# Patient Record
Sex: FEMALE | Race: WHITE
[De-identification: names, ages, dates, MRNs, and addresses within clinical notes are randomized per-mention and may not be internally consistent; named-entity substitution may affect disease eponyms.]

---

## 2017-01-04 ENCOUNTER — APPOINTMENT (OUTPATIENT)
Dept: CARDIOLOGY | Facility: CLINIC | Age: 82
End: 2017-01-04

## 2017-01-25 ENCOUNTER — APPOINTMENT (OUTPATIENT)
Dept: CARDIOLOGY | Facility: CLINIC | Age: 82
End: 2017-01-25

## 2017-02-01 ENCOUNTER — LABORATORY RESULT (OUTPATIENT)
Age: 82
End: 2017-02-01

## 2017-02-01 ENCOUNTER — APPOINTMENT (OUTPATIENT)
Dept: CARDIOLOGY | Facility: CLINIC | Age: 82
End: 2017-02-01

## 2017-02-01 ENCOUNTER — NON-APPOINTMENT (OUTPATIENT)
Age: 82
End: 2017-02-01

## 2017-02-01 VITALS
HEART RATE: 74 BPM | SYSTOLIC BLOOD PRESSURE: 167 MMHG | DIASTOLIC BLOOD PRESSURE: 79 MMHG | HEIGHT: 60 IN | WEIGHT: 155 LBS | BODY MASS INDEX: 30.43 KG/M2 | OXYGEN SATURATION: 96 %

## 2017-02-01 DIAGNOSIS — R07.89 OTHER CHEST PAIN: ICD-10-CM

## 2017-02-01 DIAGNOSIS — R05 COUGH: ICD-10-CM

## 2017-02-01 DIAGNOSIS — Z86.2 PERSONAL HISTORY OF DISEASES OF THE BLOOD AND BLOOD-FORMING ORGANS AND CERTAIN DISORDERS INVOLVING THE IMMUNE MECHANISM: ICD-10-CM

## 2017-02-01 DIAGNOSIS — G89.29 PAIN IN RIGHT KNEE: ICD-10-CM

## 2017-02-01 DIAGNOSIS — Z96.651 PRESENCE OF RIGHT ARTIFICIAL KNEE JOINT: ICD-10-CM

## 2017-02-01 DIAGNOSIS — M25.561 PAIN IN RIGHT KNEE: ICD-10-CM

## 2017-02-01 DIAGNOSIS — M62.81 MUSCLE WEAKNESS (GENERALIZED): ICD-10-CM

## 2017-02-01 DIAGNOSIS — Z87.39 PERSONAL HISTORY OF OTHER DISEASES OF THE MUSCULOSKELETAL SYSTEM AND CONNECTIVE TISSUE: ICD-10-CM

## 2017-02-01 RX ORDER — IRON/IRON ASP GLY/FA/MV-MIN 38 125-25-1MG
TABLET ORAL TWICE DAILY
Refills: 0 | Status: ACTIVE | COMMUNITY

## 2017-02-03 LAB
25(OH)D3 SERPL-MCNC: 22.2 NG/ML
ALBUMIN SERPL ELPH-MCNC: 4.5 G/DL
ALP BLD-CCNC: 67 U/L
ALT SERPL-CCNC: 17 U/L
ANION GAP SERPL CALC-SCNC: 14 MMOL/L
AST SERPL-CCNC: 18 U/L
BASOPHILS # BLD AUTO: 0.26 K/UL
BASOPHILS NFR BLD AUTO: 4 %
BILIRUB SERPL-MCNC: 0.2 MG/DL
BUN SERPL-MCNC: 17 MG/DL
CALCIUM SERPL-MCNC: 9.8 MG/DL
CHLORIDE SERPL-SCNC: 106 MMOL/L
CHOLEST SERPL-MCNC: 208 MG/DL
CHOLEST/HDLC SERPL: 4.2 RATIO
CK SERPL-CCNC: 46 U/L
CO2 SERPL-SCNC: 22 MMOL/L
CREAT SERPL-MCNC: 0.81 MG/DL
EOSINOPHIL # BLD AUTO: 0.32 K/UL
EOSINOPHIL NFR BLD AUTO: 5 %
GLUCOSE SERPL-MCNC: 107 MG/DL
HBA1C MFR BLD HPLC: 5.4 %
HCT VFR BLD CALC: 41.5 %
HDLC SERPL-MCNC: 49 MG/DL
HGB BLD-MCNC: 12.6 G/DL
LDLC SERPL CALC-MCNC: 135 MG/DL
LDLC SERPL DIRECT ASSAY-MCNC: 134 MG/DL
LYMPHOCYTES # BLD AUTO: 2.32 K/UL
LYMPHOCYTES NFR BLD AUTO: 36 %
MAN DIFF?: NORMAL
MCHC RBC-ENTMCNC: 28.8 PG
MCHC RBC-ENTMCNC: 30.4 GM/DL
MCV RBC AUTO: 95 FL
MONOCYTES # BLD AUTO: 0.65 K/UL
MONOCYTES NFR BLD AUTO: 10 %
NEUTROPHILS # BLD AUTO: 2.9 K/UL
NEUTROPHILS NFR BLD AUTO: 45 %
PLATELET # BLD AUTO: 303 K/UL
POTASSIUM SERPL-SCNC: 4.6 MMOL/L
PROT SERPL-MCNC: 6.5 G/DL
RBC # BLD: 4.37 M/UL
RBC # FLD: 14.7 %
SODIUM SERPL-SCNC: 142 MMOL/L
TRIGL SERPL-MCNC: 121 MG/DL
TSH SERPL-ACNC: 3.1 UIU/ML
WBC # FLD AUTO: 6.45 K/UL

## 2017-02-22 ENCOUNTER — MEDICATION RENEWAL (OUTPATIENT)
Age: 82
End: 2017-02-22

## 2017-02-24 ENCOUNTER — RECORD ABSTRACTING (OUTPATIENT)
Age: 82
End: 2017-02-24

## 2017-03-01 ENCOUNTER — APPOINTMENT (OUTPATIENT)
Dept: CARDIOLOGY | Facility: CLINIC | Age: 82
End: 2017-03-01

## 2017-03-02 ENCOUNTER — MEDICATION RENEWAL (OUTPATIENT)
Age: 82
End: 2017-03-02

## 2017-03-06 ENCOUNTER — APPOINTMENT (OUTPATIENT)
Dept: ELECTROPHYSIOLOGY | Facility: CLINIC | Age: 82
End: 2017-03-06

## 2017-04-10 ENCOUNTER — EMERGENCY (EMERGENCY)
Facility: HOSPITAL | Age: 82
LOS: 1 days | Discharge: ANOTHER HOSPITAL | End: 2017-04-10
Attending: EMERGENCY MEDICINE | Admitting: EMERGENCY MEDICINE
Payer: MEDICARE

## 2017-04-10 ENCOUNTER — TRANSCRIPTION ENCOUNTER (OUTPATIENT)
Age: 82
End: 2017-04-10

## 2017-04-10 ENCOUNTER — INPATIENT (INPATIENT)
Facility: HOSPITAL | Age: 82
LOS: 2 days | Discharge: HOME | DRG: 251 | End: 2017-04-13
Attending: INTERNAL MEDICINE | Admitting: INTERNAL MEDICINE
Payer: MEDICARE

## 2017-04-10 VITALS
WEIGHT: 149.91 LBS | HEIGHT: 60 IN | TEMPERATURE: 98 F | OXYGEN SATURATION: 100 % | SYSTOLIC BLOOD PRESSURE: 175 MMHG | RESPIRATION RATE: 20 BRPM | DIASTOLIC BLOOD PRESSURE: 47 MMHG | HEART RATE: 76 BPM

## 2017-04-10 VITALS
OXYGEN SATURATION: 97 % | HEART RATE: 80 BPM | DIASTOLIC BLOOD PRESSURE: 74 MMHG | RESPIRATION RATE: 14 BRPM | SYSTOLIC BLOOD PRESSURE: 158 MMHG | TEMPERATURE: 97 F | WEIGHT: 149.91 LBS

## 2017-04-10 VITALS — DIASTOLIC BLOOD PRESSURE: 67 MMHG | SYSTOLIC BLOOD PRESSURE: 160 MMHG | TEMPERATURE: 99 F | HEART RATE: 76 BPM

## 2017-04-10 DIAGNOSIS — E03.9 HYPOTHYROIDISM, UNSPECIFIED: ICD-10-CM

## 2017-04-10 DIAGNOSIS — Z95.0 PRESENCE OF CARDIAC PACEMAKER: Chronic | ICD-10-CM

## 2017-04-10 DIAGNOSIS — R07.89 OTHER CHEST PAIN: ICD-10-CM

## 2017-04-10 DIAGNOSIS — E78.5 HYPERLIPIDEMIA, UNSPECIFIED: ICD-10-CM

## 2017-04-10 DIAGNOSIS — Z95.5 PRESENCE OF CORONARY ANGIOPLASTY IMPLANT AND GRAFT: Chronic | ICD-10-CM

## 2017-04-10 DIAGNOSIS — Z90.710 ACQUIRED ABSENCE OF BOTH CERVIX AND UTERUS: Chronic | ICD-10-CM

## 2017-04-10 DIAGNOSIS — H26.9 UNSPECIFIED CATARACT: Chronic | ICD-10-CM

## 2017-04-10 DIAGNOSIS — Z96.659 PRESENCE OF UNSPECIFIED ARTIFICIAL KNEE JOINT: Chronic | ICD-10-CM

## 2017-04-10 DIAGNOSIS — Z98.89 OTHER SPECIFIED POSTPROCEDURAL STATES: Chronic | ICD-10-CM

## 2017-04-10 DIAGNOSIS — I10 ESSENTIAL (PRIMARY) HYPERTENSION: ICD-10-CM

## 2017-04-10 DIAGNOSIS — Z79.82 LONG TERM (CURRENT) USE OF ASPIRIN: ICD-10-CM

## 2017-04-10 DIAGNOSIS — G56.01 CARPAL TUNNEL SYNDROME, RIGHT UPPER LIMB: Chronic | ICD-10-CM

## 2017-04-10 DIAGNOSIS — I25.10 ATHEROSCLEROTIC HEART DISEASE OF NATIVE CORONARY ARTERY WITHOUT ANGINA PECTORIS: ICD-10-CM

## 2017-04-10 DIAGNOSIS — Z95.5 PRESENCE OF CORONARY ANGIOPLASTY IMPLANT AND GRAFT: ICD-10-CM

## 2017-04-10 DIAGNOSIS — Z98.82 BREAST IMPLANT STATUS: Chronic | ICD-10-CM

## 2017-04-10 DIAGNOSIS — Z90.710 ACQUIRED ABSENCE OF BOTH CERVIX AND UTERUS: ICD-10-CM

## 2017-04-10 LAB
ALBUMIN SERPL ELPH-MCNC: 3.7 G/DL — SIGNIFICANT CHANGE UP (ref 3.3–5)
ALP SERPL-CCNC: 67 U/L — SIGNIFICANT CHANGE UP (ref 40–120)
ALT FLD-CCNC: 24 U/L — SIGNIFICANT CHANGE UP (ref 12–78)
ANION GAP SERPL CALC-SCNC: 9 MMOL/L — SIGNIFICANT CHANGE UP (ref 5–17)
AST SERPL-CCNC: 18 U/L — SIGNIFICANT CHANGE UP (ref 15–37)
BASOPHILS # BLD AUTO: 0.1 K/UL — SIGNIFICANT CHANGE UP (ref 0–0.2)
BASOPHILS NFR BLD AUTO: 1 % — SIGNIFICANT CHANGE UP (ref 0–2)
BILIRUB SERPL-MCNC: 0.2 MG/DL — SIGNIFICANT CHANGE UP (ref 0.2–1.2)
BUN SERPL-MCNC: 17 MG/DL — SIGNIFICANT CHANGE UP (ref 7–23)
CALCIUM SERPL-MCNC: 9 MG/DL — SIGNIFICANT CHANGE UP (ref 8.5–10.1)
CHLORIDE SERPL-SCNC: 109 MMOL/L — HIGH (ref 96–108)
CK SERPL-CCNC: 49 U/L — SIGNIFICANT CHANGE UP (ref 26–192)
CO2 SERPL-SCNC: 26 MMOL/L — SIGNIFICANT CHANGE UP (ref 22–31)
CREAT SERPL-MCNC: 0.89 MG/DL — SIGNIFICANT CHANGE UP (ref 0.5–1.3)
EOSINOPHIL # BLD AUTO: 0.1 K/UL — SIGNIFICANT CHANGE UP (ref 0–0.5)
EOSINOPHIL NFR BLD AUTO: 1.4 % — SIGNIFICANT CHANGE UP (ref 0–6)
GLUCOSE SERPL-MCNC: 152 MG/DL — HIGH (ref 70–99)
HCT VFR BLD CALC: 44.3 % — SIGNIFICANT CHANGE UP (ref 34.5–45)
HGB BLD-MCNC: 14.2 G/DL — SIGNIFICANT CHANGE UP (ref 11.5–15.5)
INR BLD: 1.07 RATIO — SIGNIFICANT CHANGE UP (ref 0.88–1.16)
LYMPHOCYTES # BLD AUTO: 1.9 K/UL — SIGNIFICANT CHANGE UP (ref 1–3.3)
LYMPHOCYTES # BLD AUTO: 24 % — SIGNIFICANT CHANGE UP (ref 13–44)
MCHC RBC-ENTMCNC: 29.2 PG — SIGNIFICANT CHANGE UP (ref 27–34)
MCHC RBC-ENTMCNC: 32 GM/DL — SIGNIFICANT CHANGE UP (ref 32–36)
MCV RBC AUTO: 91 FL — SIGNIFICANT CHANGE UP (ref 80–100)
MONOCYTES # BLD AUTO: 0.5 K/UL — SIGNIFICANT CHANGE UP (ref 0–0.9)
MONOCYTES NFR BLD AUTO: 6.6 % — SIGNIFICANT CHANGE UP (ref 1–9)
NEUTROPHILS # BLD AUTO: 5.4 K/UL — SIGNIFICANT CHANGE UP (ref 1.8–7.4)
NEUTROPHILS NFR BLD AUTO: 67 % — SIGNIFICANT CHANGE UP (ref 43–77)
NT-PROBNP SERPL-SCNC: 705 PG/ML — HIGH (ref 0–450)
PLATELET # BLD AUTO: 314 K/UL — SIGNIFICANT CHANGE UP (ref 150–400)
POTASSIUM SERPL-MCNC: 4.3 MMOL/L — SIGNIFICANT CHANGE UP (ref 3.5–5.3)
POTASSIUM SERPL-SCNC: 4.3 MMOL/L — SIGNIFICANT CHANGE UP (ref 3.5–5.3)
PROT SERPL-MCNC: 6.6 G/DL — SIGNIFICANT CHANGE UP (ref 6–8.3)
PROTHROM AB SERPL-ACNC: 11.7 SEC — SIGNIFICANT CHANGE UP (ref 9.8–12.7)
RBC # BLD: 4.87 M/UL — SIGNIFICANT CHANGE UP (ref 3.8–5.2)
RBC # FLD: 13 % — SIGNIFICANT CHANGE UP (ref 10.3–14.5)
SODIUM SERPL-SCNC: 144 MMOL/L — SIGNIFICANT CHANGE UP (ref 135–145)
TROPONIN I SERPL-MCNC: <.015 NG/ML — SIGNIFICANT CHANGE UP (ref 0.01–0.04)
WBC # BLD: 8.1 K/UL — SIGNIFICANT CHANGE UP (ref 3.8–10.5)
WBC # FLD AUTO: 8.1 K/UL — SIGNIFICANT CHANGE UP (ref 3.8–10.5)

## 2017-04-10 PROCEDURE — 85610 PROTHROMBIN TIME: CPT

## 2017-04-10 PROCEDURE — 93010 ELECTROCARDIOGRAM REPORT: CPT

## 2017-04-10 PROCEDURE — 99285 EMERGENCY DEPT VISIT HI MDM: CPT | Mod: 25

## 2017-04-10 PROCEDURE — 93458 L HRT ARTERY/VENTRICLE ANGIO: CPT | Mod: 26,GC

## 2017-04-10 PROCEDURE — 99285 EMERGENCY DEPT VISIT HI MDM: CPT

## 2017-04-10 PROCEDURE — 96372 THER/PROPH/DIAG INJ SC/IM: CPT

## 2017-04-10 PROCEDURE — 71045 X-RAY EXAM CHEST 1 VIEW: CPT

## 2017-04-10 PROCEDURE — 83880 ASSAY OF NATRIURETIC PEPTIDE: CPT

## 2017-04-10 PROCEDURE — 80053 COMPREHEN METABOLIC PANEL: CPT

## 2017-04-10 PROCEDURE — 82550 ASSAY OF CK (CPK): CPT

## 2017-04-10 PROCEDURE — 93005 ELECTROCARDIOGRAM TRACING: CPT

## 2017-04-10 PROCEDURE — 85027 COMPLETE CBC AUTOMATED: CPT

## 2017-04-10 PROCEDURE — 71010: CPT | Mod: 26

## 2017-04-10 PROCEDURE — 84484 ASSAY OF TROPONIN QUANT: CPT

## 2017-04-10 RX ORDER — AMLODIPINE BESYLATE 2.5 MG/1
5 TABLET ORAL DAILY
Qty: 0 | Refills: 0 | Status: DISCONTINUED | OUTPATIENT
Start: 2017-04-10 | End: 2017-04-13

## 2017-04-10 RX ORDER — CLOPIDOGREL BISULFATE 75 MG/1
600 TABLET, FILM COATED ORAL ONCE
Qty: 0 | Refills: 0 | Status: COMPLETED | OUTPATIENT
Start: 2017-04-10 | End: 2017-04-10

## 2017-04-10 RX ORDER — ENOXAPARIN SODIUM 100 MG/ML
70 INJECTION SUBCUTANEOUS ONCE
Qty: 0 | Refills: 0 | Status: COMPLETED | OUTPATIENT
Start: 2017-04-10 | End: 2017-04-10

## 2017-04-10 RX ORDER — CLOPIDOGREL BISULFATE 75 MG/1
75 TABLET, FILM COATED ORAL DAILY
Qty: 0 | Refills: 0 | Status: DISCONTINUED | OUTPATIENT
Start: 2017-04-10 | End: 2017-04-13

## 2017-04-10 RX ORDER — ASPIRIN/CALCIUM CARB/MAGNESIUM 324 MG
81 TABLET ORAL DAILY
Qty: 0 | Refills: 0 | Status: DISCONTINUED | OUTPATIENT
Start: 2017-04-10 | End: 2017-04-13

## 2017-04-10 RX ORDER — ASPIRIN/CALCIUM CARB/MAGNESIUM 324 MG
325 TABLET ORAL ONCE
Qty: 0 | Refills: 0 | Status: COMPLETED | OUTPATIENT
Start: 2017-04-10 | End: 2017-04-10

## 2017-04-10 RX ORDER — METOPROLOL TARTRATE 50 MG
200 TABLET ORAL DAILY
Qty: 0 | Refills: 0 | Status: DISCONTINUED | OUTPATIENT
Start: 2017-04-10 | End: 2017-04-13

## 2017-04-10 RX ORDER — SIMVASTATIN 20 MG/1
20 TABLET, FILM COATED ORAL AT BEDTIME
Qty: 0 | Refills: 0 | Status: DISCONTINUED | OUTPATIENT
Start: 2017-04-10 | End: 2017-04-13

## 2017-04-10 RX ORDER — PANTOPRAZOLE SODIUM 20 MG/1
40 TABLET, DELAYED RELEASE ORAL
Qty: 0 | Refills: 0 | Status: DISCONTINUED | OUTPATIENT
Start: 2017-04-10 | End: 2017-04-13

## 2017-04-10 RX ADMIN — Medication 325 MILLIGRAM(S): at 15:14

## 2017-04-10 RX ADMIN — SIMVASTATIN 20 MILLIGRAM(S): 20 TABLET, FILM COATED ORAL at 21:39

## 2017-04-10 RX ADMIN — CLOPIDOGREL BISULFATE 600 MILLIGRAM(S): 75 TABLET, FILM COATED ORAL at 15:18

## 2017-04-10 RX ADMIN — ENOXAPARIN SODIUM 70 MILLIGRAM(S): 100 INJECTION SUBCUTANEOUS at 15:18

## 2017-04-10 NOTE — CONSULT NOTE ADULT - SUBJECTIVE AND OBJECTIVE BOX
CHIEF COMPLAINT: Patient is a 83y old  Female who presents with a chief complaint of chest pain.    HPI:  This is an 83 year old woman with a history of known CAD s/p PCI to the LAD and LCx with BJORN, last in 2014, PVD s/p LE PCI, hypertension, hyperlipidemia, carotid artery disease who presents to the ED with complaints of chest pain.  She reports that on Wednesday she began to notice a burning chest discomfort that radiated up and down the center of her chest, beginning in the epigastric region.  Over the weekend, she had exertional chest pressure while walking to her car from [employer].  She used ntg, and this relieved the pain.  Today, she was a a bible class and had chest discomfort with less exertion that was also relieved by ntg. She was near syncopal as well.  She has had profound fatigue over the past few days as well.  She has not been 100% compliant with her aspirin.  She has a history of anemia, but has not had any recent abnormal bleeding.  She denies dyspnea, PND, orthopnea, LE swelling or syncope.       PAST MEDICAL & SURGICAL HISTORY:  High cholesterol  Stress incontinence in female  Rectocele  Diverticulosis  Hiatal hernia  Hypertension  Hypothyroidism  Dyslipidemia  CAD (coronary artery disease)  Stented coronary artery: 12 heart stents,   1 left leg stents  H/O carotid endarterectomy: right  History of hysterectomy  History of breast reconstruction  Bilateral cataracts: surgery  Status post total knee replacement: right      SOCIAL HISTORY:  No tobacco, ethanol, or drug abuse.    FAMILY HISTORY:  Positive family history of CAD    MEDICATIONS:  Aspirin 81, Plavix 75, Amlodipine/benazapril 5/10, lovastatin 40, Toprol 200 QD.      Allergies:      No Known Drug Allergies  Originally Entered as [Unknown see Comment: pt unable to remember] reaction to [Mold] (Unknown)  stolazine eye med- pt doesn&#x27;t remember (Unknown)  Wheat (Other (Unknown))        REVIEW OF SYSTEMS:    CONSTITUTIONAL: Weakness and fatigue  EYES/ENT: No visual changes;  No vertigo or throat pain   NECK: No pain or stiffness  RESPIRATORY: No cough, wheezing, hemoptysis; No shortness of breath  CARDIOVASCULAR: See HPI  GASTROINTESTINAL: No abdominal pain. No nausea, vomiting, or hematemesis; No diarrhea or constipation. No melena or hematochezia.  GENITOURINARY: No dysuria, frequency or hematuria  NEUROLOGICAL: No numbness or weakness  SKIN: No itching or rash  All other review of systems is negative unless indicated above    VITAL SIGNS:   Vital Signs Last 24 Hrs  T(C): 36.7, Max: 36.7 (04-10 @ 13:10)  T(F): 98, Max: 98 (04-10 @ 13:10)  HR: 77 (77 - 80)  BP: 155/70 (155/70 - 158/74)  BP(mean): --  RR: 18 (14 - 18)  SpO2: 99% (97% - 99%)      On Exam:    Constitutional: NAD, alert and oriented x 3  Lungs:  Non-labored, breath sounds are clear bilaterally, No wheezing, rales or rhonchi  Cardiovascular: RRR.  S1 and S2 positive.  2/6 systolic murmur.    Gastrointestinal: Bowel Sounds present, soft, nontender.   Lymph: No peripheral edema. No cervical lymphadenopathy.  Neurological: Alert, no focal deficits  Skin: No rashes or ulcers   Psych:  Mood & affect appropriate.    LABS: All Labs Reviewed:                        14.2   8.1   )-----------( 314      ( 10 Apr 2017 13:28 )             44.3     10 Apr 2017 13:28    144    |  109    |  17     ----------------------------<  152    4.3     |  26     |  0.89     Ca    9.0        10 Apr 2017 13:28    TPro  6.6    /  Alb  3.7    /  TBili  0.2    /  DBili  x      /  AST  18     /  ALT  24     /  AlkPhos  67     10 Apr 2017 13:28    PT/INR - ( 10 Apr 2017 13:28 )   PT: 11.7 sec;   INR: 1.07 ratio           CARDIAC MARKERS ( 10 Apr 2017 13:28 )  <.015 ng/mL / x     / 49 U/L / x     / x          Blood Culture:   04-10 @ 13:28  Pro Bnp 705    EKG:  Normal sinus rhythm with no acute ST segment or T wave changes    Assessment/Plan:  83y Female with above history with unstable angina.  Patient is going to get Aspirin 325, Plavix 600, and lovenox 1mg/kg stat.  She is going to be transferred to [facility] for coronary angiography, hopefully later this evening.  She will follow with Dr. Welsh in the office post procedure.

## 2017-04-10 NOTE — ED PROVIDER NOTE - PROGRESS NOTE DETAILS
called Dr. Welsh, Dr. Palla covering, asking for Dr. Woo group to consult. Seen by Dr. Santos, to transfer to [facility], accepting Dr. Humphrey to cath lab, patient to get aspirin, lovenox, plavix

## 2017-04-10 NOTE — ED PROVIDER NOTE - OBJECTIVE STATEMENT
83 female presents to ER c/o left sided chest pressure/discomfort, comes and goes, worse with exertion, gets better with nitro patch for the past week, yesterday felt light headed and dizziness, near syncope.

## 2017-04-10 NOTE — H&P CARDIOLOGY - PSH
Bilateral cataracts  surgery  Cardiac pacemaker    Carpal tunnel syndrome of right wrist    H/O carotid endarterectomy  right  History of breast reconstruction    History of hysterectomy    Status post total knee replacement  right  Stented coronary artery  12 heart stents,   1 left leg stents

## 2017-04-10 NOTE — ED ADULT NURSE NOTE - PSH
Bilateral cataracts  surgery  H/O carotid endarterectomy  right  History of breast reconstruction    History of hysterectomy    Status post total knee replacement  right  Stented coronary artery  12 heart stents,   1 left leg stents

## 2017-04-10 NOTE — ED ADULT NURSE NOTE - PMH
CAD (coronary artery disease)    Diverticulosis    Dyslipidemia    Hiatal hernia    High cholesterol    Hypertension    Hypothyroidism    Rectocele    Stress incontinence in female

## 2017-04-10 NOTE — H&P CARDIOLOGY - HISTORY OF PRESENT ILLNESS
83 yo female with pmhx of htn, CAD, arthritis, diverticulosis, hypothyroidism, with pacemaker and cardiac stents, presented to [facility] with sudden onset of non radiating chest pressure at rest & exertion relieved with nitro patch, trop neg, seen & evaluated by Dr subramanian & transferred to [place] for cardiac cath.

## 2017-04-11 LAB
ANION GAP SERPL CALC-SCNC: 16 MMOL/L — SIGNIFICANT CHANGE UP (ref 5–17)
BUN SERPL-MCNC: 25 MG/DL — HIGH (ref 7–23)
CALCIUM SERPL-MCNC: 9.3 MG/DL — SIGNIFICANT CHANGE UP (ref 8.4–10.5)
CHLORIDE SERPL-SCNC: 104 MMOL/L — SIGNIFICANT CHANGE UP (ref 96–108)
CO2 SERPL-SCNC: 22 MMOL/L — SIGNIFICANT CHANGE UP (ref 22–31)
CREAT SERPL-MCNC: 0.8 MG/DL — SIGNIFICANT CHANGE UP (ref 0.5–1.3)
GLUCOSE SERPL-MCNC: 112 MG/DL — HIGH (ref 70–99)
HBA1C BLD-MCNC: 5.8 % — HIGH (ref 4–5.6)
HCT VFR BLD CALC: 37.8 % — SIGNIFICANT CHANGE UP (ref 34.5–45)
HGB BLD-MCNC: 12.9 G/DL — SIGNIFICANT CHANGE UP (ref 11.5–15.5)
MCHC RBC-ENTMCNC: 30.1 PG — SIGNIFICANT CHANGE UP (ref 27–34)
MCHC RBC-ENTMCNC: 34.1 GM/DL — SIGNIFICANT CHANGE UP (ref 32–36)
MCV RBC AUTO: 88.3 FL — SIGNIFICANT CHANGE UP (ref 80–100)
PLATELET # BLD AUTO: 240 K/UL — SIGNIFICANT CHANGE UP (ref 150–400)
POTASSIUM SERPL-MCNC: 4 MMOL/L — SIGNIFICANT CHANGE UP (ref 3.5–5.3)
POTASSIUM SERPL-SCNC: 4 MMOL/L — SIGNIFICANT CHANGE UP (ref 3.5–5.3)
RBC # BLD: 4.28 M/UL — SIGNIFICANT CHANGE UP (ref 3.8–5.2)
RBC # FLD: 12.7 % — SIGNIFICANT CHANGE UP (ref 10.3–14.5)
SODIUM SERPL-SCNC: 142 MMOL/L — SIGNIFICANT CHANGE UP (ref 135–145)
WBC # BLD: 10.2 K/UL — SIGNIFICANT CHANGE UP (ref 3.8–10.5)
WBC # FLD AUTO: 10.2 K/UL — SIGNIFICANT CHANGE UP (ref 3.8–10.5)

## 2017-04-11 PROCEDURE — 99223 1ST HOSP IP/OBS HIGH 75: CPT

## 2017-04-11 PROCEDURE — 92924 PRQ TRLUML C ATHRC 1 ART&/BR: CPT | Mod: LD,GC

## 2017-04-11 PROCEDURE — 93010 ELECTROCARDIOGRAM REPORT: CPT

## 2017-04-11 RX ORDER — SIMVASTATIN 20 MG/1
1 TABLET, FILM COATED ORAL
Qty: 30 | Refills: 0 | OUTPATIENT
Start: 2017-04-11 | End: 2017-05-11

## 2017-04-11 RX ADMIN — Medication 81 MILLIGRAM(S): at 05:00

## 2017-04-11 RX ADMIN — AMLODIPINE BESYLATE 5 MILLIGRAM(S): 2.5 TABLET ORAL at 05:00

## 2017-04-11 RX ADMIN — CLOPIDOGREL BISULFATE 75 MILLIGRAM(S): 75 TABLET, FILM COATED ORAL at 05:00

## 2017-04-11 RX ADMIN — Medication 200 MILLIGRAM(S): at 05:00

## 2017-04-11 RX ADMIN — Medication 1 APPLICATION(S): at 13:11

## 2017-04-11 RX ADMIN — SIMVASTATIN 20 MILLIGRAM(S): 20 TABLET, FILM COATED ORAL at 21:25

## 2017-04-11 RX ADMIN — PANTOPRAZOLE SODIUM 40 MILLIGRAM(S): 20 TABLET, DELAYED RELEASE ORAL at 05:00

## 2017-04-11 NOTE — DISCHARGE NOTE ADULT - CARE PROVIDERS DIRECT ADDRESSES
[email or web address]

## 2017-04-11 NOTE — DISCHARGE NOTE ADULT - CARE PROVIDER_API CALL
Froylan Monsalve), Internal Medicine  [address] [place] [address]  Phone: [phone]  Fax: [phone]

## 2017-04-11 NOTE — DISCHARGE NOTE ADULT - HOSPITAL COURSE
83 yo female with pmhx of htn, CAD, arthritis, diverticulosis, hypothyroidism, with pacemaker and cardiac stents, presented to [facility] with sudden onset of non radiating chest pressure at rest & exertion relieved with nitro patch, trop neg, seen & evaluated by Dr subramanian & transferred to [place] for cardiac cath. 81 yo female with pmhx of htn, CAD, arthritis, diverticulosis, hypothyroidism, with pacemaker and cardiac stents, presented to [facility] with sudden onset of non radiating chest pressure at rest & exertion relieved with nitro patch, trop neg, seen & evaluated by Dr subramanian & transferred to [place] for cardiac cath.  Pt s/p diagnostic cath on 4/10, staged PCI: Laser and POBA to OM via R radial on 4/11, and staged PCI: 81 yo female with pmhx of htn, CAD, arthritis, diverticulosis, hypothyroidism, with pacemaker and cardiac stents, presented to [facility] with sudden onset of non radiating chest pressure at rest & exertion relieved with nitro patch, trop neg, seen & evaluated by Dr subramanian & transferred to [place] for cardiac cath.  Pt s/p diagnostic cath on 4/10, staged PCI: Laser and POBA to OM via R radial on 4/11, and staged PCI: BJORN x1 to OM1 via R radial on 4/12/17. Pt tolerated procedure well with no post complications and hospitalization remained uneventful. Pt is hemodynamically stable and insertion/incision site benign. No c/o chest pain or SOB. Discharge teaching provided to Pt/caretaker and verbalized understanding the instruction. Pt is stable for discharge home as per attending.

## 2017-04-11 NOTE — DISCHARGE NOTE ADULT - PATIENT PORTAL LINK FT
“You can access the FollowHealth Patient Portal, offered by [facility], by registering with the following website: [email or web address]”

## 2017-04-11 NOTE — DISCHARGE NOTE ADULT - CARE PLAN
Principal Discharge DX:	Coronary artery disease due to lipid rich plaque  Goal:	Pt remains chest pain free and understands post cath discharge instructions  Instructions for follow-up, activity and diet:	Do not stop you Aspirin or Plavix unless instructed to do so by your cardiologist.   No heavy lifting or pushing/pulling with procedure arm for 2 weeks. No driving for 2 days. You may shower 24 hours following the procedure but avoid baths/swimming for 1 week. Check your wrist site for bleeding and/or swelling daily following procedure and call your doctor immediately if it occurs or if you experience increased pain at the site. Follow up with your cardiologist in 1-2 weeks. You may call [place] [facility] if you have any questions/concerns regarding your procedure [phone].   Lifestyle modification: include healthy habits to prevent stroke and future CAD  Low salt, low fat diet.   Weight management.   Take medications as prescribed.    No smoking.  Follow up with your cardiologist or primary doctor in 1- 2 weeks.  Secondary Diagnosis:	Dyslipidemia  Goal:	Your LDL cholesterol will be less than 70mg/dL  Instructions for follow-up, activity and diet:	Continue with your cholesterol medications. Eat a heart healthy diet that is low in saturated fats and salt, and includes whole grains, fruits, vegetables and lean protein; exercise regularly (consult with your physician or cardiologist first); maintain a heart healthy weight; if you smoke - quit (A resource to help you stop smoking is the [facility] for Tobacco Control – phone number 516-716-0980.). Continue to follow with your primary physician or cardiologist. Principal Discharge DX:	Coronary artery disease due to lipid rich plaque  Goal:	Pt remains chest pain free and understands post cath discharge instructions  Instructions for follow-up, activity and diet:	Do not stop you Aspirin or Plavix unless instructed to do so by your cardiologist.   No heavy lifting or pushing/pulling with procedure arm for 2 weeks. No driving for 2 days. You may shower 24 hours following the procedure but avoid baths/swimming for 1 week. Check your wrist site for bleeding and/or swelling daily following procedure and call your doctor immediately if it occurs or if you experience increased pain at the site. Follow up with your cardiologist in 1-2 weeks. You may call [place] [facility] if you have any questions/concerns regarding your procedure [phone].   Lifestyle modification: include healthy habits to prevent stroke and future CAD  Low salt, low fat diet.   Weight management.   Take medications as prescribed.    No smoking.  Follow up with your cardiologist or primary doctor in 1- 2 weeks.  Secondary Diagnosis:	Dyslipidemia  Goal:	Your LDL cholesterol will be less than 70mg/dL  Instructions for follow-up, activity and diet:	Continue with your cholesterol medications. Eat a heart healthy diet that is low in saturated fats and salt, and includes whole grains, fruits, vegetables and lean protein; exercise regularly (consult with your physician or cardiologist first); maintain a heart healthy weight; if you smoke - quit (A resource to help you stop smoking is the [facility] for Tobacco Control – phone number [phone].). Continue to follow with your primary physician or cardiologist. Principal Discharge DX:	Coronary artery disease due to lipid rich plaque  Goal:	Pt remains chest pain free and understands post cath discharge instructions  Instructions for follow-up, activity and diet:	Do not stop you Aspirin or Plavix unless instructed to do so by your cardiologist.   No heavy lifting or pushing/pulling with procedure arm for 2 weeks. No driving for 2 days. You may shower 24 hours following the procedure but avoid baths/swimming for 1 week. Check your wrist site for bleeding and/or swelling daily following procedure and call your doctor immediately if it occurs or if you experience increased pain at the site. Follow up with your cardiologist in 1-2 weeks. You may call [place] [facility] if you have any questions/concerns regarding your procedure [phone].   Lifestyle modification: include healthy habits to prevent stroke and future CAD  Low salt, low fat diet.   Weight management.   Take medications as prescribed.    No smoking.  Follow up with your cardiologist or primary doctor in 1- 2 weeks.  Secondary Diagnosis:	Dyslipidemia  Goal:	Your LDL cholesterol will be less than 70mg/dL  Instructions for follow-up, activity and diet:	Continue with your cholesterol medications. Eat a heart healthy diet that is low in saturated fats and salt, and includes whole grains, fruits, vegetables and lean protein; exercise regularly (consult with your physician or cardiologist first); maintain a heart healthy weight; if you smoke - quit (A resource to help you stop smoking is the [facility] for Tobacco Control – phone number 308-468-4848.). Continue to follow with your primary physician or cardiologist. Principal Discharge DX:	Coronary artery disease due to lipid rich plaque  Goal:	Pt remains chest pain free and understands post cath discharge instructions  Instructions for follow-up, activity and diet:	Do not stop you Aspirin or Plavix unless instructed to do so by your cardiologist.   No heavy lifting or pushing/pulling with procedure arm for 2 weeks. No driving for 2 days. You may shower 24 hours following the procedure but avoid baths/swimming for 1 week. Check your wrist site for bleeding and/or swelling daily following procedure and call your doctor immediately if it occurs or if you experience increased pain at the site. Follow up with your cardiologist in 1-2 weeks. You may call [place] [facility] if you have any questions/concerns regarding your procedure [phone].   Lifestyle modification: include healthy habits to prevent stroke and future CAD  Low salt, low fat diet.   Weight management.   Take medications as prescribed.    No smoking.  Follow up with your cardiologist or primary doctor in 1- 2 weeks.  Secondary Diagnosis:	Dyslipidemia  Goal:	Your LDL cholesterol will be less than 70mg/dL  Instructions for follow-up, activity and diet:	Continue with your cholesterol medications. Eat a heart healthy diet that is low in saturated fats and salt, and includes whole grains, fruits, vegetables and lean protein; exercise regularly (consult with your physician or cardiologist first); maintain a heart healthy weight; if you smoke - quit (A resource to help you stop smoking is the [facility] for Tobacco Control – phone number [phone].). Continue to follow with your primary physician or cardiologist. Principal Discharge DX:	Coronary artery disease due to lipid rich plaque  Goal:	Pt remains chest pain free and understands post cath discharge instructions  Instructions for follow-up, activity and diet:	Do not stop you Aspirin or Plavix unless instructed to do so by your cardiologist.   No heavy lifting or pushing/pulling with procedure arm for 2 weeks. No driving for 2 days. You may shower 24 hours following the procedure but avoid baths/swimming for 1 week. Check your wrist site for bleeding and/or swelling daily following procedure and call your doctor immediately if it occurs or if you experience increased pain at the site. Follow up with your cardiologist in 1-2 weeks. You may call [place] [facility] if you have any questions/concerns regarding your procedure [phone].   Lifestyle modification: include healthy habits to prevent stroke and future CAD  Low salt, low fat diet.   Weight management.   Take medications as prescribed.    No smoking.  Follow up with your cardiologist or primary doctor in 1- 2 weeks.  Secondary Diagnosis:	Dyslipidemia  Goal:	Your LDL cholesterol will be less than 70mg/dL  Instructions for follow-up, activity and diet:	Continue with your cholesterol medications. Eat a heart healthy diet that is low in saturated fats and salt, and includes whole grains, fruits, vegetables and lean protein; exercise regularly (consult with your physician or cardiologist first); maintain a heart healthy weight; if you smoke - quit (A resource to help you stop smoking is the [facility] for Tobacco Control – phone number 403-777-4537.). Continue to follow with your primary physician or cardiologist. Principal Discharge DX:	Coronary artery disease due to lipid rich plaque  Goal:	Pt remains chest pain free and understands post cath discharge instructions  Instructions for follow-up, activity and diet:	Do not stop you Aspirin or Plavix unless instructed to do so by your cardiologist.   No heavy lifting or pushing/pulling with procedure arm for 2 weeks. No driving for 2 days. You may shower 24 hours following the procedure but avoid baths/swimming for 1 week. Check your wrist site for bleeding and/or swelling daily following procedure and call your doctor immediately if it occurs or if you experience increased pain at the site. Follow up with your cardiologist in 1-2 weeks. You may call [place] [facility] if you have any questions/concerns regarding your procedure [phone].   Lifestyle modification: include healthy habits to prevent stroke and future CAD  Low salt, low fat diet.   Weight management.   Take medications as prescribed.    No smoking.  Follow up with your cardiologist or primary doctor in 1- 2 weeks.  Secondary Diagnosis:	Dyslipidemia  Goal:	Your LDL cholesterol will be less than 70mg/dL  Instructions for follow-up, activity and diet:	Continue with your cholesterol medications. Eat a heart healthy diet that is low in saturated fats and salt, and includes whole grains, fruits, vegetables and lean protein; exercise regularly (consult with your physician or cardiologist first); maintain a heart healthy weight; if you smoke - quit (A resource to help you stop smoking is the [facility] for Tobacco Control – phone number [phone].). Continue to follow with your primary physician or cardiologist.

## 2017-04-11 NOTE — DISCHARGE NOTE ADULT - PLAN OF CARE
Pt remains chest pain free and understands post cath discharge instructions Do not stop you Aspirin or Plavix unless instructed to do so by your cardiologist.   No heavy lifting or pushing/pulling with procedure arm for 2 weeks. No driving for 2 days. You may shower 24 hours following the procedure but avoid baths/swimming for 1 week. Check your wrist site for bleeding and/or swelling daily following procedure and call your doctor immediately if it occurs or if you experience increased pain at the site. Follow up with your cardiologist in 1-2 weeks. You may call [place] [facility] if you have any questions/concerns regarding your procedure [phone].   Lifestyle modification: include healthy habits to prevent stroke and future CAD  Low salt, low fat diet.   Weight management.   Take medications as prescribed.    No smoking.  Follow up with your cardiologist or primary doctor in 1- 2 weeks. Your LDL cholesterol will be less than 70mg/dL Continue with your cholesterol medications. Eat a heart healthy diet that is low in saturated fats and salt, and includes whole grains, fruits, vegetables and lean protein; exercise regularly (consult with your physician or cardiologist first); maintain a heart healthy weight; if you smoke - quit (A resource to help you stop smoking is the [facility] for Tobacco Control – phone number [phone].). Continue to follow with your primary physician or cardiologist.

## 2017-04-11 NOTE — DISCHARGE NOTE ADULT - MEDICATION SUMMARY - MEDICATIONS TO TAKE
I will START or STAY ON the medications listed below when I get home from the hospital:    aspirin 81 mg oral delayed release tablet  -- 1 tab(s) by mouth once a day home & hosp  -- Indication: For Cad    lovastatin 40 mg oral tablet  -- 1 tab(s) by mouth once a day home & hosp  -- Indication: For htn    simvastatin 20 mg oral tablet  -- 1 tab(s) by mouth once a day (at bedtime) MDD:1  -- Indication: For hld    amlodipine-benazepril 5 mg-10 mg oral capsule  -- 1 cap(s) by mouth once a day  home   -- Indication: For htn    clopidogrel 75 mg oral tablet  -- 1 tab(s) by mouth once a day home  -- Indication: For Cad    zolpidem 5 mg oral tablet  -- 1 tab(s) by mouth once a day (at bedtime), As needed, Insomnia hosp  -- Indication: For sleep    Metoprolol Succinate  mg oral tablet, extended release  -- 1 tab(s) by mouth once a day home & hosp  -- pt doesn't know dose  -- Indication: For htn    Zantac 150 oral tablet  -- 2 tab(s) by mouth once a day home & hosp  -- Indication: For home med    FeroSul 325 mg (65 mg elemental iron) oral tablet  -- 1 tab(s) by mouth 2 times a day home & hosp  -- Indication: For home med I will START or STAY ON the medications listed below when I get home from the hospital:    aspirin 81 mg oral delayed release tablet  -- 1 tab(s) by mouth once a day home & hosp  -- Indication: For Cad    lovastatin 40 mg oral tablet  -- 1 tab(s) by mouth once a day home & hosp  -- Indication: For htn    amlodipine-benazepril 5 mg-10 mg oral capsule  -- 1 cap(s) by mouth once a day  home   -- Indication: For htn    clopidogrel 75 mg oral tablet  -- 1 tab(s) by mouth once a day home  -- Indication: For Cad    zolpidem 5 mg oral tablet  -- 1 tab(s) by mouth once a day (at bedtime), As needed, Insomnia hosp  -- Indication: For sleep    Metoprolol Succinate  mg oral tablet, extended release  -- 1 tab(s) by mouth once a day home & hosp  -- pt doesn't know dose  -- Indication: For htn    Zantac 150 oral tablet  -- 2 tab(s) by mouth once a day home & hosp  -- Indication: For home med    FeroSul 325 mg (65 mg elemental iron) oral tablet  -- 1 tab(s) by mouth 2 times a day home & hosp  -- Indication: For home med

## 2017-04-12 LAB
ANION GAP SERPL CALC-SCNC: 12 MMOL/L — SIGNIFICANT CHANGE UP (ref 5–17)
BUN SERPL-MCNC: 16 MG/DL — SIGNIFICANT CHANGE UP (ref 7–23)
CALCIUM SERPL-MCNC: 9 MG/DL — SIGNIFICANT CHANGE UP (ref 8.4–10.5)
CHLORIDE SERPL-SCNC: 106 MMOL/L — SIGNIFICANT CHANGE UP (ref 96–108)
CO2 SERPL-SCNC: 22 MMOL/L — SIGNIFICANT CHANGE UP (ref 22–31)
CREAT SERPL-MCNC: 0.76 MG/DL — SIGNIFICANT CHANGE UP (ref 0.5–1.3)
GLUCOSE SERPL-MCNC: 106 MG/DL — HIGH (ref 70–99)
HCT VFR BLD CALC: 37.8 % — SIGNIFICANT CHANGE UP (ref 34.5–45)
HGB BLD-MCNC: 12.8 G/DL — SIGNIFICANT CHANGE UP (ref 11.5–15.5)
MCHC RBC-ENTMCNC: 29.8 PG — SIGNIFICANT CHANGE UP (ref 27–34)
MCHC RBC-ENTMCNC: 33.8 GM/DL — SIGNIFICANT CHANGE UP (ref 32–36)
MCV RBC AUTO: 88.2 FL — SIGNIFICANT CHANGE UP (ref 80–100)
PLATELET # BLD AUTO: 259 K/UL — SIGNIFICANT CHANGE UP (ref 150–400)
POTASSIUM SERPL-MCNC: 4 MMOL/L — SIGNIFICANT CHANGE UP (ref 3.5–5.3)
POTASSIUM SERPL-SCNC: 4 MMOL/L — SIGNIFICANT CHANGE UP (ref 3.5–5.3)
RBC # BLD: 4.29 M/UL — SIGNIFICANT CHANGE UP (ref 3.8–5.2)
RBC # FLD: 12.3 % — SIGNIFICANT CHANGE UP (ref 10.3–14.5)
SODIUM SERPL-SCNC: 140 MMOL/L — SIGNIFICANT CHANGE UP (ref 135–145)
WBC # BLD: 8.1 K/UL — SIGNIFICANT CHANGE UP (ref 3.8–10.5)
WBC # FLD AUTO: 8.1 K/UL — SIGNIFICANT CHANGE UP (ref 3.8–10.5)

## 2017-04-12 PROCEDURE — 93010 ELECTROCARDIOGRAM REPORT: CPT | Mod: 77

## 2017-04-12 PROCEDURE — 77770 HDR RDNCL NTRSTL/ICAV BRCHTX: CPT | Mod: 26

## 2017-04-12 PROCEDURE — 92920 PRQ TRLUML C ANGIOP 1ART&/BR: CPT | Mod: LC

## 2017-04-12 PROCEDURE — 77470 SPECIAL RADIATION TREATMENT: CPT | Mod: 26

## 2017-04-12 PROCEDURE — 77290 THER RAD SIMULAJ FIELD CPLX: CPT | Mod: 26

## 2017-04-12 PROCEDURE — 77318 BRACHYTX ISODOSE COMPLEX: CPT | Mod: 26

## 2017-04-12 PROCEDURE — 99221 1ST HOSP IP/OBS SF/LOW 40: CPT | Mod: 25

## 2017-04-12 PROCEDURE — 93010 ELECTROCARDIOGRAM REPORT: CPT

## 2017-04-12 PROCEDURE — 99233 SBSQ HOSP IP/OBS HIGH 50: CPT

## 2017-04-12 PROCEDURE — 77263 THER RADIOLOGY TX PLNG CPLX: CPT

## 2017-04-12 PROCEDURE — 92974 CATH PLACE CARDIO BRACHYTX: CPT

## 2017-04-12 RX ADMIN — Medication 81 MILLIGRAM(S): at 05:34

## 2017-04-12 RX ADMIN — PANTOPRAZOLE SODIUM 40 MILLIGRAM(S): 20 TABLET, DELAYED RELEASE ORAL at 05:34

## 2017-04-12 RX ADMIN — Medication 1 APPLICATION(S): at 05:34

## 2017-04-12 RX ADMIN — Medication 1 APPLICATION(S): at 18:41

## 2017-04-12 RX ADMIN — SIMVASTATIN 20 MILLIGRAM(S): 20 TABLET, FILM COATED ORAL at 21:36

## 2017-04-12 RX ADMIN — AMLODIPINE BESYLATE 5 MILLIGRAM(S): 2.5 TABLET ORAL at 05:34

## 2017-04-12 RX ADMIN — Medication 200 MILLIGRAM(S): at 05:34

## 2017-04-12 RX ADMIN — CLOPIDOGREL BISULFATE 75 MILLIGRAM(S): 75 TABLET, FILM COATED ORAL at 05:34

## 2017-04-13 VITALS
OXYGEN SATURATION: 97 % | SYSTOLIC BLOOD PRESSURE: 111 MMHG | TEMPERATURE: 98 F | HEART RATE: 64 BPM | RESPIRATION RATE: 18 BRPM | DIASTOLIC BLOOD PRESSURE: 44 MMHG

## 2017-04-13 LAB
ANION GAP SERPL CALC-SCNC: 16 MMOL/L — SIGNIFICANT CHANGE UP (ref 5–17)
BUN SERPL-MCNC: 16 MG/DL — SIGNIFICANT CHANGE UP (ref 7–23)
CALCIUM SERPL-MCNC: 9.3 MG/DL — SIGNIFICANT CHANGE UP (ref 8.4–10.5)
CHLORIDE SERPL-SCNC: 103 MMOL/L — SIGNIFICANT CHANGE UP (ref 96–108)
CO2 SERPL-SCNC: 21 MMOL/L — LOW (ref 22–31)
CREAT SERPL-MCNC: 0.85 MG/DL — SIGNIFICANT CHANGE UP (ref 0.5–1.3)
GLUCOSE SERPL-MCNC: 108 MG/DL — HIGH (ref 70–99)
HCT VFR BLD CALC: 37 % — SIGNIFICANT CHANGE UP (ref 34.5–45)
HGB BLD-MCNC: 12.8 G/DL — SIGNIFICANT CHANGE UP (ref 11.5–15.5)
MCHC RBC-ENTMCNC: 30.6 PG — SIGNIFICANT CHANGE UP (ref 27–34)
MCHC RBC-ENTMCNC: 34.6 GM/DL — SIGNIFICANT CHANGE UP (ref 32–36)
MCV RBC AUTO: 88.3 FL — SIGNIFICANT CHANGE UP (ref 80–100)
PLATELET # BLD AUTO: 254 K/UL — SIGNIFICANT CHANGE UP (ref 150–400)
POTASSIUM SERPL-MCNC: 4.1 MMOL/L — SIGNIFICANT CHANGE UP (ref 3.5–5.3)
POTASSIUM SERPL-SCNC: 4.1 MMOL/L — SIGNIFICANT CHANGE UP (ref 3.5–5.3)
RBC # BLD: 4.19 M/UL — SIGNIFICANT CHANGE UP (ref 3.8–5.2)
RBC # FLD: 12.1 % — SIGNIFICANT CHANGE UP (ref 10.3–14.5)
SODIUM SERPL-SCNC: 140 MMOL/L — SIGNIFICANT CHANGE UP (ref 135–145)
WBC # BLD: 8.1 K/UL — SIGNIFICANT CHANGE UP (ref 3.8–10.5)
WBC # FLD AUTO: 8.1 K/UL — SIGNIFICANT CHANGE UP (ref 3.8–10.5)

## 2017-04-13 PROCEDURE — 92924 PRQ TRLUML C ATHRC 1 ART&/BR: CPT | Mod: LC

## 2017-04-13 PROCEDURE — 80048 BASIC METABOLIC PNL TOTAL CA: CPT

## 2017-04-13 PROCEDURE — 93005 ELECTROCARDIOGRAM TRACING: CPT

## 2017-04-13 PROCEDURE — C1887: CPT

## 2017-04-13 PROCEDURE — C1725: CPT

## 2017-04-13 PROCEDURE — 77770 HDR RDNCL NTRSTL/ICAV BRCHTX: CPT

## 2017-04-13 PROCEDURE — 83036 HEMOGLOBIN GLYCOSYLATED A1C: CPT

## 2017-04-13 PROCEDURE — C1894: CPT

## 2017-04-13 PROCEDURE — 99238 HOSP IP/OBS DSCHRG MGMT 30/<: CPT

## 2017-04-13 PROCEDURE — 93458 L HRT ARTERY/VENTRICLE ANGIO: CPT

## 2017-04-13 PROCEDURE — C1717: CPT

## 2017-04-13 PROCEDURE — 77318 BRACHYTX ISODOSE COMPLEX: CPT

## 2017-04-13 PROCEDURE — 77470 SPECIAL RADIATION TREATMENT: CPT

## 2017-04-13 PROCEDURE — 85027 COMPLETE CBC AUTOMATED: CPT

## 2017-04-13 PROCEDURE — 93010 ELECTROCARDIOGRAM REPORT: CPT

## 2017-04-13 PROCEDURE — 77290 THER RAD SIMULAJ FIELD CPLX: CPT

## 2017-04-13 PROCEDURE — 77370 RADIATION PHYSICS CONSULT: CPT

## 2017-04-13 PROCEDURE — C1769: CPT

## 2017-04-13 PROCEDURE — 92920 PRQ TRLUML C ANGIOP 1ART&/BR: CPT | Mod: LC

## 2017-04-13 PROCEDURE — C1885: CPT

## 2017-04-13 RX ADMIN — AMLODIPINE BESYLATE 5 MILLIGRAM(S): 2.5 TABLET ORAL at 05:56

## 2017-04-13 RX ADMIN — Medication 200 MILLIGRAM(S): at 06:21

## 2017-04-13 RX ADMIN — Medication 1 APPLICATION(S): at 05:58

## 2017-04-13 RX ADMIN — Medication 81 MILLIGRAM(S): at 05:56

## 2017-04-13 RX ADMIN — PANTOPRAZOLE SODIUM 40 MILLIGRAM(S): 20 TABLET, DELAYED RELEASE ORAL at 05:56

## 2017-04-13 RX ADMIN — CLOPIDOGREL BISULFATE 75 MILLIGRAM(S): 75 TABLET, FILM COATED ORAL at 05:56

## 2017-04-18 ENCOUNTER — APPOINTMENT (OUTPATIENT)
Dept: CARDIOLOGY | Facility: CLINIC | Age: 82
End: 2017-04-18

## 2017-04-18 VITALS
DIASTOLIC BLOOD PRESSURE: 71 MMHG | WEIGHT: 149 LBS | HEART RATE: 69 BPM | OXYGEN SATURATION: 95 % | HEIGHT: 59.06 IN | BODY MASS INDEX: 30.04 KG/M2 | TEMPERATURE: 97.4 F | SYSTOLIC BLOOD PRESSURE: 125 MMHG

## 2017-04-19 ENCOUNTER — NON-APPOINTMENT (OUTPATIENT)
Age: 82
End: 2017-04-19

## 2017-04-19 ENCOUNTER — APPOINTMENT (OUTPATIENT)
Dept: CARDIOLOGY | Facility: CLINIC | Age: 82
End: 2017-04-19

## 2017-04-19 VITALS
BODY MASS INDEX: 30.04 KG/M2 | WEIGHT: 149 LBS | HEART RATE: 64 BPM | SYSTOLIC BLOOD PRESSURE: 157 MMHG | DIASTOLIC BLOOD PRESSURE: 71 MMHG | HEIGHT: 59 IN | OXYGEN SATURATION: 98 %

## 2017-04-19 DIAGNOSIS — Z95.0 PRESENCE OF CARDIAC PACEMAKER: ICD-10-CM

## 2017-04-19 RX ORDER — RANOLAZINE 500 MG/1
500 TABLET, FILM COATED, EXTENDED RELEASE ORAL
Qty: 120 | Refills: 1 | Status: DISCONTINUED | COMMUNITY
Start: 2017-04-19 | End: 2017-04-19

## 2017-05-17 ENCOUNTER — APPOINTMENT (OUTPATIENT)
Dept: CARDIOLOGY | Facility: CLINIC | Age: 82
End: 2017-05-17

## 2017-06-12 ENCOUNTER — APPOINTMENT (OUTPATIENT)
Dept: ELECTROPHYSIOLOGY | Facility: CLINIC | Age: 82
End: 2017-06-12

## 2017-06-22 ENCOUNTER — APPOINTMENT (OUTPATIENT)
Dept: GASTROENTEROLOGY | Facility: CLINIC | Age: 82
End: 2017-06-22

## 2017-06-22 VITALS — HEIGHT: 59 IN | TEMPERATURE: 98.2 F | WEIGHT: 146 LBS | BODY MASS INDEX: 29.43 KG/M2

## 2017-06-22 VITALS — DIASTOLIC BLOOD PRESSURE: 70 MMHG | SYSTOLIC BLOOD PRESSURE: 146 MMHG

## 2017-06-22 DIAGNOSIS — Z82.49 FAMILY HISTORY OF ISCHEMIC HEART DISEASE AND OTHER DISEASES OF THE CIRCULATORY SYSTEM: ICD-10-CM

## 2017-06-22 DIAGNOSIS — Z92.89 PERSONAL HISTORY OF OTHER MEDICAL TREATMENT: ICD-10-CM

## 2017-06-22 DIAGNOSIS — Z80.0 FAMILY HISTORY OF MALIGNANT NEOPLASM OF DIGESTIVE ORGANS: ICD-10-CM

## 2017-06-23 ENCOUNTER — RESULT REVIEW (OUTPATIENT)
Age: 82
End: 2017-06-23

## 2017-06-23 LAB
ALBUMIN SERPL ELPH-MCNC: 4.2 G/DL
ALP BLD-CCNC: 70 U/L
ALT SERPL-CCNC: 5 U/L
ANION GAP SERPL CALC-SCNC: 14 MMOL/L
AST SERPL-CCNC: 16 U/L
BASOPHILS # BLD AUTO: 0.05 K/UL
BASOPHILS NFR BLD AUTO: 0.7 %
BILIRUB SERPL-MCNC: 0.3 MG/DL
BUN SERPL-MCNC: 18 MG/DL
CALCIUM SERPL-MCNC: 9.7 MG/DL
CHLORIDE SERPL-SCNC: 103 MMOL/L
CO2 SERPL-SCNC: 23 MMOL/L
CREAT SERPL-MCNC: 0.82 MG/DL
EOSINOPHIL # BLD AUTO: 0.25 K/UL
EOSINOPHIL NFR BLD AUTO: 3.3 %
FERRITIN SERPL-MCNC: 56 NG/ML
GLUCOSE SERPL-MCNC: 89 MG/DL
HCT VFR BLD CALC: 41.6 %
HGB BLD-MCNC: 13.4 G/DL
IMM GRANULOCYTES NFR BLD AUTO: 0.3 %
IRON SATN MFR SERPL: 21 %
IRON SERPL-MCNC: 72 UG/DL
LYMPHOCYTES # BLD AUTO: 2.02 K/UL
LYMPHOCYTES NFR BLD AUTO: 26.5 %
MAN DIFF?: NORMAL
MCHC RBC-ENTMCNC: 28.8 PG
MCHC RBC-ENTMCNC: 32.2 GM/DL
MCV RBC AUTO: 89.5 FL
MONOCYTES # BLD AUTO: 0.79 K/UL
MONOCYTES NFR BLD AUTO: 10.4 %
NEUTROPHILS # BLD AUTO: 4.49 K/UL
NEUTROPHILS NFR BLD AUTO: 58.8 %
PLATELET # BLD AUTO: 305 K/UL
POTASSIUM SERPL-SCNC: 4.8 MMOL/L
PROT SERPL-MCNC: 7.1 G/DL
RBC # BLD: 4.65 M/UL
RBC # FLD: 13.9 %
SODIUM SERPL-SCNC: 140 MMOL/L
TIBC SERPL-MCNC: 351 UG/DL
UIBC SERPL-MCNC: 279 UG/DL
WBC # FLD AUTO: 7.62 K/UL

## 2017-06-29 ENCOUNTER — APPOINTMENT (OUTPATIENT)
Dept: CARDIOLOGY | Facility: CLINIC | Age: 82
End: 2017-06-29

## 2017-07-18 ENCOUNTER — APPOINTMENT (OUTPATIENT)
Dept: CARDIOLOGY | Facility: CLINIC | Age: 82
End: 2017-07-18

## 2017-07-25 ENCOUNTER — INPATIENT (INPATIENT)
Facility: HOSPITAL | Age: 82
LOS: 0 days | Discharge: HOME | DRG: 313 | End: 2017-07-26
Attending: FAMILY MEDICINE | Admitting: FAMILY MEDICINE
Payer: COMMERCIAL

## 2017-07-25 ENCOUNTER — TRANSCRIPTION ENCOUNTER (OUTPATIENT)
Age: 82
End: 2017-07-25

## 2017-07-25 VITALS
OXYGEN SATURATION: 96 % | TEMPERATURE: 98 F | WEIGHT: 149.91 LBS | SYSTOLIC BLOOD PRESSURE: 163 MMHG | HEART RATE: 70 BPM | DIASTOLIC BLOOD PRESSURE: 85 MMHG | HEIGHT: 59 IN | RESPIRATION RATE: 14 BRPM

## 2017-07-25 DIAGNOSIS — K64.9 UNSPECIFIED HEMORRHOIDS: ICD-10-CM

## 2017-07-25 DIAGNOSIS — R07.9 CHEST PAIN, UNSPECIFIED: ICD-10-CM

## 2017-07-25 DIAGNOSIS — E78.5 HYPERLIPIDEMIA, UNSPECIFIED: ICD-10-CM

## 2017-07-25 DIAGNOSIS — Z29.9 ENCOUNTER FOR PROPHYLACTIC MEASURES, UNSPECIFIED: ICD-10-CM

## 2017-07-25 DIAGNOSIS — G56.01 CARPAL TUNNEL SYNDROME, RIGHT UPPER LIMB: Chronic | ICD-10-CM

## 2017-07-25 DIAGNOSIS — Z96.659 PRESENCE OF UNSPECIFIED ARTIFICIAL KNEE JOINT: Chronic | ICD-10-CM

## 2017-07-25 DIAGNOSIS — Z98.89 OTHER SPECIFIED POSTPROCEDURAL STATES: Chronic | ICD-10-CM

## 2017-07-25 DIAGNOSIS — Z98.82 BREAST IMPLANT STATUS: Chronic | ICD-10-CM

## 2017-07-25 DIAGNOSIS — I10 ESSENTIAL (PRIMARY) HYPERTENSION: ICD-10-CM

## 2017-07-25 DIAGNOSIS — Z95.0 PRESENCE OF CARDIAC PACEMAKER: Chronic | ICD-10-CM

## 2017-07-25 DIAGNOSIS — I25.10 ATHEROSCLEROTIC HEART DISEASE OF NATIVE CORONARY ARTERY WITHOUT ANGINA PECTORIS: ICD-10-CM

## 2017-07-25 DIAGNOSIS — Z95.5 PRESENCE OF CORONARY ANGIOPLASTY IMPLANT AND GRAFT: Chronic | ICD-10-CM

## 2017-07-25 DIAGNOSIS — K22.9 DISEASE OF ESOPHAGUS, UNSPECIFIED: ICD-10-CM

## 2017-07-25 DIAGNOSIS — E03.9 HYPOTHYROIDISM, UNSPECIFIED: ICD-10-CM

## 2017-07-25 DIAGNOSIS — Z90.710 ACQUIRED ABSENCE OF BOTH CERVIX AND UTERUS: Chronic | ICD-10-CM

## 2017-07-25 DIAGNOSIS — H26.9 UNSPECIFIED CATARACT: Chronic | ICD-10-CM

## 2017-07-25 LAB
ALBUMIN SERPL ELPH-MCNC: 4 G/DL — SIGNIFICANT CHANGE UP (ref 3.3–5)
ALP SERPL-CCNC: 78 U/L — SIGNIFICANT CHANGE UP (ref 40–120)
ALT FLD-CCNC: 19 U/L — SIGNIFICANT CHANGE UP (ref 12–78)
ANION GAP SERPL CALC-SCNC: 4 MMOL/L — LOW (ref 5–17)
APTT BLD: 28.5 SEC — SIGNIFICANT CHANGE UP (ref 27.5–37.4)
AST SERPL-CCNC: 18 U/L — SIGNIFICANT CHANGE UP (ref 15–37)
BASOPHILS # BLD AUTO: 0.1 K/UL — SIGNIFICANT CHANGE UP (ref 0–0.2)
BASOPHILS NFR BLD AUTO: 1 % — SIGNIFICANT CHANGE UP (ref 0–2)
BILIRUB SERPL-MCNC: 0.3 MG/DL — SIGNIFICANT CHANGE UP (ref 0.2–1.2)
BUN SERPL-MCNC: 16 MG/DL — SIGNIFICANT CHANGE UP (ref 7–23)
CALCIUM SERPL-MCNC: 8.9 MG/DL — SIGNIFICANT CHANGE UP (ref 8.5–10.1)
CHLORIDE SERPL-SCNC: 109 MMOL/L — HIGH (ref 96–108)
CK MB BLD-MCNC: 1.8 % — SIGNIFICANT CHANGE UP (ref 0–3.5)
CK MB CFR SERPL CALC: 0.9 NG/ML — SIGNIFICANT CHANGE UP (ref 0–3.6)
CK SERPL-CCNC: 50 U/L — SIGNIFICANT CHANGE UP (ref 26–192)
CO2 SERPL-SCNC: 29 MMOL/L — SIGNIFICANT CHANGE UP (ref 22–31)
CREAT SERPL-MCNC: 0.81 MG/DL — SIGNIFICANT CHANGE UP (ref 0.5–1.3)
EOSINOPHIL # BLD AUTO: 0.3 K/UL — SIGNIFICANT CHANGE UP (ref 0–0.5)
EOSINOPHIL NFR BLD AUTO: 4 % — SIGNIFICANT CHANGE UP (ref 0–6)
GLUCOSE SERPL-MCNC: 93 MG/DL — SIGNIFICANT CHANGE UP (ref 70–99)
HCT VFR BLD CALC: 44.6 % — SIGNIFICANT CHANGE UP (ref 34.5–45)
HGB BLD-MCNC: 14.8 G/DL — SIGNIFICANT CHANGE UP (ref 11.5–15.5)
INR BLD: 1.04 RATIO — SIGNIFICANT CHANGE UP (ref 0.88–1.16)
LYMPHOCYTES # BLD AUTO: 1.8 K/UL — SIGNIFICANT CHANGE UP (ref 1–3.3)
LYMPHOCYTES # BLD AUTO: 25.7 % — SIGNIFICANT CHANGE UP (ref 13–44)
MCHC RBC-ENTMCNC: 30 PG — SIGNIFICANT CHANGE UP (ref 27–34)
MCHC RBC-ENTMCNC: 33.1 GM/DL — SIGNIFICANT CHANGE UP (ref 32–36)
MCV RBC AUTO: 90.6 FL — SIGNIFICANT CHANGE UP (ref 80–100)
MONOCYTES # BLD AUTO: 0.7 K/UL — SIGNIFICANT CHANGE UP (ref 0–0.9)
MONOCYTES NFR BLD AUTO: 9.5 % — HIGH (ref 1–9)
NEUTROPHILS # BLD AUTO: 4.3 K/UL — SIGNIFICANT CHANGE UP (ref 1.8–7.4)
NEUTROPHILS NFR BLD AUTO: 59.8 % — SIGNIFICANT CHANGE UP (ref 43–77)
NT-PROBNP SERPL-SCNC: 482 PG/ML — HIGH (ref 0–450)
PLATELET # BLD AUTO: 242 K/UL — SIGNIFICANT CHANGE UP (ref 150–400)
POTASSIUM SERPL-MCNC: 4.4 MMOL/L — SIGNIFICANT CHANGE UP (ref 3.5–5.3)
POTASSIUM SERPL-SCNC: 4.4 MMOL/L — SIGNIFICANT CHANGE UP (ref 3.5–5.3)
PROT SERPL-MCNC: 6.9 G/DL — SIGNIFICANT CHANGE UP (ref 6–8.3)
PROTHROM AB SERPL-ACNC: 11.3 SEC — SIGNIFICANT CHANGE UP (ref 9.8–12.7)
RBC # BLD: 4.92 M/UL — SIGNIFICANT CHANGE UP (ref 3.8–5.2)
RBC # FLD: 12.3 % — SIGNIFICANT CHANGE UP (ref 10.3–14.5)
SODIUM SERPL-SCNC: 142 MMOL/L — SIGNIFICANT CHANGE UP (ref 135–145)
TROPONIN I SERPL-MCNC: <.015 NG/ML — SIGNIFICANT CHANGE UP (ref 0.01–0.04)
TROPONIN I SERPL-MCNC: <.015 NG/ML — SIGNIFICANT CHANGE UP (ref 0.01–0.04)
WBC # BLD: 7.1 K/UL — SIGNIFICANT CHANGE UP (ref 3.8–10.5)
WBC # FLD AUTO: 7.1 K/UL — SIGNIFICANT CHANGE UP (ref 3.8–10.5)

## 2017-07-25 PROCEDURE — 99285 EMERGENCY DEPT VISIT HI MDM: CPT

## 2017-07-25 PROCEDURE — 99223 1ST HOSP IP/OBS HIGH 75: CPT | Mod: AI,GC

## 2017-07-25 PROCEDURE — 99223 1ST HOSP IP/OBS HIGH 75: CPT

## 2017-07-25 PROCEDURE — 71010: CPT | Mod: 26

## 2017-07-25 PROCEDURE — 93010 ELECTROCARDIOGRAM REPORT: CPT

## 2017-07-25 RX ORDER — SODIUM CHLORIDE 9 MG/ML
3 INJECTION INTRAMUSCULAR; INTRAVENOUS; SUBCUTANEOUS ONCE
Qty: 0 | Refills: 0 | Status: COMPLETED | OUTPATIENT
Start: 2017-07-25 | End: 2017-07-25

## 2017-07-25 RX ORDER — TRAZODONE HCL 50 MG
50 TABLET ORAL DAILY
Qty: 0 | Refills: 0 | Status: DISCONTINUED | OUTPATIENT
Start: 2017-07-25 | End: 2017-07-26

## 2017-07-25 RX ORDER — TRAZODONE HCL 50 MG
1 TABLET ORAL
Qty: 0 | Refills: 0 | COMMUNITY

## 2017-07-25 RX ORDER — LISINOPRIL 2.5 MG/1
10 TABLET ORAL DAILY
Qty: 0 | Refills: 0 | Status: DISCONTINUED | OUTPATIENT
Start: 2017-07-25 | End: 2017-07-26

## 2017-07-25 RX ORDER — ISOSORBIDE MONONITRATE 60 MG/1
30 TABLET, EXTENDED RELEASE ORAL DAILY
Qty: 0 | Refills: 0 | Status: DISCONTINUED | OUTPATIENT
Start: 2017-07-25 | End: 2017-07-26

## 2017-07-25 RX ORDER — ASPIRIN/CALCIUM CARB/MAGNESIUM 324 MG
81 TABLET ORAL DAILY
Qty: 0 | Refills: 0 | Status: DISCONTINUED | OUTPATIENT
Start: 2017-07-25 | End: 2017-07-26

## 2017-07-25 RX ORDER — AMLODIPINE BESYLATE 2.5 MG/1
10 TABLET ORAL DAILY
Qty: 0 | Refills: 0 | Status: DISCONTINUED | OUTPATIENT
Start: 2017-07-25 | End: 2017-07-26

## 2017-07-25 RX ORDER — METOPROLOL TARTRATE 50 MG
200 TABLET ORAL DAILY
Qty: 0 | Refills: 0 | Status: DISCONTINUED | OUTPATIENT
Start: 2017-07-25 | End: 2017-07-26

## 2017-07-25 RX ORDER — DOCUSATE SODIUM 100 MG
100 CAPSULE ORAL
Qty: 0 | Refills: 0 | Status: DISCONTINUED | OUTPATIENT
Start: 2017-07-25 | End: 2017-07-26

## 2017-07-25 RX ORDER — DIPHENHYDRAMINE HCL 50 MG
25 CAPSULE ORAL ONCE
Qty: 0 | Refills: 0 | Status: DISCONTINUED | OUTPATIENT
Start: 2017-07-25 | End: 2017-07-26

## 2017-07-25 RX ORDER — AMLODIPINE BESYLATE 2.5 MG/1
5 TABLET ORAL DAILY
Qty: 0 | Refills: 0 | Status: DISCONTINUED | OUTPATIENT
Start: 2017-07-25 | End: 2017-07-25

## 2017-07-25 RX ORDER — SIMVASTATIN 20 MG/1
20 TABLET, FILM COATED ORAL AT BEDTIME
Qty: 0 | Refills: 0 | Status: DISCONTINUED | OUTPATIENT
Start: 2017-07-25 | End: 2017-07-26

## 2017-07-25 RX ORDER — FERROUS SULFATE 325(65) MG
325 TABLET ORAL
Qty: 0 | Refills: 0 | Status: DISCONTINUED | OUTPATIENT
Start: 2017-07-25 | End: 2017-07-26

## 2017-07-25 RX ORDER — ZALEPLON 10 MG
5 CAPSULE ORAL ONCE
Qty: 0 | Refills: 0 | Status: DISCONTINUED | OUTPATIENT
Start: 2017-07-25 | End: 2017-07-25

## 2017-07-25 RX ORDER — ENOXAPARIN SODIUM 100 MG/ML
40 INJECTION SUBCUTANEOUS EVERY 24 HOURS
Qty: 0 | Refills: 0 | Status: DISCONTINUED | OUTPATIENT
Start: 2017-07-25 | End: 2017-07-26

## 2017-07-25 RX ADMIN — SIMVASTATIN 20 MILLIGRAM(S): 20 TABLET, FILM COATED ORAL at 21:48

## 2017-07-25 RX ADMIN — ENOXAPARIN SODIUM 40 MILLIGRAM(S): 100 INJECTION SUBCUTANEOUS at 21:48

## 2017-07-25 RX ADMIN — SODIUM CHLORIDE 3 MILLILITER(S): 9 INJECTION INTRAMUSCULAR; INTRAVENOUS; SUBCUTANEOUS at 14:30

## 2017-07-25 RX ADMIN — Medication 5 MILLIGRAM(S): at 23:49

## 2017-07-25 NOTE — DISCHARGE NOTE ADULT - HOSPITAL COURSE
85 yo female with pmhx of htn, CAD, arthritis, diverticulosis, hypothyroidism, with pacemaker and cardiac stents, presented to with chest pain that started 3 days ago when she was sitting that worsened yesterday. Pain is located in the left sternum, constant, stabbing, non-radiating and rates at 5/10 at onset and 0/10 currently. Patient tried nitropatch which did not relieve the pain. Pain is unchanged by eating, activity and rest.   Patient states that she does have an esophageal problem for which she gets frequent dilation. She is scheduled to have an endoscopy with possible dilation on monday for which she has stopped taking her plavix.  In the ED:  /85 HR 70 RR 14 Temp 98F O2 96% on RA  Cardiac enzymes (-)  CXR negative for acute process. Pacemaker, hiatal hernia, and coronary stenting noted.  When patient walked to the bathroom in the ED, noticed chest pressure that remitted once she sat down in bed again  Amlodipine increased to 10 for BP in the 160s 83 yo female with pmhx of htn, CAD, arthritis, diverticulosis, hypothyroidism, with pacemaker and cardiac stents, presented to with chest pain that started 3 days ago when she was sitting that worsened yesterday. Pain is located in the left sternum, constant, stabbing, non-radiating and rates at 5/10 at onset and 0/10 currently. Patient tried nitropatch which did not relieve the pain. Pain is unchanged by eating, activity and rest.   Patient states that she does have an esophageal problem for which she gets frequent dilation. She is scheduled to have an endoscopy with possible dilation on monday for which she has stopped taking her plavix.  In the ED:  /85 HR 70 RR 14 Temp 98F O2 96% on RA  Cardiac enzymes (negative)  CXR negative for acute process. Pacemaker, hiatal hernia, and coronary stenting noted.  When patient walked to the bathroom in the ED, noticed chest pressure that remitted once she sat down in bed again  patient was admitted to evaluate her chest pain and monitor given her CAD history. Cardiac enzymes were negative x 3.  Patients Amlodipine was increased to properly manage blood pressure.   On day of discharge, patient was seen and examined.  Patient states that her chest pain has resolved. She denies any SOB, n/v/d, palpitations, headaches or weakness. She states that she was able to walk around without any issue. She has no other complaints at this time.     INTERVAL HPI/OVERNIGHT EVENTS:  Vital Signs Last 24 Hrs  T(C): 36.7 (26 Jul 2017 08:00), Max: 37.1 (25 Jul 2017 18:06)  T(F): 98 (26 Jul 2017 08:00), Max: 98.7 (25 Jul 2017 18:06)  HR: 64 (26 Jul 2017 08:00) (64 - 85)  BP: 159/79 (26 Jul 2017 08:00) (135/52 - 163/85)  BP(mean): --  RR: 17 (26 Jul 2017 08:00) (14 - 19)  SpO2: 97% (26 Jul 2017 08:00) (95% - 97%)    REVIEW OF SYSTEMS:  HEENT: admits decreased vision in right eye from cataracts, difficulty hearing, admits  Respiratory: admits BENJAMIN with 5 steps, admits chronic cough unchanged denies SOB, sputum production, wheezing, hemoptysis  Cardiovascular: denies CP currently, palpitations, edema  Gastrointestinal: intermittent right red blood from hemorrhoid. admits to trouble with swallowing at times denies nausea, vomiting, diarrhea, constipation, abdominal pain, melena   Genitourinary: denies dysuria, frequency, urgency, hematuria   Musculoskeletal: denies myalgias, joint swelling, muscle weakness  Neurologic: denies headache, paresthesias, numbness/tingling  ROS negative except as noted above    PHYSICAL EXAM:  General: Well developed, well nourished, NAD. Sitting comfortably on room air  HEENT: NCAT, PERRLA, EOMI bl, moist mucous membranes   Neurology: A&Ox3, nonfocal,   Respiratory: CTA B/L, No W/R/R  CV: RRR, +S1/S2, (+) systolic murmurs, no rubs or gallops. No pain on palpation of chest  Abdominal: Soft, NT, ND +BSx4  Extremities: Slight edema b/l lower extremities No C/C, + peripheral pulses  Spoke with Patients' cardiologist Dr. Adair. He is aware of patients presentation to the hospital. States that her most recent EF was normal EF. He is awarea of her procedure on Monday for endoscopy.  Spoke with Patient's Gi (Dr. Salinas).    Patient is stable for discharge to home. 83 yo female with pmhx of htn, CAD, arthritis, diverticulosis, hypothyroidism, with pacemaker and cardiac stents, presented to with chest pain that started 3 days ago when she was sitting that worsened yesterday. Pain is located in the left sternum, constant, stabbing, non-radiating and rates at 5/10 at onset and 0/10 currently. Patient tried nitropatch which did not relieve the pain. Pain is unchanged by eating, activity and rest.   Patient states that she does have an esophageal problem for which she gets frequent dilation. She is scheduled to have an endoscopy with possible dilation on monday for which she has stopped taking her plavix.  In the ED:  /85 HR 70 RR 14 Temp 98F O2 96% on RA  Cardiac enzymes (negative)  CXR negative for acute process. Pacemaker, hiatal hernia, and coronary stenting noted.  When patient walked to the bathroom in the ED, noticed chest pressure that remitted once she sat down in bed again  patient was admitted to evaluate her chest pain and monitor given her CAD history. Cardiac enzymes were negative x 3.  Patients Amlodipine was increased to properly manage blood pressure.   On day of discharge, patient was seen and examined.  Patient states that her chest pain has resolved. She denies any SOB, n/v/d, palpitations, headaches or weakness. She states that she was able to walk around without any issue. She has no other complaints at this time.     INTERVAL HPI/OVERNIGHT EVENTS:  Vital Signs Last 24 Hrs  T(C): 36.7 (26 Jul 2017 08:00), Max: 37.1 (25 Jul 2017 18:06)  T(F): 98 (26 Jul 2017 08:00), Max: 98.7 (25 Jul 2017 18:06)  HR: 64 (26 Jul 2017 08:00) (64 - 85)  BP: 159/79 (26 Jul 2017 08:00) (135/52 - 163/85)  BP(mean): --  RR: 17 (26 Jul 2017 08:00) (14 - 19)  SpO2: 97% (26 Jul 2017 08:00) (95% - 97%)    REVIEW OF SYSTEMS:  HEENT: admits decreased vision in right eye from cataracts, difficulty hearing, admits  Respiratory: admits BENJAMIN with 5 steps, admits chronic cough unchanged denies SOB, sputum production, wheezing, hemoptysis  Cardiovascular: denies CP currently, palpitations, edema  Gastrointestinal: intermittent right red blood from hemorrhoid. admits to trouble with swallowing at times denies nausea, vomiting, diarrhea, constipation, abdominal pain, melena   Genitourinary: denies dysuria, frequency, urgency, hematuria   Musculoskeletal: denies myalgias, joint swelling, muscle weakness  Neurologic: denies headache, paresthesias, numbness/tingling  ROS negative except as noted above    PHYSICAL EXAM:  General: Well developed, well nourished, NAD. Sitting comfortably on room air  HEENT: NCAT, PERRLA, EOMI bl, moist mucous membranes   Neurology: A&Ox3, nonfocal,   Respiratory: CTA B/L, No W/R/R  CV: RRR, +S1/S2, (+) systolic murmurs, no rubs or gallops. No pain on palpation of chest  Abdominal: Soft, NT, ND +BSx4  Extremities: Slight edema b/l lower extremities No C/C, + peripheral pulses  Spoke with Patients' cardiologist Dr. Adair. He is aware of patients presentation to the hospital. States that her most recent EF was normal EF. He is awarea of her procedure on Monday for endoscopy.  Spoke with Patient's Gi (Dr. Salinas). Is aware of the patients admission. Plan is to continue with procedure on monday given clearance.  Patient is stable for discharge to home. 85 yo female with pmhx of htn, CAD, arthritis, diverticulosis, hypothyroidism, with pacemaker and cardiac stents, presented to with chest pain that started 3 days ago when she was sitting that worsened yesterday. Pain is located in the left sternum, constant, stabbing, non-radiating and rates at 5/10 at onset and 0/10 currently. Patient tried nitropatch which did not relieve the pain. Pain is unchanged by eating, activity and rest.   Patient states that she does have an esophageal problem for which she gets frequent dilation. She is scheduled to have an endoscopy with possible dilation on monday for which she has stopped taking her plavix .admitted with cp r/o acs   In the ED:  /85 HR 70 RR 14 Temp 98F O2 96% on RA  Cardiac enzymes (negative)  CXR negative for acute process. Pacemaker, hiatal hernia, and coronary stenting noted.  When patient walked to the bathroom in the ED, noticed chest pressure that remitted once she sat down in bed again  patient was admitted to evaluate her chest pain r/o acs with hx cad 12 stent in past  and monitor given her CAD historys/p PCI to the LAD and LCx with BJORN, last in 2014,  s/p POBA to pLAD, D1, and brachytherapy to OM1 in 4/2017,PVD s/p LE PCI,. Cardiac enzymes were negative x 3.  pt is on hold plavix  pt cleared by cardio dr henry for dc plan / no cardiac event on monitor overnight.   On day of discharge, patient was seen and examined.  Patient states that her chest pain has resolved. She denies any SOB, n/v/d, palpitations, headaches or weakness. She states that she was able to walk around without any issue. She has no other complaints at this time. physical exam 7-26-17 day of dc     INTERVAL HPI/OVERNIGHT EVENTS:  Vital Signs Last 24 Hrs  T(C): 36.7 (26 Jul 2017 08:00), Max: 37.1 (25 Jul 2017 18:06)  T(F): 98 (26 Jul 2017 08:00), Max: 98.7 (25 Jul 2017 18:06)  HR: 64 (26 Jul 2017 08:00) (64 - 85)  BP: 159/79 (26 Jul 2017 08:00) (135/52 - 163/85)  BP(mean): --  RR: 17 (26 Jul 2017 08:00) (14 - 19)  SpO2: 97% (26 Jul 2017 08:00) (95% - 97%)    ROS negative except as noted above    PHYSICAL EXAM:  General: Well developed, well nourished, NAD. Sitting comfortably on room air  HEENT: NCAT, PERRLA, EOMI bl, moist mucous membranes   Neurology: A&Ox3, nonfocal,   Respiratory: CTA B/L, No W/R/R  CV: RRR, +S1/S2, (+) systolic murmurs, no rubs or gallops. No pain on palpation of chest  Abdominal: Soft, NT, ND +BSx4  Extremities: Slight edema b/l lower extremities No C/C, + peripheral pulses    Spoke with Patients' cardiologist Dr. Adair. He is aware of patients presentation to the hospital. States that her most recent EF was normal EF. He is aware of her procedure on Monday for endoscopy.  Spoke with Patient's Gi (Dr. Salinas). Is aware of the patients admission. Plan is to continue with procedure on monady hold plavix  cont asa , as chest pain noncardiac this time atypical .   Patient is stable for discharge to home.  pmd dr sahu aware dc plan .

## 2017-07-25 NOTE — DISCHARGE NOTE ADULT - PATIENT PORTAL LINK FT
“You can access the [facility] Patient Portal, offered by [facility], by registering with the following website: [email or web address]”

## 2017-07-25 NOTE — H&P ADULT - ATTENDING COMMENTS
Given pt's lack of response to nitroglycerin, suspect pain is likely esophageal in origin. However, given extensive cardiac and vascular history will r/o ACS.

## 2017-07-25 NOTE — H&P ADULT - PROBLEM SELECTOR PLAN 4
continue imdur, metorpolol, lisinopril (home meds)  increase amlodipine to 10 mg daily  monitor BP  continue DASH diet continue statin

## 2017-07-25 NOTE — H&P ADULT - PROBLEM SELECTOR PLAN 3
continue statin continue statin, metoprolol, asprin  hold plavix for now for possible procedure on monday (endoscopic)  DASH diet  f/u cardiology

## 2017-07-25 NOTE — CONSULT NOTE ADULT - ASSESSMENT
83 year old woman with a history of known CAD s/p PCI to the LAD and LCx with BJORN, last in 2014,  s/p POBA to pLAD, D1, and brachytherapy to OM1 in 4/2017,PVD s/p LE PCI, hypertension, hyperlipidemia, carotid artery disease who presents to the ED with complaints of chest pain.  - CP is nonanginal in nature. No signs of significant ischemia or volume overload. EKG wihtout ischemic changes  - Trend Claudio  - ?if secondary to GI issue.   - Cont ASA and Plavix. Can hold plavix for EGD if necessary  - Cont toprol 200mg Qday  - Increase norvasc to 10mg Qday.   - Cont ACE  - If BP room start Imdur 30mg Qday.   - Monitor and replete electrolytes. Keep K>4.0 and Mg>2.0.  - echo   - Further cardiac workup will depend on clinical course.   - All other workup per primary team. Will followup.

## 2017-07-25 NOTE — H&P ADULT - ASSESSMENT
85 yo female with pmhx of htn, CAD, arthritis, diverticulosis, hypothyroidism, with pacemaker and cardiac stents admitted to evaluate chest pain

## 2017-07-25 NOTE — DISCHARGE NOTE ADULT - CARE PROVIDERS DIRECT ADDRESSES
[email or web address]

## 2017-07-25 NOTE — H&P ADULT - NSHPPHYSICALEXAM_GEN_ALL_CORE
Physical Exam:  General: Well developed, well nourished, NAD  HEENT: NCAT, PERRLA, EOMI bl, moist mucous membranes   Neurology: A&Ox3, nonfocal,   Respiratory: CTA B/L, No W/R/R  CV: RRR, +S1/S2, (+) systolic murmurs, no rubs or gallops. No pain on palpation of chest  Abdominal: Soft, NT, ND +BSx4  Extremities: Slight edema b/l lower extremitiesNo C/C, + peripheral pulses

## 2017-07-25 NOTE — DISCHARGE NOTE ADULT - MEDICATION SUMMARY - MEDICATIONS TO STOP TAKING
I will STOP taking the medications listed below when I get home from the hospital:    clopidogrel 75 mg oral tablet  -- 1 tab(s) by mouth once a day home

## 2017-07-25 NOTE — H&P ADULT - PROBLEM SELECTOR PLAN 6
continue statin, metoprolol, asprin  hold plavix for now for possible procedure on monday (endoscopic)  DASH diet  f/u cardiology patient denies hypothyroidism and not currently on medication as per pharmacy  tsh on last admission 6.39 (May 2017)  f/u TSH

## 2017-07-25 NOTE — DISCHARGE NOTE ADULT - MEDICATION SUMMARY - MEDICATIONS TO TAKE
I will START or STAY ON the medications listed below when I get home from the hospital:    aspirin 81 mg oral delayed release tablet  -- 1 tab(s) by mouth once a day home & hosp  -- Indication: For CAD (coronary artery disease)    isosorbide mononitrate 30 mg oral tablet, extended release  -- 1 tab(s) by mouth once a day  -- Indication: For Hypertension    traZODone 50 mg oral tablet  -- 1 cap(s) by mouth once a day, As Needed   -- Indication: For Insomnia    simvastatin 20 mg oral tablet  -- 1 tab(s) by mouth once a day (at bedtime)  -- Indication: For HLD (hyperlipidemia)    amlodipine-benazepril 5 mg-10 mg oral capsule  -- 1 cap(s) by mouth once a day  home   -- Indication: For Hypertension    Metoprolol Succinate  mg oral tablet, extended release  -- 1 tab(s) by mouth once a day home & hosp  -- pt doesn't know dose  -- Indication: For Hypertension    FeroSul 325 mg (65 mg elemental iron) oral tablet  -- 1 tab(s) by mouth 2 times a day home & hosp  -- Indication: For anemia

## 2017-07-25 NOTE — DISCHARGE NOTE ADULT - CARE PLAN
Principal Discharge DX:	Chest pain  Secondary Diagnosis:	Esophageal abnormality  Secondary Diagnosis:	CAD (coronary artery disease)  Secondary Diagnosis:	Dyslipidemia  Secondary Diagnosis:	Hemorrhoid  Secondary Diagnosis:	Hypertension Principal Discharge DX:	Chest pain  Goal:	atypical  Instructions for follow-up, activity and diet:	hold on plavix , for out pt gi procedure monady  Secondary Diagnosis:	Esophageal abnormality  Instructions for follow-up, activity and diet:	monday procedure by dr romo  Secondary Diagnosis:	CAD (coronary artery disease)  Secondary Diagnosis:	Dyslipidemia  Secondary Diagnosis:	Hemorrhoid  Secondary Diagnosis:	Hypertension Principal Discharge DX:	Chest pain  Goal:	atypical  Instructions for follow-up, activity and diet:	follow up with gastroenterology appointment with Dr. Vargas  Follow up with Cardiologist (Dr. Adair). Possible Echo as outpatient  monitor for further chest pain  Secondary Diagnosis:	Esophageal abnormality  Instructions for follow-up, activity and diet:	follow up with gastroenterology appointment with Dr. Vargas  Hold plavix for Procedure  Diet as tolerated  Secondary Diagnosis:	CAD (coronary artery disease)  Instructions for follow-up, activity and diet:	hold plavix for outpaitent procedure on Monday  continue asprin  Secondary Diagnosis:	Dyslipidemia  Goal:	stable  Instructions for follow-up, activity and diet:	continue statin  maintain proper diet and exercise  Secondary Diagnosis:	Hemorrhoid  Goal:	stable  Instructions for follow-up, activity and diet:	monitor for bloody bowel movements  Secondary Diagnosis:	Hypertension  Instructions for follow-up, activity and diet:	continue IMDUR, toprol, and amlodipine-benzepril 5-10mg  monitor BP  DASH Diet Principal Discharge DX:	Chest pain  Goal:	atypical  Instructions for follow-up, activity and diet:	follow up with gastroenterology appointment with Dr. Vargas  Follow up with Cardiologist (Dr. Adair). Possible Echo as outpatient  Secondary Diagnosis:	Esophageal abnormality  Instructions for follow-up, activity and diet:	follow up with gastroenterology appointment with Dr. Vargas  Hold plavix for Procedure  Diet as tolerated  Secondary Diagnosis:	CAD (coronary artery disease)  Instructions for follow-up, activity and diet:	hold plavix for outpaitent procedure on Monday  continue asprin  Secondary Diagnosis:	Dyslipidemia  Goal:	stable  Instructions for follow-up, activity and diet:	continue statin  maintain proper diet and exercise  Secondary Diagnosis:	Hemorrhoid  Goal:	stable  Instructions for follow-up, activity and diet:	monitor for bloody bowel movements  Secondary Diagnosis:	Hypertension  Instructions for follow-up, activity and diet:	continue IMDUR, toprol, and amlodipine-benzepril 5-10mg  monitor BP  DASH Diet

## 2017-07-25 NOTE — H&P ADULT - NSHPSOCIALHISTORY_GEN_ALL_CORE
Former smoker > 30 years ago. Deacno does not remember for how long or how many packs per day  Denies ETOH use  Current Marijuana USe   Lives at home [place]. Uses cane to ambulate

## 2017-07-25 NOTE — H&P ADULT - NSHPREVIEWOFSYSTEMS_GEN_ALL_CORE
Constitutional: denies fever, chills, diaphoresis   HEENT: denies blurry vision, difficulty hearing  Respiratory: denies SOB, BENJAMIN, cough, sputum production, wheezing, hemoptysis  Cardiovascular: denies CP, palpitations, edema  Gastrointestinal: denies nausea, vomiting, diarrhea, constipation, abdominal pain, melena, hematochezia   Genitourinary: denies dysuria, frequency, urgency, hematuria   Skin/Breast: denies rash, itching  Musculoskeletal: denies myalgias, joint swelling, muscle weakness  Neurologic: denies headache, weakness, dizziness, paresthesias, numbness/tingling  Psychiatric: denies feeling anxious, depressed, suicidal, homicidal thoughts  Hematology/Oncology: denies bruising, tender or enlarged lymph nodes   ROS negative except as noted above Constitutional: denies fever, chills, diaphoresis   HEENT: admits decreased vision in right eye from cataracts, difficulty hearing, admits  Respiratory: admits BENJAMIN with 5 steps, admits chronic cough unchanged denies SOB, sputum production, wheezing, hemoptysis  Cardiovascular: denies CP currently, palpitations, edema  Gastrointestinal: admits dark stools at times with iron use. Admits some right red blood from hemorrhoid. admits to trouble with swallowing at times denies nausea, vomiting, diarrhea, constipation, abdominal pain, melena,   Genitourinary: denies dysuria, frequency, urgency, hematuria   Musculoskeletal: denies myalgias, joint swelling, muscle weakness  Neurologic: admits some dizziness and weakness denies headache, paresthesias, numbness/tingling  Endo: patient states that she gets hot easily  ROS negative except as noted above Constitutional: denies fever, chills, diaphoresis   HEENT: admits decreased vision in right eye from cataracts, difficulty hearing, admits  Respiratory: admits BENJAMIN with 5 steps, admits chronic cough unchanged denies SOB, sputum production, wheezing, hemoptysis  Cardiovascular: denies CP currently, palpitations, edema  Gastrointestinal: admits dark stools at times with iron use. Admits intermittent right red blood from hemorrhoid. admits to trouble with swallowing at times denies nausea, vomiting, diarrhea, constipation, abdominal pain, melena,   Genitourinary: denies dysuria, frequency, urgency, hematuria   Musculoskeletal: denies myalgias, joint swelling, muscle weakness  Neurologic: admits some dizziness and weakness denies headache, paresthesias, numbness/tingling  Endo: patient states that she gets hot easily  ROS negative except as noted above

## 2017-07-25 NOTE — CONSULT NOTE ADULT - SUBJECTIVE AND OBJECTIVE BOX
CHIEF COMPLAINT: Patient is a 84y old  Female who presents with a chief complaint of     HPI:  83 year old woman with a history of known CAD s/p PCI to the LAD and LCx with BJORN, last in 2014,  s/p POBA to pLAD, D1, and brachytherapy to OM1 in 4/2017,PVD s/p LE PCI, hypertension, hyperlipidemia, carotid artery disease who presents to the ED with complaints of chest pain. Chest pain  started 3 days ago when she was sitting.  Pain is located in the left sternum, constant, stabbing, non-radiating and rates at 5/10 at onset and 0/10 currently. Patient tried nitropatch which did not relieve the pain. It was intermittent and then yesterday became constant. Patient states that she does have an esophageal problem for which she gets frequent dilation and is pending a repeat EGD soon.       EKG: SR, ASWMI    REVIEW OF SYSTEMS:   All other review of systems are negative unless indicated above    PAST MEDICAL & SURGICAL HISTORY:  High cholesterol  Stress incontinence in female  Rectocele  Diverticulosis  Hiatal hernia  Hypertension  Hypothyroidism  Dyslipidemia  CAD (coronary artery disease)  Carpal tunnel syndrome of right wrist  Cardiac pacemaker  Stented coronary artery: 12 heart stents,   1 left leg stents  H/O carotid endarterectomy: right  History of hysterectomy  History of breast reconstruction  Bilateral cataracts: surgery  Status post total knee replacement: right      SOCIAL HISTORY:  No tobacco, ethanol, or drug abuse.    FAMILY HISTORY:  No pertinent family history in first degree relatives    No family history of acute MI or sudden cardiac death.    Home meds reviewed      Allergies    No Known Drug Allergies  Originally Entered as [Unknown see Comment: pt unable to remember] reaction to [Mold] (Unknown)  stolazine eye med- pt doesn&#x27;t remember (Unknown)    Intolerances            VITAL SIGNS:   Vital Signs Last 24 Hrs  T(C): 37.1 (25 Jul 2017 18:06), Max: 37.1 (25 Jul 2017 18:06)  T(F): 98.7 (25 Jul 2017 18:06), Max: 98.7 (25 Jul 2017 18:06)  HR: 85 (25 Jul 2017 18:06) (70 - 85)  BP: 161/66 (25 Jul 2017 18:06) (161/66 - 163/85)  BP(mean): --  RR: 19 (25 Jul 2017 18:06) (14 - 19)  SpO2: 97% (25 Jul 2017 18:06) (96% - 97%)    I&O's Summary      On Exam:     Constitutional: NAD, awake and alert, well-developed  HEENT: Moist Mucous Membranes, Anicteric  Pulmonary: Non-labored, breath sounds are clear bilaterally, No wheezing, rales or rhonchi  Cardiovascular: Regular, S1 and S2, No murmurs, rubs, gallops or clicks  Gastrointestinal: Bowel Sounds present, soft, nontender.   Lymph: No peripheral edema. No lymphadenopathy.  Skin: No visible rashes or ulcers.  Psych:  Mood & affect appropriate    LABS: All Labs Reviewed:                        14.8   7.1   )-----------( 242      ( 25 Jul 2017 14:35 )             44.6     25 Jul 2017 14:35    142    |  109    |  16     ----------------------------<  93     4.4     |  29     |  0.81     Ca    8.9        25 Jul 2017 14:35    TPro  6.9    /  Alb  4.0    /  TBili  0.3    /  DBili  x      /  AST  18     /  ALT  19     /  AlkPhos  78     25 Jul 2017 14:35    PT/INR - ( 25 Jul 2017 14:35 )   PT: 11.3 sec;   INR: 1.04 ratio         PTT - ( 25 Jul 2017 14:35 )  PTT:28.5 sec  CARDIAC MARKERS ( 25 Jul 2017 14:35 )  <.015 ng/mL / x     / 50 U/L / x     / 0.9 ng/mL      Blood Culture:   07-25 @ 14:35  Pro Bnp 482        RADIOLOGY:  < from: Xray Chest 1 View AP/PA (07.25.17 @ 14:56) >    EXAM:  CHEST 1 VIEW                            PROCEDURE DATE:  07/25/2017          INTERPRETATION:  AP semierect chest on July 25 at 2:47 PM. Patient has   left-sided chest pain since yesterday.    Heart is magnified by technique. Left-sided pacemaker and left coronary   stents again noted. Large hiatal hernia again noted.    The lung fields and pleural surfaces are unremarkable.    The chest is similar to April 10 of this year.    IMPRESSION: Pacemaker, hiatal hernia, and coronary stenting again noted.                KEVIN ALVARES M.D., ATTENDING RADIOLOGIST  This document has been electronically signed. Jul 25 2017  3:20PM                < end of copied text >

## 2017-07-25 NOTE — H&P ADULT - HISTORY OF PRESENT ILLNESS
83 yo female with pmhx of htn, CAD, arthritis, diverticulosis, hypothyroidism, with pacemaker and cardiac stents, presented to with sudden onset of non radiating chest pressure at rest & exertion relieved with nitro patch, trop neg, seen & evaluated by Dr subramanian & transferred to [place] for cardiac cath. 83 yo female with pmhx of htn, CAD, arthritis, diverticulosis, hypothyroidism, with pacemaker and cardiac stents, presented to with chest pain that started 3 days ago when she was sitting that worsened yesterday. Pain is located in the left sternum, constant, stabbing, non-radiating and rates at 5/10 at onset and 0/10 currently. Patient tried nitropatch which did not relieve the pain. Pain is unchanged by eating, activity and rest.   Patient states that she does have an esophageal problem for which she gets frequent dilation 85 yo female with pmhx of htn, CAD, arthritis, diverticulosis, hypothyroidism, with pacemaker and cardiac stents, presented to with chest pain that started 3 days ago when she was sitting that worsened yesterday. Pain is located in the left sternum, constant, stabbing, non-radiating and rates at 5/10 at onset and 0/10 currently. Patient tried nitropatch which did not relieve the pain. Pain is unchanged by eating, activity and rest.   Patient states that she does have an esophageal problem for which she gets frequent dilation. She is scheduled to have an endoscopy with possible dilation on monday for which she has stopped taking her plavix.  In the ED:  /85 HR 70 RR 14 Temp 98F O2 96% on RA  Cardiac enzymes (-)  CXR negative for acute process. Pacemaker, hiatal hernia, and coronary stenting noted.  When patient walked to the bathroom in the ED, noticed chest pressure that remitted once she sat down in bed again

## 2017-07-25 NOTE — DISCHARGE NOTE ADULT - CARE PROVIDER_API CALL
Ector Welsh), Cardiovascular Disease  [address] [place] [address]  Phone: [phone]  Fax: [phone]    Fermin Cardona), Internal Medicine  [address]  [place] [address]  Phone: [phone]  Fax: [phone]    Alejandro Vargas), Gastroenterology  [address] [facility] [address]  [place] [address]  Phone: [phone]  Fax: [phone]

## 2017-07-25 NOTE — H&P ADULT - PROBLEM SELECTOR PLAN 1
unlikely original pain of cardiac origin. Patients symptoms currently resolved. however patient chest pressure on exertion noted in ED   EKG negative for ischemic changes  Cardiac Enzymes negative x1. continue to trend troponins  likely esophageal source. Due for endoscopy with possible dilation on Monday  f/u GI  monitor for  further chest pain. Admit to Tele  f/u cardiology unlikely original pain of cardiac origin. Patients symptoms currently resolved. however patient chest pressure on exertion noted in ED   EKG negative for ischemic changes  Cardiac Enzymes negative x1. continue to trend troponins  likely esophageal source. Due for endoscopy with possible dilation on Monday  f/u GI  monitor for  further chest pain. Admit to Tele  f/u cardiology: f/u echo

## 2017-07-25 NOTE — H&P ADULT - PROBLEM SELECTOR PLAN 2
DASH diet as tolerated. (Patient was able to tolerate food in the ED)  Patient for outpatient procedure on monday  hold plavix for now for procedure DASH diet as tolerated. (Patient was able to tolerate food in the ED)  Patient for outpatient procedure on monday  hold plavix for now for procedure  calcium channel blocker for pain (amlodipine)  speech and swallow eval DASH diet as tolerated. (Patient was able to tolerate food in the ED)  Patient for outpatient procedure on monday  hold plavix for now for procedure  calcium channel blocker for pain (amlodipine)  speech and swallow eval  aspiration precautions

## 2017-07-25 NOTE — DISCHARGE NOTE ADULT - PLAN OF CARE
atypical hold on plavix , for out pt gi procedure guille monday procedure by dr romo hold plavix for outpaitent procedure on Monday  continue asprin continue statin  maintain proper diet and exercise stable monitor for bloody bowel movements continue IMDUR, toprol, and amlodipine-benzepril 5-10mg  monitor BP  DASH Diet follow up with gastroenterology appointment with Dr. Vargas  Follow up with Cardiologist (Dr. Adair). Possible Echo as outpatient  monitor for further chest pain follow up with gastroenterology appointment with Dr. Vargas  Hold plavix for Procedure  Diet as tolerated follow up with gastroenterology appointment with Dr. Vargas  Follow up with Cardiologist (Dr. Adair). Possible Echo as outpatient

## 2017-07-25 NOTE — ED PROVIDER NOTE - OBJECTIVE STATEMENT
pt An 85 y/o woman with a hx of CAD (s/p 12 stents) presents with chest pain x 2 days. + pressure like chest pain. No nausea/vomiting. No dyspnea. No abdominal pain. No back pain. No weakness. No dizziness. No syncope. No palpitations. No other complaints.  Of note, patient is scheduled for an endoscopy next week and until the procedure, holding off on plavix. An 85 y/o woman with a hx of CAD (s/p 12 stents), hypothyroidism, ppm, presents with left sided chest pain x 2 days. + pressure like chest pain. Nonradiating. Moderate. Intermittent. Not present at this time. No nausea/vomiting. No dyspnea. No abdominal pain. No back pain. No weakness. No dizziness. No syncope. No palpitations. No other complaints.  Of note, patient is scheduled for an endoscopy next week for dilation of a esophageal abnormality and until the procedure, holding off on plavix. Cardiologist- Dr. Welsh.

## 2017-07-25 NOTE — ED PROVIDER NOTE - CHPI ED SYMPTOMS NEG
no diaphoresis/no back pain/no vomiting/no syncope/no shortness of breath/no fever/no chills/no nausea/no dizziness/no cough

## 2017-07-26 ENCOUNTER — OTHER (OUTPATIENT)
Age: 82
End: 2017-07-26

## 2017-07-26 VITALS — SYSTOLIC BLOOD PRESSURE: 110 MMHG | DIASTOLIC BLOOD PRESSURE: 70 MMHG

## 2017-07-26 LAB
ANION GAP SERPL CALC-SCNC: 6 MMOL/L — SIGNIFICANT CHANGE UP (ref 5–17)
BUN SERPL-MCNC: 14 MG/DL — SIGNIFICANT CHANGE UP (ref 7–23)
CALCIUM SERPL-MCNC: 8.9 MG/DL — SIGNIFICANT CHANGE UP (ref 8.5–10.1)
CHLORIDE SERPL-SCNC: 109 MMOL/L — HIGH (ref 96–108)
CO2 SERPL-SCNC: 28 MMOL/L — SIGNIFICANT CHANGE UP (ref 22–31)
CREAT SERPL-MCNC: 0.78 MG/DL — SIGNIFICANT CHANGE UP (ref 0.5–1.3)
GLUCOSE SERPL-MCNC: 99 MG/DL — SIGNIFICANT CHANGE UP (ref 70–99)
HCT VFR BLD CALC: 43 % — SIGNIFICANT CHANGE UP (ref 34.5–45)
HGB BLD-MCNC: 14.4 G/DL — SIGNIFICANT CHANGE UP (ref 11.5–15.5)
MAGNESIUM SERPL-MCNC: 2.2 MG/DL — SIGNIFICANT CHANGE UP (ref 1.6–2.6)
MCHC RBC-ENTMCNC: 30.1 PG — SIGNIFICANT CHANGE UP (ref 27–34)
MCHC RBC-ENTMCNC: 33.5 GM/DL — SIGNIFICANT CHANGE UP (ref 32–36)
MCV RBC AUTO: 89.9 FL — SIGNIFICANT CHANGE UP (ref 80–100)
PLATELET # BLD AUTO: 236 K/UL — SIGNIFICANT CHANGE UP (ref 150–400)
POTASSIUM SERPL-MCNC: 4.1 MMOL/L — SIGNIFICANT CHANGE UP (ref 3.5–5.3)
POTASSIUM SERPL-SCNC: 4.1 MMOL/L — SIGNIFICANT CHANGE UP (ref 3.5–5.3)
RBC # BLD: 4.78 M/UL — SIGNIFICANT CHANGE UP (ref 3.8–5.2)
RBC # FLD: 11.9 % — SIGNIFICANT CHANGE UP (ref 10.3–14.5)
SODIUM SERPL-SCNC: 143 MMOL/L — SIGNIFICANT CHANGE UP (ref 135–145)
TROPONIN I SERPL-MCNC: <.015 NG/ML — SIGNIFICANT CHANGE UP (ref 0.01–0.04)
WBC # BLD: 7.5 K/UL — SIGNIFICANT CHANGE UP (ref 3.8–10.5)
WBC # FLD AUTO: 7.5 K/UL — SIGNIFICANT CHANGE UP (ref 3.8–10.5)

## 2017-07-26 PROCEDURE — 84443 ASSAY THYROID STIM HORMONE: CPT

## 2017-07-26 PROCEDURE — 97161 PT EVAL LOW COMPLEX 20 MIN: CPT

## 2017-07-26 PROCEDURE — 71045 X-RAY EXAM CHEST 1 VIEW: CPT

## 2017-07-26 PROCEDURE — 96372 THER/PROPH/DIAG INJ SC/IM: CPT

## 2017-07-26 PROCEDURE — 80053 COMPREHEN METABOLIC PANEL: CPT

## 2017-07-26 PROCEDURE — 83735 ASSAY OF MAGNESIUM: CPT

## 2017-07-26 PROCEDURE — 99285 EMERGENCY DEPT VISIT HI MDM: CPT | Mod: 25

## 2017-07-26 PROCEDURE — 93010 ELECTROCARDIOGRAM REPORT: CPT

## 2017-07-26 PROCEDURE — 93005 ELECTROCARDIOGRAM TRACING: CPT

## 2017-07-26 PROCEDURE — 82553 CREATINE MB FRACTION: CPT

## 2017-07-26 PROCEDURE — 99232 SBSQ HOSP IP/OBS MODERATE 35: CPT

## 2017-07-26 PROCEDURE — G8980: CPT | Mod: CH

## 2017-07-26 PROCEDURE — G8978: CPT | Mod: CH

## 2017-07-26 PROCEDURE — 85610 PROTHROMBIN TIME: CPT

## 2017-07-26 PROCEDURE — 85730 THROMBOPLASTIN TIME PARTIAL: CPT

## 2017-07-26 PROCEDURE — 85027 COMPLETE CBC AUTOMATED: CPT

## 2017-07-26 PROCEDURE — 83880 ASSAY OF NATRIURETIC PEPTIDE: CPT

## 2017-07-26 PROCEDURE — 80048 BASIC METABOLIC PNL TOTAL CA: CPT

## 2017-07-26 PROCEDURE — 99239 HOSP IP/OBS DSCHRG MGMT >30: CPT

## 2017-07-26 PROCEDURE — G8979: CPT | Mod: CH

## 2017-07-26 PROCEDURE — 84484 ASSAY OF TROPONIN QUANT: CPT

## 2017-07-26 PROCEDURE — 82550 ASSAY OF CK (CPK): CPT

## 2017-07-26 RX ORDER — ISOSORBIDE MONONITRATE 60 MG/1
1 TABLET, EXTENDED RELEASE ORAL
Qty: 0 | Refills: 0 | COMMUNITY
Start: 2017-07-26

## 2017-07-26 RX ORDER — ZOLPIDEM TARTRATE 10 MG/1
5 TABLET ORAL AT BEDTIME
Qty: 0 | Refills: 0 | Status: DISCONTINUED | OUTPATIENT
Start: 2017-07-26 | End: 2017-07-26

## 2017-07-26 RX ORDER — ISOSORBIDE MONONITRATE 60 MG/1
30 TABLET, EXTENDED RELEASE ORAL
Qty: 0 | Refills: 0 | COMMUNITY

## 2017-07-26 RX ADMIN — Medication 200 MILLIGRAM(S): at 05:41

## 2017-07-26 RX ADMIN — Medication 325 MILLIGRAM(S): at 07:47

## 2017-07-26 RX ADMIN — ZOLPIDEM TARTRATE 5 MILLIGRAM(S): 10 TABLET ORAL at 01:21

## 2017-07-26 RX ADMIN — AMLODIPINE BESYLATE 10 MILLIGRAM(S): 2.5 TABLET ORAL at 05:41

## 2017-07-26 RX ADMIN — ISOSORBIDE MONONITRATE 30 MILLIGRAM(S): 60 TABLET, EXTENDED RELEASE ORAL at 11:48

## 2017-07-26 RX ADMIN — LISINOPRIL 10 MILLIGRAM(S): 2.5 TABLET ORAL at 05:41

## 2017-07-26 RX ADMIN — Medication 81 MILLIGRAM(S): at 11:48

## 2017-07-26 NOTE — SWALLOW BEDSIDE ASSESSMENT ADULT - SWALLOW EVAL: RECOMMENDED FEEDING/EATING TECHNIQUES
small sips/bites/allow for swallow between intakes/maintain upright posture during/after eating for 30 mins/oral hygiene/crush medication (when feasible)/alternate food with liquid/hard swallow w/ each bite or sip

## 2017-07-26 NOTE — SWALLOW BEDSIDE ASSESSMENT ADULT - ASR SWALLOW ASPIRATION MONITOR
oral hygiene/change of breathing pattern/fever/gurgly voice/pneumonia/position upright (90Y)/cough/throat clearing/upper respiratory infection

## 2017-07-26 NOTE — PROGRESS NOTE ADULT - SUBJECTIVE AND OBJECTIVE BOX
[facility]    Lore Woo, Keanu, Ramirez, Bello, Jose Luis, Tolu      [phone]    CHIEF COMPLAINT: Patient is a 84y old  Female who presents with a chief complaint of chest pain (25 Jul 2017 19:57)      Follow Up: cad, pvd, cp with neg ce    Interim history: no further cp. no sob.    MEDICATIONS  (STANDING):  aspirin enteric coated 81 milliGRAM(s) Oral daily  isosorbide   mononitrate ER Tablet (IMDUR) 30 milliGRAM(s) Oral daily  simvastatin 20 milliGRAM(s) Oral at bedtime  metoprolol succinate  milliGRAM(s) Oral daily  ferrous    sulfate 325 milliGRAM(s) Oral two times a day with meals  lisinopril 10 milliGRAM(s) Oral daily  amLODIPine   Tablet 10 milliGRAM(s) Oral daily  enoxaparin Injectable 40 milliGRAM(s) SubCutaneous every 24 hours    MEDICATIONS  (PRN):  traZODone 50 milliGRAM(s) Oral daily PRN insomnia  docusate sodium 100 milliGRAM(s) Oral two times a day PRN Constipation  diphenhydrAMINE   Capsule 25 milliGRAM(s) Oral once PRN Insomnia  zolpidem 5 milliGRAM(s) Oral at bedtime PRN Insomnia      REVIEW OF SYSTEMS:  eye, ent, GI, , allergic, dermatologic, musculoskeletal and neurologic are negative except as described above    Vital Signs Last 24 Hrs  T(C): 36.7 (26 Jul 2017 08:00), Max: 37.1 (25 Jul 2017 18:06)  T(F): 98 (26 Jul 2017 08:00), Max: 98.7 (25 Jul 2017 18:06)  HR: 64 (26 Jul 2017 08:00) (64 - 85)  BP: 159/79 (26 Jul 2017 08:00) (135/52 - 163/85)  BP(mean): --  RR: 17 (26 Jul 2017 08:00) (14 - 19)  SpO2: 97% (26 Jul 2017 08:00) (95% - 97%)    I&O's Summary      Telemetry past 24h: sr, occ ap    PHYSICAL EXAM:    Constitutional: well-nourished, well-developed, NAD   HEENT:  MMM, sclerae anicteric, conjunctivae clear, no oral cyanosis.  Pulmonary: Non-labored, breath sounds are clear bilaterally, No wheezing, rales or rhonchi  Cardiovascular: Regular, S1 and S2.  No murmur.  No rubs, gallops or clicks  Gastrointestinal: Bowel Sounds present, soft, nontender.   Lymph: No peripheral edema.   Neurological: Alert, no focal deficits  Skin: No rashes.  Psych:  Mood & affect appropriate    LABS: All Labs Reviewed:                        14.4   7.5   )-----------( 236      ( 26 Jul 2017 06:12 )             43.0                         14.8   7.1   )-----------( 242      ( 25 Jul 2017 14:35 )             44.6     26 Jul 2017 06:12    143    |  109    |  14     ----------------------------<  99     4.1     |  28     |  0.78   25 Jul 2017 14:35    142    |  109    |  16     ----------------------------<  93     4.4     |  29     |  0.81     Ca    8.9        26 Jul 2017 06:12  Ca    8.9        25 Jul 2017 14:35  Mg     2.2       26 Jul 2017 06:12    TPro  6.9    /  Alb  4.0    /  TBili  0.3    /  DBili  x      /  AST  18     /  ALT  19     /  AlkPhos  78     25 Jul 2017 14:35    PT/INR - ( 25 Jul 2017 14:35 )   PT: 11.3 sec;   INR: 1.04 ratio         PTT - ( 25 Jul 2017 14:35 )  PTT:28.5 sec  CARDIAC MARKERS ( 26 Jul 2017 02:06 )  <.015 ng/mL / x     / x     / x     / x      CARDIAC MARKERS ( 25 Jul 2017 20:39 )  <.015 ng/mL / x     / x     / x     / x      CARDIAC MARKERS ( 25 Jul 2017 14:35 )  <.015 ng/mL / x     / 50 U/L / x     / 0.9 ng/mL      Blood Culture:   07-25 @ 14:35  Pro Bnp 482    07-26 @ 06:12  TSH: 2.12      RADIOLOGY:    EKG:    Echo:

## 2017-07-26 NOTE — SWALLOW BEDSIDE ASSESSMENT ADULT - COMMENTS
Pt A+Ox4, cooperative.  Pt reported hx of esophageal dysphagia which she received esophageal dilation for.  Pt reported she is due for dilation this coming Monday.  She reports pharyngeal stasis occasionally.  This will most likely resolve when pt receives dilation.  Pt does not presents with oropharyngeal dysphagia at this time and is safely tolerating regular consistencies with thin liquids.  Safe swallow strategies (i.e. small bites/sips and alternating liquids with solids) reviewed with pt.

## 2017-07-26 NOTE — PHYSICAL THERAPY INITIAL EVALUATION ADULT - SPECIFY REASON(S)
Pt is functionally independent and requires no skilled physical therapy needs. Will discharge from physical therapy secondary to patient independent

## 2017-07-26 NOTE — PROGRESS NOTE ADULT - ASSESSMENT
83 year old woman with a history of known CAD s/p PCI to the LAD and LCx with BJORN, last in 2014,  s/p POBA to pLAD, D1, and brachytherapy to OM1 in 4/2017,PVD s/p LE PCI, hypertension, hyperlipidemia, carotid artery disease who presents to the ED with complaints of chest pain.    - CP is seemingly non-cardiac. No signs of significant ischemia or volume overload. EKG without ischemic changes, and ce are neg despite prolonged sxs  - ?if secondary to GI issue. is already planned for egd in 5d  - Cont ASA.  Was told by her cardiologist to hold plavix, this will be confirmed by housestaff  - Cont bb, ccb, ace, ismn  - Monitor and replete electrolytes. Keep K>4.0 and Mg>2.0.  - Further cardiac workup will depend on clinical course.   - All other workup and dec planning per primary team. Will follow

## 2017-07-26 NOTE — PHYSICAL THERAPY INITIAL EVALUATION ADULT - DISCHARGE DISPOSITION, PT EVAL
no skilled PT needs/home/Pt is functionally independent and requires no skilled physical therapy needs. Will discharge from physical therapy secondary to patient independent

## 2017-07-27 ENCOUNTER — OTHER (OUTPATIENT)
Age: 82
End: 2017-07-27

## 2017-07-28 ENCOUNTER — OTHER (OUTPATIENT)
Age: 82
End: 2017-07-28

## 2017-07-28 ENCOUNTER — APPOINTMENT (OUTPATIENT)
Dept: GASTROENTEROLOGY | Facility: CLINIC | Age: 82
End: 2017-07-28

## 2017-07-31 ENCOUNTER — OTHER (OUTPATIENT)
Age: 82
End: 2017-07-31

## 2017-07-31 ENCOUNTER — APPOINTMENT (OUTPATIENT)
Dept: GASTROENTEROLOGY | Facility: HOSPITAL | Age: 82
End: 2017-07-31

## 2017-08-01 DIAGNOSIS — N39.3 STRESS INCONTINENCE (FEMALE) (MALE): ICD-10-CM

## 2017-08-01 DIAGNOSIS — K64.9 UNSPECIFIED HEMORRHOIDS: ICD-10-CM

## 2017-08-01 DIAGNOSIS — I25.10 ATHEROSCLEROTIC HEART DISEASE OF NATIVE CORONARY ARTERY WITHOUT ANGINA PECTORIS: ICD-10-CM

## 2017-08-01 DIAGNOSIS — K44.9 DIAPHRAGMATIC HERNIA WITHOUT OBSTRUCTION OR GANGRENE: ICD-10-CM

## 2017-08-01 DIAGNOSIS — I10 ESSENTIAL (PRIMARY) HYPERTENSION: ICD-10-CM

## 2017-08-01 DIAGNOSIS — E78.5 HYPERLIPIDEMIA, UNSPECIFIED: ICD-10-CM

## 2017-08-01 DIAGNOSIS — N81.6 RECTOCELE: ICD-10-CM

## 2017-08-01 DIAGNOSIS — Z95.0 PRESENCE OF CARDIAC PACEMAKER: ICD-10-CM

## 2017-08-01 DIAGNOSIS — Z87.891 PERSONAL HISTORY OF NICOTINE DEPENDENCE: ICD-10-CM

## 2017-08-01 DIAGNOSIS — M19.90 UNSPECIFIED OSTEOARTHRITIS, UNSPECIFIED SITE: ICD-10-CM

## 2017-08-01 DIAGNOSIS — K57.90 DIVERTICULOSIS OF INTESTINE, PART UNSPECIFIED, WITHOUT PERFORATION OR ABSCESS WITHOUT BLEEDING: ICD-10-CM

## 2017-08-01 DIAGNOSIS — Z95.1 PRESENCE OF AORTOCORONARY BYPASS GRAFT: ICD-10-CM

## 2017-08-01 DIAGNOSIS — E03.9 HYPOTHYROIDISM, UNSPECIFIED: ICD-10-CM

## 2017-08-01 DIAGNOSIS — R07.89 OTHER CHEST PAIN: ICD-10-CM

## 2017-08-02 ENCOUNTER — RECORD ABSTRACTING (OUTPATIENT)
Age: 82
End: 2017-08-02

## 2017-08-02 ENCOUNTER — APPOINTMENT (OUTPATIENT)
Dept: CARDIOLOGY | Facility: CLINIC | Age: 82
End: 2017-08-02
Payer: MEDICARE

## 2017-08-02 ENCOUNTER — NON-APPOINTMENT (OUTPATIENT)
Age: 82
End: 2017-08-02

## 2017-08-02 VITALS
WEIGHT: 146 LBS | OXYGEN SATURATION: 96 % | SYSTOLIC BLOOD PRESSURE: 110 MMHG | HEART RATE: 79 BPM | BODY MASS INDEX: 29.43 KG/M2 | HEIGHT: 59 IN | DIASTOLIC BLOOD PRESSURE: 67 MMHG

## 2017-08-02 DIAGNOSIS — Z86.79 PERSONAL HISTORY OF OTHER DISEASES OF THE CIRCULATORY SYSTEM: ICD-10-CM

## 2017-08-02 DIAGNOSIS — Z92.3 PERSONAL HISTORY OF IRRADIATION: ICD-10-CM

## 2017-08-02 PROCEDURE — 99214 OFFICE O/P EST MOD 30 MIN: CPT | Mod: 25

## 2017-08-02 PROCEDURE — 93000 ELECTROCARDIOGRAM COMPLETE: CPT

## 2017-08-04 ENCOUNTER — APPOINTMENT (OUTPATIENT)
Dept: GASTROENTEROLOGY | Facility: HOSPITAL | Age: 82
End: 2017-08-04

## 2017-08-06 PROBLEM — Z92.3 HISTORY OF BRACHYTHERAPY: Status: RESOLVED | Noted: 2017-08-06 | Resolved: 2017-08-06

## 2017-08-06 PROBLEM — Z86.79 HISTORY OF CLASS II ANGINA PECTORIS: Status: RESOLVED | Noted: 2017-04-19 | Resolved: 2017-08-06

## 2017-08-07 ENCOUNTER — RESULT CHARGE (OUTPATIENT)
Age: 82
End: 2017-08-07

## 2017-08-15 ENCOUNTER — MEDICATION RENEWAL (OUTPATIENT)
Age: 82
End: 2017-08-15

## 2017-09-15 ENCOUNTER — APPOINTMENT (OUTPATIENT)
Dept: ELECTROPHYSIOLOGY | Facility: CLINIC | Age: 82
End: 2017-09-15

## 2017-09-26 ENCOUNTER — MEDICATION RENEWAL (OUTPATIENT)
Age: 82
End: 2017-09-26

## 2017-10-10 ENCOUNTER — OTHER (OUTPATIENT)
Age: 82
End: 2017-10-10

## 2017-10-23 ENCOUNTER — APPOINTMENT (OUTPATIENT)
Dept: UROLOGY | Facility: CLINIC | Age: 82
End: 2017-10-23
Payer: MEDICARE

## 2017-10-23 PROCEDURE — 99204 OFFICE O/P NEW MOD 45 MIN: CPT

## 2017-10-23 RX ORDER — SODIUM SULFATE, POTASSIUM SULFATE, MAGNESIUM SULFATE 17.5; 3.13; 1.6 G/ML; G/ML; G/ML
17.5-3.13-1.6 SOLUTION, CONCENTRATE ORAL
Qty: 1 | Refills: 0 | Status: COMPLETED | COMMUNITY
Start: 2017-06-22 | End: 2017-10-23

## 2017-11-08 ENCOUNTER — APPOINTMENT (OUTPATIENT)
Dept: CARDIOLOGY | Facility: CLINIC | Age: 82
End: 2017-11-08
Payer: MEDICARE

## 2017-11-08 ENCOUNTER — NON-APPOINTMENT (OUTPATIENT)
Age: 82
End: 2017-11-08

## 2017-11-08 VITALS
SYSTOLIC BLOOD PRESSURE: 164 MMHG | HEART RATE: 63 BPM | WEIGHT: 146 LBS | DIASTOLIC BLOOD PRESSURE: 82 MMHG | HEIGHT: 59 IN | BODY MASS INDEX: 29.43 KG/M2 | OXYGEN SATURATION: 96 %

## 2017-11-08 DIAGNOSIS — Z87.19 PERSONAL HISTORY OF OTHER DISEASES OF THE DIGESTIVE SYSTEM: ICD-10-CM

## 2017-11-08 DIAGNOSIS — N81.5 VAGINAL ENTEROCELE: ICD-10-CM

## 2017-11-08 DIAGNOSIS — N81.6 RECTOCELE: ICD-10-CM

## 2017-11-08 DIAGNOSIS — D50.0 IRON DEFICIENCY ANEMIA SECONDARY TO BLOOD LOSS (CHRONIC): ICD-10-CM

## 2017-11-08 DIAGNOSIS — Z87.898 PERSONAL HISTORY OF OTHER SPECIFIED CONDITIONS: ICD-10-CM

## 2017-11-08 DIAGNOSIS — Z12.11 ENCOUNTER FOR SCREENING FOR MALIGNANT NEOPLASM OF COLON: ICD-10-CM

## 2017-11-08 DIAGNOSIS — D50.8 OTHER IRON DEFICIENCY ANEMIAS: ICD-10-CM

## 2017-11-08 DIAGNOSIS — R39.198 OTHER DIFFICULTIES WITH MICTURITION: ICD-10-CM

## 2017-11-08 PROCEDURE — 93000 ELECTROCARDIOGRAM COMPLETE: CPT

## 2017-11-08 PROCEDURE — 99215 OFFICE O/P EST HI 40 MIN: CPT | Mod: 25

## 2017-11-08 RX ORDER — ISOSORBIDE MONONITRATE 30 MG/1
30 TABLET, EXTENDED RELEASE ORAL DAILY
Qty: 90 | Refills: 3 | Status: DISCONTINUED | COMMUNITY
Start: 2017-04-19 | End: 2017-11-08

## 2017-11-09 PROBLEM — Z87.19 HISTORY OF RECTAL BLEEDING: Status: RESOLVED | Noted: 2017-06-22 | Resolved: 2017-11-09

## 2017-11-09 PROBLEM — Z87.19 HISTORY OF DYSPHAGIA: Status: RESOLVED | Noted: 2017-06-22 | Resolved: 2017-11-09

## 2017-11-09 PROBLEM — R39.198 URINE STREAM SPRAYING: Status: RESOLVED | Noted: 2017-10-23 | Resolved: 2017-11-09

## 2017-11-09 PROBLEM — D50.8 OTHER IRON DEFICIENCY ANEMIA: Status: RESOLVED | Noted: 2017-06-22 | Resolved: 2017-11-09

## 2017-11-09 PROBLEM — D50.0 IRON DEFICIENCY ANEMIA DUE TO CHRONIC BLOOD LOSS: Status: RESOLVED | Noted: 2017-06-22 | Resolved: 2017-11-09

## 2017-11-09 PROBLEM — Z12.11 SCREEN FOR COLON CANCER: Status: RESOLVED | Noted: 2017-06-22 | Resolved: 2017-11-09

## 2017-11-09 PROBLEM — Z87.898 HISTORY OF HEARTBURN: Status: RESOLVED | Noted: 2017-06-22 | Resolved: 2017-11-09

## 2017-11-09 PROBLEM — N81.5 VAGINAL ENTEROCELE: Status: RESOLVED | Noted: 2017-10-23 | Resolved: 2017-11-09

## 2017-11-10 ENCOUNTER — OTHER (OUTPATIENT)
Age: 82
End: 2017-11-10

## 2017-11-13 ENCOUNTER — APPOINTMENT (OUTPATIENT)
Dept: UROLOGY | Facility: CLINIC | Age: 82
End: 2017-11-13

## 2017-11-29 ENCOUNTER — APPOINTMENT (OUTPATIENT)
Dept: CARDIOLOGY | Facility: CLINIC | Age: 82
End: 2017-11-29
Payer: MEDICARE

## 2017-11-29 PROCEDURE — 93280 PM DEVICE PROGR EVAL DUAL: CPT

## 2017-11-30 ENCOUNTER — OTHER (OUTPATIENT)
Age: 82
End: 2017-11-30

## 2017-12-01 ENCOUNTER — APPOINTMENT (OUTPATIENT)
Dept: CARDIOLOGY | Facility: CLINIC | Age: 82
End: 2017-12-01
Payer: MEDICARE

## 2017-12-01 PROCEDURE — 93015 CV STRESS TEST SUPVJ I&R: CPT

## 2017-12-01 PROCEDURE — 78452 HT MUSCLE IMAGE SPECT MULT: CPT

## 2017-12-01 PROCEDURE — A9500: CPT

## 2017-12-06 ENCOUNTER — NON-APPOINTMENT (OUTPATIENT)
Age: 82
End: 2017-12-06

## 2017-12-06 ENCOUNTER — APPOINTMENT (OUTPATIENT)
Dept: CARDIOLOGY | Facility: CLINIC | Age: 82
End: 2017-12-06
Payer: MEDICARE

## 2017-12-06 VITALS — SYSTOLIC BLOOD PRESSURE: 150 MMHG | DIASTOLIC BLOOD PRESSURE: 80 MMHG

## 2017-12-06 VITALS
BODY MASS INDEX: 33.79 KG/M2 | HEART RATE: 67 BPM | HEIGHT: 55 IN | WEIGHT: 146 LBS | SYSTOLIC BLOOD PRESSURE: 146 MMHG | OXYGEN SATURATION: 98 % | DIASTOLIC BLOOD PRESSURE: 86 MMHG

## 2017-12-06 PROCEDURE — 99214 OFFICE O/P EST MOD 30 MIN: CPT | Mod: 25

## 2017-12-06 PROCEDURE — 93000 ELECTROCARDIOGRAM COMPLETE: CPT

## 2017-12-08 ENCOUNTER — MEDICATION RENEWAL (OUTPATIENT)
Age: 82
End: 2017-12-08

## 2017-12-12 ENCOUNTER — MEDICATION RENEWAL (OUTPATIENT)
Age: 82
End: 2017-12-12

## 2017-12-15 ENCOUNTER — OUTPATIENT (OUTPATIENT)
Dept: OUTPATIENT SERVICES | Facility: HOSPITAL | Age: 82
LOS: 1 days | End: 2017-12-15
Payer: MEDICARE

## 2017-12-15 DIAGNOSIS — Z90.710 ACQUIRED ABSENCE OF BOTH CERVIX AND UTERUS: Chronic | ICD-10-CM

## 2017-12-15 DIAGNOSIS — I10 ESSENTIAL (PRIMARY) HYPERTENSION: ICD-10-CM

## 2017-12-15 DIAGNOSIS — R78.9 FINDING OF UNSPECIFIED SUBSTANCE, NOT NORMALLY FOUND IN BLOOD: ICD-10-CM

## 2017-12-15 DIAGNOSIS — Z98.82 BREAST IMPLANT STATUS: Chronic | ICD-10-CM

## 2017-12-15 DIAGNOSIS — Z95.5 PRESENCE OF CORONARY ANGIOPLASTY IMPLANT AND GRAFT: Chronic | ICD-10-CM

## 2017-12-15 DIAGNOSIS — Z96.659 PRESENCE OF UNSPECIFIED ARTIFICIAL KNEE JOINT: Chronic | ICD-10-CM

## 2017-12-15 DIAGNOSIS — Z00.00 ENCOUNTER FOR GENERAL ADULT MEDICAL EXAMINATION WITHOUT ABNORMAL FINDINGS: ICD-10-CM

## 2017-12-15 DIAGNOSIS — Z95.0 PRESENCE OF CARDIAC PACEMAKER: Chronic | ICD-10-CM

## 2017-12-15 DIAGNOSIS — R53.83 OTHER FATIGUE: ICD-10-CM

## 2017-12-15 DIAGNOSIS — Z98.89 OTHER SPECIFIED POSTPROCEDURAL STATES: Chronic | ICD-10-CM

## 2017-12-15 DIAGNOSIS — H26.9 UNSPECIFIED CATARACT: Chronic | ICD-10-CM

## 2017-12-15 DIAGNOSIS — G56.01 CARPAL TUNNEL SYNDROME, RIGHT UPPER LIMB: Chronic | ICD-10-CM

## 2017-12-15 DIAGNOSIS — I50.9 HEART FAILURE, UNSPECIFIED: ICD-10-CM

## 2017-12-15 DIAGNOSIS — R78.89 FINDING OF OTHER SPECIFIED SUBSTANCES, NOT NORMALLY FOUND IN BLOOD: ICD-10-CM

## 2017-12-15 LAB
ALBUMIN SERPL ELPH-MCNC: 4.2 G/DL — SIGNIFICANT CHANGE UP (ref 3.3–5)
ALP SERPL-CCNC: 87 U/L — SIGNIFICANT CHANGE UP (ref 30–120)
ALT FLD-CCNC: 30 U/L DA — SIGNIFICANT CHANGE UP (ref 10–60)
ANION GAP SERPL CALC-SCNC: 8 MMOL/L — SIGNIFICANT CHANGE UP (ref 5–17)
AST SERPL-CCNC: 20 U/L — SIGNIFICANT CHANGE UP (ref 10–40)
BASOPHILS # BLD AUTO: 0.1 K/UL — SIGNIFICANT CHANGE UP (ref 0–0.2)
BASOPHILS NFR BLD AUTO: 1.2 % — SIGNIFICANT CHANGE UP (ref 0–2)
BILIRUB SERPL-MCNC: 0.3 MG/DL — SIGNIFICANT CHANGE UP (ref 0.2–1.2)
BUN SERPL-MCNC: 19 MG/DL — SIGNIFICANT CHANGE UP (ref 7–23)
CALCIUM SERPL-MCNC: 9.5 MG/DL — SIGNIFICANT CHANGE UP (ref 8.4–10.5)
CHLORIDE SERPL-SCNC: 105 MMOL/L — SIGNIFICANT CHANGE UP (ref 96–108)
CO2 SERPL-SCNC: 26 MMOL/L — SIGNIFICANT CHANGE UP (ref 22–31)
CREAT SERPL-MCNC: 0.8 MG/DL — SIGNIFICANT CHANGE UP (ref 0.5–1.3)
D DIMER BLD IA.RAPID-MCNC: 320 NG/ML DDU — HIGH
EOSINOPHIL # BLD AUTO: 0.2 K/UL — SIGNIFICANT CHANGE UP (ref 0–0.5)
EOSINOPHIL NFR BLD AUTO: 2.6 % — SIGNIFICANT CHANGE UP (ref 0–6)
GLUCOSE SERPL-MCNC: 103 MG/DL — HIGH (ref 70–99)
HCT VFR BLD CALC: 46.7 % — HIGH (ref 34.5–45)
HGB BLD-MCNC: 14.6 G/DL — SIGNIFICANT CHANGE UP (ref 11.5–15.5)
LYMPHOCYTES # BLD AUTO: 2 K/UL — SIGNIFICANT CHANGE UP (ref 1–3.3)
LYMPHOCYTES # BLD AUTO: 26.9 % — SIGNIFICANT CHANGE UP (ref 13–44)
MCHC RBC-ENTMCNC: 28.1 PG — SIGNIFICANT CHANGE UP (ref 27–34)
MCHC RBC-ENTMCNC: 31.3 GM/DL — LOW (ref 32–36)
MCV RBC AUTO: 89.8 FL — SIGNIFICANT CHANGE UP (ref 80–100)
MONOCYTES # BLD AUTO: 0.6 K/UL — SIGNIFICANT CHANGE UP (ref 0–0.9)
MONOCYTES NFR BLD AUTO: 8.3 % — SIGNIFICANT CHANGE UP (ref 2–14)
NEUTROPHILS # BLD AUTO: 4.5 K/UL — SIGNIFICANT CHANGE UP (ref 1.8–7.4)
NEUTROPHILS NFR BLD AUTO: 61 % — SIGNIFICANT CHANGE UP (ref 43–77)
NT-PROBNP SERPL-SCNC: 799 PG/ML — HIGH (ref 0–450)
PLATELET # BLD AUTO: 290 K/UL — SIGNIFICANT CHANGE UP (ref 150–400)
POTASSIUM SERPL-MCNC: 4.4 MMOL/L — SIGNIFICANT CHANGE UP (ref 3.5–5.3)
POTASSIUM SERPL-SCNC: 4.4 MMOL/L — SIGNIFICANT CHANGE UP (ref 3.5–5.3)
PROT SERPL-MCNC: 7.3 G/DL — SIGNIFICANT CHANGE UP (ref 6–8.3)
RBC # BLD: 5.2 M/UL — SIGNIFICANT CHANGE UP (ref 3.8–5.2)
RBC # FLD: 12.6 % — SIGNIFICANT CHANGE UP (ref 10.3–14.5)
SODIUM SERPL-SCNC: 139 MMOL/L — SIGNIFICANT CHANGE UP (ref 135–145)
WBC # BLD: 7.3 K/UL — SIGNIFICANT CHANGE UP (ref 3.8–10.5)
WBC # FLD AUTO: 7.3 K/UL — SIGNIFICANT CHANGE UP (ref 3.8–10.5)

## 2017-12-15 PROCEDURE — 82785 ASSAY OF IGE: CPT

## 2017-12-15 PROCEDURE — 80053 COMPREHEN METABOLIC PANEL: CPT

## 2017-12-15 PROCEDURE — 85379 FIBRIN DEGRADATION QUANT: CPT

## 2017-12-15 PROCEDURE — 36415 COLL VENOUS BLD VENIPUNCTURE: CPT

## 2017-12-15 PROCEDURE — 83880 ASSAY OF NATRIURETIC PEPTIDE: CPT

## 2017-12-15 PROCEDURE — 85027 COMPLETE CBC AUTOMATED: CPT

## 2017-12-15 PROCEDURE — 86003 ALLG SPEC IGE CRUDE XTRC EA: CPT

## 2017-12-17 LAB
ALLERGEN - JUNIPER (RED CEDAR) CLASS: 0 — SIGNIFICANT CHANGE UP
ALLERGEN - JUNIPER (RED CEDAR) CONC: <0.1 KUA/L — SIGNIFICANT CHANGE UP
ALLERGEN - ML SYCAMORE, LONDON PLANE: <0.1 KUA/L — SIGNIFICANT CHANGE UP
BERMUDA GRASS IGE QN: 0 — SIGNIFICANT CHANGE UP
BERMUDA GRASS IGE QN: <0.1 KUA/L — SIGNIFICANT CHANGE UP
BOXELDER/MAPLE CLASS: 0 — SIGNIFICANT CHANGE UP
CAT DANDER IGE QN: <0.1 KUA/L — SIGNIFICANT CHANGE UP
CLADOSPORIUM IGE QN: 0 — SIGNIFICANT CHANGE UP
CLADOSPORIUM IGE QN: <0.1 KUA/L — SIGNIFICANT CHANGE UP
CMN PIGWEED IGE QN: <0.1 KUA/L — SIGNIFICANT CHANGE UP
COMMON RAGWEED IGE QN: <0.1 KUA/L — SIGNIFICANT CHANGE UP
COTTONWOOD IGE QN: <0.1 KUA/L — SIGNIFICANT CHANGE UP
D FARINAE IGE QN: <0.1 KUA/L — SIGNIFICANT CHANGE UP
D PTERONYSS IGE QN: <0.1 KUA/L — SIGNIFICANT CHANGE UP
DEPRECATED A ALTERNATA IGE RAST QL: <0.1 KUA/L — SIGNIFICANT CHANGE UP
DEPRECATED A FUMIGATUS IGE RAST QL: 0 — SIGNIFICANT CHANGE UP
DEPRECATED ALTERNARIA IGE RAST QL: 0 — SIGNIFICANT CHANGE UP
DEPRECATED CAT DANDER IGE RAST QL: 0 — SIGNIFICANT CHANGE UP
DEPRECATED COMMON PIGWEED IGE RAST QL: 0 — SIGNIFICANT CHANGE UP
DEPRECATED COMMON RAGWEED IGE RAST QL: 0 — SIGNIFICANT CHANGE UP
DEPRECATED COTTONWOOD IGE RAST QL: 0 — SIGNIFICANT CHANGE UP
DEPRECATED D FARINAE IGE RAST QL: 0 — SIGNIFICANT CHANGE UP
DEPRECATED LONDON PLANE IGE RAST QL: 0 — SIGNIFICANT CHANGE UP
DEPRECATED MUGWORT IGE RAST QL: 0 — SIGNIFICANT CHANGE UP
DEPRECATED P NOTATUM IGE RAST QL: 0 — SIGNIFICANT CHANGE UP
DEPRECATED ROACH IGE RAST QL: 0 — SIGNIFICANT CHANGE UP
DEPRECATED TIMOTHY IGE RAST QL: 0 — SIGNIFICANT CHANGE UP
DEPRECATED WHITE ASH IGE RAST QL: 0 — SIGNIFICANT CHANGE UP
DOG DANDER IGE QN: 0 — SIGNIFICANT CHANGE UP
DOG DANDER IGE QN: <0.1 KUA/L — SIGNIFICANT CHANGE UP
DUST ALLERG MIX2 IGE RAST: 0 — SIGNIFICANT CHANGE UP
GOOSEFOOT IGE QN: <0.1 KUA/L — SIGNIFICANT CHANGE UP
MOLD ALLERG MIX A IGE QL: <0.1 KUA/L — SIGNIFICANT CHANGE UP
MUGWORT IGE QN: <0.1 KUA/L — SIGNIFICANT CHANGE UP
P NOTATUM IGE QN: <0.1 KUA/L — SIGNIFICANT CHANGE UP
ROACH IGE QN: <0.1 KUA/L — SIGNIFICANT CHANGE UP
SILVER BIRCH IGE QN: 0 — SIGNIFICANT CHANGE UP
SILVER BIRCH IGE QN: <0.1 KUA/L — SIGNIFICANT CHANGE UP
TIMOTHY IGE QN: <0.1 KUA/L — SIGNIFICANT CHANGE UP
TREE ALLERG MIX1 IGE QL: <0.1 KUA/L — SIGNIFICANT CHANGE UP
TREE ALLERG MIX1 IGE QN: <0.1 KUA/L — SIGNIFICANT CHANGE UP
TREE ALLERG MIX1 IGE RAST: 0 — SIGNIFICANT CHANGE UP
TREE ALLERG MIX1 IGE RAST: 0 — SIGNIFICANT CHANGE UP
TREE ALLERG MIX8 IGE QL: <0.1 KUA/L — SIGNIFICANT CHANGE UP
WEED ALLERG MIX1 IGE RAST: 0 — SIGNIFICANT CHANGE UP
WHITE ASH IGE QN: <0.1 KUA/L — SIGNIFICANT CHANGE UP

## 2017-12-18 ENCOUNTER — APPOINTMENT (OUTPATIENT)
Dept: UROLOGY | Facility: CLINIC | Age: 82
End: 2017-12-18
Payer: MEDICARE

## 2017-12-18 PROCEDURE — 99212 OFFICE O/P EST SF 10 MIN: CPT

## 2017-12-20 ENCOUNTER — NON-APPOINTMENT (OUTPATIENT)
Age: 82
End: 2017-12-20

## 2017-12-20 ENCOUNTER — APPOINTMENT (OUTPATIENT)
Dept: CARDIOLOGY | Facility: CLINIC | Age: 82
End: 2017-12-20
Payer: MEDICARE

## 2017-12-20 LAB
ALLERGEN - MULBERRY: SIGNIFICANT CHANGE UP
CALIF WALNUT IGE QN: SIGNIFICANT CHANGE UP
DEPRECATED CALIF WALNUT POLN IGE RAST QL: SIGNIFICANT CHANGE UP
DEPRECATED SHEEP SORREL IGE RAST QL: SIGNIFICANT CHANGE UP
MULBERRY CLASS: SIGNIFICANT CHANGE UP
SHEEP SORREL IGE QN: SIGNIFICANT CHANGE UP
TOTAL IGE SMQN RAST: SIGNIFICANT CHANGE UP

## 2017-12-20 PROCEDURE — 36415 COLL VENOUS BLD VENIPUNCTURE: CPT

## 2017-12-20 PROCEDURE — 93000 ELECTROCARDIOGRAM COMPLETE: CPT

## 2017-12-20 PROCEDURE — 81003 URINALYSIS AUTO W/O SCOPE: CPT | Mod: QW

## 2017-12-20 PROCEDURE — 99214 OFFICE O/P EST MOD 30 MIN: CPT | Mod: 25

## 2017-12-20 RX ORDER — NITROGLYCERIN 20 MG/1
0.1 PATCH TRANSDERMAL
Qty: 180 | Refills: 2 | Status: DISCONTINUED | COMMUNITY
Start: 2017-09-26 | End: 2017-12-20

## 2017-12-22 LAB
25(OH)D3 SERPL-MCNC: 24.6 NG/ML
ALBUMIN SERPL ELPH-MCNC: 4.2 G/DL
ALP BLD-CCNC: 74 U/L
ALT SERPL-CCNC: 15 U/L
ANION GAP SERPL CALC-SCNC: 14 MMOL/L
APPEARANCE: CLEAR
AST SERPL-CCNC: 24 U/L
BACTERIA UR CULT: NORMAL
BACTERIA: ABNORMAL
BASOPHILS # BLD AUTO: 0.04 K/UL
BASOPHILS NFR BLD AUTO: 0.5 %
BILIRUB SERPL-MCNC: 0.2 MG/DL
BILIRUBIN URINE: NEGATIVE
BLOOD URINE: NEGATIVE
BUN SERPL-MCNC: 22 MG/DL
CALCIUM SERPL-MCNC: 9.7 MG/DL
CHLORIDE SERPL-SCNC: 103 MMOL/L
CHOLEST SERPL-MCNC: 150 MG/DL
CHOLEST/HDLC SERPL: 3.6 RATIO
CK SERPL-CCNC: 44 U/L
CO2 SERPL-SCNC: 24 MMOL/L
COLOR: YELLOW
CREAT SERPL-MCNC: 0.72 MG/DL
EOSINOPHIL # BLD AUTO: 0.21 K/UL
EOSINOPHIL NFR BLD AUTO: 2.7 %
FOLATE SERPL-MCNC: >20 NG/ML
GLUCOSE QUALITATIVE U: NEGATIVE MG/DL
GLUCOSE SERPL-MCNC: 93 MG/DL
HBA1C MFR BLD HPLC: 5.6 %
HCT VFR BLD CALC: 39.5 %
HDLC SERPL-MCNC: 42 MG/DL
HGB BLD-MCNC: 12.7 G/DL
HYALINE CASTS: 5 /LPF
IMM GRANULOCYTES NFR BLD AUTO: 0.3 %
IRON SERPL-MCNC: 123 UG/DL
KETONES URINE: NEGATIVE
LDLC SERPL CALC-MCNC: 74 MG/DL
LEUKOCYTE ESTERASE URINE: ABNORMAL
LYMPHOCYTES # BLD AUTO: 1.73 K/UL
LYMPHOCYTES NFR BLD AUTO: 22.6 %
MAGNESIUM SERPL-MCNC: 2.4 MG/DL
MAN DIFF?: NORMAL
MCHC RBC-ENTMCNC: 29.3 PG
MCHC RBC-ENTMCNC: 32.2 GM/DL
MCV RBC AUTO: 91 FL
MICROSCOPIC-UA: NORMAL
MONOCYTES # BLD AUTO: 0.89 K/UL
MONOCYTES NFR BLD AUTO: 11.6 %
NEUTROPHILS # BLD AUTO: 4.75 K/UL
NEUTROPHILS NFR BLD AUTO: 62.3 %
NITRITE URINE: NEGATIVE
PH URINE: 5.5
PLATELET # BLD AUTO: 273 K/UL
POTASSIUM SERPL-SCNC: 5 MMOL/L
PROT SERPL-MCNC: 6.4 G/DL
PROTEIN URINE: NEGATIVE MG/DL
RBC # BLD: 4.34 M/UL
RBC # FLD: 14.3 %
RED BLOOD CELLS URINE: 0 /HPF
SODIUM SERPL-SCNC: 141 MMOL/L
SPECIFIC GRAVITY URINE: 1.02
SQUAMOUS EPITHELIAL CELLS: 8 /HPF
T3 SERPL-MCNC: 86 NG/DL
T4 SERPL-MCNC: 6 UG/DL
TRIGL SERPL-MCNC: 168 MG/DL
TSH SERPL-ACNC: 2.84 UIU/ML
UROBILINOGEN URINE: NEGATIVE MG/DL
VIT B12 SERPL-MCNC: 690 PG/ML
WBC # FLD AUTO: 7.64 K/UL
WHITE BLOOD CELLS URINE: 12 /HPF

## 2018-01-10 ENCOUNTER — NON-APPOINTMENT (OUTPATIENT)
Age: 83
End: 2018-01-10

## 2018-01-10 ENCOUNTER — APPOINTMENT (OUTPATIENT)
Dept: CARDIOLOGY | Facility: CLINIC | Age: 83
End: 2018-01-10
Payer: MEDICARE

## 2018-01-10 ENCOUNTER — LABORATORY RESULT (OUTPATIENT)
Age: 83
End: 2018-01-10

## 2018-01-10 ENCOUNTER — INPATIENT (INPATIENT)
Facility: HOSPITAL | Age: 83
LOS: 1 days | Discharge: HOME | End: 2018-01-12
Attending: INTERNAL MEDICINE | Admitting: INTERNAL MEDICINE
Payer: MEDICARE

## 2018-01-10 VITALS
TEMPERATURE: 97 F | RESPIRATION RATE: 16 BRPM | HEART RATE: 76 BPM | HEIGHT: 61 IN | DIASTOLIC BLOOD PRESSURE: 76 MMHG | SYSTOLIC BLOOD PRESSURE: 153 MMHG | OXYGEN SATURATION: 97 % | WEIGHT: 145.95 LBS

## 2018-01-10 DIAGNOSIS — Z95.5 PRESENCE OF CORONARY ANGIOPLASTY IMPLANT AND GRAFT: Chronic | ICD-10-CM

## 2018-01-10 DIAGNOSIS — Z95.0 PRESENCE OF CARDIAC PACEMAKER: Chronic | ICD-10-CM

## 2018-01-10 DIAGNOSIS — Z98.89 OTHER SPECIFIED POSTPROCEDURAL STATES: Chronic | ICD-10-CM

## 2018-01-10 DIAGNOSIS — Z90.710 ACQUIRED ABSENCE OF BOTH CERVIX AND UTERUS: Chronic | ICD-10-CM

## 2018-01-10 DIAGNOSIS — G56.01 CARPAL TUNNEL SYNDROME, RIGHT UPPER LIMB: Chronic | ICD-10-CM

## 2018-01-10 DIAGNOSIS — Z98.82 BREAST IMPLANT STATUS: Chronic | ICD-10-CM

## 2018-01-10 DIAGNOSIS — H26.9 UNSPECIFIED CATARACT: Chronic | ICD-10-CM

## 2018-01-10 DIAGNOSIS — Z96.659 PRESENCE OF UNSPECIFIED ARTIFICIAL KNEE JOINT: Chronic | ICD-10-CM

## 2018-01-10 LAB
ALBUMIN SERPL ELPH-MCNC: 3.4 G/DL — SIGNIFICANT CHANGE UP (ref 3.3–5)
ALP SERPL-CCNC: 77 U/L — SIGNIFICANT CHANGE UP (ref 40–120)
ALT FLD-CCNC: 18 U/L — SIGNIFICANT CHANGE UP (ref 12–78)
ANION GAP SERPL CALC-SCNC: 7 MMOL/L — SIGNIFICANT CHANGE UP (ref 5–17)
APTT BLD: 31 SEC — SIGNIFICANT CHANGE UP (ref 27.5–37.4)
AST SERPL-CCNC: 14 U/L — LOW (ref 15–37)
BASOPHILS # BLD AUTO: 0.1 K/UL — SIGNIFICANT CHANGE UP (ref 0–0.2)
BASOPHILS NFR BLD AUTO: 0.9 % — SIGNIFICANT CHANGE UP (ref 0–2)
BILIRUB SERPL-MCNC: 0.3 MG/DL — SIGNIFICANT CHANGE UP (ref 0.2–1.2)
BUN SERPL-MCNC: 24 MG/DL — HIGH (ref 7–23)
CALCIUM SERPL-MCNC: 9.1 MG/DL — SIGNIFICANT CHANGE UP (ref 8.5–10.1)
CHLORIDE SERPL-SCNC: 108 MMOL/L — SIGNIFICANT CHANGE UP (ref 96–108)
CK SERPL-CCNC: 59 U/L — SIGNIFICANT CHANGE UP (ref 26–192)
CO2 SERPL-SCNC: 26 MMOL/L — SIGNIFICANT CHANGE UP (ref 22–31)
CREAT SERPL-MCNC: 1.03 MG/DL — SIGNIFICANT CHANGE UP (ref 0.5–1.3)
EOSINOPHIL # BLD AUTO: 0.2 K/UL — SIGNIFICANT CHANGE UP (ref 0–0.5)
EOSINOPHIL NFR BLD AUTO: 1.7 % — SIGNIFICANT CHANGE UP (ref 0–6)
GLUCOSE SERPL-MCNC: 108 MG/DL — HIGH (ref 70–99)
HCT VFR BLD CALC: 37.7 % — SIGNIFICANT CHANGE UP (ref 34.5–45)
HGB BLD-MCNC: 12 G/DL — SIGNIFICANT CHANGE UP (ref 11.5–15.5)
INR BLD: 1.06 RATIO — SIGNIFICANT CHANGE UP (ref 0.88–1.16)
LYMPHOCYTES # BLD AUTO: 1.8 K/UL — SIGNIFICANT CHANGE UP (ref 1–3.3)
LYMPHOCYTES # BLD AUTO: 18.8 % — SIGNIFICANT CHANGE UP (ref 13–44)
MCHC RBC-ENTMCNC: 28.8 PG — SIGNIFICANT CHANGE UP (ref 27–34)
MCHC RBC-ENTMCNC: 31.9 GM/DL — LOW (ref 32–36)
MCV RBC AUTO: 90.1 FL — SIGNIFICANT CHANGE UP (ref 80–100)
MONOCYTES # BLD AUTO: 1 K/UL — HIGH (ref 0–0.9)
MONOCYTES NFR BLD AUTO: 10.5 % — SIGNIFICANT CHANGE UP (ref 2–14)
NEUTROPHILS # BLD AUTO: 6.4 K/UL — SIGNIFICANT CHANGE UP (ref 1.8–7.4)
NEUTROPHILS NFR BLD AUTO: 68.2 % — SIGNIFICANT CHANGE UP (ref 43–77)
NT-PROBNP SERPL-SCNC: 1322 PG/ML — HIGH (ref 0–450)
PLATELET # BLD AUTO: 229 K/UL — SIGNIFICANT CHANGE UP (ref 150–400)
POTASSIUM SERPL-MCNC: 3.9 MMOL/L — SIGNIFICANT CHANGE UP (ref 3.5–5.3)
POTASSIUM SERPL-SCNC: 3.9 MMOL/L — SIGNIFICANT CHANGE UP (ref 3.5–5.3)
PROT SERPL-MCNC: 6.7 GM/DL — SIGNIFICANT CHANGE UP (ref 6–8.3)
PROTHROM AB SERPL-ACNC: 11.5 SEC — SIGNIFICANT CHANGE UP (ref 9.8–12.7)
RBC # BLD: 4.19 M/UL — SIGNIFICANT CHANGE UP (ref 3.8–5.2)
RBC # FLD: 12.8 % — SIGNIFICANT CHANGE UP (ref 10.3–14.5)
SODIUM SERPL-SCNC: 141 MMOL/L — SIGNIFICANT CHANGE UP (ref 135–145)
TROPONIN I SERPL-MCNC: 0.13 NG/ML — HIGH (ref 0.01–0.04)
TROPONIN I SERPL-MCNC: 1.24 NG/ML — HIGH (ref 0.01–0.04)
WBC # BLD: 9.4 K/UL — SIGNIFICANT CHANGE UP (ref 3.8–10.5)
WBC # FLD AUTO: 9.4 K/UL — SIGNIFICANT CHANGE UP (ref 3.8–10.5)

## 2018-01-10 PROCEDURE — 71045 X-RAY EXAM CHEST 1 VIEW: CPT | Mod: 26

## 2018-01-10 PROCEDURE — 93000 ELECTROCARDIOGRAM COMPLETE: CPT

## 2018-01-10 PROCEDURE — 93010 ELECTROCARDIOGRAM REPORT: CPT

## 2018-01-10 PROCEDURE — 99214 OFFICE O/P EST MOD 30 MIN: CPT | Mod: 25

## 2018-01-10 PROCEDURE — 36415 COLL VENOUS BLD VENIPUNCTURE: CPT

## 2018-01-10 PROCEDURE — 99285 EMERGENCY DEPT VISIT HI MDM: CPT

## 2018-01-10 RX ORDER — CLOPIDOGREL BISULFATE 75 MG/1
75 TABLET, FILM COATED ORAL ONCE
Qty: 0 | Refills: 0 | Status: COMPLETED | OUTPATIENT
Start: 2018-01-10 | End: 2018-01-10

## 2018-01-10 RX ORDER — ASPIRIN/CALCIUM CARB/MAGNESIUM 324 MG
325 TABLET ORAL ONCE
Qty: 0 | Refills: 0 | Status: COMPLETED | OUTPATIENT
Start: 2018-01-10 | End: 2018-01-10

## 2018-01-10 RX ADMIN — CLOPIDOGREL BISULFATE 75 MILLIGRAM(S): 75 TABLET, FILM COATED ORAL at 23:20

## 2018-01-10 RX ADMIN — Medication 325 MILLIGRAM(S): at 23:20

## 2018-01-10 NOTE — ED ADULT NURSE REASSESSMENT NOTE - NS ED NURSE REASSESS COMMENT FT1
assumed pt care. Pt remains aox3, pt denies any chest pressure or weakness at this time. SR on monitor. Pt awaiting for admitting orders. Exercising .

## 2018-01-10 NOTE — ED ADULT NURSE NOTE - OBJECTIVE STATEMENT
Pt alert and oriented x3. Pt presents with intermittent chest discomfort, pressure and SOB. Pt states "I feel under the weather" Pt saw Dr. Lance today in office and scheduled for cardiac cath on 1/12/18. Pt called her service and they sent pt here for eval. Pt hx of multiple stents and pacemaker. Pt states she took nitro today without relief. Pt states "I felt like I was going to pass out while at the doctors office"

## 2018-01-10 NOTE — ED PROVIDER NOTE - MEDICAL DECISION MAKING DETAILS
Intermittent chest pressure, concern for unstable angina. plan for admission for diagnostic catheterization.

## 2018-01-10 NOTE — ED PROVIDER NOTE - OBJECTIVE STATEMENT
83 y/o F PMH CAD s/p stents x12, HLD, HTN presents to the ED c/o intermittent chest pressure x7 days, worsened today. Pt was seen by Dr. Adair for this pressure and he increased several of pt's medications, but sx did not resolve. Dr. Adair advised pt to go to ED for admission with a plan for elective catheterization. 3 episodes of chest pressure since 3:00am this morning. Chest pressure is located on the mid sternum and described as an elephant sitting on her chest. Pt used a nitro patch at home this morning at 3am which resolved the pain. The last episode of chest pressure was at 6:00pm, and currently does not have any. Pt reports SOB sometimes with the pressure. Pt also c/o b/l shoulder pain radiating down both of her arms since yesterday. No pins and needles, changes in vision, n/v/d, headaches, fever, abd pain, peripheral edema, trauma. On Plavix, ASA.

## 2018-01-10 NOTE — ED ADULT TRIAGE NOTE - CHIEF COMPLAINT QUOTE
Chest pressure, SOB today despite isosorbide.  Pt winded at triage desk.  Angiogram scheduled for 1/12 with Dr. Adair.

## 2018-01-10 NOTE — ED ADULT NURSE REASSESSMENT NOTE - NS ED NURSE REASSESS COMMENT FT1
2nd trop 1.240, DR. Rosario aware, pt already given ASA, Plavix. Pt denies any chest pressure. SR on monitor. Awaiting bed assignment.

## 2018-01-11 DIAGNOSIS — I21.4 NON-ST ELEVATION (NSTEMI) MYOCARDIAL INFARCTION: ICD-10-CM

## 2018-01-11 DIAGNOSIS — E78.00 PURE HYPERCHOLESTEROLEMIA, UNSPECIFIED: ICD-10-CM

## 2018-01-11 DIAGNOSIS — I25.110 ATHEROSCLEROTIC HEART DISEASE OF NATIVE CORONARY ARTERY WITH UNSTABLE ANGINA PECTORIS: ICD-10-CM

## 2018-01-11 LAB
ALBUMIN SERPL ELPH-MCNC: 2.9 G/DL — LOW (ref 3.3–5)
ALBUMIN SERPL ELPH-MCNC: 4.2 G/DL
ALP BLD-CCNC: 73 U/L
ALP SERPL-CCNC: 65 U/L — SIGNIFICANT CHANGE UP (ref 40–120)
ALT FLD-CCNC: 14 U/L — SIGNIFICANT CHANGE UP (ref 12–78)
ALT SERPL-CCNC: 9 U/L
ANION GAP SERPL CALC-SCNC: 14 MMOL/L
ANION GAP SERPL CALC-SCNC: 7 MMOL/L — SIGNIFICANT CHANGE UP (ref 5–17)
APPEARANCE UR: (no result)
APTT BLD: 137.6 SEC — CRITICAL HIGH (ref 27.5–37.4)
APTT IMM NP/PRE NP PPP: NORMAL SEC
APTT INV RATIO PPP: 31 SEC
AST SERPL-CCNC: 14 U/L
AST SERPL-CCNC: 17 U/L — SIGNIFICANT CHANGE UP (ref 15–37)
BACTERIA # UR AUTO: (no result)
BASOPHILS # BLD AUTO: 0.05 K/UL
BASOPHILS NFR BLD AUTO: 0.7 %
BILIRUB SERPL-MCNC: 0.4 MG/DL
BILIRUB SERPL-MCNC: 0.4 MG/DL — SIGNIFICANT CHANGE UP (ref 0.2–1.2)
BILIRUB UR-MCNC: NEGATIVE — SIGNIFICANT CHANGE UP
BLD GP AB SCN SERPL QL: SIGNIFICANT CHANGE UP
BUN SERPL-MCNC: 20 MG/DL
BUN SERPL-MCNC: 21 MG/DL — SIGNIFICANT CHANGE UP (ref 7–23)
CALCIUM SERPL-MCNC: 8.4 MG/DL — LOW (ref 8.5–10.1)
CALCIUM SERPL-MCNC: 9.8 MG/DL
CHLORIDE SERPL-SCNC: 104 MMOL/L
CHLORIDE SERPL-SCNC: 111 MMOL/L — HIGH (ref 96–108)
CHOLEST SERPL-MCNC: 131 MG/DL — SIGNIFICANT CHANGE UP (ref 10–199)
CO2 SERPL-SCNC: 24 MMOL/L — SIGNIFICANT CHANGE UP (ref 22–31)
CO2 SERPL-SCNC: 26 MMOL/L
COLOR SPEC: YELLOW — SIGNIFICANT CHANGE UP
CREAT SERPL-MCNC: 0.68 MG/DL — SIGNIFICANT CHANGE UP (ref 0.5–1.3)
CREAT SERPL-MCNC: 0.87 MG/DL
DIFF PNL FLD: (no result)
EOSINOPHIL # BLD AUTO: 0.14 K/UL
EOSINOPHIL NFR BLD AUTO: 1.9 %
EPI CELLS # UR: (no result)
GLUCOSE SERPL-MCNC: 89 MG/DL
GLUCOSE SERPL-MCNC: 95 MG/DL — SIGNIFICANT CHANGE UP (ref 70–99)
GLUCOSE UR QL: NEGATIVE MG/DL — SIGNIFICANT CHANGE UP
HCT VFR BLD CALC: 32.6 % — LOW (ref 34.5–45)
HCT VFR BLD CALC: 41.6 %
HDLC SERPL-MCNC: 42 MG/DL — SIGNIFICANT CHANGE UP (ref 40–125)
HGB BLD-MCNC: 10.6 G/DL — LOW (ref 11.5–15.5)
HGB BLD-MCNC: 12.5 G/DL
IMM GRANULOCYTES NFR BLD AUTO: 0.4 %
INR BLD: 1.14 RATIO — SIGNIFICANT CHANGE UP (ref 0.88–1.16)
INR PPP: 0.95 RATIO
KETONES UR-MCNC: NEGATIVE — SIGNIFICANT CHANGE UP
LEUKOCYTE ESTERASE UR-ACNC: (no result)
LIPID PNL WITH DIRECT LDL SERPL: 72 MG/DL — SIGNIFICANT CHANGE UP
LYMPHOCYTES # BLD AUTO: 1.53 K/UL
LYMPHOCYTES NFR BLD AUTO: 20.6 %
MAGNESIUM SERPL-MCNC: 2 MG/DL — SIGNIFICANT CHANGE UP (ref 1.6–2.6)
MAN DIFF?: NORMAL
MCHC RBC-ENTMCNC: 28.8 PG
MCHC RBC-ENTMCNC: 29 PG — SIGNIFICANT CHANGE UP (ref 27–34)
MCHC RBC-ENTMCNC: 30 GM/DL
MCHC RBC-ENTMCNC: 32.3 GM/DL — SIGNIFICANT CHANGE UP (ref 32–36)
MCV RBC AUTO: 89.7 FL — SIGNIFICANT CHANGE UP (ref 80–100)
MCV RBC AUTO: 95.9 FL
MONOCYTES # BLD AUTO: 0.86 K/UL
MONOCYTES NFR BLD AUTO: 11.6 %
MYOGLOBIN SERPL-MCNC: 26 NG/ML — SIGNIFICANT CHANGE UP (ref 9–82)
NEUTROPHILS # BLD AUTO: 4.83 K/UL
NEUTROPHILS NFR BLD AUTO: 64.8 %
NITRITE UR-MCNC: NEGATIVE — SIGNIFICANT CHANGE UP
NPP NORMAL POOLED PLASMA: NORMAL SEC
PH UR: 6 — SIGNIFICANT CHANGE UP (ref 5–8)
PHOSPHATE SERPL-MCNC: 2.7 MG/DL — SIGNIFICANT CHANGE UP (ref 2.5–4.5)
PLATELET # BLD AUTO: 202 K/UL — SIGNIFICANT CHANGE UP (ref 150–400)
PLATELET # BLD AUTO: 271 K/UL
POTASSIUM SERPL-MCNC: 4 MMOL/L — SIGNIFICANT CHANGE UP (ref 3.5–5.3)
POTASSIUM SERPL-SCNC: 4 MMOL/L — SIGNIFICANT CHANGE UP (ref 3.5–5.3)
POTASSIUM SERPL-SCNC: 5.6 MMOL/L
PROT SERPL-MCNC: 5.6 GM/DL — LOW (ref 6–8.3)
PROT SERPL-MCNC: 6.6 G/DL
PROT UR-MCNC: NEGATIVE MG/DL — SIGNIFICANT CHANGE UP
PROTHROM AB SERPL-ACNC: 12.3 SEC — SIGNIFICANT CHANGE UP (ref 9.8–12.7)
PT BLD: 10.7 SEC
RBC # BLD: 3.64 M/UL — LOW (ref 3.8–5.2)
RBC # BLD: 4.34 M/UL
RBC # FLD: 12.5 % — SIGNIFICANT CHANGE UP (ref 10.3–14.5)
RBC # FLD: 14 %
RBC CASTS # UR COMP ASSIST: SIGNIFICANT CHANGE UP /HPF (ref 0–4)
SODIUM SERPL-SCNC: 142 MMOL/L — SIGNIFICANT CHANGE UP (ref 135–145)
SODIUM SERPL-SCNC: 144 MMOL/L
SP GR SPEC: 1.01 — SIGNIFICANT CHANGE UP (ref 1.01–1.02)
TOTAL CHOLESTEROL/HDL RATIO MEASUREMENT: 3.1 RATIO — LOW (ref 3.3–7.1)
TRIGL SERPL-MCNC: 85 MG/DL — SIGNIFICANT CHANGE UP (ref 10–149)
TROPONIN I SERPL-MCNC: 1.59 NG/ML — HIGH (ref 0.01–0.04)
TROPONIN I SERPL-MCNC: 2.65 NG/ML — HIGH (ref 0.01–0.04)
TYPE + AB SCN PNL BLD: SIGNIFICANT CHANGE UP
UROBILINOGEN FLD QL: NEGATIVE MG/DL — SIGNIFICANT CHANGE UP
WBC # BLD: 6.4 K/UL — SIGNIFICANT CHANGE UP (ref 3.8–10.5)
WBC # FLD AUTO: 6.4 K/UL — SIGNIFICANT CHANGE UP (ref 3.8–10.5)
WBC # FLD AUTO: 7.44 K/UL
WBC UR QL: (no result)

## 2018-01-11 PROCEDURE — 99223 1ST HOSP IP/OBS HIGH 75: CPT

## 2018-01-11 PROCEDURE — 93571 IV DOP VEL&/PRESS C FLO 1ST: CPT | Mod: 26,LD

## 2018-01-11 PROCEDURE — 93572 IV DOP VEL&/PRESS C FLO EA: CPT | Mod: 26,RC

## 2018-01-11 PROCEDURE — 93458 L HRT ARTERY/VENTRICLE ANGIO: CPT | Mod: 26,59

## 2018-01-11 PROCEDURE — 93010 ELECTROCARDIOGRAM REPORT: CPT

## 2018-01-11 PROCEDURE — 92928 PRQ TCAT PLMT NTRAC ST 1 LES: CPT | Mod: LD,RC

## 2018-01-11 RX ORDER — HEPARIN SODIUM 5000 [USP'U]/ML
4100 INJECTION INTRAVENOUS; SUBCUTANEOUS ONCE
Qty: 0 | Refills: 0 | Status: COMPLETED | OUTPATIENT
Start: 2018-01-11 | End: 2018-01-11

## 2018-01-11 RX ORDER — ATORVASTATIN CALCIUM 80 MG/1
40 TABLET, FILM COATED ORAL AT BEDTIME
Qty: 0 | Refills: 0 | Status: DISCONTINUED | OUTPATIENT
Start: 2018-01-11 | End: 2018-01-12

## 2018-01-11 RX ORDER — HEPARIN SODIUM 5000 [USP'U]/ML
INJECTION INTRAVENOUS; SUBCUTANEOUS
Qty: 25000 | Refills: 0 | Status: DISCONTINUED | OUTPATIENT
Start: 2018-01-11 | End: 2018-01-11

## 2018-01-11 RX ORDER — DOCUSATE SODIUM 100 MG
100 CAPSULE ORAL THREE TIMES A DAY
Qty: 0 | Refills: 0 | Status: DISCONTINUED | OUTPATIENT
Start: 2018-01-11 | End: 2018-01-12

## 2018-01-11 RX ORDER — ASPIRIN/CALCIUM CARB/MAGNESIUM 324 MG
81 TABLET ORAL DAILY
Qty: 0 | Refills: 0 | Status: DISCONTINUED | OUTPATIENT
Start: 2018-01-11 | End: 2018-01-12

## 2018-01-11 RX ORDER — LISINOPRIL 2.5 MG/1
2.5 TABLET ORAL DAILY
Qty: 0 | Refills: 0 | Status: DISCONTINUED | OUTPATIENT
Start: 2018-01-11 | End: 2018-01-12

## 2018-01-11 RX ORDER — AMLODIPINE BESYLATE 2.5 MG/1
5 TABLET ORAL DAILY
Qty: 0 | Refills: 0 | Status: DISCONTINUED | OUTPATIENT
Start: 2018-01-11 | End: 2018-01-12

## 2018-01-11 RX ORDER — NITROGLYCERIN 6.5 MG
0.5 CAPSULE, EXTENDED RELEASE ORAL EVERY 6 HOURS
Qty: 0 | Refills: 0 | Status: DISCONTINUED | OUTPATIENT
Start: 2018-01-11 | End: 2018-01-12

## 2018-01-11 RX ORDER — HEPARIN SODIUM 5000 [USP'U]/ML
4100 INJECTION INTRAVENOUS; SUBCUTANEOUS EVERY 6 HOURS
Qty: 0 | Refills: 0 | Status: DISCONTINUED | OUTPATIENT
Start: 2018-01-11 | End: 2018-01-11

## 2018-01-11 RX ORDER — METOPROLOL TARTRATE 50 MG
200 TABLET ORAL DAILY
Qty: 0 | Refills: 0 | Status: DISCONTINUED | OUTPATIENT
Start: 2018-01-11 | End: 2018-01-12

## 2018-01-11 RX ORDER — ISOSORBIDE MONONITRATE 60 MG/1
30 TABLET, EXTENDED RELEASE ORAL DAILY
Qty: 0 | Refills: 0 | Status: DISCONTINUED | OUTPATIENT
Start: 2018-01-11 | End: 2018-01-12

## 2018-01-11 RX ORDER — MORPHINE SULFATE 50 MG/1
2 CAPSULE, EXTENDED RELEASE ORAL EVERY 6 HOURS
Qty: 0 | Refills: 0 | Status: DISCONTINUED | OUTPATIENT
Start: 2018-01-11 | End: 2018-01-12

## 2018-01-11 RX ORDER — CLOPIDOGREL BISULFATE 75 MG/1
75 TABLET, FILM COATED ORAL DAILY
Qty: 0 | Refills: 0 | Status: DISCONTINUED | OUTPATIENT
Start: 2018-01-11 | End: 2018-01-12

## 2018-01-11 RX ORDER — SODIUM CHLORIDE 9 MG/ML
1000 INJECTION INTRAMUSCULAR; INTRAVENOUS; SUBCUTANEOUS
Qty: 0 | Refills: 0 | Status: DISCONTINUED | OUTPATIENT
Start: 2018-01-11 | End: 2018-01-12

## 2018-01-11 RX ADMIN — HEPARIN SODIUM 800 UNIT(S)/HR: 5000 INJECTION INTRAVENOUS; SUBCUTANEOUS at 03:57

## 2018-01-11 RX ADMIN — Medication 81 MILLIGRAM(S): at 11:14

## 2018-01-11 RX ADMIN — LISINOPRIL 2.5 MILLIGRAM(S): 2.5 TABLET ORAL at 06:07

## 2018-01-11 RX ADMIN — ISOSORBIDE MONONITRATE 30 MILLIGRAM(S): 60 TABLET, EXTENDED RELEASE ORAL at 11:14

## 2018-01-11 RX ADMIN — Medication 0.5 INCH(S): at 06:07

## 2018-01-11 RX ADMIN — AMLODIPINE BESYLATE 5 MILLIGRAM(S): 2.5 TABLET ORAL at 06:07

## 2018-01-11 RX ADMIN — HEPARIN SODIUM 4100 UNIT(S): 5000 INJECTION INTRAVENOUS; SUBCUTANEOUS at 03:57

## 2018-01-11 RX ADMIN — Medication 200 MILLIGRAM(S): at 06:07

## 2018-01-11 RX ADMIN — CLOPIDOGREL BISULFATE 75 MILLIGRAM(S): 75 TABLET, FILM COATED ORAL at 11:14

## 2018-01-11 RX ADMIN — HEPARIN SODIUM 0 UNIT(S)/HR: 5000 INJECTION INTRAVENOUS; SUBCUTANEOUS at 10:52

## 2018-01-11 RX ADMIN — ATORVASTATIN CALCIUM 40 MILLIGRAM(S): 80 TABLET, FILM COATED ORAL at 21:49

## 2018-01-11 NOTE — PROGRESS NOTE ADULT - SUBJECTIVE AND OBJECTIVE BOX
CC:  Patient is a 84y old  Female who presents with a chief complaint of CP (11 Jan 2018 03:12)    SUBJECTIVE:      ROS:      amLODIPine   Tablet 5 milliGRAM(s) Oral daily  aspirin enteric coated 81 milliGRAM(s) Oral daily  atorvastatin 40 milliGRAM(s) Oral at bedtime  clopidogrel Tablet 75 milliGRAM(s) Oral daily  docusate sodium 100 milliGRAM(s) Oral three times a day PRN  heparin  Infusion.  Unit(s)/Hr IV Continuous <Continuous>  heparin  Injectable 4100 Unit(s) IV Push every 6 hours PRN  isosorbide   mononitrate ER Tablet (IMDUR) 30 milliGRAM(s) Oral daily  lisinopril 2.5 milliGRAM(s) Oral daily  metoprolol succinate  milliGRAM(s) Oral daily  morphine  - Injectable 2 milliGRAM(s) IV Push every 6 hours PRN  nitroglycerin    2% Ointment 0.5 Inch(s) Transdermal every 6 hours  sodium chloride 0.9%. 1000 milliLiter(s) IV Continuous <Continuous>    T(C): 36.2 (01-11-18 @ 04:47), Max: 36.7 (01-11-18 @ 04:03)  HR: 70 (01-11-18 @ 06:00) (68 - 82)  BP: 116/49 (01-11-18 @ 06:00) (105/60 - 153/76)  RR: 17 (01-11-18 @ 06:00) (16 - 18)  SpO2: 96% (01-11-18 @ 04:03) (96% - 97%)    PHYSICAL EXAM:  Constitutional: NAD, awake and alert, well-developed with good responses to questions, mentally intact.   HEENT: PERRL, EOMI, MMM.  Neck: Soft and supple, No carotid bruit, No JVD  Respiratory: Breath sounds are clear bilaterally, No wheezing, rales or rhonchi  Cardiovascular: S1 and S2, regular rate and rhythm, no murmur, rub or gallop.  Gastrointestinal: Bowel Sounds present, soft, nontender, nondistended, no guarding, no rebound, no mass.  Extremities: No peripheral edema  Vascular: 2+ peripheral pulses  Neurological: A/O x 3, no focal deficits  Musculoskeletal: 5/5 strength b/l upper and lower extremities  Skin:  no visible rashes.                         12.0   9.4   )-----------( 229      ( 10 Christian 2018 19:12 )             37.7     01-10    141  |  108  |  24<H>  ----------------------------<  108<H>  3.9   |  26  |  1.03    Ca    9.1      10 Christian 2018 19:12    TPro  6.7  /  Alb  3.4  /  TBili  0.3  /  DBili  x   /  AST  14<L>  /  ALT  18  /  AlkPhos  77  01-10 CC:  Patient is a 84y old  Female who presents with a chief complaint of CP (11 Jan 2018 03:12)    SUBJECTIVE:  no chest pain at current time.    ROS:  unchanged.    amLODIPine   Tablet 5 milliGRAM(s) Oral daily  aspirin enteric coated 81 milliGRAM(s) Oral daily  atorvastatin 40 milliGRAM(s) Oral at bedtime  clopidogrel Tablet 75 milliGRAM(s) Oral daily  docusate sodium 100 milliGRAM(s) Oral three times a day PRN  heparin  Infusion.  Unit(s)/Hr IV Continuous <Continuous>  heparin  Injectable 4100 Unit(s) IV Push every 6 hours PRN  isosorbide   mononitrate ER Tablet (IMDUR) 30 milliGRAM(s) Oral daily  lisinopril 2.5 milliGRAM(s) Oral daily  metoprolol succinate  milliGRAM(s) Oral daily  morphine  - Injectable 2 milliGRAM(s) IV Push every 6 hours PRN  nitroglycerin    2% Ointment 0.5 Inch(s) Transdermal every 6 hours  sodium chloride 0.9%. 1000 milliLiter(s) IV Continuous <Continuous>    T(C): 36.2 (01-11-18 @ 04:47), Max: 36.7 (01-11-18 @ 04:03)  HR: 70 (01-11-18 @ 06:00) (68 - 82)  BP: 116/49 (01-11-18 @ 06:00) (105/60 - 153/76)  RR: 17 (01-11-18 @ 06:00) (16 - 18)  SpO2: 96% (01-11-18 @ 04:03) (96% - 97%)    PHYSICAL EXAM:  Constitutional: NAD, awake and alert, well-developed with good responses to questions, mentally intact.   HEENT: PERRL, EOMI, MMM.  Neck: Soft and supple, No carotid bruit, No JVD  Respiratory: Breath sounds are clear bilaterally, No wheezing, rales or rhonchi  Cardiovascular: S1 and S2, regular rate and rhythm, no murmur, rub or gallop.  Gastrointestinal: Bowel Sounds present, soft, nontender, nondistended, no guarding, no rebound, no mass.  Extremities: No peripheral edema  Vascular: 2+ peripheral pulses  Neurological: A/O x 3, no focal deficits  Musculoskeletal: 5/5 strength b/l upper and lower extremities  Skin:  no visible rashes.                         12.0   9.4   )-----------( 229      ( 10 Christian 2018 19:12 )             37.7     01-10    141  |  108  |  24<H>  ----------------------------<  108<H>  3.9   |  26  |  1.03    Ca    9.1      10 Christian 2018 19:12    TPro  6.7  /  Alb  3.4  /  TBili  0.3  /  DBili  x   /  AST  14<L>  /  ALT  18  /  AlkPhos  77  01-10

## 2018-01-11 NOTE — CHART NOTE - NSCHARTNOTEFT_GEN_A_CORE
Nurse Practitioner Progress note:   s/p PCI     Denies chest pain, shortness of breath, dizziness or palpitations at this time    Right groin procedure site with angioseal closure;  no bleeding, no hematoma, site soft, non tender, positive pedal pulses bilaterally    T(C): 36.5 (01-11-18 @ 14:11), Max: 36.7 (01-11-18 @ 04:03)  HR: 68 (01-11-18 @ 17:20) (66 - 99)  BP: 148/63 (01-11-18 @ 17:36) (105/60 - 153/76)  RR: 16 (01-11-18 @ 17:36) (16 - 21)  SpO2: 98% (01-11-18 @ 17:36) (96% - 99%)  Wt(kg): --  CBC Full  -  ( 11 Jan 2018 09:35 )  WBC Count : 6.4 K/uL  Hemoglobin : 10.6 g/dL  Hematocrit : 32.6 %  Platelet Count - Automated : 202 K/uL  Mean Cell Volume : 89.7 fl  Mean Cell Hemoglobin : 29.0 pg  Mean Cell Hemoglobin Concentration : 32.3 gm/dL  Auto Neutrophil # : x  Auto Lymphocyte # : x  Auto Monocyte # : x  Auto Eosinophil # : x  Auto Basophil # : x  Auto Neutrophil % : x  Auto Lymphocyte % : x  Auto Monocyte % : x  Auto Eosinophil % : x  Auto Basophil % : x    01-11    142  |  111<H>  |  21  ----------------------------<  95  4.0   |  24  |  0.68    Ca    8.4<L>      11 Jan 2018 09:41  Phos  2.7     01-11  Mg     2.0     01-11    TPro  5.6<L>  /  Alb  2.9<L>  /  TBili  0.4  /  DBili  x   /  AST  17  /  ALT  14  /  AlkPhos  65  01-11    CARDIAC MARKERS ( 11 Jan 2018 09:41 )  1.590 ng/mL / x     / x     / x     / x      CARDIAC MARKERS ( 11 Jan 2018 01:58 )  2.650 ng/mL / x     / x     / x     / x      CARDIAC MARKERS ( 10 Christian 2018 22:17 )  1.240 ng/mL / x     / x     / x     / x      CARDIAC MARKERS ( 10 Christian 2018 19:12 )  0.127 ng/mL / x     / 59 U/L / x     / x              MEDICATIONS  (STANDING):  amLODIPine   Tablet 5 milliGRAM(s) Oral daily  aspirin enteric coated 81 milliGRAM(s) Oral daily  atorvastatin 40 milliGRAM(s) Oral at bedtime  clopidogrel Tablet 75 milliGRAM(s) Oral daily  isosorbide   mononitrate ER Tablet (IMDUR) 30 milliGRAM(s) Oral daily  lisinopril 2.5 milliGRAM(s) Oral daily  metoprolol succinate  milliGRAM(s) Oral daily  nitroglycerin    2% Ointment 0.5 Inch(s) Transdermal every 6 hours  sodium chloride 0.9%. 1000 milliLiter(s) (75 mL/Hr) IV Continuous <Continuous>  sodium chloride 0.9%. 1000 milliLiter(s) (50 mL/Hr) IV Continuous <Continuous>    MEDICATIONS  (PRN):  docusate sodium 100 milliGRAM(s) Oral three times a day PRN Constipation  morphine  - Injectable 2 milliGRAM(s) IV Push every 6 hours PRN Chest Pain        HPI:  85 yo F with PMH of HTN, CAD s/p 12 stents, LLE stent, arthritis, diverticulosis, hypothyroidism, PPM,h/o carotid endarterectomy, p/w CP. Patient states chest pressure feels like elephant sitting on chest. She has been feeling it intermittently for the past few weeks, and now is increasing in frequency and intensity. CP is located in the midsternal area, and radiates across the chest. No radiation to arm / jaw / back. Last time she felt the chest pressure was around 5:20pm, and it lasted for a few minutes.   + Trop    now s/p PCI and BJORN to pLAD and dRCA    ASSESSMENT/PLAN:    Coronary artery disease  -CICU  -VS, labs, diet, activity as per PCI orders  -IV hydration  -Encourage PO fluids  - mg  and Plavix 300 mg load post PCI  -Aspirin 81mg PO daily  -Plavix 75mg PO daily  -Continue BB, ACEI and statin  -Plan of care discussed with patient, and MD  -Discussed therapeutic lifestyle changes to reduce risk factors such as following a cardiac diet, weight loss, maintaining a healthy weight, exercise, smoking cessation, medication compliance, and regular follow-up  with MD

## 2018-01-11 NOTE — H&P ADULT - HISTORY OF PRESENT ILLNESS
83 yo F with PMH of HTN, CAD s/p 12 stents, LLE stent, arthritis, diverticulosis, hypothyroidism, PPM,h/o carotid endarterectomy, p/w CP. Patient states chest pressure feels like elephant sitting on chest. She has been feeling it intermittently for the past few weeks, and now is increasing in frequency and intensity. CP is located in the midsternal area, and radiates across the chest. No radiation to arm / jaw / back. Last time she felt the chest pressure was around 5:20pm, and it lasted for a few minutes. Currently denies any CP at all. Denies nausea, vomiting, diophoresis, cough, runny nose, fever, chills, abdominal pain.     ED physician Dr. Rosario had contacted on call cardiologist Dr. Welsh when troponin was  0.127. He had recommended asa and plavix 75, but no heparin. At this time the troponin has significantly trended up, but patient denies having any CP.     PSH: Cataracts surgery, PPM placement, carotid endarterectomy, breast reconstruction, hysterectomy, s/p TKR, PCI, LLE stent    Family Hx: father - cancer, brother - cancer (unknown what type)    Social hx: former smoker, denies etoh, +marijuana use, lives at home alone

## 2018-01-11 NOTE — CONSULT NOTE ADULT - SUBJECTIVE AND OBJECTIVE BOX
PCP:    REQUESTING PHYSICIAN:    REASON FOR CONSULT:    CHIEF COMPLAINT:    HPI:  85 yo F with PMH of HTN, CAD s/p 12 stents, LLE stent, arthritis, diverticulosis, hypothyroidism, PPM,h/o carotid endarterectomy, p/w CP. Patient states chest pressure feels like elephant sitting on chest. She has been feeling it intermittently for the past few weeks, and now is increasing in frequency and intensity. CP is located in the midsternal area, and radiates across the chest. No radiation to arm / jaw / back. Last time she felt the chest pressure was around 5:20pm, and it lasted for a few minutes. Currently denies any CP at all. Denies nausea, vomiting, diophoresis, cough, runny nose, fever, chills, abdominal pain. Pt was seen in our office earlier and medications were adjusted to no avail. Pt seen in stepdown reporting chest discomfort and mild dyspnea. Cardiac cath planned today. Office records were reviewed and discussed with Dr Welsh.      PSH: Cataracts surgery, PPM placement, carotid endarterectomy, breast reconstruction, hysterectomy, s/p TKR, PCI, LLE stent    Family Hx: father - cancer, brother - cancer (unknown what type)    Social hx: former smoker, denies etoh, +marijuana use, lives at home alone (11 Jan 2018 03:12)      PAST MEDICAL & SURGICAL HISTORY:  High cholesterol  Stress incontinence in female  Rectocele  Diverticulosis  Hiatal hernia  Hypertension  Hypothyroidism  Dyslipidemia  CAD (coronary artery disease)  Carpal tunnel syndrome of right wrist  Cardiac pacemaker  Stented coronary artery: 12 heart stents,   1 left leg stents  H/O carotid endarterectomy: right  History of hysterectomy  History of breast reconstruction  Bilateral cataracts: surgery  Status post total knee replacement: right      Allergies    No Known Drug Allergies  Originally Entered as [Unknown see Comment: pt unable to remember] reaction to [Mold] (Unknown)  stolazine eye med- pt doesn&#x27;t remember (Unknown)    Intolerances        SOCIAL HISTORY:    FAMILY HISTORY:  No pertinent family history in first degree relatives      MEDICATIONS:  MEDICATIONS  (STANDING):  amLODIPine   Tablet 5 milliGRAM(s) Oral daily  aspirin enteric coated 81 milliGRAM(s) Oral daily  atorvastatin 40 milliGRAM(s) Oral at bedtime  clopidogrel Tablet 75 milliGRAM(s) Oral daily  heparin  Infusion.  Unit(s)/Hr (8 mL/Hr) IV Continuous <Continuous>  isosorbide   mononitrate ER Tablet (IMDUR) 30 milliGRAM(s) Oral daily  lisinopril 2.5 milliGRAM(s) Oral daily  metoprolol succinate  milliGRAM(s) Oral daily  nitroglycerin    2% Ointment 0.5 Inch(s) Transdermal every 6 hours  sodium chloride 0.9%. 1000 milliLiter(s) (75 mL/Hr) IV Continuous <Continuous>    MEDICATIONS  (PRN):  docusate sodium 100 milliGRAM(s) Oral three times a day PRN Constipation  heparin  Injectable 4100 Unit(s) IV Push every 6 hours PRN For aPTT less than 40  morphine  - Injectable 2 milliGRAM(s) IV Push every 6 hours PRN Chest Pain      REVIEW OF SYSTEMS:    CONSTITUTIONAL: No weakness, fevers or chills  EYES/ENT: No visual changes;  No vertigo or throat pain   NECK: No pain or stiffness  RESPIRATORY:  shortness of breath  CARDIOVASCULAR: Chest discomfort  GASTROINTESTINAL: No abdominal or epigastric pain. No nausea, vomiting, or hematemesis; No diarrhea or constipation. No melena or hematochezia.  GENITOURINARY: No dysuria, frequency or hematuria  NEUROLOGICAL: No numbness or weakness  SKIN: No itching, burning, rashes, or lesions   All other review of systems is negative unless indicated above    Vital Signs Last 24 Hrs  T(C): 36.2 (11 Jan 2018 04:47), Max: 36.7 (11 Jan 2018 04:03)  T(F): 97.1 (11 Jan 2018 04:47), Max: 98 (11 Jan 2018 04:03)  HR: 70 (11 Jan 2018 06:00) (68 - 82)  BP: 116/49 (11 Jan 2018 06:00) (105/60 - 153/76)  BP(mean): 66 (11 Jan 2018 06:00) (66 - 66)  RR: 17 (11 Jan 2018 06:00) (16 - 18)  SpO2: 96% (11 Jan 2018 04:03) (96% - 97%)    I&O's Summary    10 Christian 2018 07:01  -  11 Jan 2018 07:00  --------------------------------------------------------  IN: 158.2 mL / OUT: 0 mL / NET: 158.2 mL        PHYSICAL EXAM:    Constitutional: NAD, awake and alert, well-developed  HEENT: PERR, EOMI,  No oral cyananosis.  Neck:  supple,  No JVD  Respiratory: Breath sounds are clear bilaterally, No wheezing, rales or rhonchi  Cardiovascular: S1 and S2, regular rate and rhythm, no Murmurs, gallops or rubs  Gastrointestinal: Bowel Sounds present, soft, nontender.   Extremities: No peripheral edema. No clubbing or cyanosis.  Vascular: 2+ peripheral pulses  Neurological: A/O x 3, no focal deficits  Musculoskeletal: no calf tenderness.  Skin: No rashes.      LABS: All Labs Reviewed:                        10.6   6.4   )-----------( 202      ( 11 Jan 2018 09:35 )             32.6                         12.0   9.4   )-----------( 229      ( 10 Christian 2018 19:12 )             37.7     11 Jan 2018 09:41    142    |  111    |  21     ----------------------------<  95     4.0     |  24     |  0.68   10 Christian 2018 19:12    141    |  108    |  24     ----------------------------<  108    3.9     |  26     |  1.03     Ca    8.4        11 Jan 2018 09:41  Ca    9.1        10 Christian 2018 19:12  Phos  2.7       11 Jan 2018 09:41  Mg     2.0       11 Jan 2018 09:41    TPro  5.6    /  Alb  2.9    /  TBili  0.4    /  DBili  x      /  AST  17     /  ALT  14     /  AlkPhos  65     11 Jan 2018 09:41  TPro  6.7    /  Alb  3.4    /  TBili  0.3    /  DBili  x      /  AST  14     /  ALT  18     /  AlkPhos  77     10 Christian 2018 19:12    PT/INR - ( 11 Jan 2018 09:41 )   PT: 12.3 sec;   INR: 1.14 ratio         PTT - ( 11 Jan 2018 09:41 )  PTT:137.6 sec  CARDIAC MARKERS ( 11 Jan 2018 09:41 )  1.590 ng/mL / x     / x     / x     / x      CARDIAC MARKERS ( 11 Jan 2018 01:58 )  2.650 ng/mL / x     / x     / x     / x      CARDIAC MARKERS ( 10 Christian 2018 22:17 )  1.240 ng/mL / x     / x     / x     / x      CARDIAC MARKERS ( 10 Christian 2018 19:12 )  0.127 ng/mL / x     / 59 U/L / x     / x          Blood Culture:   01-10 @ 19:12  Pro Bnp 1322        RADIOLOGY/EKG:< from: 12 Lead ECG (01.11.18 @ 02:58) >  Diagnosis Line Normal sinus rhythm  Septal infarct (cited on or before 10-CHRISTIAN-2018)  Abnormal ECG  When compared with ECG of 10-CHRISTIAN-2018 19:18,  Borderline criteria for Inferior infarct are no longer Present  Nonspecific T wave abnormality has replaced inverted T waves in Inferior leads  Confirmed by OLEGARIO LOVELACE, HENRIQUE ([id number]) on 1/11/2018 9:48:15 AM    < end of copied text >

## 2018-01-11 NOTE — PATIENT PROFILE ADULT. - VISION (WITH CORRECTIVE LENSES IF THE PATIENT USUALLY WEARS THEM):
Partially impaired: cannot see medication labels or newsprint, but can see obstacles in path, and the surrounding layout; can count fingers at arm's length/pt. wears glasses

## 2018-01-11 NOTE — H&P ADULT - ASSESSMENT
83 yo F with PMH of HTN, CAD s/p stents, arthritis, diverticulosis, hypothyroidism, PPM, p/w CP     *NSTEMI w/ h/o CAD s/p PCI  -Denies chest pressure at this time  -ASA / plavix   -Heparin drip -  risks vs benefits explained, including but not limited to risk of catastrophic bleed / death / debility  -Cardio consult  -BB + statin + ACEi  -Morphine PRN  -Nitro PRN  -Supplemental O2  -Tele monitoring  -Stat EKG repeated and reviewed, no ST elevations  -Echo    *H/o arthritis / diverticulosis, hypothyroidism / PPM   -C/w home meds + outpatient management     *Medication reconcilliation  -Patient states she doesn't remember all the names, but meds haven't changed since previous discharge    *DVT ppx  -Heparin drip 83 yo F with PMH of HTN, CAD s/p stents, arthritis, diverticulosis, hypothyroidism, PPM, p/w CP     *NSTEMI w/ h/o CAD s/p PCI  -Denies chest pressure at this time  -ASA / plavix   -Heparin drip -  risks vs benefits explained, including but not limited to risk of catastrophic bleed / death / debility  -Cardio consult  -BB + statin + ACEi  -Morphine PRN  -Nitro PRN  -Supplemental O2  -Tele monitoring  -Stat EKG repeated and reviewed, no ST elevations  -Echo  -NPO for possible cath in AM    *H/o arthritis / diverticulosis, hypothyroidism / PPM   -C/w home meds + outpatient management     *Medication reconciliation  -Patient states she doesn't remember all the names, but meds haven't changed since previous discharge    *DVT ppx  -Heparin drip

## 2018-01-11 NOTE — PROGRESS NOTE ADULT - ASSESSMENT
84F.  admitted 01/10/18.  presents to ED c/o chest pain.  onset ~ 2 weeks prior to day of admission.  hase increased in frequency and intensity.  midsternal a/w radiation across the chest.  TnI 0.127.  given ASA + PLX.    PMHx:  CAD-PCI;  PAD-LLE stent + CEA;  HTN;  PPM;  OA;  hypothyroidism;  diverticulosis    NSTEMI.  hx CAD-PCI.  -telemetry monitoring.  -trend TnI + serial EKGs.  -TTE.  -UFH gtt.  -DAPT + BB + ACEI + NTG + statin.  -MS PRN chest pain.  -Cardiology consult, re:  coronary angiogram, +/- PCI.    DVT prophylaxis.  -UFH gtt.    disposition.  -IS unit monitoring.    communication.  -d/w RN.  -d/w Case Management.

## 2018-01-11 NOTE — PACU DISCHARGE NOTE - COMMENTS
patient discharged to CICU. RIght groin site benign, soft, nontender, clean/dry/intact. IVL site benign, NS at 75cc/hour as per orders. Patient without any complaints. Vital signs stable. post procedure instructions understood by patient via teach back method. Will continue to monitor patient status. Transported on tele monitor with nurse escort. Report given to PAZ Austin.

## 2018-01-12 ENCOUNTER — TRANSCRIPTION ENCOUNTER (OUTPATIENT)
Age: 83
End: 2018-01-12

## 2018-01-12 VITALS — SYSTOLIC BLOOD PRESSURE: 141 MMHG | DIASTOLIC BLOOD PRESSURE: 70 MMHG

## 2018-01-12 LAB
ANION GAP SERPL CALC-SCNC: 6 MMOL/L — SIGNIFICANT CHANGE UP (ref 5–17)
BUN SERPL-MCNC: 17 MG/DL — SIGNIFICANT CHANGE UP (ref 7–23)
CALCIUM SERPL-MCNC: 8.9 MG/DL — SIGNIFICANT CHANGE UP (ref 8.5–10.1)
CHLORIDE SERPL-SCNC: 109 MMOL/L — HIGH (ref 96–108)
CO2 SERPL-SCNC: 24 MMOL/L — SIGNIFICANT CHANGE UP (ref 22–31)
CREAT SERPL-MCNC: 0.68 MG/DL — SIGNIFICANT CHANGE UP (ref 0.5–1.3)
GLUCOSE SERPL-MCNC: 98 MG/DL — SIGNIFICANT CHANGE UP (ref 70–99)
HCT VFR BLD CALC: 34.6 % — SIGNIFICANT CHANGE UP (ref 34.5–45)
HGB BLD-MCNC: 11.7 G/DL — SIGNIFICANT CHANGE UP (ref 11.5–15.5)
MCHC RBC-ENTMCNC: 29.5 PG — SIGNIFICANT CHANGE UP (ref 27–34)
MCHC RBC-ENTMCNC: 33.7 GM/DL — SIGNIFICANT CHANGE UP (ref 32–36)
MCV RBC AUTO: 87.4 FL — SIGNIFICANT CHANGE UP (ref 80–100)
PLATELET # BLD AUTO: 214 K/UL — SIGNIFICANT CHANGE UP (ref 150–400)
POTASSIUM SERPL-MCNC: 4 MMOL/L — SIGNIFICANT CHANGE UP (ref 3.5–5.3)
POTASSIUM SERPL-SCNC: 4 MMOL/L — SIGNIFICANT CHANGE UP (ref 3.5–5.3)
RBC # BLD: 3.96 M/UL — SIGNIFICANT CHANGE UP (ref 3.8–5.2)
RBC # FLD: 12.4 % — SIGNIFICANT CHANGE UP (ref 10.3–14.5)
SODIUM SERPL-SCNC: 139 MMOL/L — SIGNIFICANT CHANGE UP (ref 135–145)
WBC # BLD: 7.9 K/UL — SIGNIFICANT CHANGE UP (ref 3.8–10.5)
WBC # FLD AUTO: 7.9 K/UL — SIGNIFICANT CHANGE UP (ref 3.8–10.5)

## 2018-01-12 PROCEDURE — 93010 ELECTROCARDIOGRAM REPORT: CPT

## 2018-01-12 PROCEDURE — 93306 TTE W/DOPPLER COMPLETE: CPT | Mod: 26

## 2018-01-12 PROCEDURE — 99233 SBSQ HOSP IP/OBS HIGH 50: CPT

## 2018-01-12 RX ORDER — METOPROLOL TARTRATE 50 MG
1 TABLET ORAL
Qty: 0 | Refills: 0 | COMMUNITY

## 2018-01-12 RX ORDER — SIMVASTATIN 20 MG/1
1 TABLET, FILM COATED ORAL
Qty: 0 | Refills: 0 | COMMUNITY

## 2018-01-12 RX ORDER — METOPROLOL TARTRATE 50 MG
1 TABLET ORAL
Qty: 0 | Refills: 0 | DISCHARGE
Start: 2018-01-12

## 2018-01-12 RX ORDER — ATORVASTATIN CALCIUM 80 MG/1
1 TABLET, FILM COATED ORAL
Qty: 30 | Refills: 0 | OUTPATIENT
Start: 2018-01-12 | End: 2018-02-10

## 2018-01-12 RX ADMIN — ISOSORBIDE MONONITRATE 30 MILLIGRAM(S): 60 TABLET, EXTENDED RELEASE ORAL at 09:44

## 2018-01-12 RX ADMIN — AMLODIPINE BESYLATE 5 MILLIGRAM(S): 2.5 TABLET ORAL at 08:44

## 2018-01-12 RX ADMIN — Medication 81 MILLIGRAM(S): at 08:44

## 2018-01-12 RX ADMIN — CLOPIDOGREL BISULFATE 75 MILLIGRAM(S): 75 TABLET, FILM COATED ORAL at 08:44

## 2018-01-12 RX ADMIN — Medication 0.5 INCH(S): at 05:40

## 2018-01-12 RX ADMIN — LISINOPRIL 2.5 MILLIGRAM(S): 2.5 TABLET ORAL at 05:37

## 2018-01-12 RX ADMIN — Medication 200 MILLIGRAM(S): at 05:37

## 2018-01-12 NOTE — DISCHARGE NOTE ADULT - MEDICATION SUMMARY - MEDICATIONS TO TAKE
I will START or STAY ON the medications listed below when I get home from the hospital:    aspirin 81 mg oral delayed release tablet  -- 1 tab(s) by mouth once a day home & hosp  -- Indication: For Cad    isosorbide mononitrate 30 mg oral tablet, extended release  -- 1 tab(s) by mouth once a day  -- Indication: For Cad    traZODone 50 mg oral tablet  -- 1 cap(s) by mouth once a day, As Needed   -- Indication: For depression    atorvastatin 40 mg oral tablet  -- 1 tab(s) by mouth once a day (at bedtime)  -- Indication: For Hld    amlodipine-benazepril 5 mg-10 mg oral capsule  -- 1 cap(s) by mouth once a day  home   -- Indication: For Htn    Plavix 75 mg oral tablet  -- 1 tab(s) by mouth once a day  -- Indication: For Cad    metoprolol succinate 200 mg oral tablet, extended release  -- 1 tab(s) by mouth once a day  -- Indication: For Htn    FeroSul 325 mg (65 mg elemental iron) oral tablet  -- 1 tab(s) by mouth 2 times a day home & hosp  -- Indication: For anemia

## 2018-01-12 NOTE — DISCHARGE NOTE ADULT - CARE PLAN
Principal Discharge DX:	NSTEMI, initial episode of care  Goal:	keep sx free  Instructions for follow-up, activity and diet:	f/u with Dr Welsh in 3-4 days

## 2018-01-12 NOTE — DISCHARGE NOTE ADULT - HOSPITAL COURSE
History of Present Illness: 	  85 yo F with PMH of HTN, CAD s/p 12 stents, LLE stent, arthritis, diverticulosis, hypothyroidism, PPM,h/o carotid endarterectomy, p/w CP. Patient states chest pressure feels like elephant sitting on chest. She has been feeling it intermittently for the past few weeks, and now is increasing in frequency and intensity. CP is located in the midsternal area, and radiates across the chest. No radiation to arm / jaw / back. Last time she felt the chest pressure was around 5:20pm, and it lasted for a few minutes. Currently denies any CP at all. Denies nausea, vomiting, diaphoresis cough, runny nose, fever, chills, abdominal pain.     84/F admitted with     NSTEMI: s/p PCI and stent of LAD and RCA  cont asa, plavix, statins, BB, ACEI, nitrates  f/u with Dr Welsh in 3-4 days  pt cleared by cardio for discharge    *H/o arthritis / diverticulosis, hypothyroidism / PPM   -C/w home meds + outpatient management     poc discussed with pt, team, Dr. Colindres    total time spent 45 min

## 2018-01-12 NOTE — DISCHARGE NOTE ADULT - VISION (WITH CORRECTIVE LENSES IF THE PATIENT USUALLY WEARS THEM):
pt. wears glasses/Partially impaired: cannot see medication labels or newsprint, but can see obstacles in path, and the surrounding layout; can count fingers at arm's length

## 2018-01-12 NOTE — DISCHARGE NOTE ADULT - PATIENT PORTAL LINK FT
“You can access the [employer] Patient Portal, offered by [facility], by registering with the following website: [email or web address]”

## 2018-01-12 NOTE — DISCHARGE NOTE ADULT - CARE PROVIDER_API CALL
Ector Welsh (MD), Cardiovascular Disease  [address] [place] [address]  Phone: [phone]  Fax: [phone]

## 2018-01-12 NOTE — PROGRESS NOTE ADULT - SUBJECTIVE AND OBJECTIVE BOX
PCP:    REQUESTING PHYSICIAN:    REASON FOR CONSULT:    CHIEF COMPLAINT:    HPI:  83 yo F with PMH of HTN, CAD s/p 12 stents, LLE stent, arthritis, diverticulosis, hypothyroidism, PPM,h/o carotid endarterectomy, p/w CP. Patient states chest pressure feels like elephant sitting on chest. She has been feeling it intermittently for the past few weeks, and now is increasing in frequency and intensity. CP is located in the midsternal area, and radiates across the chest. No radiation to arm / jaw / back. Last time she felt the chest pressure was around 5:20pm, and it lasted for a few minutes. Currently denies any CP at all. Denies nausea, vomiting, diophoresis, cough, runny nose, fever, chills, abdominal pain. Pt was seen in our office earlier and medications were adjusted to no avail. Pt seen in stepdown reporting chest discomfort and mild dyspnea. Cardiac cath planned today. Office records were reviewed and discussed with Dr Welsh.    18: Pt s/p PCI yesterday. Feels improved.     PSH: Cataracts surgery, PPM placement, carotid endarterectomy, breast reconstruction, hysterectomy, s/p TKR, PCI, LLE stent    Family Hx: father - cancer, brother - cancer (unknown what type)    Social hx: former smoker, denies etoh, +marijuana use, lives at home alone (2018 03:12)      PAST MEDICAL & SURGICAL HISTORY:  High cholesterol  Stress incontinence in female  Rectocele  Diverticulosis  Hiatal hernia  Hypertension  Hypothyroidism  Dyslipidemia  CAD (coronary artery disease)  Carpal tunnel syndrome of right wrist  Cardiac pacemaker  Stented coronary artery: 12 heart stents,   1 left leg stents  H/O carotid endarterectomy: right  History of hysterectomy  History of breast reconstruction  Bilateral cataracts: surgery  Status post total knee replacement: right      SOCIAL HISTORY:    FAMILY HISTORY:  No pertinent family history in first degree relatives      ALLERGIES:  Allergies    No Known Drug Allergies  Originally Entered as [Unknown see Comment: pt unable to remember] reaction to [Mold] (Unknown)  stolazine eye med- pt doesn&#x27;t remember (Unknown)    Intolerances        MEDICATIONS:    MEDICATIONS  (STANDING):  amLODIPine   Tablet 5 milliGRAM(s) Oral daily  aspirin enteric coated 81 milliGRAM(s) Oral daily  atorvastatin 40 milliGRAM(s) Oral at bedtime  clopidogrel Tablet 75 milliGRAM(s) Oral daily  isosorbide   mononitrate ER Tablet (IMDUR) 30 milliGRAM(s) Oral daily  lisinopril 2.5 milliGRAM(s) Oral daily  metoprolol succinate  milliGRAM(s) Oral daily  nitroglycerin    2% Ointment 0.5 Inch(s) Transdermal every 6 hours  sodium chloride 0.9%. 1000 milliLiter(s) (75 mL/Hr) IV Continuous <Continuous>  sodium chloride 0.9%. 1000 milliLiter(s) (50 mL/Hr) IV Continuous <Continuous>    MEDICATIONS  (PRN):  docusate sodium 100 milliGRAM(s) Oral three times a day PRN Constipation  morphine  - Injectable 2 milliGRAM(s) IV Push every 6 hours PRN Chest Pain      REVIEW OF SYSTEMS:    CONSTITUTIONAL: No weakness, fevers or chills  EYES/ENT: No visual changes;  No vertigo or throat pain   NECK: No pain or stiffness  RESPIRATORY: No cough, wheezing, hemoptysis; No shortness of breath  CARDIOVASCULAR: No chest pain or palpitations  GASTROINTESTINAL: No abdominal or epigastric pain. No nausea, vomiting, or hematemesis; No diarrhea or constipation. No melena or hematochezia.  GENITOURINARY: No dysuria, frequency or hematuria  NEUROLOGICAL: No numbness or weakness  SKIN: No itching, burning, rashes, or lesions   All other review of systems is negative unless indicated above    Vital Signs Last 24 Hrs  T(C): 36.1 (2018 06:36), Max: 36.5 (2018 14:11)  T(F): 97 (2018 06:36), Max: 97.7 (2018 14:11)  HR: 76 (2018 09:00) (66 - 99)  BP: 146/64 (2018 09:00) (81/44 - 150/72)  BP(mean): 72 (2018 04:00) (51 - 80)  RR: 18 (2018 20:00) (16 - 26)  SpO2: 98% (2018 17:36) (96% - 99%)Daily     Daily Weight in k.2 (2018 06:36)I&O's Summary    2018 07:01  -  2018 07:00  --------------------------------------------------------  IN: 0 mL / OUT: 1 mL / NET: -1 mL        PHYSICAL EXAM:    Constitutional: NAD, awake and alert, well-developed  HEENT: PERR, EOMI,  No oral cyananosis.  Neck:  supple,  No JVD  Respiratory: Breath sounds are clear bilaterally, No wheezing, rales or rhonchi  Cardiovascular: S1 and S2, regular rate and rhythm, no Murmurs, gallops or rubs  Gastrointestinal: Bowel Sounds present, soft, nontender.   Extremities: No peripheral edema. No clubbing or cyanosis.  Vascular: 2+ peripheral pulses  Neurological: A/O x 3, no focal deficits  Musculoskeletal: no calf tenderness.  Skin: No rashes.      LABS: All Labs Reviewed:                        11.7   7.9   )-----------( 214      ( 2018 05:14 )             34.6                         10.6   6.4   )-----------( 202      ( 2018 09:35 )             32.6                         12.0   9.4   )-----------( 229      ( 10 Christian 2018 19:12 )             37.7     2018 05:14    139    |  109    |  17     ----------------------------<  98     4.0     |  24     |  0.68   2018 09:41    142    |  111    |  21     ----------------------------<  95     4.0     |  24     |  0.68   10 Christian 2018 19:12    141    |  108    |  24     ----------------------------<  108    3.9     |  26     |  1.03     Ca    8.9        2018 05:14  Ca    8.4        2018 09:41  Ca    9.1        10 Christian 2018 19:12  Phos  2.7       2018 09:41  Mg     2.0       2018 09:41    TPro  5.6    /  Alb  2.9    /  TBili  0.4    /  DBili  x      /  AST  17     /  ALT  14     /  AlkPhos  65     2018 09:41  TPro  6.7    /  Alb  3.4    /  TBili  0.3    /  DBili  x      /  AST  14     /  ALT  18     /  AlkPhos  77     10 Christian 2018 19:12    PT/INR - ( 2018 09:41 )   PT: 12.3 sec;   INR: 1.14 ratio         PTT - ( 2018 09:41 )  PTT:137.6 sec  CARDIAC MARKERS ( 2018 09:41 )  1.590 ng/mL / x     / x     / x     / x      CARDIAC MARKERS ( 2018 01:58 )  2.650 ng/mL / x     / x     / x     / x      CARDIAC MARKERS ( 10 Christian 2018 22:17 )  1.240 ng/mL / x     / x     / x     / x      CARDIAC MARKERS ( 10 Christian 2018 19:12 )  0.127 ng/mL / x     / 59 U/L / x     / x          Blood Culture:   01-10 @ 19:12  Pro Bnp 1322        RADIOLOGY/EKG:      ECHO/CARDIAC CATHTERIZATION/STRESS TEST:< from: Cardiac Cath Lab - Adult (18 @ 15:10) >   Diagnostic Conclusions   Severe proximal LADand RCA stenoses     Interventional Conclusions     Successful Stent of both LAD and RCA stenoses     Recommendations     Continue aspirin and Plavix    Estimated Blood Loss:5ml.     Signatures     ----------------------------------------------------------------   Electronically signed by Zan Burris MD(Performing   Physician) on 2018 16:25   ----------------------------------------------------------------    < end of copied text >

## 2018-01-12 NOTE — PROGRESS NOTE ADULT - ASSESSMENT
HPI:  85 yo F with PMH of HTN, CAD s/p 12 stents, LLE stent, arthritis, diverticulosis, hypothyroidism, PPM,h/o carotid endarterectomy, p/w CP. Patient states chest pressure feels like elephant sitting on chest. She has been feeling it intermittently for the past few weeks, and now is increasing in frequency and intensity. CP is located in the midsternal area, and radiates across the chest.     Patient now s/p angiogram  with PCI and BJORN to the LAD / RCA    - AM labs and EKG reviewed  - procedure, outcome and f/u care reviewed with patient/MD  - continue ASA/ Plavix  - continue statin/ ACE  - DC home today  - f/u with Dr Welsh in 4-7 days

## 2018-01-12 NOTE — PROGRESS NOTE ADULT - SUBJECTIVE AND OBJECTIVE BOX
Cardiology NP     Patient is a 84y old  Female who presents with a chief complaint of CP (11 Jan 2018 03:12)      HPI:  83 yo F with PMH of HTN, CAD s/p 12 stents, LLE stent, arthritis, diverticulosis, hypothyroidism, PPM,h/o carotid endarterectomy, p/w CP. Patient states chest pressure feels like elephant sitting on chest. She has been feeling it intermittently for the past few weeks, and now is increasing in frequency and intensity. CP is located in the midsternal area, and radiates across the chest. No radiation to arm / jaw / back.      PAST MEDICAL & SURGICAL HISTORY:  High cholesterol  Stress incontinence in female  Rectocele  Diverticulosis  Hiatal hernia  Hypertension  Hypothyroidism  Dyslipidemia  CAD (coronary artery disease)  Carpal tunnel syndrome of right wrist  Cardiac pacemaker  Stented coronary artery: 12 heart stents,   1 left leg stents  H/O carotid endarterectomy: right  History of hysterectomy  History of breast reconstruction  Bilateral cataracts: surgery  Status post total knee replacement: right      MEDICATIONS  (STANDING):  amLODIPine   Tablet 5 milliGRAM(s) Oral daily  aspirin enteric coated 81 milliGRAM(s) Oral daily  atorvastatin 40 milliGRAM(s) Oral at bedtime  clopidogrel Tablet 75 milliGRAM(s) Oral daily  isosorbide   mononitrate ER Tablet (IMDUR) 30 milliGRAM(s) Oral daily  lisinopril 2.5 milliGRAM(s) Oral daily  metoprolol succinate  milliGRAM(s) Oral daily  nitroglycerin    2% Ointment 0.5 Inch(s) Transdermal every 6 hours  sodium chloride 0.9%. 1000 milliLiter(s) (75 mL/Hr) IV Continuous <Continuous>  sodium chloride 0.9%. 1000 milliLiter(s) (50 mL/Hr) IV Continuous <Continuous>    MEDICATIONS  (PRN):  docusate sodium 100 milliGRAM(s) Oral three times a day PRN Constipation  morphine  - Injectable 2 milliGRAM(s) IV Push every 6 hours PRN Chest Pain      Allergies    No Known Drug Allergies  Originally Entered as [Unknown see Comment: pt unable to remember] reaction to [Mold] (Unknown)  stolazine eye med- pt doesn&#x27;t remember (Unknown)      REVIEW OF SYSTEMS: As mentioned in HPI all others Negative     Vital Signs Last 24 Hrs  T(C): 36.1 (12 Jan 2018 06:36), Max: 36.5 (11 Jan 2018 14:11)  T(F): 97 (12 Jan 2018 06:36), Max: 97.7 (11 Jan 2018 14:11)  HR: 75 (12 Jan 2018 04:00) (66 - 99)  BP: 123/54 (12 Jan 2018 04:00) (81/44 - 150/72)  BP(mean): 72 (12 Jan 2018 04:00) (51 - 80)  RR: 18 (11 Jan 2018 20:00) (16 - 26)  SpO2: 98% (11 Jan 2018 17:36) (96% - 99%)    PHYSICAL EXAM:  NERVOUS SYSTEM:  Alert & Oriented X3, Good concentration  CHEST/LUNG: Clear to auscultation bilaterally  HEART: Regular rate and rhythm; No murmurs, rubs, or gallops  ABDOMEN: Soft, Nontender, Nondistended  EXTREMITIES:  + Peripheral Pulses  SKIN: right femoral cath site without bleeding or hematoma    LABS:                        11.7   7.9   )-----------( 214      ( 12 Jan 2018 05:14 )             34.6     01-12    139  |  109<H>  |  17  ----------------------------<  98  4.0   |  24  |  0.68    Ca    8.9      12 Jan 2018 05:14  Phos  2.7     01-11  Mg     2.0     01-11    TPro  5.6<L>  /  Alb  2.9<L>  /  TBili  0.4  /  DBili  x   /  AST  17  /  ALT  14  /  AlkPhos  65  01-11    PT/INR - ( 11 Jan 2018 09:41 )   PT: 12.3 sec;   INR: 1.14 ratio         PTT - ( 11 Jan 2018 09:41 )  PTT:137.6 sec  Urinalysis Basic - ( 11 Jan 2018 05:10 )    EKG- SR 77bpm

## 2018-01-18 DIAGNOSIS — Z88.8 ALLERGY STATUS TO OTHER DRUGS, MEDICAMENTS AND BIOLOGICAL SUBSTANCES: ICD-10-CM

## 2018-01-18 DIAGNOSIS — Z95.0 PRESENCE OF CARDIAC PACEMAKER: ICD-10-CM

## 2018-01-18 DIAGNOSIS — N39.3 STRESS INCONTINENCE (FEMALE) (MALE): ICD-10-CM

## 2018-01-18 DIAGNOSIS — Z79.82 LONG TERM (CURRENT) USE OF ASPIRIN: ICD-10-CM

## 2018-01-18 DIAGNOSIS — I21.4 NON-ST ELEVATION (NSTEMI) MYOCARDIAL INFARCTION: ICD-10-CM

## 2018-01-18 DIAGNOSIS — M19.90 UNSPECIFIED OSTEOARTHRITIS, UNSPECIFIED SITE: ICD-10-CM

## 2018-01-18 DIAGNOSIS — N81.6 RECTOCELE: ICD-10-CM

## 2018-01-18 DIAGNOSIS — K57.90 DIVERTICULOSIS OF INTESTINE, PART UNSPECIFIED, WITHOUT PERFORATION OR ABSCESS WITHOUT BLEEDING: ICD-10-CM

## 2018-01-18 DIAGNOSIS — F12.90 CANNABIS USE, UNSPECIFIED, UNCOMPLICATED: ICD-10-CM

## 2018-01-18 DIAGNOSIS — I10 ESSENTIAL (PRIMARY) HYPERTENSION: ICD-10-CM

## 2018-01-18 DIAGNOSIS — E78.5 HYPERLIPIDEMIA, UNSPECIFIED: ICD-10-CM

## 2018-01-18 DIAGNOSIS — Z95.828 PRESENCE OF OTHER VASCULAR IMPLANTS AND GRAFTS: ICD-10-CM

## 2018-01-18 DIAGNOSIS — I25.110 ATHEROSCLEROTIC HEART DISEASE OF NATIVE CORONARY ARTERY WITH UNSTABLE ANGINA PECTORIS: ICD-10-CM

## 2018-01-18 DIAGNOSIS — Z95.5 PRESENCE OF CORONARY ANGIOPLASTY IMPLANT AND GRAFT: ICD-10-CM

## 2018-01-18 DIAGNOSIS — Z79.02 LONG TERM (CURRENT) USE OF ANTITHROMBOTICS/ANTIPLATELETS: ICD-10-CM

## 2018-01-18 DIAGNOSIS — E03.9 HYPOTHYROIDISM, UNSPECIFIED: ICD-10-CM

## 2018-01-18 DIAGNOSIS — Z87.891 PERSONAL HISTORY OF NICOTINE DEPENDENCE: ICD-10-CM

## 2018-01-18 DIAGNOSIS — R07.9 CHEST PAIN, UNSPECIFIED: ICD-10-CM

## 2018-01-18 DIAGNOSIS — K44.9 DIAPHRAGMATIC HERNIA WITHOUT OBSTRUCTION OR GANGRENE: ICD-10-CM

## 2018-01-18 DIAGNOSIS — Z90.710 ACQUIRED ABSENCE OF BOTH CERVIX AND UTERUS: ICD-10-CM

## 2018-01-18 DIAGNOSIS — Z96.651 PRESENCE OF RIGHT ARTIFICIAL KNEE JOINT: ICD-10-CM

## 2018-02-09 ENCOUNTER — NON-APPOINTMENT (OUTPATIENT)
Age: 83
End: 2018-02-09

## 2018-02-09 ENCOUNTER — APPOINTMENT (OUTPATIENT)
Dept: CARDIOLOGY | Facility: CLINIC | Age: 83
End: 2018-02-09
Payer: MEDICARE

## 2018-02-09 VITALS
SYSTOLIC BLOOD PRESSURE: 123 MMHG | DIASTOLIC BLOOD PRESSURE: 77 MMHG | OXYGEN SATURATION: 98 % | HEART RATE: 72 BPM | WEIGHT: 148 LBS | BODY MASS INDEX: 29.84 KG/M2 | HEIGHT: 59 IN

## 2018-02-09 DIAGNOSIS — N81.10 CYSTOCELE, UNSPECIFIED: ICD-10-CM

## 2018-02-09 PROCEDURE — 99214 OFFICE O/P EST MOD 30 MIN: CPT | Mod: 25

## 2018-02-09 PROCEDURE — 93000 ELECTROCARDIOGRAM COMPLETE: CPT

## 2018-03-20 ENCOUNTER — RX RENEWAL (OUTPATIENT)
Age: 83
End: 2018-03-20

## 2018-04-02 ENCOUNTER — APPOINTMENT (OUTPATIENT)
Dept: UROLOGY | Facility: CLINIC | Age: 83
End: 2018-04-02

## 2018-05-23 ENCOUNTER — MEDICATION RENEWAL (OUTPATIENT)
Age: 83
End: 2018-05-23

## 2018-05-23 ENCOUNTER — RX RENEWAL (OUTPATIENT)
Age: 83
End: 2018-05-23

## 2018-07-05 ENCOUNTER — RX RENEWAL (OUTPATIENT)
Age: 83
End: 2018-07-05

## 2018-08-08 ENCOUNTER — APPOINTMENT (OUTPATIENT)
Dept: CARDIOLOGY | Facility: CLINIC | Age: 83
End: 2018-08-08

## 2018-11-15 ENCOUNTER — INPATIENT (INPATIENT)
Facility: HOSPITAL | Age: 83
LOS: 1 days | Discharge: HOME | End: 2018-11-17
Attending: HOSPITALIST | Admitting: HOSPITALIST
Payer: MEDICARE

## 2018-11-15 VITALS — WEIGHT: 145.06 LBS | HEIGHT: 60 IN

## 2018-11-15 DIAGNOSIS — I10 ESSENTIAL (PRIMARY) HYPERTENSION: ICD-10-CM

## 2018-11-15 DIAGNOSIS — Z96.659 PRESENCE OF UNSPECIFIED ARTIFICIAL KNEE JOINT: Chronic | ICD-10-CM

## 2018-11-15 DIAGNOSIS — H26.9 UNSPECIFIED CATARACT: Chronic | ICD-10-CM

## 2018-11-15 DIAGNOSIS — G56.01 CARPAL TUNNEL SYNDROME, RIGHT UPPER LIMB: Chronic | ICD-10-CM

## 2018-11-15 DIAGNOSIS — Z98.82 BREAST IMPLANT STATUS: Chronic | ICD-10-CM

## 2018-11-15 DIAGNOSIS — Z98.89 OTHER SPECIFIED POSTPROCEDURAL STATES: Chronic | ICD-10-CM

## 2018-11-15 DIAGNOSIS — Z90.710 ACQUIRED ABSENCE OF BOTH CERVIX AND UTERUS: Chronic | ICD-10-CM

## 2018-11-15 DIAGNOSIS — I25.10 ATHEROSCLEROTIC HEART DISEASE OF NATIVE CORONARY ARTERY WITHOUT ANGINA PECTORIS: ICD-10-CM

## 2018-11-15 DIAGNOSIS — Z95.0 PRESENCE OF CARDIAC PACEMAKER: Chronic | ICD-10-CM

## 2018-11-15 DIAGNOSIS — Z95.5 PRESENCE OF CORONARY ANGIOPLASTY IMPLANT AND GRAFT: Chronic | ICD-10-CM

## 2018-11-15 LAB
ALBUMIN SERPL ELPH-MCNC: 3.3 G/DL — SIGNIFICANT CHANGE UP (ref 3.3–5)
ALP SERPL-CCNC: 54 U/L — SIGNIFICANT CHANGE UP (ref 40–120)
ALT FLD-CCNC: 23 U/L — SIGNIFICANT CHANGE UP (ref 12–78)
ANION GAP SERPL CALC-SCNC: 8 MMOL/L — SIGNIFICANT CHANGE UP (ref 5–17)
APPEARANCE UR: CLEAR — SIGNIFICANT CHANGE UP
APTT BLD: 24 SEC — LOW (ref 27.5–36.3)
AST SERPL-CCNC: 29 U/L — SIGNIFICANT CHANGE UP (ref 15–37)
BILIRUB SERPL-MCNC: 0.4 MG/DL — SIGNIFICANT CHANGE UP (ref 0.2–1.2)
BILIRUB UR-MCNC: NEGATIVE — SIGNIFICANT CHANGE UP
BUN SERPL-MCNC: 21 MG/DL — SIGNIFICANT CHANGE UP (ref 7–23)
CALCIUM SERPL-MCNC: 8.9 MG/DL — SIGNIFICANT CHANGE UP (ref 8.5–10.1)
CHLORIDE SERPL-SCNC: 111 MMOL/L — HIGH (ref 96–108)
CK SERPL-CCNC: 60 U/L — SIGNIFICANT CHANGE UP (ref 26–192)
CO2 SERPL-SCNC: 22 MMOL/L — SIGNIFICANT CHANGE UP (ref 22–31)
COLOR SPEC: YELLOW — SIGNIFICANT CHANGE UP
CREAT SERPL-MCNC: 0.69 MG/DL — SIGNIFICANT CHANGE UP (ref 0.5–1.3)
DIFF PNL FLD: NEGATIVE — SIGNIFICANT CHANGE UP
GLUCOSE BLDC GLUCOMTR-MCNC: 123 MG/DL — HIGH (ref 70–99)
GLUCOSE SERPL-MCNC: 96 MG/DL — SIGNIFICANT CHANGE UP (ref 70–99)
GLUCOSE UR QL: NEGATIVE MG/DL — SIGNIFICANT CHANGE UP
HCT VFR BLD CALC: 35.2 % — SIGNIFICANT CHANGE UP (ref 34.5–45)
HGB BLD-MCNC: 10.8 G/DL — LOW (ref 11.5–15.5)
INR BLD: 0.94 RATIO — SIGNIFICANT CHANGE UP (ref 0.88–1.16)
KETONES UR-MCNC: NEGATIVE — SIGNIFICANT CHANGE UP
LEUKOCYTE ESTERASE UR-ACNC: NEGATIVE — SIGNIFICANT CHANGE UP
MCHC RBC-ENTMCNC: 27.5 PG — SIGNIFICANT CHANGE UP (ref 27–34)
MCHC RBC-ENTMCNC: 30.7 GM/DL — LOW (ref 32–36)
MCV RBC AUTO: 89.6 FL — SIGNIFICANT CHANGE UP (ref 80–100)
NITRITE UR-MCNC: NEGATIVE — SIGNIFICANT CHANGE UP
NRBC # BLD: 0 /100 WBCS — SIGNIFICANT CHANGE UP (ref 0–0)
PH UR: 7 — SIGNIFICANT CHANGE UP (ref 5–8)
PLATELET # BLD AUTO: 365 K/UL — SIGNIFICANT CHANGE UP (ref 150–400)
POTASSIUM SERPL-MCNC: 4.8 MMOL/L — SIGNIFICANT CHANGE UP (ref 3.5–5.3)
POTASSIUM SERPL-SCNC: 4.8 MMOL/L — SIGNIFICANT CHANGE UP (ref 3.5–5.3)
PROT SERPL-MCNC: 6.8 GM/DL — SIGNIFICANT CHANGE UP (ref 6–8.3)
PROT UR-MCNC: NEGATIVE MG/DL — SIGNIFICANT CHANGE UP
PROTHROM AB SERPL-ACNC: 10.4 SEC — SIGNIFICANT CHANGE UP (ref 10–12.9)
RAPID RVP RESULT: SIGNIFICANT CHANGE UP
RBC # BLD: 3.93 M/UL — SIGNIFICANT CHANGE UP (ref 3.8–5.2)
RBC # FLD: 15.4 % — HIGH (ref 10.3–14.5)
SODIUM SERPL-SCNC: 141 MMOL/L — SIGNIFICANT CHANGE UP (ref 135–145)
SP GR SPEC: 1 — LOW (ref 1.01–1.02)
TROPONIN I SERPL-MCNC: <0.015 NG/ML — SIGNIFICANT CHANGE UP (ref 0.01–0.04)
TROPONIN I SERPL-MCNC: <0.015 NG/ML — SIGNIFICANT CHANGE UP (ref 0.01–0.04)
UROBILINOGEN FLD QL: NEGATIVE MG/DL — SIGNIFICANT CHANGE UP
WBC # BLD: 16.13 K/UL — HIGH (ref 3.8–10.5)
WBC # FLD AUTO: 16.13 K/UL — HIGH (ref 3.8–10.5)

## 2018-11-15 PROCEDURE — 71046 X-RAY EXAM CHEST 2 VIEWS: CPT | Mod: 26

## 2018-11-15 PROCEDURE — 93010 ELECTROCARDIOGRAM REPORT: CPT

## 2018-11-15 PROCEDURE — 99223 1ST HOSP IP/OBS HIGH 75: CPT

## 2018-11-15 PROCEDURE — 99285 EMERGENCY DEPT VISIT HI MDM: CPT

## 2018-11-15 RX ORDER — LISINOPRIL 2.5 MG/1
20 TABLET ORAL DAILY
Qty: 0 | Refills: 0 | Status: DISCONTINUED | OUTPATIENT
Start: 2018-11-15 | End: 2018-11-17

## 2018-11-15 RX ORDER — AMLODIPINE BESYLATE 2.5 MG/1
5 TABLET ORAL DAILY
Qty: 0 | Refills: 0 | Status: DISCONTINUED | OUTPATIENT
Start: 2018-11-15 | End: 2018-11-17

## 2018-11-15 RX ORDER — LANOLIN ALCOHOL/MO/W.PET/CERES
5 CREAM (GRAM) TOPICAL AT BEDTIME
Qty: 0 | Refills: 0 | Status: DISCONTINUED | OUTPATIENT
Start: 2018-11-15 | End: 2018-11-17

## 2018-11-15 RX ORDER — METOPROLOL TARTRATE 50 MG
200 TABLET ORAL DAILY
Qty: 0 | Refills: 0 | Status: DISCONTINUED | OUTPATIENT
Start: 2018-11-15 | End: 2018-11-17

## 2018-11-15 RX ORDER — PANTOPRAZOLE SODIUM 20 MG/1
40 TABLET, DELAYED RELEASE ORAL DAILY
Qty: 0 | Refills: 0 | Status: DISCONTINUED | OUTPATIENT
Start: 2018-11-15 | End: 2018-11-17

## 2018-11-15 RX ORDER — CLOPIDOGREL BISULFATE 75 MG/1
75 TABLET, FILM COATED ORAL DAILY
Qty: 0 | Refills: 0 | Status: DISCONTINUED | OUTPATIENT
Start: 2018-11-15 | End: 2018-11-17

## 2018-11-15 RX ORDER — TRAZODONE HCL 50 MG
1 TABLET ORAL
Qty: 0 | Refills: 0 | COMMUNITY

## 2018-11-15 RX ORDER — HEPARIN SODIUM 5000 [USP'U]/ML
5000 INJECTION INTRAVENOUS; SUBCUTANEOUS EVERY 12 HOURS
Qty: 0 | Refills: 0 | Status: DISCONTINUED | OUTPATIENT
Start: 2018-11-15 | End: 2018-11-17

## 2018-11-15 RX ORDER — ONDANSETRON 8 MG/1
4 TABLET, FILM COATED ORAL EVERY 6 HOURS
Qty: 0 | Refills: 0 | Status: DISCONTINUED | OUTPATIENT
Start: 2018-11-15 | End: 2018-11-17

## 2018-11-15 RX ADMIN — HEPARIN SODIUM 5000 UNIT(S): 5000 INJECTION INTRAVENOUS; SUBCUTANEOUS at 22:08

## 2018-11-15 RX ADMIN — Medication 5 MILLIGRAM(S): at 23:49

## 2018-11-15 NOTE — ED PROVIDER NOTE - OBJECTIVE STATEMENT
84 y/o F with PMHx of CAD s/p 12 stents placed by Dr. Welsh, HTN, HLD, and Hypothyroidism presenting to the ED c/o worsening SOB, chest discomfort x4 days. States chest discomfort is sometimes at rest, sometimes with exertion. SOB worse with exertion. Also reports productive cough. States that she is here for admission to receive stent placement with Dr. Welsh tomorrow. Denies fever, chills, orthopnea, PND. Pt is poor historian.

## 2018-11-15 NOTE — ED PROVIDER NOTE - MUSCULOSKELETAL, MLM
Spine appears normal, range of motion is not limited, no muscle or joint tenderness. BRADSHAW x4, no focal swelling/tenderness

## 2018-11-15 NOTE — ED ADULT TRIAGE NOTE - CHIEF COMPLAINT QUOTE
pt presents to ED pre-op for stent placement w/ MD Welsh tomorrow. hx 12(? unsure) stents. c/o "slight" cp since last night.

## 2018-11-15 NOTE — CONSULT NOTE ADULT - PROBLEM SELECTOR RECOMMENDATION 9
Previously stable, multivessel PCI s/p multiple PCI now with change in symptoms (exertional dyspnea), suggesting possible progression of disease; patient is presently comfortable with no symptoms at rest, non-ischemic ECG appearance, and normal troponin - will plan for coronary angiography 11/16; continue aspirin, Plavix, metoprolol, Imdur.

## 2018-11-15 NOTE — CONSULT NOTE ADULT - CONSULT REASON
Patient called Dr. Welsh last night and described exertional dyspnea; came to the ER today for evaluation.

## 2018-11-15 NOTE — CONSULT NOTE ADULT - SUBJECTIVE AND OBJECTIVE BOX
CHIEF COMPLAINT: Patient is a 85y old  Female who presents with a chief complaint of shortness of breath    HPI: 85 year old woman with a history of multivessel CAD s/p PCI of RCA, LAD, past in-stent restenosis, PPM, HTN, HLD, carotid atherosclerosis, diverticulosis, hypothyroidism, anemia, insomnia presented to the ER with complaints of dyspnea x several days.  She says that she is "winded" and "fatigued" with minimal exertion; symptoms are relieved with rest.   She has difficulty providing additional details -- repeatedly says "I just don't feel well" and "I have a hard time remembering."  She denies angina - can't recall having any recent chest pain.      PAST MEDICAL & SURGICAL HISTORY:  High cholesterol  Stress incontinence in female  Rectocele  Diverticulosis  Hiatal hernia  Hypertension  Hypothyroidism  Dyslipidemia  CAD (coronary artery disease)  Carpal tunnel syndrome of right wrist  Cardiac pacemaker  Stented coronary artery: 12 heart stents,   1 left leg stents  H/O carotid endarterectomy: right  History of hysterectomy  History of breast reconstruction  Bilateral cataracts: surgery  Status post total knee replacement: right    SOCIAL HISTORY:   Alcohol: Social use  Smoking: Former smoker  Marital Status:     FAMILY HISTORY: CAD.  Father with esophageal cancer; mother with DVT    Home Medications:  amlodipine-benazepril 5 mg-10 mg oral capsule: 1 cap(s) orally once a day  home  (11 Jan 2018 03:14)  aspirin 81 mg oral delayed release tablet: 1 tab(s) orally once a day home &amp; hosp (11 Jan 2018 03:14)  isosorbide mononitrate 30 mg oral tablet, extended release: 1 tab(s) orally once a day (11 Jan 2018 03:14)  metoprolol succinate 200 mg oral tablet, extended release: 1 tab(s) orally once a day (12 Jan 2018 13:50)  Plavix 75 mg oral tablet: 1 tab(s) orally once a day (11 Jan 2018 03:14)  traZODone 50 mg oral tablet: 1 cap(s) orally once a day, As Needed  (11 Jan 2018 03:14)    Allergies:  No Known Drug Allergies    REVIEW OF SYSTEMS:  CONSTITUTIONAL: No weakness, fevers or chills; + fatigue  Eyes: No visual changes  NECK: No pain or stiffness  RESPIRATORY: No cough, wheezing, hemoptysis; + shortness of breath  CARDIOVASCULAR: No chest pain or palpitations  GASTROINTESTINAL: No abdominal pain. No nausea, vomiting, or hematemesis; No diarrhea or constipation. No melena or hematochezia.  GENITOURINARY: No dysuria, frequency or hematuria  NEUROLOGICAL: No numbness.  + forgetful  SKIN: No itching or rash  All other review of systems is negative unless indicated above    VITAL SIGNS:   Vital Signs Last 24 Hrs  T(C): 36.9 (15 Nov 2018 11:56), Max: 36.9 (15 Nov 2018 11:56)  T(F): 98.4 (15 Nov 2018 11:56), Max: 98.4 (15 Nov 2018 11:56)  HR: 63 (15 Nov 2018 11:56) (63 - 63)  BP: 148/65 (15 Nov 2018 11:56) (148/65 - 148/65)  RR: 18 (15 Nov 2018 11:56) (18 - 18)  SpO2: 98% (15 Nov 2018 11:56) (98% - 98%)    PHYSICAL EXAM:  Constitutional: NAD, awake and alert, appears stated age  HEENT:  EOMI,  Pupils round, No oral cyanosis.  Pulmonary: Non-labored, breath sounds are clear bilaterally, No wheezing, rales or rhonchi  Cardiovascular: S1 and S2, regular rate and rhythm, II/VI systolic murmur  Gastrointestinal: Bowel Sounds present, soft, nontender.   Lymph: No peripheral edema. No cervical lymphadenopathy.  Neurological: Alert, no focal deficits  Skin: No rashes.  Psych:  Mood & affect appropriate    LABS:                     10.8   16.13 )-----------( 365      ( 15 Nov 2018 12:21 )             35.2     141    |  111    |  21     ----------------------------<  96     4.8     |  22     |  0.69     CARDIAC MARKERS ( 15 Nov 2018 12:21 ) <0.015 ng/mL / x     / 60 U/L / x     / x       12 Lead ECG (11.15.18 @ 11:53):   Poor data quality.  Normal sinus rhythm.    Transthoracic Echocardiogram (01.12.18 @ 09:31):   Mild (1+) mitral regurgitation.   Mild to Moderate aortic insufficiency noted.   Moderate (2+) tricuspid valve regurgitation.   Mild pulmonary hypertension.   The left atrium is mildly dilated.   The left ventricle is normal in size, wall thickness, wall motion and contractility. Estimated left ventricular ejection fraction is 55-60 %.

## 2018-11-15 NOTE — H&P ADULT - ASSESSMENT
85 year old woman with multiple medical history, CAD s/p 12 stents by Dr Welsh presented to the ED with complaints of worsening SOBx3 days. States "I get short of breath when I walk to the curb." No EKG changes, negative troponin. Plan for cardiac cath in AM as per cardiology consult.   Of note patient is on Prednisone 20mg Daily (since October 29) prescribed by Dr Cardona for presumed PMR.     * CAD- BENJAMIN- possible progression of disease  - hx of cardiac stent placement with Dr Welsh  - cardiology consult appreciated  - Troponin negative.  - Plan for coronary angiography in AM  - continue Plavix    *PMR  - I called Dr Cardona's office and patient was started on Prednisone 20mg daily in OCtober 29 for presumes PMR  - ct steroids.    * HTN  - resume antihypertensive medications    *HLD  - statin    DVT prophylaxis- heparin    Case d/w Dr Claire. 85 year old woman with multiple medical history, CAD s/p 12 stents by Dr Welsh presented to the ED with complaints of worsening SOBx3 days. States "I get short of breath when I walk to the curb." No EKG changes, negative troponin. Plan for cardiac cath in AM as per cardiology consult.   Of note patient is on Prednisone 20mg Daily (since October 29) prescribed by Dr Cardona for presumed PMR.     * CAD- BENJAMIN- possible progression of disease  - hx of cardiac stent placement with Dr Welsh  - cardiology consult appreciated  - Troponin negative.  - Plan for coronary angiography in AM  - continue Plavix    *PMR  - Leukocytosis secondary to steroid  - I called Dr Cardona's office and patient was started on Prednisone 20mg daily in OCtober 29 for presumes PMR  - ct steroids.    * HTN  - resume antihypertensive medications    *HLD  - statin    DVT prophylaxis- heparin    Case d/w Dr Claire. 85 year old woman with multiple medical history, CAD s/p 12 stents by Dr Welsh presented to the ED with complaints of worsening SOBx3 days. States "I get short of breath when I walk to the curb." No EKG changes, negative troponin. Plan for cardiac cath in AM as per cardiology consult.   Of note patient is on Prednisone 20mg Daily (since October 29) prescribed by Dr Cardona for presumed PMR.     * CAD- BENJAMIN- possible progression of disease  - hx of cardiac stent placement with Dr Welsh  - cardiology consult appreciated  - Troponin negative.  - Plan for coronary angiography in AM  - NPO a MN. No heparin.   - continue Plavix    *PMR  - Leukocytosis suspect to be secondary to steroid  - I called Dr Cardona's office and patient was started on Prednisone 20mg daily in OCtober 29 for presumes PMR  - ct steroids.  - Check UA/ CXR / RVP to rule out possible occult infection    * HTN  - resume antihypertensive medications    *HLD  - statin    DVT prophylaxis- heparin    Case d/w Dr Trejo.

## 2018-11-15 NOTE — ED PROVIDER NOTE - MEDICAL DECISION MAKING DETAILS
84 y/o F with PMHx of CAD s/p 12 stents, HTN, HLD, ambulatory to ED c/o with progressive BENJAMIN, some exertional chest discomfort. Denies fever, nontoxic. Plan for labs including serial troponin, CXR, EKG, cardiology consult, Tele admission.

## 2018-11-15 NOTE — H&P ADULT - NSHPREVIEWOFSYSTEMS_GEN_ALL_CORE
REVIEW OF SYSTEMS:    CONSTITUTIONAL: No weakness, fevers or chills  EYES/ENT: No visual changes;  No vertigo or throat pain   NECK: No pain or stiffness  RESPIRATORY: + cough, no wheezing, hemoptysis; short of breath when ambulating- going to the bathroom  CARDIOVASCULAR: No chest pain or palpitations  GASTROINTESTINAL: No abdominal or epigastric pain. No nausea, vomiting, or hematemesis; No diarrhea or constipation. No melena or hematochezia.  GENITOURINARY: No dysuria, frequency or hematuria  NEUROLOGICAL: No numbness or weakness  SKIN: No itching, burning, rashes, or lesions   All other review of systems is negative unless indicated above.

## 2018-11-15 NOTE — ED STATDOCS - PROGRESS NOTE DETAILS
Glen CARMONA for ED attending, Dr. Triplett: 86 y/o F with PMHx of CAD s/p 12 stents placed by Dr. Welsh, HTN, HLD, and Hypothyroidism presenting to the ED c/o worsening SOB x4 days. Pt is poor historian. States that she is here for admission to receive stent placement with Dr. Welsh tomorrow. Will send patient to main ED for further evaluation.

## 2018-11-15 NOTE — H&P ADULT - NSHPPHYSICALEXAM_GEN_ALL_CORE
Vital Signs Last 24 Hrs  T(C): 36.9 (15 Nov 2018 11:56), Max: 36.9 (15 Nov 2018 11:56)  T(F): 98.4 (15 Nov 2018 11:56), Max: 98.4 (15 Nov 2018 11:56)  HR: 63 (15 Nov 2018 11:56) (63 - 63)  BP: 148/65 (15 Nov 2018 11:56) (148/65 - 148/65)  BP(mean): --  RR: 18 (15 Nov 2018 11:56) (18 - 18)  SpO2: 98% (15 Nov 2018 11:56) (98% - 98%)    PE:  Constitutional: NAD, laying in bed  HEENT: NC/AT, EOMI, PERRLA  Neck: supple  Back: no tenderness  Respiratory: respirations even and non labored- LCTA  Cardiovascular: S1S2 regular, no murmurs  Abdomen: soft, not tender, not distended, positive BS  Genitourinary: voiding  Musculoskeletal: no muscle tenderness, no joint swelling or tenderness  Extremities: +1 pedal edema   Neurological: no focal deficits

## 2018-11-15 NOTE — ED ADULT NURSE NOTE - OBJECTIVE STATEMENT
Patient presetns with chest pain/ discomfort for the past 4 days. Patient states she has felt generalized discomfort with mild shortness of breat, patient has history of stents

## 2018-11-15 NOTE — PATIENT PROFILE ADULT - IS PATIENT POST-MENOPAUSAL?
-jaqueline Friday, NPO after MN yes

## 2018-11-15 NOTE — H&P ADULT - HISTORY OF PRESENT ILLNESS
85 year old woman with multiple medical history, CAD s/p 12 stents by Dr Welsh presented to the ED with complaints of worsening SOBx3 days. States "I get short of breath when I walk to the curb." No EKG changes, negative troponin. Plan for cardiac cath in AM as per cardiology consult.   Of note patient is on Prednisone 20mg Daily (since October 29) prescribed by Dr Cardona for presumed PMR. 85 year old woman with multiple medical history, CAD s/p 12 stents by Dr Welsh presented to the ED with complaints of worsening SOBx3 days. States "I get short of breath when I walk to the curb." No EKG changes, negative troponin. She is presently comfortable and has no other anginal equivalent when at rest. Denies palpitations. denies LE edema. Endorse chronic cough no fevers or chills. No recent sick contacts. No N/V/D. No abd pain. no dysuria.   Plan for cardiac cath in AM as per cardiology consult.   Of note patient is on Prednisone 20mg Daily (since October 29) prescribed by Dr Cardona for presumed PMR.

## 2018-11-16 LAB
ANION GAP SERPL CALC-SCNC: 8 MMOL/L — SIGNIFICANT CHANGE UP (ref 5–17)
BASOPHILS # BLD AUTO: 0.08 K/UL — SIGNIFICANT CHANGE UP (ref 0–0.2)
BASOPHILS NFR BLD AUTO: 0.7 % — SIGNIFICANT CHANGE UP (ref 0–2)
BLD GP AB SCN SERPL QL: SIGNIFICANT CHANGE UP
BUN SERPL-MCNC: 20 MG/DL — SIGNIFICANT CHANGE UP (ref 7–23)
CALCIUM SERPL-MCNC: 8.8 MG/DL — SIGNIFICANT CHANGE UP (ref 8.5–10.1)
CHLORIDE SERPL-SCNC: 110 MMOL/L — HIGH (ref 96–108)
CO2 SERPL-SCNC: 25 MMOL/L — SIGNIFICANT CHANGE UP (ref 22–31)
CREAT SERPL-MCNC: 0.7 MG/DL — SIGNIFICANT CHANGE UP (ref 0.5–1.3)
CULTURE RESULTS: SIGNIFICANT CHANGE UP
EOSINOPHIL # BLD AUTO: 0.1 K/UL — SIGNIFICANT CHANGE UP (ref 0–0.5)
EOSINOPHIL NFR BLD AUTO: 0.9 % — SIGNIFICANT CHANGE UP (ref 0–6)
GLUCOSE SERPL-MCNC: 94 MG/DL — SIGNIFICANT CHANGE UP (ref 70–99)
HCT VFR BLD CALC: 34.6 % — SIGNIFICANT CHANGE UP (ref 34.5–45)
HGB BLD-MCNC: 10.7 G/DL — LOW (ref 11.5–15.5)
IMM GRANULOCYTES NFR BLD AUTO: 1.1 % — SIGNIFICANT CHANGE UP (ref 0–1.5)
LYMPHOCYTES # BLD AUTO: 3.76 K/UL — HIGH (ref 1–3.3)
LYMPHOCYTES # BLD AUTO: 35.1 % — SIGNIFICANT CHANGE UP (ref 13–44)
MCHC RBC-ENTMCNC: 27 PG — SIGNIFICANT CHANGE UP (ref 27–34)
MCHC RBC-ENTMCNC: 30.9 GM/DL — LOW (ref 32–36)
MCV RBC AUTO: 87.4 FL — SIGNIFICANT CHANGE UP (ref 80–100)
MONOCYTES # BLD AUTO: 1.08 K/UL — HIGH (ref 0–0.9)
MONOCYTES NFR BLD AUTO: 10.1 % — SIGNIFICANT CHANGE UP (ref 2–14)
NEUTROPHILS # BLD AUTO: 5.58 K/UL — SIGNIFICANT CHANGE UP (ref 1.8–7.4)
NEUTROPHILS NFR BLD AUTO: 52.1 % — SIGNIFICANT CHANGE UP (ref 43–77)
NRBC # BLD: 0 /100 WBCS — SIGNIFICANT CHANGE UP (ref 0–0)
PLATELET # BLD AUTO: 351 K/UL — SIGNIFICANT CHANGE UP (ref 150–400)
POTASSIUM SERPL-MCNC: 4 MMOL/L — SIGNIFICANT CHANGE UP (ref 3.5–5.3)
POTASSIUM SERPL-SCNC: 4 MMOL/L — SIGNIFICANT CHANGE UP (ref 3.5–5.3)
RBC # BLD: 3.96 M/UL — SIGNIFICANT CHANGE UP (ref 3.8–5.2)
RBC # FLD: 15.5 % — HIGH (ref 10.3–14.5)
SODIUM SERPL-SCNC: 143 MMOL/L — SIGNIFICANT CHANGE UP (ref 135–145)
SPECIMEN SOURCE: SIGNIFICANT CHANGE UP
TYPE + AB SCN PNL BLD: SIGNIFICANT CHANGE UP
WBC # BLD: 10.72 K/UL — HIGH (ref 3.8–10.5)
WBC # FLD AUTO: 10.72 K/UL — HIGH (ref 3.8–10.5)

## 2018-11-16 PROCEDURE — 99152 MOD SED SAME PHYS/QHP 5/>YRS: CPT

## 2018-11-16 PROCEDURE — 93571 IV DOP VEL&/PRESS C FLO 1ST: CPT | Mod: 26,RC

## 2018-11-16 PROCEDURE — 92928 PRQ TCAT PLMT NTRAC ST 1 LES: CPT | Mod: LC

## 2018-11-16 PROCEDURE — 92978 ENDOLUMINL IVUS OCT C 1ST: CPT | Mod: 26,LC

## 2018-11-16 PROCEDURE — 93010 ELECTROCARDIOGRAM REPORT: CPT

## 2018-11-16 PROCEDURE — 99233 SBSQ HOSP IP/OBS HIGH 50: CPT | Mod: 25

## 2018-11-16 PROCEDURE — 93458 L HRT ARTERY/VENTRICLE ANGIO: CPT | Mod: 26,XU

## 2018-11-16 RX ORDER — SODIUM CHLORIDE 9 MG/ML
1000 INJECTION INTRAMUSCULAR; INTRAVENOUS; SUBCUTANEOUS
Qty: 0 | Refills: 0 | Status: DISCONTINUED | OUTPATIENT
Start: 2018-11-16 | End: 2018-11-17

## 2018-11-16 RX ORDER — ZOLPIDEM TARTRATE 10 MG/1
5 TABLET ORAL AT BEDTIME
Qty: 0 | Refills: 0 | Status: DISCONTINUED | OUTPATIENT
Start: 2018-11-16 | End: 2018-11-17

## 2018-11-16 RX ORDER — ATORVASTATIN CALCIUM 80 MG/1
20 TABLET, FILM COATED ORAL AT BEDTIME
Qty: 0 | Refills: 0 | Status: DISCONTINUED | OUTPATIENT
Start: 2018-11-16 | End: 2018-11-17

## 2018-11-16 RX ORDER — DOCUSATE SODIUM 100 MG
100 CAPSULE ORAL ONCE
Qty: 0 | Refills: 0 | Status: COMPLETED | OUTPATIENT
Start: 2018-11-16 | End: 2018-11-16

## 2018-11-16 RX ORDER — ASPIRIN/CALCIUM CARB/MAGNESIUM 324 MG
81 TABLET ORAL DAILY
Qty: 0 | Refills: 0 | Status: DISCONTINUED | OUTPATIENT
Start: 2018-11-16 | End: 2018-11-17

## 2018-11-16 RX ADMIN — Medication 600 MILLIGRAM(S): at 21:04

## 2018-11-16 RX ADMIN — Medication 200 MILLIGRAM(S): at 05:22

## 2018-11-16 RX ADMIN — Medication 100 MILLIGRAM(S): at 21:04

## 2018-11-16 RX ADMIN — AMLODIPINE BESYLATE 5 MILLIGRAM(S): 2.5 TABLET ORAL at 05:22

## 2018-11-16 RX ADMIN — LISINOPRIL 20 MILLIGRAM(S): 2.5 TABLET ORAL at 05:22

## 2018-11-16 RX ADMIN — ZOLPIDEM TARTRATE 5 MILLIGRAM(S): 10 TABLET ORAL at 01:32

## 2018-11-16 RX ADMIN — SODIUM CHLORIDE 75 MILLILITER(S): 9 INJECTION INTRAMUSCULAR; INTRAVENOUS; SUBCUTANEOUS at 18:50

## 2018-11-16 RX ADMIN — Medication 20 MILLIGRAM(S): at 18:50

## 2018-11-16 RX ADMIN — ZOLPIDEM TARTRATE 5 MILLIGRAM(S): 10 TABLET ORAL at 21:04

## 2018-11-16 RX ADMIN — ATORVASTATIN CALCIUM 20 MILLIGRAM(S): 80 TABLET, FILM COATED ORAL at 21:04

## 2018-11-16 RX ADMIN — CLOPIDOGREL BISULFATE 75 MILLIGRAM(S): 75 TABLET, FILM COATED ORAL at 05:22

## 2018-11-16 NOTE — PROGRESS NOTE ADULT - SUBJECTIVE AND OBJECTIVE BOX
Reason for Admission: sob and dyspnea on exertion	  History of Present Illness: 	  85 year old woman with multiple medical history, CAD s/p 12 stents by Dr Welsh presented to the ED with complaints of worsening SOBx3 days. States "I get short of breath when I walk to the curb." No EKG changes, negative troponin. She is presently comfortable and has no other anginal equivalent when at rest. Denies palpitations. denies LE edema. Endorse chronic cough no fevers or chills. No recent sick contacts. No N/V/D. No abd pain. no dysuria.   Plan for cardiac cath in AM as per cardiology consult.   Of note patient is on Prednisone 20mg Daily (since ) prescribed by Dr Cardona for presumed PMR.   - patient was seen at 12 pm prior to cardiac cath   sitting in chair in [address] asymptomatic     PE:  	Constitutional: NAD, laying in bed  	HEENT: NC/AT, EOMI, PERRLA  	Neck: supple  	Back: no tenderness  	Respiratory: respirations even and non labored-, CTA B/L  	Cardiovascular: S1S2 regular, no murmurs  	Abdomen: soft, not tender, not distended, positive BS  	Genitourinary: voiding  	Musculoskeletal: no muscle tenderness, no joint swelling or tenderness  	Extremities: +1 pedal edema       PHYSICAL EXAM:    Daily     Daily     ICU Vital Signs Last 24 Hrs  T(C): 36.1 (2018 13:51), Max: 36.9 (2018 04:58)  T(F): 97 (2018 13:51), Max: 98.5 (2018 04:58)  HR: 63 (2018 17:35) (62 - 68)  BP: 124/78 (2018 17:35) (105/47 - 159/65)  BP(mean): --  ABP: --  ABP(mean): --  RR: 16 (2018 17:35) (16 - 18)  SpO2: 98% (2018 17:35) (96% - 100%)                            10.7   10.72 )-----------( 351      ( 2018 09:56 )             34.6       CBC Full  -  ( 2018 09:56 )  WBC Count : 10.72 K/uL  Hemoglobin : 10.7 g/dL  Hematocrit : 34.6 %  Platelet Count - Automated : 351 K/uL  Mean Cell Volume : 87.4 fl  Mean Cell Hemoglobin : 27.0 pg  Mean Cell Hemoglobin Concentration : 30.9 gm/dL  Auto Neutrophil # : 5.58 K/uL  Auto Lymphocyte # : 3.76 K/uL  Auto Monocyte # : 1.08 K/uL  Auto Eosinophil # : 0.10 K/uL  Auto Basophil # : 0.08 K/uL  Auto Neutrophil % : 52.1 %  Auto Lymphocyte % : 35.1 %  Auto Monocyte % : 10.1 %  Auto Eosinophil % : 0.9 %  Auto Basophil % : 0.7 %          143  |  110<H>  |  20  ----------------------------<  94  4.0   |  25  |  0.70    Ca    8.8      2018 09:56    TPro  6.8  /  Alb  3.3  /  TBili  0.4  /  DBili  x   /  AST  29  /  ALT  23  /  AlkPhos  54  11-15      LIVER FUNCTIONS - ( 15 Nov 2018 12:21 )  Alb: 3.3 g/dL / Pro: 6.8 gm/dL / ALK PHOS: 54 U/L / ALT: 23 U/L / AST: 29 U/L / GGT: x             PT/INR - ( 15 Nov 2018 12:21 )   PT: 10.4 sec;   INR: 0.94 ratio         PTT - ( 15 Nov 2018 12:21 )  PTT:24.0 sec    CARDIAC MARKERS ( 15 Nov 2018 15:16 )  <0.015 ng/mL / x     / x     / x     / x      CARDIAC MARKERS ( 15 Nov 2018 12:21 )  <0.015 ng/mL / x     / 60 U/L / x     / x            Urinalysis Basic - ( 15 Nov 2018 14:28 )    Color: Yellow / Appearance: Clear / S.005 / pH: x  Gluc: x / Ketone: Negative  / Bili: Negative / Urobili: Negative mg/dL   Blood: x / Protein: Negative mg/dL / Nitrite: Negative   Leuk Esterase: Negative / RBC: x / WBC x   Sq Epi: x / Non Sq Epi: x / Bacteria: x            MEDICATIONS  (STANDING):  amLODIPine   Tablet 5 milliGRAM(s) Oral daily  aspirin  chewable 81 milliGRAM(s) Oral daily  atorvastatin 20 milliGRAM(s) Oral at bedtime  clopidogrel Tablet 75 milliGRAM(s) Oral daily  heparin  Injectable 5000 Unit(s) SubCutaneous every 12 hours  lisinopril 20 milliGRAM(s) Oral daily  melatonin 5 milliGRAM(s) Oral at bedtime  metoprolol succinate  milliGRAM(s) Oral daily  pantoprazole   Suspension 40 milliGRAM(s) Oral daily  predniSONE   Tablet 20 milliGRAM(s) Oral daily  sodium chloride 0.9%. 1000 milliLiter(s) (75 mL/Hr) IV Continuous <Continuous>  zolpidem 5 milliGRAM(s) Oral at bedtime

## 2018-11-16 NOTE — PROGRESS NOTE ADULT - SUBJECTIVE AND OBJECTIVE BOX
CHIEF COMPLAINT: Patient is a 85y old  Female who presents with a chief complaint of shortness of breath    HPI: 85 year old woman with a history of multivessel CAD s/p PCI of RCA, LAD, past in-stent restenosis, PPM, HTN, HLD, carotid atherosclerosis, diverticulosis, hypothyroidism, anemia, insomnia presented to the ER with complaints of dyspnea x several days.  She says that she is "winded" and "fatigued" with minimal exertion; symptoms are relieved with rest.   She has difficulty providing additional details -- repeatedly says "I just don't feel well" and "I have a hard time remembering."  She denies angina - can't recall having any recent chest pain.     11/16/18: s/p Cath with significant lession in Proximal LCX.  BJORN to LCX,     PAST MEDICAL & SURGICAL HISTORY:  High cholesterol  Stress incontinence in female  Rectocele  Diverticulosis  Hiatal hernia  Hypertension  Hypothyroidism  Dyslipidemia  CAD (coronary artery disease)  Carpal tunnel syndrome of right wrist  Cardiac pacemaker  Stented coronary artery: 12 heart stents,   1 left leg stents  H/O carotid endarterectomy: right  History of hysterectomy  History of breast reconstruction  Bilateral cataracts: surgery  Status post total knee replacement: right    SOCIAL HISTORY:   Alcohol: Social use  Smoking: Former smoker  Marital Status:     FAMILY HISTORY: CAD.  Father with esophageal cancer; mother with DVT    Home Medications:  amlodipine-benazepril 5 mg-10 mg oral capsule: 1 cap(s) orally once a day  home  (11 Jan 2018 03:14)  aspirin 81 mg oral delayed release tablet: 1 tab(s) orally once a day home &amp; hosp (11 Jan 2018 03:14)  isosorbide mononitrate 30 mg oral tablet, extended release: 1 tab(s) orally once a day (11 Jan 2018 03:14)  metoprolol succinate 200 mg oral tablet, extended release: 1 tab(s) orally once a day (12 Jan 2018 13:50)  Plavix 75 mg oral tablet: 1 tab(s) orally once a day (11 Jan 2018 03:14)  traZODone 50 mg oral tablet: 1 cap(s) orally once a day, As Needed  (11 Jan 2018 03:14)    Allergies:  No Known Drug Allergies    Intolerance:  Intermitantly refuse to take statin "I dont feel well on them".     REVIEW OF SYSTEMS:  All other review of systems is negative unless indicated above    VITAL SIGNS:   Vital Signs Last 24 Hrs  T(C): 36.9 (15 Nov 2018 11:56), Max: 36.9 (15 Nov 2018 11:56)  T(F): 98.4 (15 Nov 2018 11:56), Max: 98.4 (15 Nov 2018 11:56)  HR: 63 (15 Nov 2018 11:56) (63 - 63)  BP: 148/65 (15 Nov 2018 11:56) (148/65 - 148/65)  RR: 18 (15 Nov 2018 11:56) (18 - 18)  SpO2: 98% (15 Nov 2018 11:56) (98% - 98%)    PHYSICAL EXAM:  Constitutional: NAD, awake and alert, appears stated age  HEENT:  EOMI,  Pupils round, No oral cyanosis.  Pulmonary: Non-labored, breath sounds are clear bilaterally, No wheezing, rales or rhonchi  Cardiovascular: S1 and S2, regular rate and rhythm, II/VI systolic murmur  Gastrointestinal: Bowel Sounds present, soft, nontender.   Lymph: No peripheral edema. No cervical lymphadenopathy.  Neurological: Alert, no focal deficits  Skin: No rashes.  Psych:  Mood & affect appropriate    LABS:                                10.7   10.72 )-----------( 351      ( 16 Nov 2018 09:56 )             34.6     11-16    143  |  110<H>  |  20  ----------------------------<  94  4.0   |  25  |  0.70    Ca    8.8      16 Nov 2018 09:56    TPro  6.8  /  Alb  3.3  /  TBili  0.4  /  DBili  x   /  AST  29  /  ALT  23  /  AlkPhos  54  11-15    CARDIAC MARKERS ( 15 Nov 2018 15:16 )  <0.015 ng/mL / x     / x     / x     / x      CARDIAC MARKERS ( 15 Nov 2018 12:21 )  <0.015 ng/mL / x     / 60 U/L / x     / x          LIVER FUNCTIONS - ( 15 Nov 2018 12:21 )  Alb: 3.3 g/dL / Pro: 6.8 gm/dL / ALK PHOS: 54 U/L / ALT: 23 U/L / AST: 29 U/L / GGT: x           PT/INR - ( 15 Nov 2018 12:21 )   PT: 10.4 sec;   INR: 0.94 ratio         PTT - ( 15 Nov 2018 12:21 )  PTT:24.0 sec         12 Lead ECG (11.15.18 @ 11:53):   Poor data quality.  Normal sinus rhythm.    Transthoracic Echocardiogram (01.12.18 @ 09:31):   Mild (1+) mitral regurgitation.   Mild to Moderate aortic insufficiency noted.   Moderate (2+) tricuspid valve regurgitation.   Mild pulmonary hypertension.   The left atrium is mildly dilated.   The left ventricle is normal in size, wall thickness, wall motion and contractility. Estimated left ventricular ejection fraction is 55-60 %.    < from: Cardiac Cath Lab - Adult (11.16.18 @ 14:52) >   Angiographic Findings     Cardiac Arteries and Lesion Findings    LMCA: Diffuse irregularity.     LMCA: Mid subsection.25% stenosis 12 mm length.Pre procedure ADA III   flow   was noted. Good runoff was present.The lesion was diagnosed as a low risk   lesion.     Devices used    LAD: Diffuse irregularity.     Prox LAD: Proximal subsection.10% stenosis 20 mm length.Pre procedure   ADA   III flow was noted.     The lesion was previously treated with the following devices: drug   eluting   stent.     Devices used     Mid LAD: Mid subsection.20% stenosis 30 mm length.Pre procedure ADA III   flow was noted. The lesion was diagnosed as a low risk lesion.     The lesion was previously treated with the following devices: drug   eluting   stent.     Devices used    LCx: Diffuse irregularity.     Mid CX: Proximal subsection.85% stenosis 8 mm length reduced to 0%.Pre   procedure ADA III flow was noted. The lesion was discrete.The lesion   showed moderate angulation and mild tortuosity.     Post Procedure ADA III flow was present.     Treatment results:Interventional treatment was successful.     Comments:IVUS showed suboptimal stent apposition post deployment so it   was   post dilated.     Devices used     * 6FR JL3.5 LAUNCHER     * .014 x 190cm [place] XT     * Resolute SALIMA BJORN     Diameter: 3 mm. Length:12 mm. Total duration: 20 sec.1 inflation(s). to   a max pressure of: 16 LIYA.     * EAGLE EYE PLATINUM IVUS     * NC Euphora     Diameter: 3.25 mm. Length: 8 mm. Total duration: 14 sec.1 inflation(s).   to a max pressure of: 18 LIYA.     Prox CX: Mid subsection.15% stenosis 20 mm length.Pre procedure ADA III   flow was noted. The lesion was diagnosed as a low risk lesion.     The lesion was previously treated with the following devices: drug   eluting   stent.     Devices used     Mid CX: Mid subsection.20% stenosis 20 mm length.Pre procedure ADA III   flow was noted. The lesion was diagnosed as a low risk lesion.     The lesion was previously treated with the following devices: drug   eluting   stent.     Devices used     1st Ob Ruthie: Proximal subsection.40% stenosis 12 mm length.Pre procedure   ADA III flow was noted. The lesion was diagnosed as a moderate risk   lesion.     Devices used    RCA: Diffuse irregularity.     Mid RCA: Mid subsection.65% stenosis 14 mm length.Pre procedure ADA III   flow was noted. The lesion was diagnosed as a moderate risk lesion.The   lesion was tubular.The lesion showed mild angulation and mild tortuosity.     Comments:IFR Negative.     Devices used     * 6FR JR 4.0 LAUNCHER     * .014 [place] Pressure Wire    FFR  +------------------+-------------------------------+-----------------------  +  !FFR               !Stage/Medication               !Dosage                   !  +------------------+-------------------------------+-----------------------  +  !0.92              !                               !                         !  +------------------+-------------------------------+-----------------------  +  !0.93              !                               !                         !  +------------------+-------------------------------+-----------------------  +     Dist RCA: Mid subsection.0% stenosis 20 mm length.     The lesion was previously treated with the following devices: drug   eluting   stent.     Devices used    Valves  +------+----------------------------+----------------------------+---------  +  !Valve !Stenosis                    !Insufficiency                 !Comments !  +------+----------------------------+----------------------------+---------  +  !Aortic!No                     !Not assessed                !           !  +------+----------------------------+----------------------------+---------  +  !Mitral!Not assessed                !No insufficiency            !           !  +------+----------------------------+----------------------------+---------  +    LV function assessed as:Normal.  Ejection Fraction  +----------------------------------------------------------------------+---  +  !Method          !EF%!  +----------------------------------------------------------------------+---  +  !LV gram                                                                 !65 !  +----------------------------------------------------------------------+---  +    VA  Ventriculography Findings:  Normal left ventricular systolic function.     Coronary tree     Dominance: Right     Impression     Diagnostic Conclusions   3 Vessel CAd with widely patent stents and moderate RCA disease and new   lession inProximal LCX and NL LV FX.     Interventional Conclusions     Successful Coronary Intervention BJORN of LCx.    < end of copied text >

## 2018-11-16 NOTE — PHARMACOTHERAPY INTERVENTION NOTE - COMMENTS
Patient on prednisone 20 mg daily at home for PMR - not ordered at admission.  SPoke to MD and placed order

## 2018-11-16 NOTE — CHART NOTE - NSCHARTNOTEFT_GEN_A_CORE
Nurse Practitioner Progress note:   s/p  PCI. Denies chest pain, shortness of breath, dizziness or palpitations at this time    HPI:85 year old woman with multiple medical history, CAD s/p 12 stents by Dr Welsh presented to the ED with complaints of worsening SOBx3 days. States "I get short of breath when I walk to the curb." No EKG changes, negative troponin.  A+Ox3  CV  Respiratory:  Right radial hemoband in place.  Procedure site CDI, no bleeding, no hematoma, able to move all digits with capillary refill < 3 seconds, fingers warm    T(C): 36.1 (16 Nov 2018 13:51), Max: 36.9 (16 Nov 2018 04:58)  T(F): 97 (16 Nov 2018 13:51), Max: 98.5 (16 Nov 2018 04:58)  HR: 63 (16 Nov 2018 13:51) (63 - 81)  BP: 159/65 (16 Nov 2018 13:51) (105/47 - 159/65)  RR: 16 (16 Nov 2018 13:51) (16 - 18)  SpO2: 100% (16 Nov 2018 13:51) (96% - 100%)  Wt(kg): --  CBC Full  -  ( 16 Nov 2018 09:56 )  WBC Count : 10.72 K/uL  Hemoglobin : 10.7 g/dL  Hematocrit : 34.6 %  Platelet Count - Automated : 351 K/uL  Mean Cell Volume : 87.4 fl  Mean Cell Hemoglobin : 27.0 pg  Mean Cell Hemoglobin Concentration : 30.9 gm/dL  Auto Neutrophil # : 5.58 K/uL  Auto Lymphocyte # : 3.76 K/uL  Auto Monocyte # : 1.08 K/uL  Auto Eosinophil # : 0.10 K/uL  Auto Basophil # : 0.08 K/uL  Auto Neutrophil % : 52.1 %  Auto Lymphocyte % : 35.1 %  Auto Monocyte % : 10.1 %  Auto Eosinophil % : 0.9 %  Auto Basophil % : 0.7 %    11-16    143  |  110<H>  |  20  ----------------------------<  94  4.0   |  25  |  0.70    Ca    8.8      16 Nov 2018 09:56    TPro  6.8  /  Alb  3.3  /  TBili  0.4  /  DBili  x   /  AST  29  /  ALT  23  /  AlkPhos  54  11-15    CARDIAC MARKERS ( 15 Nov 2018 15:16 )  <0.015 ng/mL / x     / x     / x     / x      CARDIAC MARKERS ( 15 Nov 2018 12:21 )  <0.015 ng/mL / x     / 60 U/L / x     / x              MEDICATIONS  (STANDING):  amLODIPine   Tablet 5 milliGRAM(s) Oral daily  aspirin  chewable 81 milliGRAM(s) Oral daily  clopidogrel Tablet 75 milliGRAM(s) Oral daily  heparin  Injectable 5000 Unit(s) SubCutaneous every 12 hours  lisinopril 20 milliGRAM(s) Oral daily  melatonin 5 milliGRAM(s) Oral at bedtime  metoprolol succinate  milliGRAM(s) Oral daily  pantoprazole   Suspension 40 milliGRAM(s) Oral daily  predniSONE   Tablet 20 milliGRAM(s) Oral daily  sodium chloride 0.9%. 1000 milliLiter(s) (75 mL/Hr) IV Continuous <Continuous>  zolpidem 5 milliGRAM(s) Oral at bedtime    MEDICATIONS  (PRN):  ondansetron Injectable 4 milliGRAM(s) IV Push every 6 hours PRN Nausea      ASSESSMENT/PLAN: 85 year old female with PMHx CAD with multiple stents presented to cardiologist with BENJAMIN; now s/p successful PCI and BJORN to LCX    Coronary artery disease  - CICU  -VS, labs, diet, activity as per PCI orders  -IV hydration  -Encourage PO fluids  -Aspirin 81mg PO daily  -Continue BB, ACEI  -Plan of care discussed with patient and MD Nurse Practitioner Progress note:   s/p  PCI. Denies chest pain, shortness of breath, dizziness or palpitations at this time    HPI:85 year old woman with multiple medical history, CAD s/p 12 stents by Dr Welsh presented to the ED with complaints of worsening SOBx3 days. States "I get short of breath when I walk to the curb." No EKG changes, negative troponin.  A+Ox3  CV: S1S2 reg  Respiratory: CTAB, normal effort  Right radial hemoband removed. Procedure site CDI, no bleeding, no hematoma, able to move all digits with capillary refill < 3 seconds, fingers warm    T(C): 36.1 (16 Nov 2018 13:51), Max: 36.9 (16 Nov 2018 04:58)  T(F): 97 (16 Nov 2018 13:51), Max: 98.5 (16 Nov 2018 04:58)  HR: 63 (16 Nov 2018 13:51) (63 - 81)  BP: 159/65 (16 Nov 2018 13:51) (105/47 - 159/65)  RR: 16 (16 Nov 2018 13:51) (16 - 18)  SpO2: 100% (16 Nov 2018 13:51) (96% - 100%)  Wt(kg): --  CBC Full  -  ( 16 Nov 2018 09:56 )  WBC Count : 10.72 K/uL  Hemoglobin : 10.7 g/dL  Hematocrit : 34.6 %  Platelet Count - Automated : 351 K/uL  Mean Cell Volume : 87.4 fl  Mean Cell Hemoglobin : 27.0 pg  Mean Cell Hemoglobin Concentration : 30.9 gm/dL  Auto Neutrophil # : 5.58 K/uL  Auto Lymphocyte # : 3.76 K/uL  Auto Monocyte # : 1.08 K/uL  Auto Eosinophil # : 0.10 K/uL  Auto Basophil # : 0.08 K/uL  Auto Neutrophil % : 52.1 %  Auto Lymphocyte % : 35.1 %  Auto Monocyte % : 10.1 %  Auto Eosinophil % : 0.9 %  Auto Basophil % : 0.7 %    11-16    143  |  110<H>  |  20  ----------------------------<  94  4.0   |  25  |  0.70    Ca    8.8      16 Nov 2018 09:56    TPro  6.8  /  Alb  3.3  /  TBili  0.4  /  DBili  x   /  AST  29  /  ALT  23  /  AlkPhos  54  11-15    CARDIAC MARKERS ( 15 Nov 2018 15:16 )  <0.015 ng/mL / x     / x     / x     / x      CARDIAC MARKERS ( 15 Nov 2018 12:21 )  <0.015 ng/mL / x     / 60 U/L / x     / x              MEDICATIONS  (STANDING):  amLODIPine   Tablet 5 milliGRAM(s) Oral daily  aspirin  chewable 81 milliGRAM(s) Oral daily  clopidogrel Tablet 75 milliGRAM(s) Oral daily  heparin  Injectable 5000 Unit(s) SubCutaneous every 12 hours  lisinopril 20 milliGRAM(s) Oral daily  melatonin 5 milliGRAM(s) Oral at bedtime  metoprolol succinate  milliGRAM(s) Oral daily  pantoprazole   Suspension 40 milliGRAM(s) Oral daily  predniSONE   Tablet 20 milliGRAM(s) Oral daily  sodium chloride 0.9%. 1000 milliLiter(s) (75 mL/Hr) IV Continuous <Continuous>  zolpidem 5 milliGRAM(s) Oral at bedtime    MEDICATIONS  (PRN):  ondansetron Injectable 4 milliGRAM(s) IV Push every 6 hours PRN Nausea      ASSESSMENT/PLAN: 85 year old female with PMHx CAD with multiple stents presented to cardiologist with BENJAMIN; now s/p successful PCI and BJORN to LCX    Coronary artery disease  - CICU  -VS, labs, diet, activity as per PCI orders  -IV hydration  -Encourage PO fluids  -Aspirin 81mg PO daily  -Continue BB, ACEI  -Lipitor 20 mg PO daily  -Plan of care discussed with patient and MD  -Post PCI instructions reviewed

## 2018-11-16 NOTE — PROGRESS NOTE ADULT - ASSESSMENT
· Assessment		  85 year old woman with multiple medical history, CAD s/p 12 stents by Dr Welsh presented to the ED with complaints of worsening SOBx3 days. States "I get short of breath when I walk to the curb." No EKG changes, negative troponin. Plan for cardiac cath in AM as per cardiology consult.   Of note patient is on Prednisone 20mg Daily (since October 29) prescribed by Dr Cardona for presumed PMR.     * CAD hx stent- BENJAMIN with unstable angina presentation   s/p PCI to LCX 11/16  continue asa, plavix and statin   continue BP meds      *PMR  - Leukocytosis suspect to be secondary to steroid/  afberile, CXR and UA wnl, no abdominal pain or tenderness  doubt infcetious etiology  continue steroids until patient sees her rheumatologist Dr Fitzpatrick after discharge  RVP negative     * HTN  - resume antihypertensive medications    *HLD  - statin    DVT prophylaxis- heparin   disposition   plan for D/c home 11/17 once cleared by cardio, check BMP in am

## 2018-11-16 NOTE — PACU DISCHARGE NOTE - COMMENTS
report given to PAZ Alvarenga , CICU. Pt  transported on moniter with nurse. v/s stable, hemostasis maintained

## 2018-11-17 ENCOUNTER — TRANSCRIPTION ENCOUNTER (OUTPATIENT)
Age: 83
End: 2018-11-17

## 2018-11-17 VITALS — TEMPERATURE: 97 F

## 2018-11-17 LAB
ANION GAP SERPL CALC-SCNC: 7 MMOL/L — SIGNIFICANT CHANGE UP (ref 5–17)
BASOPHILS # BLD AUTO: 0.02 K/UL — SIGNIFICANT CHANGE UP (ref 0–0.2)
BASOPHILS NFR BLD AUTO: 0.2 % — SIGNIFICANT CHANGE UP (ref 0–2)
BUN SERPL-MCNC: 24 MG/DL — HIGH (ref 7–23)
CALCIUM SERPL-MCNC: 8.2 MG/DL — LOW (ref 8.5–10.1)
CHLORIDE SERPL-SCNC: 110 MMOL/L — HIGH (ref 96–108)
CHOLEST SERPL-MCNC: 174 MG/DL — SIGNIFICANT CHANGE UP (ref 10–199)
CO2 SERPL-SCNC: 24 MMOL/L — SIGNIFICANT CHANGE UP (ref 22–31)
CREAT SERPL-MCNC: 0.65 MG/DL — SIGNIFICANT CHANGE UP (ref 0.5–1.3)
EOSINOPHIL # BLD AUTO: 0 K/UL — SIGNIFICANT CHANGE UP (ref 0–0.5)
EOSINOPHIL NFR BLD AUTO: 0 % — SIGNIFICANT CHANGE UP (ref 0–6)
GLUCOSE SERPL-MCNC: 117 MG/DL — HIGH (ref 70–99)
HCT VFR BLD CALC: 32.4 % — LOW (ref 34.5–45)
HDLC SERPL-MCNC: 50 MG/DL — SIGNIFICANT CHANGE UP
HGB BLD-MCNC: 10.2 G/DL — LOW (ref 11.5–15.5)
IMM GRANULOCYTES NFR BLD AUTO: 0.8 % — SIGNIFICANT CHANGE UP (ref 0–1.5)
LIPID PNL WITH DIRECT LDL SERPL: 112 MG/DL — SIGNIFICANT CHANGE UP
LYMPHOCYTES # BLD AUTO: 1.19 K/UL — SIGNIFICANT CHANGE UP (ref 1–3.3)
LYMPHOCYTES # BLD AUTO: 9.9 % — LOW (ref 13–44)
MCHC RBC-ENTMCNC: 26.8 PG — LOW (ref 27–34)
MCHC RBC-ENTMCNC: 31.5 GM/DL — LOW (ref 32–36)
MCV RBC AUTO: 85.3 FL — SIGNIFICANT CHANGE UP (ref 80–100)
MONOCYTES # BLD AUTO: 0.46 K/UL — SIGNIFICANT CHANGE UP (ref 0–0.9)
MONOCYTES NFR BLD AUTO: 3.8 % — SIGNIFICANT CHANGE UP (ref 2–14)
NEUTROPHILS # BLD AUTO: 10.26 K/UL — HIGH (ref 1.8–7.4)
NEUTROPHILS NFR BLD AUTO: 85.3 % — HIGH (ref 43–77)
NRBC # BLD: 0 /100 WBCS — SIGNIFICANT CHANGE UP (ref 0–0)
PLATELET # BLD AUTO: 321 K/UL — SIGNIFICANT CHANGE UP (ref 150–400)
POTASSIUM SERPL-MCNC: 4.4 MMOL/L — SIGNIFICANT CHANGE UP (ref 3.5–5.3)
POTASSIUM SERPL-SCNC: 4.4 MMOL/L — SIGNIFICANT CHANGE UP (ref 3.5–5.3)
RBC # BLD: 3.8 M/UL — SIGNIFICANT CHANGE UP (ref 3.8–5.2)
RBC # FLD: 15.4 % — HIGH (ref 10.3–14.5)
SODIUM SERPL-SCNC: 141 MMOL/L — SIGNIFICANT CHANGE UP (ref 135–145)
TOTAL CHOLESTEROL/HDL RATIO MEASUREMENT: 3.5 RATIO — SIGNIFICANT CHANGE UP (ref 3.3–7.1)
TRIGL SERPL-MCNC: 58 MG/DL — SIGNIFICANT CHANGE UP (ref 10–149)
WBC # BLD: 12.03 K/UL — HIGH (ref 3.8–10.5)
WBC # FLD AUTO: 12.03 K/UL — HIGH (ref 3.8–10.5)

## 2018-11-17 PROCEDURE — 93010 ELECTROCARDIOGRAM REPORT: CPT

## 2018-11-17 PROCEDURE — 99232 SBSQ HOSP IP/OBS MODERATE 35: CPT

## 2018-11-17 RX ORDER — ASPIRIN/CALCIUM CARB/MAGNESIUM 324 MG
1 TABLET ORAL
Qty: 0 | Refills: 0 | COMMUNITY
Start: 2018-11-17

## 2018-11-17 RX ORDER — PANTOPRAZOLE SODIUM 20 MG/1
1 TABLET, DELAYED RELEASE ORAL
Qty: 14 | Refills: 0 | OUTPATIENT
Start: 2018-11-17 | End: 2018-11-30

## 2018-11-17 RX ORDER — CLOPIDOGREL BISULFATE 75 MG/1
1 TABLET, FILM COATED ORAL
Qty: 14 | Refills: 0
Start: 2018-11-17 | End: 2018-11-30

## 2018-11-17 RX ORDER — ATORVASTATIN CALCIUM 80 MG/1
1 TABLET, FILM COATED ORAL
Qty: 14 | Refills: 0 | OUTPATIENT
Start: 2018-11-17 | End: 2018-11-30

## 2018-11-17 RX ORDER — CLOPIDOGREL BISULFATE 75 MG/1
1 TABLET, FILM COATED ORAL
Qty: 0 | Refills: 0 | COMMUNITY

## 2018-11-17 RX ADMIN — HEPARIN SODIUM 5000 UNIT(S): 5000 INJECTION INTRAVENOUS; SUBCUTANEOUS at 05:55

## 2018-11-17 RX ADMIN — LISINOPRIL 20 MILLIGRAM(S): 2.5 TABLET ORAL at 05:55

## 2018-11-17 RX ADMIN — AMLODIPINE BESYLATE 5 MILLIGRAM(S): 2.5 TABLET ORAL at 05:55

## 2018-11-17 RX ADMIN — Medication 200 MILLIGRAM(S): at 05:55

## 2018-11-17 RX ADMIN — Medication 20 MILLIGRAM(S): at 05:55

## 2018-11-17 NOTE — DISCHARGE NOTE ADULT - MEDICATION SUMMARY - MEDICATIONS TO TAKE
I will START or STAY ON the medications listed below when I get home from the hospital:    predniSONE 20 mg oral tablet  -- 1 tab(s) by mouth once a day  -- Indication: For .    aspirin 81 mg oral tablet, chewable  -- 1 tab(s) by mouth once a day  -- Indication: For .    atorvastatin 20 mg oral tablet  -- 1 tab(s) by mouth once a day (at bedtime)  -- Indication: For .    amlodipine-benazepril 5 mg-20 mg oral capsule  -- 1 cap(s) by mouth once a day  -- Indication: For .    Plavix 75 mg oral tablet  -- 1 tab(s) by mouth once a day  -- Indication: For .    metoprolol succinate 200 mg oral tablet, extended release  -- 1 tab(s) by mouth once a day  -- Indication: For .    FeroSul 325 mg (65 mg elemental iron) oral tablet  -- 1 tab(s) by mouth 2 times a day home & hosp  -- Indication: For .    magnesium oxide 400 mg (240 mg elemental magnesium) oral tablet  -- 1 tab(s) by mouth once a day  -- Indication: For .    ocular lubricant ophthalmic solution  -- 1 drop(s) to each affected eye 2 times a day, As Needed - for dry eyes  -- Indication: For .    pantoprazole 40 mg oral granule, delayed release  -- 1 tab(s) by mouth once a day   -- Indication: For .    ascorbic acid 500 mg oral tablet  -- 1 tab(s) by mouth once a day  -- Indication: For .

## 2018-11-17 NOTE — DISCHARGE NOTE ADULT - HOSPITAL COURSE
CC:  Patient is a 85y old  Female who presents with a chief complaint of sob and dyspnea on exertion (17 Nov 2018 09:39)    SUBJECTIVE:  offers no new complaints at current time.    ROS:  all other review of systems are negative unless indicated above.    amLODIPine   Tablet 5 milliGRAM(s) Oral daily  aspirin  chewable 81 milliGRAM(s) Oral daily  atorvastatin 20 milliGRAM(s) Oral at bedtime  clopidogrel Tablet 75 milliGRAM(s) Oral daily  heparin  Injectable 5000 Unit(s) SubCutaneous every 12 hours  lisinopril 20 milliGRAM(s) Oral daily  melatonin 5 milliGRAM(s) Oral at bedtime  metoprolol succinate  milliGRAM(s) Oral daily  ondansetron Injectable 4 milliGRAM(s) IV Push every 6 hours PRN  pantoprazole   Suspension 40 milliGRAM(s) Oral daily  predniSONE   Tablet 20 milliGRAM(s) Oral daily  sodium chloride 0.9%. 1000 milliLiter(s) IV Continuous <Continuous>  zolpidem 5 milliGRAM(s) Oral at bedtime    T(C): 36.2 (11-17-18 @ 09:44), Max: 36.8 (11-17-18 @ 00:33)  HR: 75 (11-17-18 @ 09:00) (62 - 80)  BP: 130/62 (11-17-18 @ 06:34) (106/44 - 159/65)  RR: 12 (11-17-18 @ 09:00) (12 - 28)  SpO2: 96% (11-17-18 @ 09:00) (94% - 100%)    Constitutional: NAD, awake and alert, well-developed with good responses to questions, mentally intact.   HEENT: PERRL, EOMI, MMM.  Neck: Soft and supple, No carotid bruit, No JVD  Respiratory: Breath sounds are clear bilaterally, No wheezing, rales or rhonchi  Cardiovascular: S1 and S2, regular rate and rhythm, no murmur, rub or gallop.  Gastrointestinal: Bowel Sounds present, soft, nontender, nondistended, no guarding, no rebound, no mass.  Extremities: No peripheral edema  Vascular: 2+ peripheral pulses  Neurological: A/O x 3, no focal deficits  Musculoskeletal: 5/5 strength b/l upper and lower extremities  Skin:  no visible rashes.                         10.2   12.03 )-----------( 321      ( 17 Nov 2018 04:29 )             32.4     PT/INR - ( 15 Nov 2018 12:21 )   PT: 10.4 sec;   INR: 0.94 ratio         PTT - ( 15 Nov 2018 12:21 )  PTT:24.0 sec  11-17    141  |  110<H>  |  24<H>  ----------------------------<  117<H>  4.4   |  24  |  0.65    Ca    8.2<L>      17 Nov 2018 04:29    TPro  6.8  /  Alb  3.3  /  TBili  0.4  /  DBili  x   /  AST  29  /  ALT  23  /  AlkPhos  54  11-15    s/p LHC with successful PCI and BJORN to Left CX   -tele NSR 60s no ectopy  -DAPT + BB + ACEI + statin.  -lifestyle modifications.  -Cardiology outpatient.

## 2018-11-17 NOTE — DISCHARGE NOTE ADULT - PATIENT PORTAL LINK FT
You can access the [facility] Patient Portal, offered by [facility], by registering with the following website: [email or web address]

## 2018-11-17 NOTE — PROGRESS NOTE ADULT - REASON FOR ADMISSION
sob and dyspnea on exertion

## 2018-11-17 NOTE — PROGRESS NOTE ADULT - SUBJECTIVE AND OBJECTIVE BOX
REASON FOR VISIT: CAD    HPI: 85 year old woman with a history of multivessel CAD s/p PCI of RCA, LAD, past in-stent restenosis, PPM, HTN, HLD, carotid atherosclerosis, diverticulosis, hypothyroidism, anemia, insomnia admitted with exertional dyspnea and fatigue; now s/p LCx stent.    11/17/18:  "I feel great."    MEDICATIONS  (STANDING):  amLODIPine   Tablet 5 milliGRAM(s) Oral daily  aspirin  chewable 81 milliGRAM(s) Oral daily  atorvastatin 20 milliGRAM(s) Oral at bedtime  clopidogrel Tablet 75 milliGRAM(s) Oral daily  heparin  Injectable 5000 Unit(s) SubCutaneous every 12 hours  lisinopril 20 milliGRAM(s) Oral daily  melatonin 5 milliGRAM(s) Oral at bedtime  metoprolol succinate  milliGRAM(s) Oral daily  pantoprazole   Suspension 40 milliGRAM(s) Oral daily  predniSONE   Tablet 20 milliGRAM(s) Oral daily  sodium chloride 0.9%. 1000 milliLiter(s) (75 mL/Hr) IV Continuous <Continuous>  zolpidem 5 milliGRAM(s) Oral at bedtime    Vital Signs Last 24 Hrs  T(C): 36.4 (17 Nov 2018 06:49), Max: 36.8 (16 Nov 2018 11:07)  T(F): 97.5 (17 Nov 2018 06:49), Max: 98.2 (16 Nov 2018 11:07)  HR: 66 (17 Nov 2018 06:34) (62 - 80)  BP: 130/62 (17 Nov 2018 06:34) (106/44 - 159/65)  BP(mean): 77 (17 Nov 2018 06:34) (59 - 104)  RR: 19 (17 Nov 2018 06:34) (15 - 28)  SpO2: 96% (17 Nov 2018 06:34) (94% - 100%)    PHYSICAL EXAM:  Constitutional: NAD, awake and alert, appears stated age  Pulmonary: Non-labored, breath sounds are clear bilaterally  Cardiovascular: S1 and S2, regular rate and rhythm, II/VI systolic murmur, R radial dressing in place with normal distal perfusion  Neurological: Alert, no focal deficits  Skin: No rashes.  Psych:  Mood & affect appropriate    LABS:          CARDIAC MARKERS ( 15 Nov 2018 15:16 ) <0.015 ng/mL / x     / x     / x     / x      CARDIAC MARKERS ( 15 Nov 2018 12:21 ) <0.015 ng/mL / x     / 60 U/L / x     / x                            10.2   12.03 )-----------( 321      ( 17 Nov 2018 04:29 )             32.4     141  |  110<H>  |  24<H>  ----------------------------<  117<H>  4.4   |  24  |  0.65    Ca    8.2<L>      17 Nov 2018 04:29    TPro  6.8  /  Alb  3.3  /  TBili  0.4  /  DBili  x   /  AST  29  /  ALT  23  /  AlkPhos  54  11-15    CARDIAC MARKERS ( 15 Nov 2018 12:21 ) <0.015 ng/mL / x     / 60 U/L / x     / x       12 Lead ECG (11.15.18 @ 11:53):   Poor data quality.  Normal sinus rhythm.    Transthoracic Echocardiogram (01.12.18 @ 09:31):   Mild (1+) mitral regurgitation.   Mild to Moderate aortic insufficiency noted.   Moderate (2+) tricuspid valve regurgitation.   Mild pulmonary hypertension.   The left atrium is mildly dilated.   The left ventricle is normal in size, wall thickness, wall motion and contractility. Estimated left ventricular ejection fraction is 55-60 %.

## 2018-11-17 NOTE — PROGRESS NOTE ADULT - ASSESSMENT
s/p LHC with successful PCI and BJORN to Left CX   observed overnight on tele NSR 60s no ectopy  Right radial hemoband dressing removed, site CDI with no bleeding, no hematoma, patient able to move all digits with capillary refill < 3 seconds, fingers warm  Patient feels well.  Denies chest pain, shortness of breath, dizziness or palpitations at this time.        EKG:NSR@65, normal axis and intervals

## 2018-11-17 NOTE — PROGRESS NOTE ADULT - SUBJECTIVE AND OBJECTIVE BOX
Nurse Practitioner Progress note:   s/p C with successful PCI and BJORN to Left CX   observed overnight on tele NSR 60s no ectopy  Right radial hemoband dressing removed, site CDI with no bleeding, no hematoma, patient able to move all digits with capillary refill < 3 seconds, fingers warm  Patient feels well.  Denies chest pain, shortness of breath, dizziness or palpitations at this time.        EKG:NSR@65, normal axis and intervals  TELE:no ectopy, 60s  GENERAL: appears well, OOB to chair  CV: RRR, S1 S2, no murmur, rub, clicks or gallop noted  RESP: LCTA bilaterally, no wheeze, no rhonchi or crackles noted  GI/: soft, NT/ND  NEURO: AOx4, no focal deficits  EXTREMITIES: no edema, clubbing or cyanosis noted  PROCEDURE SITE:  Right radial hemoband dressing removed, site CDI with no bleeding, no hematoma, patient able to move all digits with capillary refill < 3 seconds, fingers warm      T(C): 36.4 (11-17-18 @ 06:49), Max: 36.8 (11-16-18 @ 11:07)  HR: 75 (11-17-18 @ 09:00) (62 - 80)  BP: 130/62 (11-17-18 @ 06:34) (106/44 - 159/65)  RR: 12 (11-17-18 @ 09:00) (12 - 28)  SpO2: 96% (11-17-18 @ 09:00) (94% - 100%)  Wt(kg): --  CBC Full  -  ( 17 Nov 2018 04:29 )  WBC Count : 12.03 K/uL  Hemoglobin : 10.2 g/dL  Hematocrit : 32.4 %  Platelet Count - Automated : 321 K/uL  Mean Cell Volume : 85.3 fl  Mean Cell Hemoglobin : 26.8 pg  Mean Cell Hemoglobin Concentration : 31.5 gm/dL  Auto Neutrophil # : 10.26 K/uL  Auto Lymphocyte # : 1.19 K/uL  Auto Monocyte # : 0.46 K/uL  Auto Eosinophil # : 0.00 K/uL  Auto Basophil # : 0.02 K/uL  Auto Neutrophil % : 85.3 %  Auto Lymphocyte % : 9.9 %  Auto Monocyte % : 3.8 %  Auto Eosinophil % : 0.0 %  Auto Basophil % : 0.2 %    11-17    141  |  110<H>  |  24<H>  ----------------------------<  117<H>  4.4   |  24  |  0.65    Ca    8.2<L>      17 Nov 2018 04:29    TPro  6.8  /  Alb  3.3  /  TBili  0.4  /  DBili  x   /  AST  29  /  ALT  23  /  AlkPhos  54  11-15    CARDIAC MARKERS ( 15 Nov 2018 15:16 )  <0.015 ng/mL / x     / x     / x     / x      CARDIAC MARKERS ( 15 Nov 2018 12:21 )  <0.015 ng/mL / x     / 60 U/L / x     / x              MEDICATIONS  (STANDING):  amLODIPine   Tablet 5 milliGRAM(s) Oral daily  aspirin  chewable 81 milliGRAM(s) Oral daily  atorvastatin 20 milliGRAM(s) Oral at bedtime  clopidogrel Tablet 75 milliGRAM(s) Oral daily  heparin  Injectable 5000 Unit(s) SubCutaneous every 12 hours  lisinopril 20 milliGRAM(s) Oral daily  melatonin 5 milliGRAM(s) Oral at bedtime  metoprolol succinate  milliGRAM(s) Oral daily  pantoprazole   Suspension 40 milliGRAM(s) Oral daily  predniSONE   Tablet 20 milliGRAM(s) Oral daily  sodium chloride 0.9%. 1000 milliLiter(s) (75 mL/Hr) IV Continuous <Continuous>  zolpidem 5 milliGRAM(s) Oral at bedtime    MEDICATIONS  (PRN):  ondansetron Injectable 4 milliGRAM(s) IV Push every 6 hours PRN Nausea        HPI:  85 year old woman with multiple medical history, CAD s/p 12 stents by Dr Welsh presented to the ED with complaints of worsening SOBx3 days. States "I get short of breath when I walk to the curb." No EKG changes, negative troponin. She is presently comfortable and has no other anginal equivalent when at rest. Denies palpitations. denies LE edema. Endorse chronic cough no fevers or chills. No recent sick contacts. No N/V/D. No abd pain. no dysuria.   Plan for cardiac cath in AM as per cardiology consult.   Of note patient is on Prednisone 20mg Daily (since October 29) prescribed by Dr Cardona for presumed PMR. (15 Nov 2018 15:17)    now s/p C with succesful PCI and BJORN to L CX    ASSESSMENT/PLAN:    Coronary artery disease    -OOB to chair, then ambulate ad cheng on tele  -continue aspirin, plavix, BB, ACE, statin and PPI  -Encourage PO fluids  -Plan of care discussed with patient, nursing staff   -labs, EKG and procedure site assessed  -Follow-up with Dr Welsh within 1 week  -Discussed therapeutic lifestyle changes to reduce risk factors such as following a cardiac diet, weight loss, maintaining a healthy weight, exercise, smoking cessation, medication compliance, and regular follow-up  with MD to know your numbers (BP, cholesterol, weight, and glucose)

## 2018-11-17 NOTE — DISCHARGE NOTE ADULT - CARE PROVIDER_API CALL
Ector Welsh (MD), Cardiovascular Disease  [address] [place] [address]  Phone: [phone]  Fax: [phone]

## 2018-11-17 NOTE — DISCHARGE NOTE ADULT - CARE PLAN
Principal Discharge DX:	ACS (acute coronary syndrome)  Goal:	see below.  Assessment and plan of treatment:	continue medical therapy.  lifestyle modifications.  cardiology outpatient.

## 2018-11-17 NOTE — PROGRESS NOTE ADULT - PROBLEM SELECTOR PLAN 1
Dyspnea related to obstructive disease of LCx artery; now s/p coronary stent; continue medical management; patient can be discharged home today.
Unstable angina presentation with PCI of LCX.  Continue plavix and aspirin.  Has intermittently been on statin and discussed with her staying on more permanently.
Discharge home via hospitalist team  F/U with Dr Welsh with 1 week  Discharge instructions and need for DAPT reinforced

## 2018-11-21 DIAGNOSIS — I25.10 ATHEROSCLEROTIC HEART DISEASE OF NATIVE CORONARY ARTERY WITHOUT ANGINA PECTORIS: ICD-10-CM

## 2018-11-21 DIAGNOSIS — I10 ESSENTIAL (PRIMARY) HYPERTENSION: ICD-10-CM

## 2018-11-21 DIAGNOSIS — D72.829 ELEVATED WHITE BLOOD CELL COUNT, UNSPECIFIED: ICD-10-CM

## 2018-11-21 DIAGNOSIS — E78.5 HYPERLIPIDEMIA, UNSPECIFIED: ICD-10-CM

## 2018-11-21 DIAGNOSIS — Z87.891 PERSONAL HISTORY OF NICOTINE DEPENDENCE: ICD-10-CM

## 2018-11-21 DIAGNOSIS — T38.0X5A ADVERSE EFFECT OF GLUCOCORTICOIDS AND SYNTHETIC ANALOGUES, INITIAL ENCOUNTER: ICD-10-CM

## 2018-11-21 DIAGNOSIS — R06.02 SHORTNESS OF BREATH: ICD-10-CM

## 2018-11-21 DIAGNOSIS — Z95.0 PRESENCE OF CARDIAC PACEMAKER: ICD-10-CM

## 2018-11-21 DIAGNOSIS — Z95.5 PRESENCE OF CORONARY ANGIOPLASTY IMPLANT AND GRAFT: ICD-10-CM

## 2018-12-14 ENCOUNTER — APPOINTMENT (OUTPATIENT)
Dept: UROGYNECOLOGY | Facility: CLINIC | Age: 83
End: 2018-12-14
Payer: MEDICARE

## 2018-12-14 DIAGNOSIS — Z78.9 OTHER SPECIFIED HEALTH STATUS: ICD-10-CM

## 2018-12-14 PROCEDURE — 51701 INSERT BLADDER CATHETER: CPT

## 2018-12-14 PROCEDURE — 99204 OFFICE O/P NEW MOD 45 MIN: CPT | Mod: 25

## 2018-12-17 ENCOUNTER — RESULT REVIEW (OUTPATIENT)
Age: 83
End: 2018-12-17

## 2018-12-17 LAB — URINE CYTOLOGY: NORMAL

## 2018-12-29 ENCOUNTER — EMERGENCY (EMERGENCY)
Facility: HOSPITAL | Age: 83
LOS: 1 days | Discharge: HOME | End: 2018-12-29
Attending: EMERGENCY MEDICINE | Admitting: EMERGENCY MEDICINE
Payer: MEDICARE

## 2018-12-29 VITALS
WEIGHT: 125 LBS | RESPIRATION RATE: 18 BRPM | HEIGHT: 61 IN | DIASTOLIC BLOOD PRESSURE: 75 MMHG | HEART RATE: 112 BPM | TEMPERATURE: 98 F | OXYGEN SATURATION: 98 % | SYSTOLIC BLOOD PRESSURE: 142 MMHG

## 2018-12-29 VITALS
RESPIRATION RATE: 18 BRPM | TEMPERATURE: 99 F | OXYGEN SATURATION: 98 % | SYSTOLIC BLOOD PRESSURE: 144 MMHG | DIASTOLIC BLOOD PRESSURE: 74 MMHG | HEART RATE: 95 BPM

## 2018-12-29 DIAGNOSIS — H26.9 UNSPECIFIED CATARACT: Chronic | ICD-10-CM

## 2018-12-29 DIAGNOSIS — Z90.710 ACQUIRED ABSENCE OF BOTH CERVIX AND UTERUS: Chronic | ICD-10-CM

## 2018-12-29 DIAGNOSIS — Z95.0 PRESENCE OF CARDIAC PACEMAKER: Chronic | ICD-10-CM

## 2018-12-29 DIAGNOSIS — Z96.659 PRESENCE OF UNSPECIFIED ARTIFICIAL KNEE JOINT: Chronic | ICD-10-CM

## 2018-12-29 DIAGNOSIS — G56.01 CARPAL TUNNEL SYNDROME, RIGHT UPPER LIMB: Chronic | ICD-10-CM

## 2018-12-29 DIAGNOSIS — Z98.89 OTHER SPECIFIED POSTPROCEDURAL STATES: Chronic | ICD-10-CM

## 2018-12-29 DIAGNOSIS — Z98.82 BREAST IMPLANT STATUS: Chronic | ICD-10-CM

## 2018-12-29 DIAGNOSIS — Z95.5 PRESENCE OF CORONARY ANGIOPLASTY IMPLANT AND GRAFT: Chronic | ICD-10-CM

## 2018-12-29 LAB
ALBUMIN SERPL ELPH-MCNC: 3 G/DL — LOW (ref 3.3–5)
ALP SERPL-CCNC: 67 U/L — SIGNIFICANT CHANGE UP (ref 30–120)
ALT FLD-CCNC: 11 U/L DA — SIGNIFICANT CHANGE UP (ref 10–60)
ANION GAP SERPL CALC-SCNC: 13 MMOL/L — SIGNIFICANT CHANGE UP (ref 5–17)
AST SERPL-CCNC: 19 U/L — SIGNIFICANT CHANGE UP (ref 10–40)
BASOPHILS # BLD AUTO: 0.04 K/UL — SIGNIFICANT CHANGE UP (ref 0–0.2)
BASOPHILS NFR BLD AUTO: 0.3 % — SIGNIFICANT CHANGE UP (ref 0–2)
BILIRUB SERPL-MCNC: 0.5 MG/DL — SIGNIFICANT CHANGE UP (ref 0.2–1.2)
BUN SERPL-MCNC: 14 MG/DL — SIGNIFICANT CHANGE UP (ref 7–23)
CALCIUM SERPL-MCNC: 9 MG/DL — SIGNIFICANT CHANGE UP (ref 8.4–10.5)
CHLORIDE SERPL-SCNC: 103 MMOL/L — SIGNIFICANT CHANGE UP (ref 96–108)
CO2 SERPL-SCNC: 22 MMOL/L — SIGNIFICANT CHANGE UP (ref 22–31)
CREAT SERPL-MCNC: 0.86 MG/DL — SIGNIFICANT CHANGE UP (ref 0.5–1.3)
EOSINOPHIL # BLD AUTO: 0.07 K/UL — SIGNIFICANT CHANGE UP (ref 0–0.5)
EOSINOPHIL NFR BLD AUTO: 0.5 % — SIGNIFICANT CHANGE UP (ref 0–6)
GLUCOSE SERPL-MCNC: 109 MG/DL — HIGH (ref 70–99)
HCT VFR BLD CALC: 31.4 % — LOW (ref 34.5–45)
HGB BLD-MCNC: 10 G/DL — LOW (ref 11.5–15.5)
IMM GRANULOCYTES NFR BLD AUTO: 0.5 % — SIGNIFICANT CHANGE UP (ref 0–1.5)
LYMPHOCYTES # BLD AUTO: 1.39 K/UL — SIGNIFICANT CHANGE UP (ref 1–3.3)
LYMPHOCYTES # BLD AUTO: 10.5 % — LOW (ref 13–44)
MCHC RBC-ENTMCNC: 25.8 PG — LOW (ref 27–34)
MCHC RBC-ENTMCNC: 31.8 GM/DL — LOW (ref 32–36)
MCV RBC AUTO: 80.9 FL — SIGNIFICANT CHANGE UP (ref 80–100)
MONOCYTES # BLD AUTO: 1.35 K/UL — HIGH (ref 0–0.9)
MONOCYTES NFR BLD AUTO: 10.2 % — SIGNIFICANT CHANGE UP (ref 2–14)
NEUTROPHILS # BLD AUTO: 10.34 K/UL — HIGH (ref 1.8–7.4)
NEUTROPHILS NFR BLD AUTO: 78 % — HIGH (ref 43–77)
PLATELET # BLD AUTO: 354 K/UL — SIGNIFICANT CHANGE UP (ref 150–400)
POTASSIUM SERPL-MCNC: 4.6 MMOL/L — SIGNIFICANT CHANGE UP (ref 3.5–5.3)
POTASSIUM SERPL-SCNC: 4.6 MMOL/L — SIGNIFICANT CHANGE UP (ref 3.5–5.3)
PROT SERPL-MCNC: 6.8 G/DL — SIGNIFICANT CHANGE UP (ref 6–8.3)
RBC # BLD: 3.88 M/UL — SIGNIFICANT CHANGE UP (ref 3.8–5.2)
RBC # FLD: 15.3 % — HIGH (ref 10.3–14.5)
SODIUM SERPL-SCNC: 138 MMOL/L — SIGNIFICANT CHANGE UP (ref 135–145)
WBC # BLD: 13.26 K/UL — HIGH (ref 3.8–10.5)
WBC # FLD AUTO: 13.26 K/UL — HIGH (ref 3.8–10.5)

## 2018-12-29 PROCEDURE — 99284 EMERGENCY DEPT VISIT MOD MDM: CPT

## 2018-12-29 PROCEDURE — 73562 X-RAY EXAM OF KNEE 3: CPT

## 2018-12-29 PROCEDURE — 80053 COMPREHEN METABOLIC PANEL: CPT

## 2018-12-29 PROCEDURE — 73120 X-RAY EXAM OF HAND: CPT | Mod: 26,RT

## 2018-12-29 PROCEDURE — 73562 X-RAY EXAM OF KNEE 3: CPT | Mod: 26,LT

## 2018-12-29 PROCEDURE — 96375 TX/PRO/DX INJ NEW DRUG ADDON: CPT

## 2018-12-29 PROCEDURE — 99284 EMERGENCY DEPT VISIT MOD MDM: CPT | Mod: 25

## 2018-12-29 PROCEDURE — 36415 COLL VENOUS BLD VENIPUNCTURE: CPT

## 2018-12-29 PROCEDURE — 85027 COMPLETE CBC AUTOMATED: CPT

## 2018-12-29 PROCEDURE — 96374 THER/PROPH/DIAG INJ IV PUSH: CPT

## 2018-12-29 PROCEDURE — 73120 X-RAY EXAM OF HAND: CPT

## 2018-12-29 RX ORDER — ASCORBIC ACID 60 MG
1 TABLET,CHEWABLE ORAL
Qty: 0 | Refills: 0 | COMMUNITY

## 2018-12-29 RX ORDER — MULTIVIT WITH MIN/MFOLATE/K2 340-15/3 G
300 POWDER (GRAM) ORAL ONCE
Qty: 0 | Refills: 0 | Status: COMPLETED | OUTPATIENT
Start: 2018-12-29 | End: 2018-12-29

## 2018-12-29 RX ORDER — ONDANSETRON 8 MG/1
4 TABLET, FILM COATED ORAL ONCE
Qty: 0 | Refills: 0 | Status: COMPLETED | OUTPATIENT
Start: 2018-12-29 | End: 2018-12-29

## 2018-12-29 RX ORDER — MAGNESIUM OXIDE 400 MG ORAL TABLET 241.3 MG
1 TABLET ORAL
Qty: 0 | Refills: 0 | COMMUNITY

## 2018-12-29 RX ORDER — MORPHINE SULFATE 50 MG/1
2 CAPSULE, EXTENDED RELEASE ORAL ONCE
Qty: 0 | Refills: 0 | Status: DISCONTINUED | OUTPATIENT
Start: 2018-12-29 | End: 2018-12-29

## 2018-12-29 RX ORDER — SODIUM CHLORIDE 9 MG/ML
1000 INJECTION INTRAMUSCULAR; INTRAVENOUS; SUBCUTANEOUS ONCE
Qty: 0 | Refills: 0 | Status: COMPLETED | OUTPATIENT
Start: 2018-12-29 | End: 2018-12-29

## 2018-12-29 RX ADMIN — SODIUM CHLORIDE 2000 MILLILITER(S): 9 INJECTION INTRAMUSCULAR; INTRAVENOUS; SUBCUTANEOUS at 16:55

## 2018-12-29 RX ADMIN — Medication 300 MILLILITER(S): at 19:05

## 2018-12-29 RX ADMIN — SODIUM CHLORIDE 1000 MILLILITER(S): 9 INJECTION INTRAMUSCULAR; INTRAVENOUS; SUBCUTANEOUS at 17:10

## 2018-12-29 RX ADMIN — MORPHINE SULFATE 2 MILLIGRAM(S): 50 CAPSULE, EXTENDED RELEASE ORAL at 16:45

## 2018-12-29 RX ADMIN — ONDANSETRON 4 MILLIGRAM(S): 8 TABLET, FILM COATED ORAL at 16:55

## 2018-12-29 RX ADMIN — MORPHINE SULFATE 2 MILLIGRAM(S): 50 CAPSULE, EXTENDED RELEASE ORAL at 16:54

## 2018-12-29 NOTE — ED PROVIDER NOTE - MUSCULOSKELETAL, MLM
R hand swollen with TTP, slight warmth, no erythema. Minimal pain on ROM diffusely. L knee with mild swelling, no erythema or effusion, TTP diffusely, FROM with minimal pain. R knee non-tender s/p knee replacement surgery. no other joint tenderness, erythema or pain on ROM

## 2018-12-29 NOTE — ED PROVIDER NOTE - PROGRESS NOTE DETAILS
pt able to ambulate steadily after pain medication pt on prednisone 20mg daily. will d/c with pain meds and pt fu pmd on monday

## 2018-12-29 NOTE — ED PROVIDER NOTE - OBJECTIVE STATEMENT
84 y/o F pt w/ PMHx CAD (coronary artery disease), Diverticulosis, Dyslipidemia, Hiatal hernia, High cholesterol, Hypertension, Hypothyroidism, Rectocele, Stress incontinence in female and PSHx R knee replacement presents to the ED c/o joint pain and swelling x 3-4 days. States she has pain in R hand and bilateral knees and is unable to ambulate. Has not taken any medication for the pain, states she has taken pain medicationin the past, unsure of name and reports she ran out. Daughter at bedside states pt may be Rxd Prendisone for arthritis and unsure if she is still taking it. Pt also endorsing constipation. Pt denies trauma/injury, cough, fever, chills, CP or any other complaints at this time.   PMD: Dr. Fermin Cardona

## 2018-12-29 NOTE — ED ADULT NURSE NOTE - NSIMPLEMENTINTERV_GEN_ALL_ED
Implemented All Fall with Harm Risk Interventions:  [place] to call system. Call bell, personal items and telephone within reach. Instruct patient to call for assistance. Room bathroom lighting operational. Non-slip footwear when patient is off stretcher. Physically safe environment: no spills, clutter or unnecessary equipment. Stretcher in lowest position, wheels locked, appropriate side rails in place. Provide visual cue, wrist band, yellow gown, etc. Monitor gait and stability. Monitor for mental status changes and reorient to person, place, and time. Review medications for side effects contributing to fall risk. Reinforce activity limits and safety measures with patient and family. Provide visual clues: red socks.

## 2018-12-29 NOTE — ED PROVIDER NOTE - MEDICAL DECISION MAKING DETAILS
86 y/o woman with questionable hx prior arthritis, R knee replacement, unsure if may have stopped meds including steroids. 2 days of L knee and R hand pain, unable to ambulate due to pain. Check basic labs IVF, check XRs, no signs of septic joint, pain medication. Daughter went to home to get medication list for review.

## 2019-01-08 ENCOUNTER — APPOINTMENT (OUTPATIENT)
Dept: SURGERY | Facility: CLINIC | Age: 84
End: 2019-01-08

## 2019-01-09 PROBLEM — M19.90 UNSPECIFIED OSTEOARTHRITIS, UNSPECIFIED SITE: Chronic | Status: ACTIVE | Noted: 2018-12-29

## 2019-01-14 ENCOUNTER — MEDICATION RENEWAL (OUTPATIENT)
Age: 84
End: 2019-01-14

## 2019-02-09 ENCOUNTER — MEDICATION RENEWAL (OUTPATIENT)
Age: 84
End: 2019-02-09

## 2019-02-15 ENCOUNTER — INPATIENT (INPATIENT)
Facility: HOSPITAL | Age: 84
LOS: 6 days | Discharge: HOME | DRG: 377 | End: 2019-02-22
Attending: HOSPITALIST | Admitting: HOSPITALIST
Payer: MEDICARE

## 2019-02-15 VITALS
OXYGEN SATURATION: 97 % | TEMPERATURE: 98 F | RESPIRATION RATE: 18 BRPM | HEART RATE: 78 BPM | WEIGHT: 149.03 LBS | SYSTOLIC BLOOD PRESSURE: 135 MMHG | HEIGHT: 60 IN | DIASTOLIC BLOOD PRESSURE: 72 MMHG

## 2019-02-15 DIAGNOSIS — I25.10 ATHEROSCLEROTIC HEART DISEASE OF NATIVE CORONARY ARTERY WITHOUT ANGINA PECTORIS: ICD-10-CM

## 2019-02-15 DIAGNOSIS — K92.2 GASTROINTESTINAL HEMORRHAGE, UNSPECIFIED: ICD-10-CM

## 2019-02-15 DIAGNOSIS — Z95.0 PRESENCE OF CARDIAC PACEMAKER: Chronic | ICD-10-CM

## 2019-02-15 DIAGNOSIS — Z98.89 OTHER SPECIFIED POSTPROCEDURAL STATES: Chronic | ICD-10-CM

## 2019-02-15 DIAGNOSIS — Z90.710 ACQUIRED ABSENCE OF BOTH CERVIX AND UTERUS: Chronic | ICD-10-CM

## 2019-02-15 DIAGNOSIS — Z98.82 BREAST IMPLANT STATUS: Chronic | ICD-10-CM

## 2019-02-15 DIAGNOSIS — Z95.5 PRESENCE OF CORONARY ANGIOPLASTY IMPLANT AND GRAFT: Chronic | ICD-10-CM

## 2019-02-15 DIAGNOSIS — H26.9 UNSPECIFIED CATARACT: Chronic | ICD-10-CM

## 2019-02-15 DIAGNOSIS — Z29.9 ENCOUNTER FOR PROPHYLACTIC MEASURES, UNSPECIFIED: ICD-10-CM

## 2019-02-15 DIAGNOSIS — E78.5 HYPERLIPIDEMIA, UNSPECIFIED: ICD-10-CM

## 2019-02-15 DIAGNOSIS — G56.01 CARPAL TUNNEL SYNDROME, RIGHT UPPER LIMB: Chronic | ICD-10-CM

## 2019-02-15 DIAGNOSIS — Z96.659 PRESENCE OF UNSPECIFIED ARTIFICIAL KNEE JOINT: Chronic | ICD-10-CM

## 2019-02-15 LAB
ALBUMIN SERPL ELPH-MCNC: 3.5 G/DL — SIGNIFICANT CHANGE UP (ref 3.3–5)
ALP SERPL-CCNC: 55 U/L — SIGNIFICANT CHANGE UP (ref 30–120)
ALT FLD-CCNC: 18 U/L DA — SIGNIFICANT CHANGE UP (ref 10–60)
ANION GAP SERPL CALC-SCNC: 12 MMOL/L — SIGNIFICANT CHANGE UP (ref 5–17)
APTT BLD: 26 SEC — LOW (ref 28.5–37)
AST SERPL-CCNC: 9 U/L — LOW (ref 10–40)
BILIRUB SERPL-MCNC: 0.1 MG/DL — LOW (ref 0.2–1.2)
BUN SERPL-MCNC: 26 MG/DL — HIGH (ref 7–23)
CALCIUM SERPL-MCNC: 9.1 MG/DL — SIGNIFICANT CHANGE UP (ref 8.4–10.5)
CHLORIDE SERPL-SCNC: 109 MMOL/L — HIGH (ref 96–108)
CO2 SERPL-SCNC: 23 MMOL/L — SIGNIFICANT CHANGE UP (ref 22–31)
CREAT SERPL-MCNC: 0.84 MG/DL — SIGNIFICANT CHANGE UP (ref 0.5–1.3)
GLUCOSE SERPL-MCNC: 112 MG/DL — HIGH (ref 70–99)
HCT VFR BLD CALC: 29.1 % — LOW (ref 34.5–45)
HGB BLD-MCNC: 8.8 G/DL — LOW (ref 11.5–15.5)
INR BLD: 1.02 RATIO — SIGNIFICANT CHANGE UP (ref 0.88–1.16)
MCHC RBC-ENTMCNC: 24 PG — LOW (ref 27–34)
MCHC RBC-ENTMCNC: 30.2 GM/DL — LOW (ref 32–36)
MCV RBC AUTO: 79.5 FL — LOW (ref 80–100)
NRBC # BLD: 0 /100 WBCS — SIGNIFICANT CHANGE UP (ref 0–0)
OB PNL STL: POSITIVE
PLATELET # BLD AUTO: 399 K/UL — SIGNIFICANT CHANGE UP (ref 150–400)
POTASSIUM SERPL-MCNC: 4.8 MMOL/L — SIGNIFICANT CHANGE UP (ref 3.5–5.3)
POTASSIUM SERPL-SCNC: 4.8 MMOL/L — SIGNIFICANT CHANGE UP (ref 3.5–5.3)
PROT SERPL-MCNC: 6.6 G/DL — SIGNIFICANT CHANGE UP (ref 6–8.3)
PROTHROM AB SERPL-ACNC: 11.1 SEC — SIGNIFICANT CHANGE UP (ref 10–12.9)
RBC # BLD: 3.66 M/UL — LOW (ref 3.8–5.2)
RBC # FLD: 16.2 % — HIGH (ref 10.3–14.5)
SODIUM SERPL-SCNC: 144 MMOL/L — SIGNIFICANT CHANGE UP (ref 135–145)
TROPONIN I SERPL-MCNC: 0 NG/ML — LOW (ref 0.02–0.06)
WBC # BLD: 14.43 K/UL — HIGH (ref 3.8–10.5)
WBC # FLD AUTO: 14.43 K/UL — HIGH (ref 3.8–10.5)

## 2019-02-15 PROCEDURE — 71046 X-RAY EXAM CHEST 2 VIEWS: CPT | Mod: 26

## 2019-02-15 PROCEDURE — 99223 1ST HOSP IP/OBS HIGH 75: CPT | Mod: AI

## 2019-02-15 PROCEDURE — 93010 ELECTROCARDIOGRAM REPORT: CPT

## 2019-02-15 PROCEDURE — 99285 EMERGENCY DEPT VISIT HI MDM: CPT

## 2019-02-15 RX ORDER — ATORVASTATIN CALCIUM 80 MG/1
10 TABLET, FILM COATED ORAL AT BEDTIME
Qty: 0 | Refills: 0 | Status: DISCONTINUED | OUTPATIENT
Start: 2019-02-15 | End: 2019-02-22

## 2019-02-15 RX ORDER — PANTOPRAZOLE SODIUM 20 MG/1
40 TABLET, DELAYED RELEASE ORAL ONCE
Qty: 0 | Refills: 0 | Status: COMPLETED | OUTPATIENT
Start: 2019-02-15 | End: 2019-02-15

## 2019-02-15 RX ORDER — METOPROLOL TARTRATE 50 MG
200 TABLET ORAL DAILY
Qty: 0 | Refills: 0 | Status: DISCONTINUED | OUTPATIENT
Start: 2019-02-15 | End: 2019-02-22

## 2019-02-15 RX ORDER — LISINOPRIL 2.5 MG/1
20 TABLET ORAL DAILY
Qty: 0 | Refills: 0 | Status: DISCONTINUED | OUTPATIENT
Start: 2019-02-15 | End: 2019-02-22

## 2019-02-15 RX ORDER — OXYCODONE AND ACETAMINOPHEN 5; 325 MG/1; MG/1
1 TABLET ORAL EVERY 4 HOURS
Qty: 0 | Refills: 0 | Status: DISCONTINUED | OUTPATIENT
Start: 2019-02-15 | End: 2019-02-22

## 2019-02-15 RX ORDER — PANTOPRAZOLE SODIUM 20 MG/1
40 TABLET, DELAYED RELEASE ORAL
Qty: 0 | Refills: 0 | Status: DISCONTINUED | OUTPATIENT
Start: 2019-02-15 | End: 2019-02-15

## 2019-02-15 RX ORDER — PANTOPRAZOLE SODIUM 20 MG/1
40 TABLET, DELAYED RELEASE ORAL DAILY
Qty: 0 | Refills: 0 | Status: DISCONTINUED | OUTPATIENT
Start: 2019-02-15 | End: 2019-02-22

## 2019-02-15 RX ORDER — AMLODIPINE BESYLATE 2.5 MG/1
5 TABLET ORAL DAILY
Qty: 0 | Refills: 0 | Status: DISCONTINUED | OUTPATIENT
Start: 2019-02-15 | End: 2019-02-22

## 2019-02-15 RX ORDER — SODIUM CHLORIDE 9 MG/ML
1000 INJECTION, SOLUTION INTRAVENOUS
Qty: 0 | Refills: 0 | Status: DISCONTINUED | OUTPATIENT
Start: 2019-02-15 | End: 2019-02-19

## 2019-02-15 RX ORDER — SUCRALFATE 1 G
1 TABLET ORAL
Qty: 0 | Refills: 0 | Status: DISCONTINUED | OUTPATIENT
Start: 2019-02-15 | End: 2019-02-22

## 2019-02-15 RX ADMIN — PANTOPRAZOLE SODIUM 40 MILLIGRAM(S): 20 TABLET, DELAYED RELEASE ORAL at 20:14

## 2019-02-15 RX ADMIN — ATORVASTATIN CALCIUM 10 MILLIGRAM(S): 80 TABLET, FILM COATED ORAL at 21:57

## 2019-02-15 RX ADMIN — Medication 1 GRAM(S): at 23:17

## 2019-02-15 NOTE — ED ADULT NURSE REASSESSMENT NOTE - NS ED NURSE REASSESS COMMENT FT1
pt comfortable offers no c/o at present pt pending observation and labs
Pt received awake and alert on stretcher, is conversant with clear speech. Pt denies any discomfort at this time, tolerated dinner tray. No signs of bleeding, denies weakness, dizziness or SOB. Plan for admission to the hospital d/w pt and questions answered.

## 2019-02-15 NOTE — ED ADULT NURSE NOTE - PMH
Acute arthritis    CAD (coronary artery disease)    Diverticulosis    Dyslipidemia    Hiatal hernia    High cholesterol    Hypertension    Hypothyroidism    Rectocele    Stress incontinence in female

## 2019-02-15 NOTE — H&P ADULT - ASSESSMENT
85F with CAD s/p 13 stents, PVD s/p 1 stent in left side, s/p PPM (patient doesn't remember why she has it), arthritis, diverticulosis, HLD, HTN who presents with weakness for the past 2-3 days.  Also with chest discomfort though now chest discomfort resolved.

## 2019-02-15 NOTE — ED ADULT NURSE NOTE - NSIMPLEMENTINTERV_GEN_ALL_ED
Implemented All Universal Safety Interventions:  [place] to call system. Call bell, personal items and telephone within reach. Instruct patient to call for assistance. Room bathroom lighting operational. Non-slip footwear when patient is off stretcher. Physically safe environment: no spills, clutter or unnecessary equipment. Stretcher in lowest position, wheels locked, appropriate side rails in place.

## 2019-02-15 NOTE — H&P ADULT - NSHPSOCIALHISTORY_GEN_ALL_CORE
- denies smoking or etoh use  + occasionally uses marijuana (last use was about a couple of months ago)

## 2019-02-15 NOTE — H&P ADULT - HISTORY OF PRESENT ILLNESS
85F with CAD s/p 13 stents, PVD s/p 1 stent in left side, s/p PPM (patient doesn't remember why she has it), arthritis, diverticulosis, HLD, HTN who presents with weakness for the past 2-3 days.  Felt weak but denied any headaches or dizziness.  Today, she felt even more weak and decided to come to the ED.  Patient also started to have some slight left lower chest discomfort today though it was fleeting.  Denied any radiation.  Patient thought she needed "another stent" and decided to come here. Otherwise, patient denied any fevers, nausea/vomiting, diarrhea.  Did admit to have dark tarry stools for at least a week and patient would noticed some blood tinged stools when she would self-disimpact herself.  In the ED, patient was found to have a hgb of 8.8 (last known hgb was 10 on 12/29/2018).  Was occult positive as well.  Patient has been on Plavix for her stents as well as prednisone for her right shoulder pain.  Has been on steroids for a couple of months (slow taper according to the patient).  Troponin was negative x1.   Dr. Walters from GI was consulted by the ED already.  Patient currently feels "ok" with no acute complaints.

## 2019-02-15 NOTE — ED PROVIDER NOTE - PROGRESS NOTE DETAILS
spoke with Dr. Walters (GI). will see patient in hospital spoke with Dr. Walters (GI). will see patient in hospital  protonix ordered. resting comfortably. no complaints.

## 2019-02-15 NOTE — ED ADULT NURSE NOTE - CHPI ED NUR SYMPTOMS NEG
no diaphoresis/no nausea/no fever/no dizziness/no chest pain/no chills/no shortness of breath/no vomiting/no syncope/no back pain

## 2019-02-15 NOTE — ED PROVIDER NOTE - ATTENDING CONTRIBUTION TO CARE
Pt is a 84 yo male who presents to the ED with a cc of weakness.  PMHx of arthritis, CAD, diverticulosis, HLD, hiatal hernia, HTN, hypothyroidism, rectocele, stress incontinence.   Pt reports that she has been suffering from generalized weakness for the last several days and states that she believes that she may need another cardiac stent.  She reports that she also developed some left lower chest discomfort for several min today which has since resolved.  Pt reports that she has multiple cardiac stents and has felt similar prior to her previous stent placement.  Pt is currently chest pain free.  Pt further reports that she has been sneezing for the last several days.  Denies fever, chills, SOB, abd pain, ext numbness or weakness.  Upon questioning pt reports that for the last several days she has been suffering from black tarry stools.  On exam pt lying in bed NAD, NCAT, PERRL, EOMI, heart RRR, lungs CTA, abd soft NT/ND.  CN II-XII intact with no focal deficits.  Will obtain screening labs, check CE, EKG, chest x-ray, INR, fecal occult blood.  Will obtain type and screen.  Agree with above plan of care

## 2019-02-15 NOTE — H&P ADULT - PROBLEM SELECTOR PLAN 4
IMPROVE VTE Individual Risk Assessment          RISK                                                          Points  [  ] Previous VTE                                                 3  [  ] Thrombophilia                                              2  [  ] Lower limb paralysis                                    2        (unable to hold up >15 seconds)    [  ] Current Cancer                                             2         (within 6 months)  [  ] Immobilization > 24 hrs                              1  [  ] ICU/CCU stay > 24 hours                            1  [  ] Age > 60                                                        1    IMPROVE VTE Score

## 2019-02-15 NOTE — ED PROVIDER NOTE - CARE PLAN
Principal Discharge DX:	Gastrointestinal hemorrhage, unspecified gastrointestinal hemorrhage type  Secondary Diagnosis:	Generalized weakness

## 2019-02-15 NOTE — H&P ADULT - PROBLEM SELECTOR PLAN 1
- admit to medicine  - GI to follow   - will need endoscopy -> NPO for now  - check CBC tonight  - check TS  - was already give pantoprazole 40mg IV x1 in ED -> will continue pantoprazole IV 40mg BID  - hold anticoagulants  - transfuse if <8  - cont with IVF hydration

## 2019-02-15 NOTE — H&P ADULT - PROBLEM SELECTOR PLAN 2
- continue metoprolol  - cont with amlodipine and benazepril (or equivalent)  - holding plavix and ASA given bleed (patient said her stents were placed at least >6 months ago if not longer)

## 2019-02-15 NOTE — PATIENT PROFILE ADULT - ABILITY TO HEAR (WITH HEARING AID OR HEARING APPLIANCE IF NORMALLY USED):
has hearing aids both ears, left at home/Mildly to Moderately Impaired: difficulty hearing in some environments or speaker may need to increase volume or speak distinctly

## 2019-02-15 NOTE — ED PROVIDER NOTE - CLINICAL SUMMARY MEDICAL DECISION MAKING FREE TEXT BOX
generalized weakness for 2 days. no current chest pain. no beth. will order CBC, CMP, EKG, trop, UA, guaic, CXR, will continue to closely monitor

## 2019-02-15 NOTE — H&P ADULT - NSHPPHYSICALEXAM_GEN_ALL_CORE
PHYSICAL EXAM:  Vital Signs Last 24 Hrs  T(C): 36.7 (15 Feb 2019 19:26), Max: 36.7 (15 Feb 2019 19:26)  T(F): 98.1 (15 Feb 2019 19:26), Max: 98.1 (15 Feb 2019 19:26)  HR: 76 (15 Feb 2019 19:26) (68 - 78)  BP: 115/50 (15 Feb 2019 19:26) (115/50 - 135/72)  BP(mean): --  RR: 17 (15 Feb 2019 19:26) (17 - 20)  SpO2: 96% (15 Feb 2019 19:26) (96% - 98%)    GENERAL:     NAD, well-groomed, well-developed  HEAD:     atraumatic, normocephalic  EYES:     EOMI, conjunctiva and sclera clear  ENMT:     no tonsillar erythema or exudates or enlargement, no oral lesions, moist mucous membranes, good dentition  NECK:     supple, no JVD  RESPIRATORY:     clear to auscultation bilaterally, no rales or rhonchi or wheezing or rubs  CARDIOVASCULAR:     regular rate and rhythm, no murmurs or rubs or gallops, 2+ peripheral pulses  GASTROINTESTINAL:     soft, slight tenderness in her LLQ, nondistended, no hepatosplenomegaly palpated, bowel sounds present  EXTREMITIES:     no clubbing or cyanosis or edema  MUSCULOSKELETAL:     no joint pain or swelling or deformities  NERVOUS SYSTEM:     motor strength intact with 5/5 B/L upper and lower extremities, no gross sensory deficits  SKIN:     no rashes or lesions  PSYCH:     appropriate, alert and orientated x3, good concentration

## 2019-02-15 NOTE — H&P ADULT - NSHPREVIEWOFSYSTEMS_GEN_ALL_CORE
REVIEW OF SYSTEMS:  CONSTITUTIONAL:    +weakness, no fever or weight loss or fatigue  EYES:    no eye pain or visual disturbances or discharge  ENMT:     no difficulty hearing or tinnitus or vertigo, no sinus or throat pain  NECK:    no pain or stiffness  RESPIRATORY:    no cough or wheezing or chills or hemoptysis, no shortness of breath  CARDIOVASCULAR:    +chest discomfort, no dizziness  GASTROINTESTINAL:    +melena, no abdominal or epigastric pain. no nausea or vomiting or hematemesis, no diarrhea or constipation  GENITOURINARY:    no dysuria or frequency or hematuria or incontinence  NEUROLOGICAL:    no headaches or memory loss or loss of strength or numbness or tremors  SKIN:    no itching or burning or rashes or lesions   LYMPH NODES:    no enlarged glands  ENDOCRINE:    no heat or cold intolerance, no hair loss, no polydipsia or polyuria  MUSCULOSKELETAL:   +right shoulder pain, no swelling, no muscle or back or extremity pain  PSYCHIATRIC:    no depression or anxiety or mood swings or difficulty sleeping  HEME/LYMPH:    no easy bruising or bleeding gums  ALLERGY AND IMMUNOLOGIC:    no hives or eczema

## 2019-02-15 NOTE — H&P ADULT - NSHPLABSRESULTS_GEN_ALL_CORE
LABS:                        8.8<L>  14.43<H> )-----------( 399      ( 15 Feb 2019 17:08 )             29.1<L>    144    |  109<H>  |  26<H>  ----------------------------<  112<H>    15 Feb 2019 17:08  4.8     |  23     |  0.84         Ca 9.1           15 Feb 2019 17:08        TPro  6.6    /  Alb  3.5    /  TBili  0.1<L>  /  DBili  x      /  AST  9<L>   /  ALT  18     /  AlkPhos  55     15 Feb 2019 17:08    PT/INR - ( 15 Feb 2019 17:08 )   PT: 11.1 ;   INR: 1.02          PTT - ( 15 Feb 2019 17:08 )  PTT:26.0<L>        Troponin trend:  .003  02-15 @ 17:08      EKG:   NSR with TWI in III, isoelectric in aVF  Radiology:  < from: Xray Chest 2 Views PA/Lat (02.15.19 @ 17:26) >    FINDINGS:  Prior study dated 11/15/2018 was available for review.    The lungs are clear.  No pleural abnormality is seen.      The heart and mediastinum appear intact.  Coronary artery stents are   noted. Pacemaker is present. Hiatal hernia is noted.      IMPRESSION:  No evidence of active chest disease.  Other findings as   noted above.      < end of copied text >

## 2019-02-15 NOTE — ED PROVIDER NOTE - OBJECTIVE STATEMENT
85 year old female with history of Acute arthritis, CAD (coronary artery disease), Diverticulosis, Dyslipidemia, Hiatal hernia. High cholesterol, Hypertension, Hypothyroidism presents with weakness and "I need another stent". reports generalized weakness the past 2-3 days. when asked why she needs another stent, patient states "why else would I feel so weak". noticed slight discomfort under her left breast today around 4:00 pm for a couple minutes. no current chest pain. no shortness of breath. no cough, fevers, chills, or body aches. 85 year old female with history of Acute arthritis, CAD (coronary artery disease), Diverticulosis, Dyslipidemia, Hiatal hernia. High cholesterol, Hypertension, Hypothyroidism presents with weakness and "I need another stent". reports generalized weakness the past 2-3 days. when asked why she needs another stent, patient states "why else would I feel so weak". noticed slight discomfort under her left breast today around 4:00 pm for a couple minutes. no current chest pain. no shortness of breath. no cough, fevers, chills, or body aches. reports black stools a couple days ago  PCP Dulce Welsh 85 year old female with history of Acute arthritis, CAD (coronary artery disease), Diverticulosis, Dyslipidemia, Hiatal hernia. High cholesterol, Hypertension, Hypothyroidism presents with weakness and "I need another stent". reports generalized weakness the past 2-3 days. when asked why she needs another stent, patient states "why else would I feel so weak". noticed slight discomfort under her left breast today around 4:00 pm for a couple minutes. no current chest pain. also reports sneezing. no shortness of breath. no cough, fevers, chills, or body aches. reports black stools a couple days ago  PCP Dulce Welsh

## 2019-02-16 DIAGNOSIS — I10 ESSENTIAL (PRIMARY) HYPERTENSION: ICD-10-CM

## 2019-02-16 DIAGNOSIS — Z01.810 ENCOUNTER FOR PREPROCEDURAL CARDIOVASCULAR EXAMINATION: ICD-10-CM

## 2019-02-16 LAB
ALBUMIN SERPL ELPH-MCNC: 2.9 G/DL — LOW (ref 3.3–5)
ALP SERPL-CCNC: 43 U/L — SIGNIFICANT CHANGE UP (ref 30–120)
ALT FLD-CCNC: 17 U/L DA — SIGNIFICANT CHANGE UP (ref 10–60)
ANION GAP SERPL CALC-SCNC: 9 MMOL/L — SIGNIFICANT CHANGE UP (ref 5–17)
APPEARANCE UR: CLEAR — SIGNIFICANT CHANGE UP
APTT BLD: 25.6 SEC — LOW (ref 28.5–37)
AST SERPL-CCNC: 8 U/L — LOW (ref 10–40)
BASOPHILS # BLD AUTO: 0.06 K/UL — SIGNIFICANT CHANGE UP (ref 0–0.2)
BASOPHILS NFR BLD AUTO: 0.6 % — SIGNIFICANT CHANGE UP (ref 0–2)
BILIRUB SERPL-MCNC: 0.2 MG/DL — SIGNIFICANT CHANGE UP (ref 0.2–1.2)
BILIRUB UR-MCNC: NEGATIVE — SIGNIFICANT CHANGE UP
BUN SERPL-MCNC: 21 MG/DL — SIGNIFICANT CHANGE UP (ref 7–23)
CALCIUM SERPL-MCNC: 8.8 MG/DL — SIGNIFICANT CHANGE UP (ref 8.4–10.5)
CHLORIDE SERPL-SCNC: 113 MMOL/L — HIGH (ref 96–108)
CHOLEST SERPL-MCNC: 152 MG/DL — SIGNIFICANT CHANGE UP (ref 10–199)
CO2 SERPL-SCNC: 25 MMOL/L — SIGNIFICANT CHANGE UP (ref 22–31)
COLOR SPEC: YELLOW — SIGNIFICANT CHANGE UP
CREAT SERPL-MCNC: 0.72 MG/DL — SIGNIFICANT CHANGE UP (ref 0.5–1.3)
DIFF PNL FLD: NEGATIVE — SIGNIFICANT CHANGE UP
EOSINOPHIL # BLD AUTO: 0.18 K/UL — SIGNIFICANT CHANGE UP (ref 0–0.5)
EOSINOPHIL NFR BLD AUTO: 1.7 % — SIGNIFICANT CHANGE UP (ref 0–6)
GLUCOSE SERPL-MCNC: 84 MG/DL — SIGNIFICANT CHANGE UP (ref 70–99)
GLUCOSE UR QL: NEGATIVE MG/DL — SIGNIFICANT CHANGE UP
HCT VFR BLD CALC: 27.2 % — LOW (ref 34.5–45)
HCT VFR BLD CALC: 27.8 % — LOW (ref 34.5–45)
HCT VFR BLD CALC: 31.5 % — LOW (ref 34.5–45)
HDLC SERPL-MCNC: 48 MG/DL — LOW
HGB BLD-MCNC: 8.1 G/DL — LOW (ref 11.5–15.5)
HGB BLD-MCNC: 8.2 G/DL — LOW (ref 11.5–15.5)
HGB BLD-MCNC: 9.2 G/DL — LOW (ref 11.5–15.5)
IMM GRANULOCYTES NFR BLD AUTO: 0.6 % — SIGNIFICANT CHANGE UP (ref 0–1.5)
INR BLD: 1.09 RATIO — SIGNIFICANT CHANGE UP (ref 0.88–1.16)
KETONES UR-MCNC: NEGATIVE — SIGNIFICANT CHANGE UP
LEUKOCYTE ESTERASE UR-ACNC: ABNORMAL
LIPID PNL WITH DIRECT LDL SERPL: 90 MG/DL — SIGNIFICANT CHANGE UP
LYMPHOCYTES # BLD AUTO: 2.66 K/UL — SIGNIFICANT CHANGE UP (ref 1–3.3)
LYMPHOCYTES # BLD AUTO: 25.8 % — SIGNIFICANT CHANGE UP (ref 13–44)
MAGNESIUM SERPL-MCNC: 2.1 MG/DL — SIGNIFICANT CHANGE UP (ref 1.6–2.6)
MCHC RBC-ENTMCNC: 22.7 PG — LOW (ref 27–34)
MCHC RBC-ENTMCNC: 23.1 PG — LOW (ref 27–34)
MCHC RBC-ENTMCNC: 23.2 PG — LOW (ref 27–34)
MCHC RBC-ENTMCNC: 29.2 GM/DL — LOW (ref 32–36)
MCHC RBC-ENTMCNC: 29.5 GM/DL — LOW (ref 32–36)
MCHC RBC-ENTMCNC: 29.8 GM/DL — LOW (ref 32–36)
MCV RBC AUTO: 77.5 FL — LOW (ref 80–100)
MCV RBC AUTO: 77.8 FL — LOW (ref 80–100)
MCV RBC AUTO: 78.5 FL — LOW (ref 80–100)
MONOCYTES # BLD AUTO: 1.12 K/UL — HIGH (ref 0–0.9)
MONOCYTES NFR BLD AUTO: 10.8 % — SIGNIFICANT CHANGE UP (ref 2–14)
NEUTROPHILS # BLD AUTO: 6.25 K/UL — SIGNIFICANT CHANGE UP (ref 1.8–7.4)
NEUTROPHILS NFR BLD AUTO: 60.5 % — SIGNIFICANT CHANGE UP (ref 43–77)
NITRITE UR-MCNC: NEGATIVE — SIGNIFICANT CHANGE UP
NRBC # BLD: 0 /100 WBCS — SIGNIFICANT CHANGE UP (ref 0–0)
PH UR: 6 — SIGNIFICANT CHANGE UP (ref 5–8)
PHOSPHATE SERPL-MCNC: 3.2 MG/DL — SIGNIFICANT CHANGE UP (ref 2.5–4.5)
PLATELET # BLD AUTO: 367 K/UL — SIGNIFICANT CHANGE UP (ref 150–400)
PLATELET # BLD AUTO: 391 K/UL — SIGNIFICANT CHANGE UP (ref 150–400)
PLATELET # BLD AUTO: 404 K/UL — HIGH (ref 150–400)
POTASSIUM SERPL-MCNC: 4.1 MMOL/L — SIGNIFICANT CHANGE UP (ref 3.5–5.3)
POTASSIUM SERPL-SCNC: 4.1 MMOL/L — SIGNIFICANT CHANGE UP (ref 3.5–5.3)
PROT SERPL-MCNC: 5.5 G/DL — LOW (ref 6–8.3)
PROT UR-MCNC: NEGATIVE MG/DL — SIGNIFICANT CHANGE UP
PROTHROM AB SERPL-ACNC: 11.9 SEC — SIGNIFICANT CHANGE UP (ref 10–12.9)
RBC # BLD: 3.51 M/UL — LOW (ref 3.8–5.2)
RBC # BLD: 3.54 M/UL — LOW (ref 3.8–5.2)
RBC # BLD: 4.05 M/UL — SIGNIFICANT CHANGE UP (ref 3.8–5.2)
RBC # FLD: 16.2 % — HIGH (ref 10.3–14.5)
RBC # FLD: 16.3 % — HIGH (ref 10.3–14.5)
RBC # FLD: 16.3 % — HIGH (ref 10.3–14.5)
SODIUM SERPL-SCNC: 147 MMOL/L — HIGH (ref 135–145)
SP GR SPEC: 1.01 — SIGNIFICANT CHANGE UP (ref 1.01–1.02)
TOTAL CHOLESTEROL/HDL RATIO MEASUREMENT: 3.2 RATIO — LOW (ref 3.3–7.1)
TRIGL SERPL-MCNC: 71 MG/DL — SIGNIFICANT CHANGE UP (ref 10–149)
TROPONIN I SERPL-MCNC: 0.01 NG/ML — LOW (ref 0.02–0.06)
UROBILINOGEN FLD QL: NEGATIVE MG/DL — SIGNIFICANT CHANGE UP
WBC # BLD: 10.33 K/UL — SIGNIFICANT CHANGE UP (ref 3.8–10.5)
WBC # BLD: 10.83 K/UL — HIGH (ref 3.8–10.5)
WBC # BLD: 12.03 K/UL — HIGH (ref 3.8–10.5)
WBC # FLD AUTO: 10.33 K/UL — SIGNIFICANT CHANGE UP (ref 3.8–10.5)
WBC # FLD AUTO: 10.83 K/UL — HIGH (ref 3.8–10.5)
WBC # FLD AUTO: 12.03 K/UL — HIGH (ref 3.8–10.5)

## 2019-02-16 PROCEDURE — 99233 SBSQ HOSP IP/OBS HIGH 50: CPT

## 2019-02-16 PROCEDURE — 99223 1ST HOSP IP/OBS HIGH 75: CPT

## 2019-02-16 RX ORDER — CLOPIDOGREL BISULFATE 75 MG/1
75 TABLET, FILM COATED ORAL DAILY
Qty: 0 | Refills: 0 | Status: DISCONTINUED | OUTPATIENT
Start: 2019-02-16 | End: 2019-02-22

## 2019-02-16 RX ADMIN — Medication 200 MILLIGRAM(S): at 06:05

## 2019-02-16 RX ADMIN — PANTOPRAZOLE SODIUM 40 MILLIGRAM(S): 20 TABLET, DELAYED RELEASE ORAL at 14:23

## 2019-02-16 RX ADMIN — LISINOPRIL 20 MILLIGRAM(S): 2.5 TABLET ORAL at 06:05

## 2019-02-16 RX ADMIN — ATORVASTATIN CALCIUM 10 MILLIGRAM(S): 80 TABLET, FILM COATED ORAL at 21:19

## 2019-02-16 RX ADMIN — CLOPIDOGREL BISULFATE 75 MILLIGRAM(S): 75 TABLET, FILM COATED ORAL at 14:22

## 2019-02-16 RX ADMIN — Medication 1 GRAM(S): at 14:21

## 2019-02-16 RX ADMIN — Medication 1 GRAM(S): at 18:21

## 2019-02-16 RX ADMIN — AMLODIPINE BESYLATE 5 MILLIGRAM(S): 2.5 TABLET ORAL at 06:05

## 2019-02-16 NOTE — PROGRESS NOTE ADULT - SUBJECTIVE AND OBJECTIVE BOX
Hunter Velez is a 40year old male presenting for a follow up    Pt states he is here for a follow up. He is still experiencing right sided chronic rib pain while lying down, sneeze, breath, or lift anything. Other Concerns: Pt would also like to discuss going back on Adderall to help him to focus more. Denies known Latex allergy or symptoms of Latex sensitivity. Health Maintenance Summary     Topic Due On Due Status Completed On Postpone Until Reason    Diabetes Eye Exam Oct 23, 2018 Due Soon Oct 23, 2017      Glycohemoglobin A1C  (Diabetes Sugar)  Jun 6, 2018 Overdue Dec 6, 2017      Diabetes Foot Exam  May 30, 2019 Not Due May 30, 2018      IMMUNIZATION - DTaP/Tdap/Td Jul 3, 2006 Postponed Jul 2, 2006 Sep 6, 2018 Patient Refused    Immunization-Influenza Sep 1, 2018 Not Due Oct 6, 2017      Depression Screening May 30, 2019 Not Due May 30, 2018      Pneumococcal Vaccine 19-64 Highest Risk Jan 29, 2015 Overdue Jan 29, 2014            Patient is due for topics as listed above, he wishes to discuss with provider . Patient is a 85y old  Female who presents with a chief complaint of weakness (2019 14:26)      INTERVAL HPI/OVERNIGHT EVENTS: no acute overnight events. pt is feeling fine.     MEDICATIONS  (STANDING):  amLODIPine   Tablet 5 milliGRAM(s) Oral daily  atorvastatin 10 milliGRAM(s) Oral at bedtime  clopidogrel Tablet 75 milliGRAM(s) Oral daily  lactated ringers. 1000 milliLiter(s) (100 mL/Hr) IV Continuous <Continuous>  lisinopril 20 milliGRAM(s) Oral daily  metoprolol succinate  milliGRAM(s) Oral daily  pantoprazole  Injectable 40 milliGRAM(s) IV Push daily  sucralfate suspension 1 Gram(s) Oral two times a day    MEDICATIONS  (PRN):  artificial tears (preservative free) Ophthalmic Solution 1 Drop(s) Both EYES two times a day PRN for dry eyes  oxyCODONE    5 mG/acetaminophen 325 mG 1 Tablet(s) Oral every 4 hours PRN Moderate Pain (4 - 6)      Allergies    No Known Drug Allergies  Originally Entered as [Unknown see Comment: pt unable to remember] reaction to [Mold] (Unknown)  stolazine eye med- pt doesn&#x27;t remember (Unknown)    Intolerances        REVIEW OF SYSTEMS:  CONSTITUTIONAL: No fever or chills  HEENT:  No headache, no sore throat  RESPIRATORY: No cough, wheezing, or shortness of breath  CARDIOVASCULAR: No chest pain, palpitations, or leg swelling  GASTROINTESTINAL: No nausea, vomiting, or diarrhea  GENITOURINARY: No dysuria, frequency, or hematuria  NEUROLOGICAL: no focal weakness or dizziness  SKIN:  No rashes or lesions   MUSCULOSKELETAL: no myalgias   PSYCHIATRIC: No depression or anxiety      Vital Signs Last 24 Hrs  T(C): 36.5 (2019 07:47), Max: 36.8 (15 Feb 2019 22:28)  T(F): 97.7 (2019 07:47), Max: 98.2 (15 Feb 2019 22:28)  HR: 67 (2019 07:47) (67 - 77)  BP: 143/64 (2019 07:47) (115/50 - 147/72)  BP(mean): --  RR: 18 (2019 07:47) (17 - 20)  SpO2: 99% (2019 07:47) (96% - 99%)    PHYSICAL EXAM:  GENERAL: NAD  HEENT:  EOMI, mmm  CHEST/LUNG:  CTA b/l, no rales, wheezes, or rhonchi  HEART:  RRR, S1, S2, []murmur  ABDOMEN:  BS+, soft, NT, ND  EXTREMITIES: no edema or calf tenderness   NERVOUS SYSTEM: AA&Ox3 , no focal neurological deficit.     DIET:     Cultures:     LABS:                        9.2    10.83 )-----------( 391      ( 2019 13:55 )             31.5     CBC Full  -  ( 2019 13:55 )  WBC Count : 10.83 K/uL  Hemoglobin : 9.2 g/dL  Hematocrit : 31.5 %  Platelet Count - Automated : 391 K/uL  Mean Cell Volume : 77.8 fl  Mean Cell Hemoglobin : 22.7 pg  Mean Cell Hemoglobin Concentration : 29.2 gm/dL  Auto Neutrophil # : x  Auto Lymphocyte # : x  Auto Monocyte # : x  Auto Eosinophil # : x  Auto Basophil # : x  Auto Neutrophil % : x  Auto Lymphocyte % : x  Auto Monocyte % : x  Auto Eosinophil % : x  Auto Basophil % : x    2019 06:35    147    |  113    |  21     ----------------------------<  84     4.1     |  25     |  0.72     Ca    8.8        2019 06:35  Phos  3.2       2019 06:35  Mg     2.1       2019 06:35    TPro  5.5    /  Alb  2.9    /  TBili  0.2    /  DBili  x      /  AST  8      /  ALT  17     /  AlkPhos  43     2019 06:35    PT/INR - ( 2019 06:35 )   PT: 11.9 sec;   INR: 1.09 ratio         PTT - ( 2019 06:35 )  PTT:25.6 sec  Urinalysis Basic - ( 2019 01:27 )    Color: Yellow / Appearance: Clear / S.015 / pH: x  Gluc: x / Ketone: Negative  / Bili: Negative / Urobili: Negative mg/dL   Blood: x / Protein: Negative mg/dL / Nitrite: Negative   Leuk Esterase: Trace / RBC: 6-10 /HPF / WBC 6-10   Sq Epi: x / Non Sq Epi: Few / Bacteria: Few      CAPILLARY BLOOD GLUCOSE              RADIOLOGY & ADDITIONAL TESTS:    Personally reviewed.     Consultant(s) Notes Reviewed:  [x] YES  [ ] NO    Care Discussed with [ ] Consultants  [x] Patient  [ ] Family  [x]      [ ] Other; RN  DVT ppx  Advanced directive

## 2019-02-16 NOTE — PROGRESS NOTE ADULT - SUBJECTIVE AND OBJECTIVE BOX
INTERVAL HPI/OVERNIGHT EVENTS:  No new overnight event.  No N/V/D.  Tolerating diet.  no melena no brbpr    Allergies    No Known Drug Allergies  Originally Entered as [Unknown see Comment: pt unable to remember] reaction to [Mold] (Unknown)  stolazine eye med- pt doesn&#x27;t remember (Unknown)    Intolerances          General:  No wt loss, fevers, chills, night sweats, fatigue,   Eyes:  Good vision, no reported pain  ENT:  No sore throat, pain, runny nose, dysphagia  CV:  No pain, palpitations, hypo/hypertension  Resp:  No dyspnea, cough, tachypnea, wheezing  GI:  No pain, No nausea, No vomiting, No diarrhea, No constipation, No weight loss, No fever, No pruritis, No rectal bleeding, No tarry stools, No dysphagia,  :  No pain, bleeding, incontinence, nocturia  Muscle:  No pain, weakness  Neuro:  No weakness, tingling, memory problems  Psych:  No fatigue, insomnia, mood problems, depression  Endocrine:  No polyuria, polydipsia, cold/heat intolerance  Heme:  No petechiae, ecchymosis, easy bruisability  Skin:  No rash, tattoos, scars, edema      PHYSICAL EXAM:   Vital Signs:  Vital Signs Last 24 Hrs  T(C): 36.5 (2019 07:47), Max: 36.8 (15 Feb 2019 22:28)  T(F): 97.7 (2019 07:47), Max: 98.2 (15 Feb 2019 22:28)  HR: 67 (2019 07:47) (67 - 78)  BP: 143/64 (2019 07:47) (115/50 - 147/72)  BP(mean): --  RR: 18 (2019 07:47) (17 - 20)  SpO2: 99% (2019 07:47) (96% - 99%)  Daily Height in cm: 152.4 (15 Feb 2019 15:20)    Daily I&O's Summary      GENERAL:  Appears stated age, well-groomed, well-nourished, no distress  HEENT:  NC/AT,  conjunctivae clear and pink, no thyromegaly, nodules, adenopathy, no JVD, sclera -anicteric  CHEST:  Full & symmetric excursion, no increased effort, breath sounds clear  HEART:  Regular rhythm, S1, S2, no murmur/rub/S3/S4, no abdominal bruit, no edema  ABDOMEN:  Soft, non-tender, non-distended, normoactive bowel sounds,  no masses ,no hepato-splenomegaly, no signs of chronic liver disease  EXTEREMITIES:  no cyanosis,clubbing or edema  SKIN:  No rash/erythema/ecchymoses/petechiae/wounds/abscess/warm/dry  NEURO:  Alert, oriented, no asterixis, no tremor, no encephalopathy      LABS:                        9.2    10.83 )-----------( 391      ( 2019 13:55 )             31.5     02-16    147<H>  |  113<H>  |  21  ----------------------------<  84  4.1   |  25  |  0.72    Ca    8.8      2019 06:35  Phos  3.2     02-16  Mg     2.1     02-16    TPro  5.5<L>  /  Alb  2.9<L>  /  TBili  0.2  /  DBili  x   /  AST  8<L>  /  ALT  17  /  AlkPhos  43  02-16    PT/INR - ( 2019 06:35 )   PT: 11.9 sec;   INR: 1.09 ratio         PTT - ( 2019 06:35 )  PTT:25.6 sec  Urinalysis Basic - ( 2019 01:27 )    Color: Yellow / Appearance: Clear / S.015 / pH: x  Gluc: x / Ketone: Negative  / Bili: Negative / Urobili: Negative mg/dL   Blood: x / Protein: Negative mg/dL / Nitrite: Negative   Leuk Esterase: Trace / RBC: 6-10 /HPF / WBC 6-10   Sq Epi: x / Non Sq Epi: Few / Bacteria: Few      amylase   lipase  RADIOLOGY & ADDITIONAL TESTS:

## 2019-02-17 LAB
ANION GAP SERPL CALC-SCNC: 7 MMOL/L — SIGNIFICANT CHANGE UP (ref 5–17)
BUN SERPL-MCNC: 10 MG/DL — SIGNIFICANT CHANGE UP (ref 7–23)
CALCIUM SERPL-MCNC: 9 MG/DL — SIGNIFICANT CHANGE UP (ref 8.4–10.5)
CHLORIDE SERPL-SCNC: 106 MMOL/L — SIGNIFICANT CHANGE UP (ref 96–108)
CO2 SERPL-SCNC: 27 MMOL/L — SIGNIFICANT CHANGE UP (ref 22–31)
CREAT SERPL-MCNC: 0.84 MG/DL — SIGNIFICANT CHANGE UP (ref 0.5–1.3)
GLUCOSE SERPL-MCNC: 97 MG/DL — SIGNIFICANT CHANGE UP (ref 70–99)
HCT VFR BLD CALC: 26.2 % — LOW (ref 34.5–45)
HCT VFR BLD CALC: 28.4 % — LOW (ref 34.5–45)
HCT VFR BLD CALC: 30.7 % — LOW (ref 34.5–45)
HGB BLD-MCNC: 7.9 G/DL — LOW (ref 11.5–15.5)
HGB BLD-MCNC: 8.5 G/DL — LOW (ref 11.5–15.5)
HGB BLD-MCNC: 9 G/DL — LOW (ref 11.5–15.5)
MCHC RBC-ENTMCNC: 22.7 PG — LOW (ref 27–34)
MCHC RBC-ENTMCNC: 23.5 PG — LOW (ref 27–34)
MCHC RBC-ENTMCNC: 23.5 PG — LOW (ref 27–34)
MCHC RBC-ENTMCNC: 29.3 GM/DL — LOW (ref 32–36)
MCHC RBC-ENTMCNC: 29.9 GM/DL — LOW (ref 32–36)
MCHC RBC-ENTMCNC: 30.2 GM/DL — LOW (ref 32–36)
MCV RBC AUTO: 77.5 FL — LOW (ref 80–100)
MCV RBC AUTO: 78 FL — LOW (ref 80–100)
MCV RBC AUTO: 78.7 FL — LOW (ref 80–100)
NRBC # BLD: 0 /100 WBCS — SIGNIFICANT CHANGE UP (ref 0–0)
PLATELET # BLD AUTO: 341 K/UL — SIGNIFICANT CHANGE UP (ref 150–400)
PLATELET # BLD AUTO: 380 K/UL — SIGNIFICANT CHANGE UP (ref 150–400)
PLATELET # BLD AUTO: 394 K/UL — SIGNIFICANT CHANGE UP (ref 150–400)
POTASSIUM SERPL-MCNC: 4.3 MMOL/L — SIGNIFICANT CHANGE UP (ref 3.5–5.3)
POTASSIUM SERPL-SCNC: 4.3 MMOL/L — SIGNIFICANT CHANGE UP (ref 3.5–5.3)
RBC # BLD: 3.36 M/UL — LOW (ref 3.8–5.2)
RBC # BLD: 3.61 M/UL — LOW (ref 3.8–5.2)
RBC # BLD: 3.96 M/UL — SIGNIFICANT CHANGE UP (ref 3.8–5.2)
RBC # FLD: 16.3 % — HIGH (ref 10.3–14.5)
RBC # FLD: 16.4 % — HIGH (ref 10.3–14.5)
RBC # FLD: 16.4 % — HIGH (ref 10.3–14.5)
SODIUM SERPL-SCNC: 140 MMOL/L — SIGNIFICANT CHANGE UP (ref 135–145)
WBC # BLD: 10.51 K/UL — HIGH (ref 3.8–10.5)
WBC # BLD: 11.01 K/UL — HIGH (ref 3.8–10.5)
WBC # BLD: 11.78 K/UL — HIGH (ref 3.8–10.5)
WBC # FLD AUTO: 10.51 K/UL — HIGH (ref 3.8–10.5)
WBC # FLD AUTO: 11.01 K/UL — HIGH (ref 3.8–10.5)
WBC # FLD AUTO: 11.78 K/UL — HIGH (ref 3.8–10.5)

## 2019-02-17 PROCEDURE — 99232 SBSQ HOSP IP/OBS MODERATE 35: CPT

## 2019-02-17 RX ADMIN — Medication 1 GRAM(S): at 17:09

## 2019-02-17 RX ADMIN — PANTOPRAZOLE SODIUM 40 MILLIGRAM(S): 20 TABLET, DELAYED RELEASE ORAL at 08:49

## 2019-02-17 RX ADMIN — AMLODIPINE BESYLATE 5 MILLIGRAM(S): 2.5 TABLET ORAL at 05:36

## 2019-02-17 RX ADMIN — Medication 200 MILLIGRAM(S): at 05:37

## 2019-02-17 RX ADMIN — Medication 1 GRAM(S): at 05:36

## 2019-02-17 RX ADMIN — ATORVASTATIN CALCIUM 10 MILLIGRAM(S): 80 TABLET, FILM COATED ORAL at 21:03

## 2019-02-17 RX ADMIN — LISINOPRIL 20 MILLIGRAM(S): 2.5 TABLET ORAL at 05:36

## 2019-02-17 RX ADMIN — CLOPIDOGREL BISULFATE 75 MILLIGRAM(S): 75 TABLET, FILM COATED ORAL at 08:49

## 2019-02-17 NOTE — DIETITIAN INITIAL EVALUATION ADULT. - OTHER INFO
85 F with CAD s/p 13 stents, PVD s/p 1 stent in left side, s/p PPM, arthritis, diverticulosis, HLD, HTN who presents with weakness for the past 2-3 days. Seen for policy NPO/clears x 3 days.  Tolerating clear liquid diet and denies N/V/D.  NKA to food (denies allergy to wheat!). Dislikes pork or beef.  No recent weight changes.   Has her own teeth and denies difficulty chewing or swallowing. MD documents: gastrointestinal hemorrhage, cardiology clearance for upper gastrointestinal endoscopy, EGD once optimized.

## 2019-02-17 NOTE — PROGRESS NOTE ADULT - SUBJECTIVE AND OBJECTIVE BOX
CC. Weakness     HPI.  Patient reports that she feels better.  No blood per stool      Constitutional: No fever, fatigue or weight loss.  Skin: No rash.  Eyes: No recent vision problems or eye pain.  ENT: No congestion, ear pain, or sore throat.  Endocrine: No thyroid problems.  Cardiovascular: No chest pain or palpation.  Respiratory: No cough, shortness of breath, congestion, or wheezing.  Gastrointestinal: No abdominal pain, nausea, vomiting, or diarrhea.  Genitourinary: No dysuria.  Musculoskeletal: No joint swelling.  Neurologic: No headache.      Vital Signs Last 24 Hrs  T(C): 36.7 (19 @ 08:12), Max: 37.3 (19 @ 15:05)  T(F): 98.1 (19 @ 08:12), Max: 99.2 (19 @ 15:05)  HR: 89 (19 @ 08:12) (70 - 89)  BP: 152/70 (19 @ 08:12) (117/56 - 152/70)  BP(mean): --  RR: 18 (19 @ 08:12) (17 - 18)  SpO2: 98% (19 @ 08:12) (96% - 98%)        PHYSICAL EXAM-  GENERAL: NAD, well-groomed, well-developed  HEAD:  Atraumatic, Normocephalic  EYES: EOMI, PERRLA, conjunctiva and sclera clear  NECK: Supple, No JVD, Normal thyroid  NERVOUS SYSTEM:  Alert & Oriented X3, Motor Strength 5/5 B/L upper and lower extremities; DTRs 2+ intact and symmetric  CHEST/LUNG: Clear to percussion bilaterally; No rales, rhonchi, wheezing, or rubs  HEART: Regular rate and rhythm; No murmurs, rubs, or gallops  ABDOMEN: Soft, Nontender, Nondistended; Bowel sounds present  EXTREMITIES:  2+ Peripheral Pulses, No clubbing, cyanosis, or edema  SKIN: No rashes or lesions                                  8.5    11.78 )-----------( 394      ( 2019 09:34 )             28.4     02-    140  |  106  |  10  ----------------------------<  97  4.3   |  27  |  0.84    Ca    9.0      2019 07:21  Phos  3.2     02-16  Mg     2.1     02-16    TPro  5.5<L>  /  Alb  2.9<L>  /  TBili  0.2  /  DBili  x   /  AST  8<L>  /  ALT  17  /  AlkPhos  43  02-16    CARDIAC MARKERS ( 2019 11:54 )  .012 ng/mL / x     / x     / x     / x      CARDIAC MARKERS ( 2019 06:35 )  .009 ng/mL / x     / x     / x     / x      CARDIAC MARKERS ( 2019 00:57 )  .011 ng/mL / x     / x     / x     / x      CARDIAC MARKERS ( 15 Feb 2019 17:08 )  .003 ng/mL / x     / x     / x     / x            Urinalysis Basic - ( 2019 01:27 )    Color: Yellow / Appearance: Clear / S.015 / pH: x  Gluc: x / Ketone: Negative  / Bili: Negative / Urobili: Negative mg/dL   Blood: x / Protein: Negative mg/dL / Nitrite: Negative   Leuk Esterase: Trace / RBC: 6-10 /HPF / WBC 6-10   Sq Epi: x / Non Sq Epi: Few / Bacteria: Few      PT/INR - ( 2019 06:35 )   PT: 11.9 sec;   INR: 1.09 ratio         PTT - ( 2019 06:35 )  PTT:25.6 sec        MEDICATIONS  (STANDING):  amLODIPine   Tablet 5 milliGRAM(s) Oral daily  atorvastatin 10 milliGRAM(s) Oral at bedtime  clopidogrel Tablet 75 milliGRAM(s) Oral daily  lactated ringers. 1000 milliLiter(s) (100 mL/Hr) IV Continuous <Continuous>  lisinopril 20 milliGRAM(s) Oral daily  metoprolol succinate  milliGRAM(s) Oral daily  pantoprazole  Injectable 40 milliGRAM(s) IV Push daily  sucralfate suspension 1 Gram(s) Oral two times a day    MEDICATIONS  (PRN):  artificial tears (preservative free) Ophthalmic Solution 1 Drop(s) Both EYES two times a day PRN for dry eyes  oxyCODONE    5 mG/acetaminophen 325 mG 1 Tablet(s) Oral every 4 hours PRN Moderate Pain (4 - 6)          Imaging Personally Reviewed:     [x ] YES  [ ] NO    Consultant(s) Notes Reviewed:  [x ] YES  [ ] NO    Care Discussed with Consultants/Other Providers [x ] YES  [ ] No medical contraindication for discharge

## 2019-02-17 NOTE — PROGRESS NOTE ADULT - SUBJECTIVE AND OBJECTIVE BOX
INTERVAL HPI/OVERNIGHT EVENTS:  No new overnight event.  No N/V/D.  Tolerating diet.  no melena     Allergies    No Known Drug Allergies  Originally Entered as [Unknown see Comment: pt unable to remember] reaction to [Mold] (Unknown)  stolazine eye med- pt doesn&#x27;t remember (Unknown)    Intolerances  General:  No wt loss, fevers, chills, night sweats, fatigue,   Eyes:  Good vision, no reported pain  ENT:  No sore throat, pain, runny nose, dysphagia  CV:  No pain, palpitations, hypo/hypertension  Resp:  No dyspnea, cough, tachypnea, wheezing  GI:  No pain, No nausea, No vomiting, No diarrhea, No constipation, No weight loss, No fever, No pruritis, No rectal bleeding, No tarry stools, No dysphagia,  :  No pain, bleeding, incontinence, nocturia  Muscle:  No pain, weakness  Neuro:  No weakness, tingling, memory problems  Psych:  No fatigue, insomnia, mood problems, depression  Endocrine:  No polyuria, polydipsia, cold/heat intolerance  Heme:  No petechiae, ecchymosis, easy bruisability  Skin:  No rash, tattoos, scars, edema      PHYSICAL EXAM:   Vital Signs:  Vital Signs Last 24 Hrs  T(C): 36.7 (2019 08:12), Max: 37.3 (2019 15:05)  T(F): 98.1 (2019 08:12), Max: 99.2 (2019 15:05)  HR: 89 (2019 08:12) (70 - 89)  BP: 152/70 (2019 08:12) (117/56 - 152/70)  BP(mean): --  RR: 18 (2019 08:12) (17 - 18)  SpO2: 98% (2019 08:12) (96% - 98%)  Daily     Daily Weight in k.5 (2019 14:25)I&O's Summary      GENERAL:  Appears stated age, well-groomed, well-nourished, no distress  HEENT:  NC/AT,  conjunctivae clear and pink, no thyromegaly, nodules, adenopathy, no JVD, sclera -anicteric  CHEST:  Full & symmetric excursion, no increased effort, breath sounds clear  HEART:  Regular rhythm, S1, S2, no murmur/rub/S3/S4, no abdominal bruit, no edema  ABDOMEN:  Soft, non-tender, non-distended, normoactive bowel sounds,  no masses ,no hepato-splenomegaly, no signs of chronic liver disease  EXTEREMITIES:  no cyanosis,clubbing or edema  SKIN:  No rash/erythema/ecchymoses/petechiae/wounds/abscess/warm/dry  NEURO:  Alert, oriented, no asterixis, no tremor, no encephalopathy      LABS:                        8.5    11.78 )-----------( 394      ( 2019 09:34 )             28.4     02-17    140  |  106  |  10  ----------------------------<  97  4.3   |  27  |  0.84    Ca    9.0      2019 07:21  Phos  3.2     02-16  Mg     2.1     02-16    TPro  5.5<L>  /  Alb  2.9<L>  /  TBili  0.2  /  DBili  x   /  AST  8<L>  /  ALT  17  /  AlkPhos  43  02-16    PT/INR - ( 2019 06:35 )   PT: 11.9 sec;   INR: 1.09 ratio         PTT - ( 2019 06:35 )  PTT:25.6 sec  Urinalysis Basic - ( 2019 01:27 )    Color: Yellow / Appearance: Clear / S.015 / pH: x  Gluc: x / Ketone: Negative  / Bili: Negative / Urobili: Negative mg/dL   Blood: x / Protein: Negative mg/dL / Nitrite: Negative   Leuk Esterase: Trace / RBC: 6-10 /HPF / WBC 6-10   Sq Epi: x / Non Sq Epi: Few / Bacteria: Few      amylase   lipase  RADIOLOGY & ADDITIONAL TESTS:

## 2019-02-17 NOTE — PROGRESS NOTE ADULT - ASSESSMENT
85F with CAD s/p 13 stents, PVD s/p 1 stent in left side, s/p PPM (patient doesn't remember why she has it), arthritis, diverticulosis, HLD, HTN who presents with weakness for the past 2-3 days.  Also with chest discomfort though now chest discomfort resolved.        1.  Gastrointestinal hemorrhage,   -H/H stable.     -Continue with PPI  - hold anticoagulants  - transfuse if <8  - cont with IVF hydration.   -Possible scope as per GI  -Medically optimized as per cardiology    2.  Coronary artery disease   -Cont with amlodipine and benazepril (or equivalent)  - holding plavix and ASA given bleed (patient said her stents were placed at least >6 months ago if not longer).      3.   Dyslipidemia.  cont lovastatin (or equivalent).      Plan of care was discussed with patient,   in great details, All questions were answered to their satisfaction  Seems to understand, and in agreement 85F with CAD s/p 13 stents, PVD s/p 1 stent in left side, s/p PPM (patient doesn't remember why she has it), arthritis, diverticulosis, HLD, HTN who presents with weakness for the past 2-3 days.  Also with chest discomfort though now chest discomfort resolved.        1.  Gastrointestinal hemorrhage,   -H/H stable.     -Continue with PPI  - hold anticoagulants  - transfuse if <8  - cont with IVF hydration.   -Possible scope as per GI  -Medically optimized as per cardiology    2.  Coronary artery disease   -Cont with amlodipine and benazepril (or equivalent)  - Continue with plavix.  Hold ASA given bleed (patient said her stents were placed at least >6 months ago if not longer).      3.   Dyslipidemia.  cont lovastatin (or equivalent).      Plan of care was discussed with patient,   in great details, All questions were answered to their satisfaction  Seems to understand, and in agreement 85F with CAD s/p 13 stents, PVD s/p 1 stent in left side, s/p PPM (patient doesn't remember why she has it), arthritis, diverticulosis, HLD, HTN who presents with weakness for the past 2-3 days.  Also with chest discomfort though now chest discomfort resolved.        1.  Gastrointestinal hemorrhage,   -H/H stable.     -Continue with PPI, and Carafate   - hold anticoagulants  - transfuse if <8  - cont with IVF hydration.   -Possible scope as per GI  -Medically optimized as per cardiology    2.  Coronary artery disease   -Cont with amlodipine and benazepril (or equivalent)  - Continue with plavix.  Hold ASA given bleed (patient said her stents were placed at least >6 months ago if not longer).      3.   Dyslipidemia.  cont lovastatin (or equivalent).      Plan of care was discussed with patient,   in great details, All questions were answered to their satisfaction  Seems to understand, and in agreement

## 2019-02-18 ENCOUNTER — APPOINTMENT (OUTPATIENT)
Dept: CARDIOLOGY | Facility: CLINIC | Age: 84
End: 2019-02-18

## 2019-02-18 ENCOUNTER — TRANSCRIPTION ENCOUNTER (OUTPATIENT)
Age: 84
End: 2019-02-18

## 2019-02-18 LAB
ANION GAP SERPL CALC-SCNC: 12 MMOL/L — SIGNIFICANT CHANGE UP (ref 5–17)
BUN SERPL-MCNC: 9 MG/DL — SIGNIFICANT CHANGE UP (ref 7–23)
CALCIUM SERPL-MCNC: 8.9 MG/DL — SIGNIFICANT CHANGE UP (ref 8.4–10.5)
CHLORIDE SERPL-SCNC: 104 MMOL/L — SIGNIFICANT CHANGE UP (ref 96–108)
CO2 SERPL-SCNC: 24 MMOL/L — SIGNIFICANT CHANGE UP (ref 22–31)
CREAT SERPL-MCNC: 0.78 MG/DL — SIGNIFICANT CHANGE UP (ref 0.5–1.3)
GLUCOSE SERPL-MCNC: 111 MG/DL — HIGH (ref 70–99)
HCT VFR BLD CALC: 31.8 % — LOW (ref 34.5–45)
HCT VFR BLD CALC: 32.3 % — LOW (ref 34.5–45)
HGB BLD-MCNC: 10.2 G/DL — LOW (ref 11.5–15.5)
HGB BLD-MCNC: 9.9 G/DL — LOW (ref 11.5–15.5)
MCHC RBC-ENTMCNC: 24.6 PG — LOW (ref 27–34)
MCHC RBC-ENTMCNC: 24.9 PG — LOW (ref 27–34)
MCHC RBC-ENTMCNC: 31.1 GM/DL — LOW (ref 32–36)
MCHC RBC-ENTMCNC: 31.6 GM/DL — LOW (ref 32–36)
MCV RBC AUTO: 79 FL — LOW (ref 80–100)
MCV RBC AUTO: 79.1 FL — LOW (ref 80–100)
NRBC # BLD: 0 /100 WBCS — SIGNIFICANT CHANGE UP (ref 0–0)
NRBC # BLD: 0 /100 WBCS — SIGNIFICANT CHANGE UP (ref 0–0)
PLATELET # BLD AUTO: 311 K/UL — SIGNIFICANT CHANGE UP (ref 150–400)
PLATELET # BLD AUTO: 330 K/UL — SIGNIFICANT CHANGE UP (ref 150–400)
POTASSIUM SERPL-MCNC: 3.6 MMOL/L — SIGNIFICANT CHANGE UP (ref 3.5–5.3)
POTASSIUM SERPL-SCNC: 3.6 MMOL/L — SIGNIFICANT CHANGE UP (ref 3.5–5.3)
RBC # BLD: 4.02 M/UL — SIGNIFICANT CHANGE UP (ref 3.8–5.2)
RBC # BLD: 4.09 M/UL — SIGNIFICANT CHANGE UP (ref 3.8–5.2)
RBC # FLD: 16.2 % — HIGH (ref 10.3–14.5)
RBC # FLD: 16.4 % — HIGH (ref 10.3–14.5)
SODIUM SERPL-SCNC: 140 MMOL/L — SIGNIFICANT CHANGE UP (ref 135–145)
WBC # BLD: 10.67 K/UL — HIGH (ref 3.8–10.5)
WBC # BLD: 13.91 K/UL — HIGH (ref 3.8–10.5)
WBC # FLD AUTO: 10.67 K/UL — HIGH (ref 3.8–10.5)
WBC # FLD AUTO: 13.91 K/UL — HIGH (ref 3.8–10.5)

## 2019-02-18 PROCEDURE — 99232 SBSQ HOSP IP/OBS MODERATE 35: CPT

## 2019-02-18 RX ORDER — SOD SULF/SODIUM/NAHCO3/KCL/PEG
1 SOLUTION, RECONSTITUTED, ORAL ORAL EVERY 4 HOURS
Qty: 0 | Refills: 0 | Status: COMPLETED | OUTPATIENT
Start: 2019-02-18 | End: 2019-02-18

## 2019-02-18 RX ADMIN — Medication 200 MILLIGRAM(S): at 05:46

## 2019-02-18 RX ADMIN — Medication 1 GRAM(S): at 05:54

## 2019-02-18 RX ADMIN — SODIUM CHLORIDE 100 MILLILITER(S): 9 INJECTION, SOLUTION INTRAVENOUS at 03:03

## 2019-02-18 RX ADMIN — Medication 10 MILLIGRAM(S): at 21:29

## 2019-02-18 RX ADMIN — ATORVASTATIN CALCIUM 10 MILLIGRAM(S): 80 TABLET, FILM COATED ORAL at 21:28

## 2019-02-18 RX ADMIN — Medication 1 LITER(S): at 21:31

## 2019-02-18 RX ADMIN — Medication 10 MILLIGRAM(S): at 16:33

## 2019-02-18 RX ADMIN — PANTOPRAZOLE SODIUM 40 MILLIGRAM(S): 20 TABLET, DELAYED RELEASE ORAL at 12:08

## 2019-02-18 RX ADMIN — SODIUM CHLORIDE 100 MILLILITER(S): 9 INJECTION, SOLUTION INTRAVENOUS at 12:12

## 2019-02-18 RX ADMIN — SODIUM CHLORIDE 100 MILLILITER(S): 9 INJECTION, SOLUTION INTRAVENOUS at 20:56

## 2019-02-18 RX ADMIN — Medication 1 GRAM(S): at 17:20

## 2019-02-18 RX ADMIN — CLOPIDOGREL BISULFATE 75 MILLIGRAM(S): 75 TABLET, FILM COATED ORAL at 12:08

## 2019-02-18 RX ADMIN — Medication 1 LITER(S): at 17:20

## 2019-02-18 RX ADMIN — LISINOPRIL 20 MILLIGRAM(S): 2.5 TABLET ORAL at 05:46

## 2019-02-18 RX ADMIN — AMLODIPINE BESYLATE 5 MILLIGRAM(S): 2.5 TABLET ORAL at 05:46

## 2019-02-18 NOTE — PROGRESS NOTE ADULT - SUBJECTIVE AND OBJECTIVE BOX
INTERVAL HPI/OVERNIGHT EVENTS:  No new overnight event.  No N/V/D.  Tolerating diet.  brbpr yesterday    Allergies    No Known Drug Allergies  Originally Entered as [Unknown see Comment: pt unable to remember] reaction to [Mold] (Unknown)  stolazine eye med- pt doesn&#x27;t remember (Unknown)    Intolerances    General:  No wt loss, fevers, chills, night sweats, fatigue,   Eyes:  Good vision, no reported pain  ENT:  No sore throat, pain, runny nose, dysphagia  CV:  No pain, palpitations, hypo/hypertension  Resp:  No dyspnea, cough, tachypnea, wheezing  GI:  No pain, No nausea, No vomiting, No diarrhea, No constipation, No weight loss, No fever, No pruritis, No rectal bleeding, No tarry stools, No dysphagia,  :  No pain, bleeding, incontinence, nocturia  Muscle:  No pain, weakness  Neuro:  No weakness, tingling, memory problems  Psych:  No fatigue, insomnia, mood problems, depression  Endocrine:  No polyuria, polydipsia, cold/heat intolerance  Heme:  No petechiae, ecchymosis, easy bruisability  Skin:  No rash, tattoos, scars, edema      PHYSICAL EXAM:   Vital Signs:  Vital Signs Last 24 Hrs  T(C): 37.7 (18 Feb 2019 07:12), Max: 37.7 (18 Feb 2019 00:10)  T(F): 99.8 (18 Feb 2019 07:12), Max: 99.8 (18 Feb 2019 00:10)  HR: 79 (18 Feb 2019 07:12) (73 - 86)  BP: 130/56 (18 Feb 2019 07:12) (108/61 - 153/74)  BP(mean): --  RR: 18 (18 Feb 2019 07:12) (14 - 18)  SpO2: 92% (18 Feb 2019 07:12) (92% - 98%)  Daily     Daily I&O's Summary    17 Feb 2019 07:01  -  18 Feb 2019 07:00  --------------------------------------------------------  IN: 361 mL / OUT: 0 mL / NET: 361 mL        GENERAL:  Appears stated age, well-groomed, well-nourished, no distress  HEENT:  NC/AT,  conjunctivae clear and pink, no thyromegaly, nodules, adenopathy, no JVD, sclera -anicteric  CHEST:  Full & symmetric excursion, no increased effort, breath sounds clear  HEART:  Regular rhythm, S1, S2, no murmur/rub/S3/S4, no abdominal bruit, no edema  ABDOMEN:  Soft, non-tender, non-distended, normoactive bowel sounds,  no masses ,no hepato-splenomegaly, no signs of chronic liver disease  EXTEREMITIES:  no cyanosis,clubbing or edema  SKIN:  No rash/erythema/ecchymoses/petechiae/wounds/abscess/warm/dry  NEURO:  Alert, oriented, no asterixis, no tremor, no encephalopathy      LABS:                        9.9    10.67 )-----------( 330      ( 18 Feb 2019 07:03 )             31.8     02-18    140  |  104  |  9   ----------------------------<  111<H>  3.6   |  24  |  0.78    Ca    8.9      18 Feb 2019 07:02          amylase   lipase  RADIOLOGY & ADDITIONAL TESTS:

## 2019-02-18 NOTE — PROGRESS NOTE ADULT - SUBJECTIVE AND OBJECTIVE BOX
CC. Weakness     HPI.  Patient reports that she feels better.  One episode of melena yesterday.  Offers no other complaints      Constitutional: No fever, fatigue or weight loss.  Skin: No rash.  Eyes: No recent vision problems or eye pain.  ENT: No congestion, ear pain, or sore throat.  Endocrine: No thyroid problems.  Cardiovascular: No chest pain or palpation.  Respiratory: No cough, shortness of breath, congestion, or wheezing.  Gastrointestinal: No abdominal pain, nausea, vomiting, or diarrhea.  Genitourinary: No dysuria.  Musculoskeletal: No joint swelling.  Neurologic: No headache.      Vital Signs Last 24 Hrs  T(C): 37.7 (18 Feb 2019 07:12), Max: 37.7 (18 Feb 2019 00:10)  T(F): 99.8 (18 Feb 2019 07:12), Max: 99.8 (18 Feb 2019 00:10)  HR: 79 (18 Feb 2019 07:12) (73 - 86)  BP: 130/56 (18 Feb 2019 07:12) (108/61 - 153/74)  BP(mean): --  RR: 18 (18 Feb 2019 07:12) (14 - 18)  SpO2: 92% (18 Feb 2019 07:12) (92% - 98%)        PHYSICAL EXAM-  GENERAL: NAD, well-groomed, well-developed  HEAD:  Atraumatic, Normocephalic  EYES: EOMI, PERRLA, conjunctiva and sclera clear  NECK: Supple, No JVD, Normal thyroid  NERVOUS SYSTEM:  Alert & Oriented X3, Motor Strength 5/5 B/L upper and lower extremities; DTRs 2+ intact and symmetric  CHEST/LUNG: Clear to percussion bilaterally; No rales, rhonchi, wheezing, or rubs  HEART: Regular rate and rhythm; No murmurs, rubs, or gallops  ABDOMEN: Soft, Nontender, Nondistended; Bowel sounds present  EXTREMITIES:  2+ Peripheral Pulses, No clubbing, cyanosis, or edema  SKIN: No rashes or lesions                            9.9    10.67 )-----------( 330      ( 18 Feb 2019 07:03 )             31.8     02-18    140  |  104  |  9   ----------------------------<  111<H>  3.6   |  24  |  0.78    Ca    8.9      18 Feb 2019 07:02      CARDIAC MARKERS ( 16 Feb 2019 11:54 )  .012 ng/mL / x     / x     / x     / x            MEDICATIONS  (STANDING):  amLODIPine   Tablet 5 milliGRAM(s) Oral daily  atorvastatin 10 milliGRAM(s) Oral at bedtime  clopidogrel Tablet 75 milliGRAM(s) Oral daily  lactated ringers. 1000 milliLiter(s) (100 mL/Hr) IV Continuous <Continuous>  lisinopril 20 milliGRAM(s) Oral daily  metoprolol succinate  milliGRAM(s) Oral daily  pantoprazole  Injectable 40 milliGRAM(s) IV Push daily  sucralfate suspension 1 Gram(s) Oral two times a day    MEDICATIONS  (PRN):  artificial tears (preservative free) Ophthalmic Solution 1 Drop(s) Both EYES two times a day PRN for dry eyes  oxyCODONE    5 mG/acetaminophen 325 mG 1 Tablet(s) Oral every 4 hours PRN Moderate Pain (4 - 6)                              Imaging Personally Reviewed:     [x ] YES  [ ] NO    Consultant(s) Notes Reviewed:  [x ] YES  [ ] NO    Care Discussed with Consultants/Other Providers [x ] YES  [ ] No medical contraindication for discharge

## 2019-02-18 NOTE — PROGRESS NOTE ADULT - ASSESSMENT
85F with CAD s/p 13 stents, PVD s/p 1 stent in left side, s/p PPM (patient doesn't remember why she has it), arthritis, diverticulosis, HLD, HTN who presents with weakness for the past 2-3 days.  Also with chest discomfort though now chest discomfort resolved.        1.  Gastrointestinal hemorrhage,   -H/H stable.     -Continue with PPI, and Carafate   - hold anticoagulants  - transfuse if <8  - cont with IVF hydration.   -Possible scope as per GI  -Medically optimized as per cardiology    2.  Coronary artery disease   -Cont with amlodipine and benazepril (or equivalent)  - Continue with plavix.  Hold ASA given bleed (patient said her stents were placed at least >6 months ago if not longer).      3.   Dyslipidemia.  cont lovastatin (or equivalent).      Plan of care was discussed with patient,   in great details, All questions were answered to their satisfaction  Seems to understand, and in agreement

## 2019-02-18 NOTE — PROGRESS NOTE ADULT - SUBJECTIVE AND OBJECTIVE BOX
Poor Historian     CHIEF COMPLAINT:    HPI:  85F with CAD s/p 13 stents, PVD s/p 1 stent in left side LCX 11/2018 recent , s/p PPM (patient doesn't remember why she has it), arthritis, diverticulosis, HLD, HTN who presents with weakness for the past 2-3 days.  Felt weak but denied any headaches or dizziness.  Today, she felt even more weak and decided to come to the ED.  Patient also started to have some slight left lower chest discomfort yesterday though it was fleeting.  Denied any radiation.  Patient thought she needed "another stent" and decided to come here. Otherwise, patient denied any fevers, nausea/vomiting, diarrhea.  Did admit to have dark tarry stools for at least a week and patient would noticed some blood tinged stools when she would self-disimpact herself.  In the ED, patient was found to have a hgb of 8.8 (last known hgb was 10 on 12/29/2018).  Was occult positive as well.  Patient has been on Plavix for her stents as well as prednisone for her right shoulder pain.  Has been on steroids for a couple of months (slow taper according to the patient).  Troponin was negative x1.   Dr. Walters from GI was consulted who is recommended endoscopy , called for pre procedure cardiac evaluation ,  Patient is not very good historian , denies any chest pain or shortness of breath or dizziness ,  patient is able to walk around without any chest pain , her CE negative     2/18/19 Patient is feeling ok , did recieve 1 Unit of PRBC , had melena , on plavix due to recent PCI 11/2018       PAST MEDICAL & SURGICAL HISTORY:  Acute arthritis  High cholesterol  Stress incontinence in female  Rectocele  Diverticulosis  Hiatal hernia  Hypertension  Hypothyroidism  Dyslipidemia  CAD (coronary artery disease)  Carpal tunnel syndrome of right wrist  Cardiac pacemaker  Stented coronary artery: 12 heart stents,   1 left leg stents  H/O carotid endarterectomy: right  History of hysterectomy  History of breast reconstruction  Bilateral cataracts: surgery  Status post total knee replacement: right      Allergies    MEDICATIONS  (STANDING):  amLODIPine   Tablet 5 milliGRAM(s) Oral daily  atorvastatin 10 milliGRAM(s) Oral at bedtime  clopidogrel Tablet 75 milliGRAM(s) Oral daily  lactated ringers. 1000 milliLiter(s) (100 mL/Hr) IV Continuous <Continuous>  lisinopril 20 milliGRAM(s) Oral daily  metoprolol succinate  milliGRAM(s) Oral daily  pantoprazole  Injectable 40 milliGRAM(s) IV Push daily  sucralfate suspension 1 Gram(s) Oral two times a day    MEDICATIONS  (PRN):  artificial tears (preservative free) Ophthalmic Solution 1 Drop(s) Both EYES two times a day PRN for dry eyes  oxyCODONE    5 mG/acetaminophen 325 mG 1 Tablet(s) Oral every 4 hours PRN Moderate Pain (4 - 6)        REVIEW OF SYSTEMS:  All other review of systems is negative unless indicated above    Vital Signs Last 24 Hrs  T(C): 37.7 (18 Feb 2019 07:12), Max: 37.7 (18 Feb 2019 00:10)  T(F): 99.8 (18 Feb 2019 07:12), Max: 99.8 (18 Feb 2019 00:10)  HR: 79 (18 Feb 2019 07:12) (73 - 86)  BP: 130/56 (18 Feb 2019 07:12) (108/61 - 153/74)  BP(mean): --  RR: 18 (18 Feb 2019 07:12) (14 - 18)  SpO2: 92% (18 Feb 2019 07:12) (92% - 98%)    I&O's Summary    17 Feb 2019 07:01  -  18 Feb 2019 07:00  --------------------------------------------------------  IN: 361 mL / OUT: 0 mL / NET: 361 mL        PHYSICAL EXAM:    Constitutional: NAD, awake and alert, well-developed  HEENT: PERR, EOMI,  No oral cyananosis.  Neck:  supple,  No JVD  Respiratory: Breath sounds are clear bilaterally, No wheezing, rales or rhonchi  Cardiovascular: S1 and S2, regular rate and rhythm, ESM   Gastrointestinal: Bowel Sounds present, soft, nontender.   Extremities: No peripheral edema. No clubbing or cyanosis.  Vascular: 2+ peripheral pulses  Neurological: A/O x 3, no focal deficits  Musculoskeletal: no calf tenderness.  Skin: No rashes.      LABS: All Labs Reviewed:                                    9.9    10.67 )-----------( 330      ( 18 Feb 2019 07:03 )             31.8     02-18    140  |  104  |  9   ----------------------------<  111<H>  3.6   |  24  |  0.78    Ca    8.9      18 Feb 2019 07:02      CARDIAC MARKERS ( 16 Feb 2019 11:54 )  .012 ng/mL / x     / x     / x     / x                02-16 Chol 152 LDL 90 HDL 48<L> Trig 71    RADIOLOGY/EKG: normal sinus rhythm  artifacts     < from: Cardiac Cath Lab - Adult (11.16.18 @ 14:52) >  ardiac Arteries and Lesion Findings    LMCA: Diffuse irregularity.     LMCA: Mid subsection.25% stenosis 12 mm length.Pre procedure ADA III   flow   was noted. Good runoff was present.The lesion was diagnosed as a low risk   lesion.     Devices used    LAD: Diffuse irregularity.     Prox LAD: Proximal subsection.10% stenosis 20 mm length.Pre procedure   ADA   III flow was noted.     The lesion was previously treated with the following devices: drug   eluting   stent.     Devices used     Mid LAD: Mid subsection.20% stenosis 30 mm length.Pre procedure ADA III   flow was noted. The lesion was diagnosed as a low risk lesion.     The lesion was previously treated with the following devices: drug   eluting   stent.     Devices used    LCx: Diffuse irregularity.     Mid CX: Proximal subsection.85% stenosis 8 mm length reduced to 0%.Pre   procedure ADA III flow was noted. The lesion was discrete.The lesion   showed moderate angulation and mild tortuosity.     Post Procedure ADA III flow was present.     Treatment results:Interventional treatment was successful.     Comments:IVUS showed suboptimal stent apposition post deployment so it   was   post dilated.     Devices used      < end of copied text >    < from: Transthoracic Echocardiogram (01.12.18 @ 09:31) >  ummary     Fibrocalcific changes noted to the mitral valve leaflets with preserved   leaflet excursion.   mitral annular calcification is present.   Mild (1+) mitral regurgitation is present.   There are fibrocalcific changes noted to the aortic valve leaflets with   restriction in leaflet excursion. Transaortic gradients are elevated but   do notmeet the criteria for aortic stenosis. MIld to Moderate aortic   insufficiency noted.   Moderate (2+) tricuspid valve regurgitation is present.   Mild pulmonary hypertension.   The left atrium is mildly dilated.   The left ventricle is normal in size, wall thickness, wall motion and   contractility.   Estimated left ventricular ejection fraction is 55-60 %.     Signature    < end of copied text >

## 2019-02-18 NOTE — PROGRESS NOTE ADULT - PROBLEM SELECTOR PLAN 4
continue statin.
IMPROVE VTE Individual Risk Assessment          RISK                                                          Points  [  ] Previous VTE                                                 3  [  ] Thrombophilia                                              2  [  ] Lower limb paralysis                                    2        (unable to hold up >15 seconds)    [  ] Current Cancer                                             2         (within 6 months)  [  ] Immobilization > 24 hrs                              1  [  ] ICU/CCU stay > 24 hours                            1  [  ] Age > 60                                                        1    IMPROVE VTE Score

## 2019-02-18 NOTE — PHYSICAL THERAPY INITIAL EVALUATION ADULT - PERTINENT HX OF CURRENT PROBLEM, REHAB EVAL
85F with CAD s/p 13 stents, PVD s/p 1 stent in left side, s/p PPM (patient doesn't remember why she has it), arthritis, diverticulosis, HLD, HTN who presents with weakness for the past 2-3 days.  Felt weak but denied any headaches or dizziness.  Today, she felt even more weak and decided to come to the ED.  Patient also started to have some slight left lower chest discomfort today though fleeting. Pt admits to dark tarry stool  and blood tinged stool.

## 2019-02-19 ENCOUNTER — RESULT REVIEW (OUTPATIENT)
Age: 84
End: 2019-02-19

## 2019-02-19 ENCOUNTER — TRANSCRIPTION ENCOUNTER (OUTPATIENT)
Age: 84
End: 2019-02-19

## 2019-02-19 LAB
ANION GAP SERPL CALC-SCNC: 14 MMOL/L — SIGNIFICANT CHANGE UP (ref 5–17)
BLD GP AB SCN SERPL QL: SIGNIFICANT CHANGE UP
BUN SERPL-MCNC: 8 MG/DL — SIGNIFICANT CHANGE UP (ref 7–23)
CALCIUM SERPL-MCNC: 8.9 MG/DL — SIGNIFICANT CHANGE UP (ref 8.4–10.5)
CHLORIDE SERPL-SCNC: 105 MMOL/L — SIGNIFICANT CHANGE UP (ref 96–108)
CO2 SERPL-SCNC: 20 MMOL/L — LOW (ref 22–31)
CREAT SERPL-MCNC: 0.71 MG/DL — SIGNIFICANT CHANGE UP (ref 0.5–1.3)
GLUCOSE SERPL-MCNC: 104 MG/DL — HIGH (ref 70–99)
HCT VFR BLD CALC: 32.6 % — LOW (ref 34.5–45)
HGB BLD-MCNC: 9.9 G/DL — LOW (ref 11.5–15.5)
MCHC RBC-ENTMCNC: 23.9 PG — LOW (ref 27–34)
MCHC RBC-ENTMCNC: 30.4 GM/DL — LOW (ref 32–36)
MCV RBC AUTO: 78.6 FL — LOW (ref 80–100)
NRBC # BLD: 0 /100 WBCS — SIGNIFICANT CHANGE UP (ref 0–0)
PLATELET # BLD AUTO: 323 K/UL — SIGNIFICANT CHANGE UP (ref 150–400)
POTASSIUM SERPL-MCNC: 3.3 MMOL/L — LOW (ref 3.5–5.3)
POTASSIUM SERPL-SCNC: 3.3 MMOL/L — LOW (ref 3.5–5.3)
RBC # BLD: 4.15 M/UL — SIGNIFICANT CHANGE UP (ref 3.8–5.2)
RBC # FLD: 16.6 % — HIGH (ref 10.3–14.5)
SODIUM SERPL-SCNC: 139 MMOL/L — SIGNIFICANT CHANGE UP (ref 135–145)
WBC # BLD: 12.63 K/UL — HIGH (ref 3.8–10.5)
WBC # FLD AUTO: 12.63 K/UL — HIGH (ref 3.8–10.5)

## 2019-02-19 PROCEDURE — 88305 TISSUE EXAM BY PATHOLOGIST: CPT | Mod: 26

## 2019-02-19 PROCEDURE — 88312 SPECIAL STAINS GROUP 1: CPT | Mod: 26

## 2019-02-19 PROCEDURE — 99221 1ST HOSP IP/OBS SF/LOW 40: CPT

## 2019-02-19 PROCEDURE — 99232 SBSQ HOSP IP/OBS MODERATE 35: CPT

## 2019-02-19 RX ORDER — SODIUM CHLORIDE 9 MG/ML
1000 INJECTION, SOLUTION INTRAVENOUS
Qty: 0 | Refills: 0 | Status: DISCONTINUED | OUTPATIENT
Start: 2019-02-19 | End: 2019-02-19

## 2019-02-19 RX ORDER — POTASSIUM CHLORIDE 20 MEQ
10 PACKET (EA) ORAL
Qty: 0 | Refills: 0 | Status: COMPLETED | OUTPATIENT
Start: 2019-02-19 | End: 2019-02-19

## 2019-02-19 RX ADMIN — SODIUM CHLORIDE 100 MILLILITER(S): 9 INJECTION, SOLUTION INTRAVENOUS at 06:11

## 2019-02-19 RX ADMIN — Medication 100 MILLIEQUIVALENT(S): at 10:23

## 2019-02-19 RX ADMIN — SODIUM CHLORIDE 100 MILLILITER(S): 9 INJECTION, SOLUTION INTRAVENOUS at 12:15

## 2019-02-19 RX ADMIN — AMLODIPINE BESYLATE 5 MILLIGRAM(S): 2.5 TABLET ORAL at 05:40

## 2019-02-19 RX ADMIN — LISINOPRIL 20 MILLIGRAM(S): 2.5 TABLET ORAL at 05:39

## 2019-02-19 RX ADMIN — Medication 1 GRAM(S): at 05:40

## 2019-02-19 RX ADMIN — Medication 200 MILLIGRAM(S): at 05:40

## 2019-02-19 RX ADMIN — PANTOPRAZOLE SODIUM 40 MILLIGRAM(S): 20 TABLET, DELAYED RELEASE ORAL at 13:53

## 2019-02-19 RX ADMIN — Medication 1 GRAM(S): at 17:09

## 2019-02-19 RX ADMIN — SODIUM CHLORIDE 100 MILLILITER(S): 9 INJECTION, SOLUTION INTRAVENOUS at 13:54

## 2019-02-19 RX ADMIN — ATORVASTATIN CALCIUM 10 MILLIGRAM(S): 80 TABLET, FILM COATED ORAL at 21:33

## 2019-02-19 RX ADMIN — CLOPIDOGREL BISULFATE 75 MILLIGRAM(S): 75 TABLET, FILM COATED ORAL at 13:53

## 2019-02-19 RX ADMIN — Medication 100 MILLIEQUIVALENT(S): at 12:26

## 2019-02-19 NOTE — DISCHARGE NOTE ADULT - ADDITIONAL INSTRUCTIONS
House physician to resume care  Please follow up with your doctor upon discharge from rehab in one week  Please follow up with surgery in 2 weeks  Please follow up with GI in 2 weeks  Please monitor CBC every 2-3 days  Please come back to the Hospital if you develop any further blood per stool, abdominal pain, or any new symptoms or concerns Please follow up with your doctor in 3-4 days   Please follow up with surgery in 2 weeks  Please follow up with GI in 2 weeks  Please monitor CBC every 2-3 days  Please come back to the Hospital if you develop any further blood per stool, abdominal pain, or any new symptoms or concerns

## 2019-02-19 NOTE — DISCHARGE NOTE ADULT - CARE PROVIDERS DIRECT ADDRESSES
,DirectAddress_Unknown,[email or web address],DirectAddress_Unknown

## 2019-02-19 NOTE — DISCHARGE NOTE ADULT - PATIENT PORTAL LINK FT
You can access the [email or web address] Portal, offered by [facility], by registering with the following website: [email or web address]

## 2019-02-19 NOTE — DISCHARGE NOTE ADULT - HOSPITAL COURSE
85F with CAD s/p 13 stents, PVD s/p 1 stent in left side, s/p PPM (patient doesn't remember why she has it), arthritis, diverticulosis, HLD, HTN who presents with weakness for the past 2-3 days.  Also with chest discomfort though now chest discomfort resolved.        1.  Gastrointestinal hemorrhage,   -H/H stable.     -Continue with PPI, and Carafate   - hold ASA.  ok to continue with Plavix as per GI  - EGD shows hiatial hernia with ulcer.  Surgery recommended esophagram.  Esophogram shows no evidence of reflex esophagitis.    -Cardiology recommended to hold off Surgery for another 6 months given recent stent.  Surgery in agreement  -Outpatient follow up with GI, and surgery  -Ok for discharge as per GI, and surgery      2.  Coronary artery disease   -Cont with amlodipine and benazepril (or equivalent)  - Continue with plavix.  Hold ASA given bleed (patient said her stents were placed at least >6 months ago if not longer).      3.   Dyslipidemia.  cont lovastatin (or equivalent).     4.  Weakness.  PT/OT      5.  Fever.  Appears to be secondary to possible aspiration PNA.  started on Augmentin, and resolved.        Plan of care and discharge planning were discussed with Patient and daughter/Annetta/HCP in details.  All questions were answered, seems to understand, and in agreement 85F with CAD s/p 13 stents, PVD s/p 1 stent in left side, s/p PPM (patient doesn't remember why she has it), arthritis, diverticulosis, HLD, HTN who presents with weakness for the past 2-3 days.  Also with chest discomfort though now chest discomfort resolved.        1.  Gastrointestinal hemorrhage,   -H/H stable.     -Continue with PPI, and Carafate   - hold ASA.  ok to continue with Plavix as per GI  - EGD shows hiatial hernia with ulcer.  Surgery recommended esophagram.  Esophogram shows no evidence of reflex esophagitis.    -Cardiology recommended to hold off Surgery for another 6 months given recent stent.  Surgery in agreement  -Outpatient follow up with GI, and surgery  -Ok for discharge as per GI, and surgery  -Please check CBC every 2-3 days to make sure it is stable      2.  Coronary artery disease   -Cont with amlodipine and benazepril (or equivalent)  - Continue with plavix.  Hold ASA given bleed (patient said her stents were placed at least >6 months ago if not longer).      3.   Dyslipidemia.  cont lovastatin (or equivalent).     4.  Weakness.  PT/OT      5.  Fever.  Appears to be secondary to possible aspiration PNA.  started on Augmentin, and resolved.        Plan of care and discharge planning were discussed with Patient and daughter/Annetta/HCP in details.  All questions were answered, seems to understand, and in agreement 85F with CAD s/p 13 stents, PVD s/p 1 stent in left side, s/p PPM (patient doesn't remember why she has it), arthritis, diverticulosis, HLD, HTN who presents with weakness for the past 2-3 days.  Also with chest discomfort though now chest discomfort resolved.        1.  Gastrointestinal hemorrhage,   -H/H stable.     -Continue with PPI, and Carafate   - hold ASA.  ok to continue with Plavix as per GI  - EGD shows hiatial hernia with ulcer.  Surgery recommended esophagram.  Esophogram shows no evidence of reflex esophagitis.    -Cardiology recommended to hold off Surgery for another 6 months given recent stent.  Surgery in agreement  -Outpatient follow up with GI, and surgery  -Ok for discharge as per GI, and surgery  -Please check CBC with doctor in 3-4 days      2.  Coronary artery disease   -Cont with amlodipine and benazepril (or equivalent)  - Continue with plavix.  Hold ASA given bleed (patient said her stents were placed at least >6 months ago if not longer).      3.   Dyslipidemia.  cont lovastatin (or equivalent).     4.  Weakness.  PT/OT      5.  Fever.  Appears to be secondary to possible aspiration PNA.  started on Augmentin, and resolved.        Plan of care and discharge planning were discussed with Patient, son and daughter/Annetta/HCP in details.  All questions were answered, seems to understand, and in agreement  Patient is refusing rehab placement, or Home PT.  Family failed to convince her otherwise.  they would like to go to outpatient PT  Risks, benefits, and alternative were discussed with them.

## 2019-02-19 NOTE — DISCHARGE NOTE ADULT - PLAN OF CARE
Continue with PPI, and Carafate.  Outpatient follow up with GI in one week.  Outpatient follow up with surgery in 2 weeks for hiatal hernia Cardiac diet Continue with Plavix only.  Hold ASA for now.  Outpatient follow up with cardiology in 2 weeks. Continue with statin Outpatient follow up with Surgery in 2 weeks

## 2019-02-19 NOTE — DISCHARGE NOTE ADULT - MEDICATION SUMMARY - MEDICATIONS TO TAKE
I will START or STAY ON the medications listed below when I get home from the hospital:    predniSONE 20 mg oral tablet  -- 1 tab(s) by mouth once a day  -- Indication: For ra    oxycodone-acetaminophen 5 mg-325 mg oral tablet  -- 1 tab(s) by mouth every 4 hours, As needed, Moderate Pain (4 - 6)  -- Indication: For Pain    atorvastatin 10 mg oral tablet  -- 1 tab(s) by mouth once a day (at bedtime)  -- Indication: For Hld    amlodipine-benazepril 5 mg-20 mg oral capsule  -- 1 cap(s) by mouth once a day  -- Indication: For Htn    Plavix 75 mg oral tablet  -- 1 tab(s) by mouth once a day  -- Indication: For Cad    metoprolol succinate 200 mg oral tablet, extended release  -- 1 tab(s) by mouth once a day  -- Indication: For Htn    sucralfate 1 g/10 mL oral suspension  -- 10 milliliter(s) by mouth 2 times a day  -- Indication: For Gasrtic ulcer    ocular lubricant ophthalmic solution  -- 1 drop(s) to each affected eye 2 times a day, As Needed - for dry eyes  -- Indication: For eye drop    amoxicillin-clavulanate 875 mg-125 mg oral tablet  -- 1 tab(s) by mouth 2 times a day  -- Indication: For Pna    Protonix 40 mg oral delayed release tablet  -- 1 tab(s) by mouth once a day  -- Indication: For Gastric ulcer I will START or STAY ON the medications listed below when I get home from the hospital:    predniSONE 20 mg oral tablet  -- 1 tab(s) by mouth once a day  -- Indication: For ra    oxycodone-acetaminophen 5 mg-325 mg oral tablet  -- 1 tab(s) by mouth every 4 hours, As needed, Moderate Pain (4 - 6)  -- Indication: For Pain    atorvastatin 10 mg oral tablet  -- 1 tab(s) by mouth once a day (at bedtime)  -- Indication: For Hld    amlodipine-benazepril 5 mg-20 mg oral capsule  -- 1 cap(s) by mouth once a day  -- Indication: For Htn    Plavix 75 mg oral tablet  -- 1 tab(s) by mouth once a day  -- Indication: For CAD (coronary artery disease)    metoprolol succinate 200 mg oral tablet, extended release  -- 1 tab(s) by mouth once a day  -- Indication: For Htn    sucralfate 1 g/10 mL oral suspension  -- 10 milliliter(s) by mouth 2 times a day  -- Indication: For Gastric ulcer    ocular lubricant ophthalmic solution  -- 1 drop(s) to each affected eye 2 times a day, As Needed - for dry eyes  -- Indication: For eye drop    amoxicillin-clavulanate 875 mg-125 mg oral tablet  -- 1 tab(s) by mouth 2 times a day  -- Indication: For Pna    Protonix 40 mg oral delayed release tablet  -- 1 tab(s) by mouth once a day  -- Indication: For Gerd I will START or STAY ON the medications listed below when I get home from the hospital:    predniSONE 20 mg oral tablet  -- 1 tab(s) by mouth once a day  -- Indication: For ra    oxycodone-acetaminophen 5 mg-325 mg oral tablet  -- 1 tab(s) by mouth every 4 hours, As needed, Moderate Pain (4 - 6)  -- Indication: For Pain    lovastatin 40 mg oral tablet  -- 1 tab(s) by mouth once a day  -- Indication: For Hld    amlodipine-benazepril 5 mg-20 mg oral capsule  -- 1 cap(s) by mouth once a day  -- Indication: For Htn    Plavix 75 mg oral tablet  -- 1 tab(s) by mouth once a day  -- Indication: For CAD (coronary artery disease)    metoprolol succinate 200 mg oral tablet, extended release  -- 1 tab(s) by mouth once a day  -- Indication: For Htn    sucralfate 1 g/10 mL oral suspension  -- 10 milliliter(s) by mouth 2 times a day  -- Indication: For Gastrointestinal hemorrhage    ocular lubricant ophthalmic solution  -- 1 drop(s) to each affected eye 2 times a day, As Needed - for dry eyes  -- Indication: For eye drop    amoxicillin-clavulanate 875 mg-125 mg oral tablet  -- 1 tab(s) by mouth 2 times a day  -- Indication: For Pna    Protonix 40 mg oral delayed release tablet  -- 1 tab(s) by mouth once a day  -- Indication: For Gastrointestinal hemorrhage I will START or STAY ON the medications listed below when I get home from the hospital:    predniSONE 20 mg oral tablet  -- 1 tab(s) by mouth once a day  -- Indication: For ra    oxycodone-acetaminophen 5 mg-325 mg oral tablet  -- 1 tab(s) by mouth every 4 hours, As needed, Moderate Pain (4 - 6)  -- Indication: For Pain    lovastatin 40 mg oral tablet  -- 1 tab(s) by mouth once a day  -- Indication: For Hld    amlodipine-benazepril 5 mg-20 mg oral capsule  -- 1 cap(s) by mouth once a day  -- Indication: For Htn    Plavix 75 mg oral tablet  -- 1 tab(s) by mouth once a day  -- Indication: For CAD (coronary artery disease)    metoprolol succinate 200 mg oral tablet, extended release  -- 1 tab(s) by mouth once a day  -- Indication: For Htn    FeroSul 325 mg (65 mg elemental iron) oral tablet  -- 1 tab(s) by mouth 2 times a day   -- Check with your doctor before becoming pregnant.  Do not chew, break, or crush.  May discolor urine or feces.    -- Indication: For anemia    sucralfate 1 g/10 mL oral suspension  -- 10 milliliter(s) by mouth 2 times a day  -- Indication: For Gastrointestinal hemorrhage    ocular lubricant ophthalmic solution  -- 1 drop(s) to each affected eye 2 times a day, As Needed - for dry eyes  -- Indication: For eye drop    amoxicillin-clavulanate 875 mg-125 mg oral tablet  -- 1 tab(s) by mouth 2 times a day  -- Indication: For Pna    Protonix 40 mg oral delayed release tablet  -- 1 tab(s) by mouth once a day  -- Indication: For Gastrointestinal hemorrhage

## 2019-02-19 NOTE — PROGRESS NOTE ADULT - SUBJECTIVE AND OBJECTIVE BOX
CC. Weakness     HPI.  Patient reports that she feels better.  No further blood per stool noticed.  Offers no other complaints      Constitutional: No fever, fatigue or weight loss.  Skin: No rash.  Eyes: No recent vision problems or eye pain.  ENT: No congestion, ear pain, or sore throat.  Endocrine: No thyroid problems.  Cardiovascular: No chest pain or palpation.  Respiratory: No cough, shortness of breath, congestion, or wheezing.  Gastrointestinal: No abdominal pain, nausea, vomiting, or diarrhea.  Genitourinary: No dysuria.  Musculoskeletal: No joint swelling.  Neurologic: No headache.      Vital Signs Last 24 Hrs  T(C): 37.3 (19 Feb 2019 07:28), Max: 37.3 (19 Feb 2019 07:28)  T(F): 99.1 (19 Feb 2019 07:28), Max: 99.1 (19 Feb 2019 07:28)  HR: 81 (19 Feb 2019 07:28) (52 - 91)  BP: 127/68 (19 Feb 2019 07:28) (127/68 - 163/68)  BP(mean): --  RR: 16 (19 Feb 2019 07:28) (16 - 18)  SpO2: 97% (19 Feb 2019 07:28) (92% - 97%)      PHYSICAL EXAM-  GENERAL: NAD, well-groomed, well-developed  HEAD:  Atraumatic, Normocephalic  EYES: EOMI, PERRLA, conjunctiva and sclera clear  NECK: Supple, No JVD, Normal thyroid  NERVOUS SYSTEM:  Alert & Oriented X3, Motor Strength 5/5 B/L upper and lower extremities; DTRs 2+ intact and symmetric  CHEST/LUNG: Clear to percussion bilaterally; No rales, rhonchi, wheezing, or rubs  HEART: Regular rate and rhythm; No murmurs, rubs, or gallops  ABDOMEN: Soft, Nontender, Nondistended; Bowel sounds present  EXTREMITIES:  2+ Peripheral Pulses, No clubbing, cyanosis, or edema  SKIN: No rashes or lesions                            9.9    12.63 )-----------( 323      ( 19 Feb 2019 06:47 )             32.6     02-19    139  |  105  |  8   ----------------------------<  104<H>  3.3<L>   |  20<L>  |  0.71    Ca    8.9      19 Feb 2019 06:47    MEDICATIONS  (STANDING):  amLODIPine   Tablet 5 milliGRAM(s) Oral daily  atorvastatin 10 milliGRAM(s) Oral at bedtime  clopidogrel Tablet 75 milliGRAM(s) Oral daily  lactated ringers. 1000 milliLiter(s) (100 mL/Hr) IV Continuous <Continuous>  lisinopril 20 milliGRAM(s) Oral daily  metoprolol succinate  milliGRAM(s) Oral daily  pantoprazole  Injectable 40 milliGRAM(s) IV Push daily  potassium chloride  10 mEq/100 mL IVPB 10 milliEquivalent(s) IV Intermittent every 1 hour  sucralfate suspension 1 Gram(s) Oral two times a day    MEDICATIONS  (PRN):  artificial tears (preservative free) Ophthalmic Solution 1 Drop(s) Both EYES two times a day PRN for dry eyes  oxyCODONE    5 mG/acetaminophen 325 mG 1 Tablet(s) Oral every 4 hours PRN Moderate Pain (4 - 6)                                      Imaging Personally Reviewed:     [x ] YES  [ ] NO    Consultant(s) Notes Reviewed:  [x ] YES  [ ] NO    Care Discussed with Consultants/Other Providers [x ] YES  [ ] No medical contraindication for discharge

## 2019-02-19 NOTE — DISCHARGE NOTE ADULT - PROVIDER TOKENS
PROVIDER:[TOKEN:[[id number]]],PROVIDER:[TOKEN:[[id number]]],PROVIDER:[TOKEN:[[id number]]],PROVIDER:[TOKEN:[[id number]]]

## 2019-02-19 NOTE — DISCHARGE NOTE ADULT - CARE PLAN
Principal Discharge DX:	Gastrointestinal hemorrhage, unspecified gastrointestinal hemorrhage type  Goal:	Continue with PPI, and Carafate.  Outpatient follow up with GI in one week.  Outpatient follow up with surgery in 2 weeks for hiatal hernia  Assessment and plan of treatment:	Cardiac diet  Secondary Diagnosis:	CAD (coronary artery disease)  Goal:	Continue with Plavix only.  Hold ASA for now.  Outpatient follow up with cardiology in 2 weeks.  Secondary Diagnosis:	Dyslipidemia  Goal:	Continue with statin  Secondary Diagnosis:	Hiatal hernia  Goal:	Outpatient follow up with Surgery in 2 weeks

## 2019-02-19 NOTE — DISCHARGE NOTE ADULT - HOME CARE AGENCY
[facility] - [phone]  Nurse to visit the day after hospital discharge; physical therapist to follow. Please contact the home care agency at the above phone number if you have not heard from them by 12 noon on the day after your hospital discharge.

## 2019-02-19 NOTE — DISCHARGE NOTE ADULT - CARE PROVIDER_API CALL
Will Walters ()  Gastroenterology; Internal Medicine  [address] [place] [address]  Phone: [phone]  Fax: [phone]  Follow Up Time:     David Carter)  Surgery  [address] [facility], [address] [place] [address]  Phone: [phone]  Fax: [phone]  Follow Up Time:     Palla, Venugopal R (MD)  Cardiovascular Disease; Internal Medicine  [address], [place] [id number]  Phone: [phone]  Fax: [phone]  Follow Up Time:     Fermin Cardona)  Internal Medicine  [address] [facility], [facility] [address]  [place] [address]  Phone: [phone]  Fax: [phone]  Follow Up Time:

## 2019-02-20 DIAGNOSIS — R50.9 FEVER, UNSPECIFIED: ICD-10-CM

## 2019-02-20 DIAGNOSIS — K44.9 DIAPHRAGMATIC HERNIA WITHOUT OBSTRUCTION OR GANGRENE: ICD-10-CM

## 2019-02-20 DIAGNOSIS — F12.90 CANNABIS USE, UNSPECIFIED, UNCOMPLICATED: ICD-10-CM

## 2019-02-20 LAB
ANION GAP SERPL CALC-SCNC: 14 MMOL/L — SIGNIFICANT CHANGE UP (ref 5–17)
APPEARANCE UR: CLEAR — SIGNIFICANT CHANGE UP
BILIRUB UR-MCNC: NEGATIVE — SIGNIFICANT CHANGE UP
BUN SERPL-MCNC: 11 MG/DL — SIGNIFICANT CHANGE UP (ref 7–23)
CALCIUM SERPL-MCNC: 8.6 MG/DL — SIGNIFICANT CHANGE UP (ref 8.4–10.5)
CHLORIDE SERPL-SCNC: 102 MMOL/L — SIGNIFICANT CHANGE UP (ref 96–108)
CO2 SERPL-SCNC: 21 MMOL/L — LOW (ref 22–31)
COLOR SPEC: YELLOW — SIGNIFICANT CHANGE UP
CREAT SERPL-MCNC: 0.66 MG/DL — SIGNIFICANT CHANGE UP (ref 0.5–1.3)
DIFF PNL FLD: NEGATIVE — SIGNIFICANT CHANGE UP
GLUCOSE SERPL-MCNC: 86 MG/DL — SIGNIFICANT CHANGE UP (ref 70–99)
GLUCOSE UR QL: 50 MG/DL
HCT VFR BLD CALC: 31.6 % — LOW (ref 34.5–45)
HGB BLD-MCNC: 9.3 G/DL — LOW (ref 11.5–15.5)
KETONES UR-MCNC: ABNORMAL
LEUKOCYTE ESTERASE UR-ACNC: ABNORMAL
MCHC RBC-ENTMCNC: 23.1 PG — LOW (ref 27–34)
MCHC RBC-ENTMCNC: 29.4 GM/DL — LOW (ref 32–36)
MCV RBC AUTO: 78.6 FL — LOW (ref 80–100)
NITRITE UR-MCNC: NEGATIVE — SIGNIFICANT CHANGE UP
NRBC # BLD: 0 /100 WBCS — SIGNIFICANT CHANGE UP (ref 0–0)
PH UR: 5 — SIGNIFICANT CHANGE UP (ref 5–8)
PLATELET # BLD AUTO: 343 K/UL — SIGNIFICANT CHANGE UP (ref 150–400)
POTASSIUM SERPL-MCNC: 3.1 MMOL/L — LOW (ref 3.5–5.3)
POTASSIUM SERPL-SCNC: 3.1 MMOL/L — LOW (ref 3.5–5.3)
PROT UR-MCNC: 15 MG/DL
RBC # BLD: 4.02 M/UL — SIGNIFICANT CHANGE UP (ref 3.8–5.2)
RBC # FLD: 16.6 % — HIGH (ref 10.3–14.5)
SODIUM SERPL-SCNC: 137 MMOL/L — SIGNIFICANT CHANGE UP (ref 135–145)
SP GR SPEC: 1.01 — SIGNIFICANT CHANGE UP (ref 1.01–1.02)
UROBILINOGEN FLD QL: 1 MG/DL
WBC # BLD: 12.17 K/UL — HIGH (ref 3.8–10.5)
WBC # FLD AUTO: 12.17 K/UL — HIGH (ref 3.8–10.5)

## 2019-02-20 PROCEDURE — 99233 SBSQ HOSP IP/OBS HIGH 50: CPT

## 2019-02-20 PROCEDURE — 74220 X-RAY XM ESOPHAGUS 1CNTRST: CPT | Mod: 26

## 2019-02-20 PROCEDURE — 99232 SBSQ HOSP IP/OBS MODERATE 35: CPT

## 2019-02-20 PROCEDURE — 71045 X-RAY EXAM CHEST 1 VIEW: CPT | Mod: 26

## 2019-02-20 RX ORDER — ACETAMINOPHEN 500 MG
650 TABLET ORAL EVERY 6 HOURS
Qty: 0 | Refills: 0 | Status: DISCONTINUED | OUTPATIENT
Start: 2019-02-20 | End: 2019-02-22

## 2019-02-20 RX ORDER — POTASSIUM CHLORIDE 20 MEQ
40 PACKET (EA) ORAL ONCE
Qty: 0 | Refills: 0 | Status: COMPLETED | OUTPATIENT
Start: 2019-02-20 | End: 2019-02-20

## 2019-02-20 RX ORDER — SUCRALFATE 1 G
1 TABLET ORAL
Qty: 0 | Refills: 0 | Status: DISCONTINUED | OUTPATIENT
Start: 2019-02-20 | End: 2019-02-20

## 2019-02-20 RX ADMIN — Medication 650 MILLIGRAM(S): at 17:50

## 2019-02-20 RX ADMIN — Medication 1 GRAM(S): at 17:50

## 2019-02-20 RX ADMIN — CLOPIDOGREL BISULFATE 75 MILLIGRAM(S): 75 TABLET, FILM COATED ORAL at 12:04

## 2019-02-20 RX ADMIN — Medication 40 MILLIEQUIVALENT(S): at 16:50

## 2019-02-20 RX ADMIN — ATORVASTATIN CALCIUM 10 MILLIGRAM(S): 80 TABLET, FILM COATED ORAL at 22:22

## 2019-02-20 RX ADMIN — PANTOPRAZOLE SODIUM 40 MILLIGRAM(S): 20 TABLET, DELAYED RELEASE ORAL at 12:05

## 2019-02-20 RX ADMIN — LISINOPRIL 20 MILLIGRAM(S): 2.5 TABLET ORAL at 05:20

## 2019-02-20 RX ADMIN — AMLODIPINE BESYLATE 5 MILLIGRAM(S): 2.5 TABLET ORAL at 05:20

## 2019-02-20 RX ADMIN — Medication 1 TABLET(S): at 19:01

## 2019-02-20 RX ADMIN — Medication 200 MILLIGRAM(S): at 05:20

## 2019-02-20 NOTE — CONSULT NOTE ADULT - PROBLEM SELECTOR RECOMMENDATION 9
cad  PPM  HTN  HFpEF  valv heart disease and mild Pulm HTN  cvs regimen and BP control  dietary discretion  cardio following  pt has hx of CAD with stenting
monitor cbc, transfuse prn,  carafate 1 gram bid   PPI bid  hold A/C and NSAIDs    EGD once optimized
patient with CAD PCI last stent 11/2018 . LCX stent ,  who had atypical chest pain , without exertional chest pain , fatigue mostly due to anemia ,   clinically appears optimal stable for low risk procedure

## 2019-02-20 NOTE — CONSULT NOTE ADULT - PROBLEM SELECTOR RECOMMENDATION 4
continue statin
Marijuana use - reported  counseling and education provided  smoking cess ed  will follow  no evidence of COPD - will need PFT as outpatient

## 2019-02-20 NOTE — SWALLOW BEDSIDE ASSESSMENT ADULT - SLP PERTINENT HISTORY OF CURRENT PROBLEM
Patient is an 86 YO female who presented to ER with weakness x 2-3 days and left lower chest discomfort. Prior medical history significant for multivessel CAD s/p numerous coronary stents,  PPM. Patient found to have upper GI bleed with EGD reporting hiatial hernia with ulcer. Esophogram shows no evidence of reflex esophagitis.

## 2019-02-20 NOTE — PROGRESS NOTE ADULT - SUBJECTIVE AND OBJECTIVE BOX
INTERVAL HPI/OVERNIGHT EVENTS:  No new overnight event.  No N/V/D.  Tolerating diet.  still with pain  Allergies    No Known Drug Allergies  Originally Entered as [Unknown see Comment: pt unable to remember] reaction to [Mold] (Unknown)  stolazine eye med- pt doesn&#x27;t remember (Unknown)    Intolerances    General:  No wt loss, fevers, chills, night sweats, fatigue,   Eyes:  Good vision, no reported pain  ENT:  No sore throat, pain, runny nose, dysphagia  CV:  No pain, palpitations, hypo/hypertension  Resp:  No dyspnea, cough, tachypnea, wheezing  GI:  No pain, No nausea, No vomiting, No diarrhea, No constipation, No weight loss, No fever, No pruritis, No rectal bleeding, No tarry stools, No dysphagia,  :  No pain, bleeding, incontinence, nocturia  Muscle:  No pain, weakness  Neuro:  No weakness, tingling, memory problems  Psych:  No fatigue, insomnia, mood problems, depression  Endocrine:  No polyuria, polydipsia, cold/heat intolerance  Heme:  No petechiae, ecchymosis, easy bruisability  Skin:  No rash, tattoos, scars, edema      PHYSICAL EXAM:   Vital Signs:  Vital Signs Last 24 Hrs  T(C): 38.7 (2019 15:30), Max: 38.7 (2019 15:30)  T(F): 101.7 (2019 15:30), Max: 101.7 (2019 15:30)  HR: 97 (2019 15:30) (82 - 102)  BP: 132/69 (2019 15:30) (132/69 - 160/75)  BP(mean): --  RR: 18 (2019 15:30) (17 - 20)  SpO2: 94% (2019 15:30) (93% - 96%)  Daily     Daily I&O's Summary    2019 07:01  -  2019 07:00  --------------------------------------------------------  IN: 525 mL / OUT: 0 mL / NET: 525 mL        GENERAL:  Appears stated age, well-groomed, well-nourished, no distress  HEENT:  NC/AT,  conjunctivae clear and pink, no thyromegaly, nodules, adenopathy, no JVD, sclera -anicteric  CHEST:  Full & symmetric excursion, no increased effort, breath sounds clear  HEART:  Regular rhythm, S1, S2, no murmur/rub/S3/S4, no abdominal bruit, no edema  ABDOMEN:  Soft, non-tender, non-distended, normoactive bowel sounds,  no masses ,no hepato-splenomegaly, no signs of chronic liver disease  EXTEREMITIES:  no cyanosis,clubbing or edema  SKIN:  No rash/erythema/ecchymoses/petechiae/wounds/abscess/warm/dry  NEURO:  Alert, oriented, no asterixis, no tremor, no encephalopathy      LABS:                        9.3    12.17 )-----------( 343      ( 2019 07:17 )             31.6     02-20    137  |  102  |  11  ----------------------------<  86  3.1<L>   |  21<L>  |  0.66    Ca    8.6      2019 07:17        Urinalysis Basic - ( 2019 18:44 )    Color: Yellow / Appearance: Clear / S.015 / pH: x  Gluc: x / Ketone: Small  / Bili: Negative / Urobili: 1 mg/dL   Blood: x / Protein: 15 mg/dL / Nitrite: Negative   Leuk Esterase: Trace / RBC: 0-2 /HPF / WBC 3-5   Sq Epi: x / Non Sq Epi: Few / Bacteria: Few      amylase   lipase  RADIOLOGY & ADDITIONAL TESTS:

## 2019-02-20 NOTE — CONSULT NOTE ADULT - PROBLEM SELECTOR RECOMMENDATION 2
patient had multivessel stenting , most recent LCX 11/2018 , RCA  jan 2018, patient stents  on cath  NOV 2018   except LCX which was stented. continue BB , statin , antiplatelet plavix, hold ecotrin
GI bleed eval  stable Hgb  BP control  GI following  s/p EGD  Hiatal hernia - PPI

## 2019-02-20 NOTE — CONSULT NOTE ADULT - PROBLEM SELECTOR PROBLEM 2
Gastrointestinal hemorrhage, unspecified gastrointestinal hemorrhage type
Coronary artery disease involving native heart without angina pectoris, unspecified vessel or lesion type

## 2019-02-20 NOTE — SWALLOW BEDSIDE ASSESSMENT ADULT - NS SPL SWALLOW CLINIC TRIAL FT
Patient presents with chewing and swallowing skills WFL for regular textured solids and regular thin liquids.

## 2019-02-20 NOTE — SWALLOW BEDSIDE ASSESSMENT ADULT - COMMENTS
Recommend diet upgrade to regular textured solids with regular thin liquids. Findings discussed with RN and MD.

## 2019-02-20 NOTE — CONSULT NOTE ADULT - CONSULT REASON
abd pain
poss aspiration event  hiatal hernia  atelectasis  marijuana smoke
Pre procedure cardiac evaluation

## 2019-02-20 NOTE — OCCUPATIONAL THERAPY INITIAL EVALUATION ADULT - RANGE OF MOTION EXAMINATION, UPPER EXTREMITY
Garth shoulder ff AROM ~ 1/2 range with + tightness (pt states she does not know if this weakness/dec ROM is new). Pt does state new garth hand weakness/swelling. Pt only able to flex figits slightly with signif dec ROM (unable to achieve grasp/oppose digits). Dec UE function, dec FMC and ability to use hands. Pt with dec insight stating "It will get better once I get home"

## 2019-02-20 NOTE — SWALLOW BEDSIDE ASSESSMENT ADULT - SWALLOW EVAL: DIAGNOSIS
Patient presents with chewing and swallowing WFL for regular textured solids and regular thin liquids.

## 2019-02-20 NOTE — SWALLOW BEDSIDE ASSESSMENT ADULT - SWALLOW EVAL: FUNCTIONAL LEVEL AT TIME OF EVAL
Patient alert and oriented x3. Patient cooperative and able to follow multi step commands for evaluation.

## 2019-02-20 NOTE — OCCUPATIONAL THERAPY INITIAL EVALUATION ADULT - PERTINENT HX OF CURRENT PROBLEM, REHAB EVAL
85F with CAD s/p 13 stents, PVD s/p 1 stent in left side, s/p PPM (patient doesn't remember why she has it), arthritis, diverticulosis, HLD, HTN who presents with weakness for the past 2-3 days.  Felt weak but denied any headaches or dizziness then she felt even more weak and decided to come to the ED.  Patient also started to have some slight left lower chest discomfort today though fleeting. Pt admits to dark tarry stool  and blood tinged stool.

## 2019-02-20 NOTE — PROGRESS NOTE ADULT - SUBJECTIVE AND OBJECTIVE BOX
REASON FOR VISIT: CAD; Rx management    HPI:   85 year old woman with a history of multivessel CAD s/p numerous coronary stents (most recent PCI was in November 2018), PPM, HTN, HLD, carotid atherosclerosis, diverticulosis, hypothyroidism, anemia, insomnia, peripheral artery disease s/p stent, arthritis,  admitted 2/15/19 with GI bleeding and now s/p EGD (2/19), found to have gastric linear ulcerations.    2/20/19:  No cardiac complaints; denies angina, dyspnea, orthopnea.    MEDICATIONS  (STANDING):  amLODIPine   Tablet 5 milliGRAM(s) Oral daily  atorvastatin 10 milliGRAM(s) Oral at bedtime  clopidogrel Tablet 75 milliGRAM(s) Oral daily  lisinopril 20 milliGRAM(s) Oral daily  metoprolol succinate  milliGRAM(s) Oral daily  pantoprazole  Injectable 40 milliGRAM(s) IV Push daily  sucralfate suspension 1 Gram(s) Oral two times a day    Vital Signs Last 24 Hrs  T(C): 37.5 (20 Feb 2019 07:30), Max: 37.7 (19 Feb 2019 23:07)  T(F): 99.5 (20 Feb 2019 07:30), Max: 99.8 (19 Feb 2019 23:07)  HR: 82 (20 Feb 2019 07:30) (79 - 88)  BP: 133/68 (20 Feb 2019 07:30) (95/50 - 160/75)  RR: 20 (20 Feb 2019 07:30) (16 - 26)  SpO2: 95% (20 Feb 2019 07:30) (93% - 100%)    PHYSICAL EXAM:  Constitutional: NAD, awake and alert  Neck:  supple,  No JVD  Respiratory: Breath sounds are clear bilaterally, No wheezing, rales or rhonchi  Cardiovascular: S1 and S2, regular rate and rhythm  Gastrointestinal: Bowel Sounds present, soft, nontender.   Extremities: No peripheral edema.     LABS:                  9.3    12.17 )-----------( 343      ( 20 Feb 2019 07:17 )             31.6     137  |  102  |  11  ----------------------------<  86  3.1<L>   |  21<L>  |  0.66    Ca    8.6      20 Feb 2019 07:17    Cardiac Cath Lab - Adult (11.16.18 @ 14:52):  Mid LCx stent    Transthoracic Echocardiogram (01.12.18 @ 09:31):   Mild (1+) mitral regurgitation .   There are fibrocalcific changes noted to the aortic valve leaflets. Mild to Moderate aortic insufficiency.   Moderate tricuspid valve regurgitation.   Mild pulmonary hypertension.   The left atrium is mildly dilated.   The left ventricle is normal in size, wall thickness, wall motion and contractility. Estimated left ventricular ejection fraction is 55-60 %.

## 2019-02-20 NOTE — CONSULT NOTE ADULT - PROBLEM SELECTOR RECOMMENDATION 3
severe anemia , possibly gastritic , would benefit from transfusion if drops below *8
Hiatal hernia - risk for GI bleed and GERD and Aspiration  PPI  HOB elev  monitor sx of GERD  surgery follow up noted  may need Operative Fix

## 2019-02-20 NOTE — CONSULT NOTE ADULT - PROBLEM SELECTOR PROBLEM 1
Coronary artery disease involving native heart without angina pectoris, unspecified vessel or lesion type
Gastrointestinal hemorrhage, unspecified gastrointestinal hemorrhage type
Preop cardiovascular exam

## 2019-02-20 NOTE — PROGRESS NOTE ADULT - SUBJECTIVE AND OBJECTIVE BOX
CC. Weakness     HPI.  Patient reports that she feels better.  No further blood per stool noticed.  Tolerating po intake well .  Reports generalized weakness.  Offers no other complaints      Constitutional: No fever, fatigue or weight loss.  Skin: No rash.  Eyes: No recent vision problems or eye pain.  ENT: No congestion, ear pain, or sore throat.  Endocrine: No thyroid problems.  Cardiovascular: No chest pain or palpation.  Respiratory: No cough, shortness of breath, congestion, or wheezing.  Gastrointestinal: No abdominal pain, nausea, vomiting, or diarrhea.  Genitourinary: No dysuria.  Musculoskeletal: No joint swelling.  Neurologic: No headache.      Vital Signs Last 24 Hrs  T(C): 37.5 (20 Feb 2019 07:30), Max: 37.7 (19 Feb 2019 23:07)  T(F): 99.5 (20 Feb 2019 07:30), Max: 99.8 (19 Feb 2019 23:07)  HR: 82 (20 Feb 2019 07:30) (79 - 88)  BP: 133/68 (20 Feb 2019 07:30) (95/50 - 160/75)  BP(mean): --  RR: 20 (20 Feb 2019 07:30) (16 - 26)  SpO2: 95% (20 Feb 2019 07:30) (93% - 100%)      PHYSICAL EXAM-  GENERAL: NAD, well-groomed, well-developed  HEAD:  Atraumatic, Normocephalic  EYES: EOMI, PERRLA, conjunctiva and sclera clear  NECK: Supple, No JVD, Normal thyroid  NERVOUS SYSTEM:  Alert & Oriented X3, Motor Strength 5/5 B/L upper and lower extremities; DTRs 2+ intact and symmetric  CHEST/LUNG: Clear to percussion bilaterally; No rales, rhonchi, wheezing, or rubs  HEART: Regular rate and rhythm; No murmurs, rubs, or gallops  ABDOMEN: Soft, Nontender, Nondistended; Bowel sounds present  EXTREMITIES:  2+ Peripheral Pulses, No clubbing, cyanosis, or edema  SKIN: No rashes or lesions                            9.3    12.17 )-----------( 343      ( 20 Feb 2019 07:17 )             31.6     02-20    137  |  102  |  11  ----------------------------<  86  3.1<L>   |  21<L>  |  0.66    Ca    8.6      20 Feb 2019 07:17                        Imaging Personally Reviewed:     [x ] YES  [ ] NO  MEDICATIONS  (STANDING):  amLODIPine   Tablet 5 milliGRAM(s) Oral daily  atorvastatin 10 milliGRAM(s) Oral at bedtime  clopidogrel Tablet 75 milliGRAM(s) Oral daily  lisinopril 20 milliGRAM(s) Oral daily  metoprolol succinate  milliGRAM(s) Oral daily  pantoprazole  Injectable 40 milliGRAM(s) IV Push daily  sucralfate suspension 1 Gram(s) Oral two times a day    MEDICATIONS  (PRN):  artificial tears (preservative free) Ophthalmic Solution 1 Drop(s) Both EYES two times a day PRN for dry eyes  oxyCODONE    5 mG/acetaminophen 325 mG 1 Tablet(s) Oral every 4 hours PRN Moderate Pain (4 - 6)    Consultant(s) Notes Reviewed:  [x ] YES  [ ] NO    Care Discussed with Consultants/Other Providers [x ] YES  [ ] No medical contraindication for discharge CC. Weakness     HPI.  Patient reports that she feels better.  No further blood per stool noticed.  Tolerating po intake well .  Reports generalized weakness.  fever noticed this afternoon.  Offers no other complaints      Constitutional: No   fatigue or weight loss.  Skin: No rash.  Eyes: No recent vision problems or eye pain.  ENT: No congestion, ear pain, or sore throat.  Endocrine: No thyroid problems.  Cardiovascular: No chest pain or palpation.  Respiratory: No cough, shortness of breath, congestion, or wheezing.  Gastrointestinal: No abdominal pain, nausea, vomiting, or diarrhea.  Genitourinary: No dysuria.  Musculoskeletal: No joint swelling.  Neurologic: No headache.      Vital Signs Last 24 Hrs  T(C): 37.5 (20 Feb 2019 07:30), Max: 37.7 (19 Feb 2019 23:07)  T(F): 99.5 (20 Feb 2019 07:30), Max: 99.8 (19 Feb 2019 23:07)  HR: 82 (20 Feb 2019 07:30) (79 - 88)  BP: 133/68 (20 Feb 2019 07:30) (95/50 - 160/75)  BP(mean): --  RR: 20 (20 Feb 2019 07:30) (16 - 26)  SpO2: 95% (20 Feb 2019 07:30) (93% - 100%)      PHYSICAL EXAM-  GENERAL: NAD, well-groomed, well-developed  HEAD:  Atraumatic, Normocephalic  EYES: EOMI, PERRLA, conjunctiva and sclera clear  NECK: Supple, No JVD, Normal thyroid  NERVOUS SYSTEM:  Alert & Oriented X3, Motor Strength 5/5 B/L upper and lower extremities; DTRs 2+ intact and symmetric  CHEST/LUNG: Clear to percussion bilaterally; No rales, rhonchi, wheezing, or rubs  HEART: Regular rate and rhythm; No murmurs, rubs, or gallops  ABDOMEN: Soft, Nontender, Nondistended; Bowel sounds present  EXTREMITIES:  2+ Peripheral Pulses, No clubbing, cyanosis, or edema  SKIN: No rashes or lesions                            9.3    12.17 )-----------( 343      ( 20 Feb 2019 07:17 )             31.6     02-20    137  |  102  |  11  ----------------------------<  86  3.1<L>   |  21<L>  |  0.66    Ca    8.6      20 Feb 2019 07:17                        Imaging Personally Reviewed:     [x ] YES  [ ] NO  MEDICATIONS  (STANDING):  amLODIPine   Tablet 5 milliGRAM(s) Oral daily  atorvastatin 10 milliGRAM(s) Oral at bedtime  clopidogrel Tablet 75 milliGRAM(s) Oral daily  lisinopril 20 milliGRAM(s) Oral daily  metoprolol succinate  milliGRAM(s) Oral daily  pantoprazole  Injectable 40 milliGRAM(s) IV Push daily  sucralfate suspension 1 Gram(s) Oral two times a day    MEDICATIONS  (PRN):  artificial tears (preservative free) Ophthalmic Solution 1 Drop(s) Both EYES two times a day PRN for dry eyes  oxyCODONE    5 mG/acetaminophen 325 mG 1 Tablet(s) Oral every 4 hours PRN Moderate Pain (4 - 6)    Consultant(s) Notes Reviewed:  [x ] YES  [ ] NO    Care Discussed with Consultants/Other Providers [x ] YES  [ ] No medical contraindication for discharge

## 2019-02-20 NOTE — OCCUPATIONAL THERAPY INITIAL EVALUATION ADULT - IMPAIRED TRANSFERS: SIT/STAND, REHAB EVAL
Pt performed functional mobility with RW  in hospital room & hallway, HR , c/o feeling tired and slightly SOB (02 95% on RA) + rest breaks required with dec upright posture/decreased strength

## 2019-02-20 NOTE — PROGRESS NOTE ADULT - ASSESSMENT
85F with CAD s/p 13 stents, PVD s/p 1 stent in left side, s/p PPM (patient doesn't remember why she has it), arthritis, diverticulosis, HLD, HTN who presents with weakness for the past 2-3 days.  Also with chest discomfort though now chest discomfort resolved.        1.  Gastrointestinal hemorrhage,   -H/H stable.     -Continue with PPI, and Carafate   - hold anticoagulants  - transfuse if <8  - EGD shows hiatial hernia with ulcer.  Surgery recommended esophagram.  Esophogram shows no evidence of reflex esophagitis.    -Further care as per GI, and surgery      2.  Coronary artery disease   -Cont with amlodipine and benazepril (or equivalent)  - Continue with plavix.  Hold ASA given bleed (patient said her stents were placed at least >6 months ago if not longer).      3.   Dyslipidemia.  cont lovastatin (or equivalent).     4.  Weakness.  PT/OT   Plan of care was discussed with patient,   in great details, All questions were answered to their satisfaction  Seems to understand, and in agreement 85F with CAD s/p 13 stents, PVD s/p 1 stent in left side, s/p PPM (patient doesn't remember why she has it), arthritis, diverticulosis, HLD, HTN who presents with weakness for the past 2-3 days.  Also with chest discomfort though now chest discomfort resolved.        1.  Gastrointestinal hemorrhage,   -H/H stable.     -Continue with PPI, and Carafate   - hold anticoagulants  - transfuse if <8  - EGD shows hiatial hernia with ulcer.  Surgery recommended esophagram.  Esophogram shows no evidence of reflex esophagitis.    -Further care as per GI, and surgery      2.  Coronary artery disease   -Cont with amlodipine and benazepril (or equivalent)  - Continue with plavix.  Hold ASA given bleed (patient said her stents were placed at least >6 months ago if not longer).      3.   Dyslipidemia.  cont lovastatin (or equivalent).     4.  Weakness.  PT/OT      5.  Fever.  Unclear Etiology.  Atelectasis.  Will obtain CXR, UA/UC   Plan of care was discussed with patient,   in great details, All questions were answered to their satisfaction  Seems to understand, and in agreement

## 2019-02-20 NOTE — CONSULT NOTE ADULT - SUBJECTIVE AND OBJECTIVE BOX
Date/Time Patient Seen:  		  Referring MD:   Data Reviewed	       Patient is a 85y old  Female who presents with a chief complaint of weakness (20 Feb 2019 10:18)      Subjective/HPI    in bed, seen and examined, vs and meds reviewed, H and P reviewed, ER provider note reviewed, TTE and CXR reviewed  pt smokes POT -   pt lives alone, has 3 children, has 1 pet cat  retired  on ABX - Augmentin at present    85F with CAD s/p 13 stents, PVD s/p 1 stent in left side, s/p PPM (patient doesn't remember why she has it), arthritis, diverticulosis, HLD, HTN who presents with weakness for the past 2-3 days.      History of Present Illness:  Reason for Admission: weakness  History of Present Illness:   85F with CAD s/p 13 stents, PVD s/p 1 stent in left side, s/p PPM (patient doesn't remember why she has it), arthritis, diverticulosis, HLD, HTN who presents with weakness for the past 2-3 days.  Felt weak but denied any headaches or dizziness.  Today, she felt even more weak and decided to come to the ED.  Patient also started to have some slight left lower chest discomfort today though it was fleeting.  Denied any radiation.  Patient thought she needed "another stent" and decided to come here. Otherwise, patient denied any fevers, nausea/vomiting, diarrhea.  Did admit to have dark tarry stools for at least a week and patient would noticed some blood tinged stools when she would self-disimpact herself.  In the ED, patient was found to have a hgb of 8.8 (last known hgb was 10 on 12/29/2018).  Was occult positive as well.  Patient has been on Plavix for her stents as well as prednisone for her right shoulder pain.  Has been on steroids for a couple of months (slow taper according to the patient).  Troponin was negative x1.   Dr. Walters from GI was consulted by the ED already.  Patient currently feels "ok" with no acute complaints.         PAST MEDICAL & SURGICAL HISTORY:  Acute arthritis  High cholesterol  Stress incontinence in female  Rectocele  Diverticulosis  Hiatal hernia  Hypertension  Hypothyroidism  Dyslipidemia  CAD (coronary artery disease)  Carpal tunnel syndrome of right wrist  Cardiac pacemaker  Stented coronary artery: 12 heart stents,   1 left leg stents  H/O carotid endarterectomy: right  History of hysterectomy  History of breast reconstruction  Bilateral cataracts: surgery  Status post total knee replacement: right        Medication list         MEDICATIONS  (STANDING):  amLODIPine   Tablet 5 milliGRAM(s) Oral daily  amoxicillin  875 milliGRAM(s)/clavulanate 1 Tablet(s) Oral two times a day  atorvastatin 10 milliGRAM(s) Oral at bedtime  clopidogrel Tablet 75 milliGRAM(s) Oral daily  lisinopril 20 milliGRAM(s) Oral daily  metoprolol succinate  milliGRAM(s) Oral daily  pantoprazole  Injectable 40 milliGRAM(s) IV Push daily  sucralfate 1 Gram(s) Oral two times a day  sucralfate suspension 1 Gram(s) Oral two times a day    MEDICATIONS  (PRN):  acetaminophen    Suspension .. 650 milliGRAM(s) Oral every 6 hours PRN Temp greater or equal to 38C (100.4F)  artificial tears (preservative free) Ophthalmic Solution 1 Drop(s) Both EYES two times a day PRN for dry eyes  oxyCODONE    5 mG/acetaminophen 325 mG 1 Tablet(s) Oral every 4 hours PRN Moderate Pain (4 - 6)         Vitals log        ICU Vital Signs Last 24 Hrs  T(C): 38.7 (20 Feb 2019 15:30), Max: 38.7 (20 Feb 2019 15:30)  T(F): 101.7 (20 Feb 2019 15:30), Max: 101.7 (20 Feb 2019 15:30)  HR: 97 (20 Feb 2019 15:30) (82 - 102)  BP: 132/69 (20 Feb 2019 15:30) (132/69 - 160/75)  BP(mean): --  ABP: --  ABP(mean): --  RR: 18 (20 Feb 2019 15:30) (17 - 20)  SpO2: 94% (20 Feb 2019 15:30) (93% - 96%)           Input and Output:  I&O's Detail    19 Feb 2019 07:01  -  20 Feb 2019 07:00  --------------------------------------------------------  IN:    IV PiggyBack: 200 mL    lactated ringers.: 325 mL  Total IN: 525 mL    OUT:  Total OUT: 0 mL    Total NET: 525 mL          Lab Data                        9.3    12.17 )-----------( 343      ( 20 Feb 2019 07:17 )             31.6     02-20    137  |  102  |  11  ----------------------------<  86  3.1<L>   |  21<L>  |  0.66    Ca    8.6      20 Feb 2019 07:17              Review of Systems	      Objective     Physical Examination    heart s1s2  lung dec BS  abd soft  cn grossly int  verbal  alert        Pertinent Lab findings & Imaging      Dwain:  NO   Adequate UO     I&O's Detail    19 Feb 2019 07:01  -  20 Feb 2019 07:00  --------------------------------------------------------  IN:    IV PiggyBack: 200 mL    lactated ringers.: 325 mL  Total IN: 525 mL    OUT:  Total OUT: 0 mL    Total NET: 525 mL               Discussed with:     Cultures:	        Radiology
Chief Complaint:  Patient is a 85y old  Female who presents with a chief complaint of weakness 85F with CAD s/p 13 stents, PVD s/p 1 stent in left side, s/p PPM (patient doesn't remember why she has it), arthritis, diverticulosis, HLD, HTN who presents with weakness for the past 2-3 days.  Felt weak but denied any headaches or dizziness.  Today, she felt even more weak and decided to come to the ED.  Patient also started to have some slight left lower chest discomfort today though it was fleeting.  Denied any radiation.  Patient thought she needed "another stent" and decided to come here. Otherwise, patient denied any fevers, nausea/vomiting, diarrhea.  Did admit to have dark tarry stools for at least a week and patient would noticed some blood tinged stools when she would self-disimpact herself.  In the ED, patient was found to have a hgb of 8.8 (last known hgb was 10 on 12/29/2018).  Was occult positive as well.  Patient has been on Plavix for her stents as well as prednisone for her right shoulder pain.  Has been on steroids for a couple of months (slow taper according to the patient).  Troponin was negative x1.   Dr. Walters from GI was consulted by the ED already.  Patient currently feels "ok" with no acute complaints.         Review of Systems:  Review of Systems: REVIEW OF SYSTEMS:  CONSTITUTIONAL:    +weakness, no fever or weight loss or fatigue  EYES:    no eye pain or visual disturbances or discharge  ENMT:     no difficulty hearing or tinnitus or vertigo, no sinus or throat pain  NECK:    no pain or stiffness  RESPIRATORY:    no cough or wheezing or chills or hemoptysis, no shortness of breath  CARDIOVASCULAR:    +chest discomfort, no dizziness  GASTROINTESTINAL:    +melena, no abdominal or epigastric pain. no nausea or vomiting or hematemesis, no diarrhea or constipation  GENITOURINARY:    no dysuria or frequency or hematuria or incontinence  NEUROLOGICAL:    no headaches or memory loss or loss of strength or numbness or tremors  SKIN:    no itching or burning or rashes or lesions   LYMPH NODES:    no enlarged glands  ENDOCRINE:    no heat or cold intolerance, no hair loss, no polydipsia or polyuria  MUSCULOSKELETAL:   +right shoulder pain, no swelling, no muscle or back or extremity pain  PSYCHIATRIC:    no depression or anxiety or mood swings or difficulty sleeping  HEME/LYMPH:    no easy bruising or bleeding gums ALLERGY AND IMMUNOLOGIC:    no hives or eczema	  now with dark stools no melena in er no brbpr    Allergies:  No Known Drug Allergies  Originally Entered as [Unknown see Comment: pt unable to remember] reaction to [Mold] (Unknown)  stolazine eye med- pt doesn&#x27;t remember (Unknown)      Medications:  amLODIPine   Tablet 5 milliGRAM(s) Oral daily  artificial tears (preservative free) Ophthalmic Solution 1 Drop(s) Both EYES two times a day PRN  atorvastatin 10 milliGRAM(s) Oral at bedtime  lactated ringers. 1000 milliLiter(s) IV Continuous <Continuous>  lisinopril 20 milliGRAM(s) Oral daily  metoprolol succinate  milliGRAM(s) Oral daily  oxyCODONE    5 mG/acetaminophen 325 mG 1 Tablet(s) Oral every 4 hours PRN  pantoprazole  Injectable 40 milliGRAM(s) IV Push two times a day  pantoprazole  Injectable 40 milliGRAM(s) IV Push daily  sucralfate suspension 1 Gram(s) Oral two times a day      PMHX/PSHX:  Acute arthritis  High cholesterol  Stress incontinence in female  Rectocele  Diverticulosis  Hiatal hernia  Hypertension  Hypothyroidism  Dyslipidemia  CAD (coronary artery disease)  Carpal tunnel syndrome of right wrist  Cardiac pacemaker  Stented coronary artery  H/O carotid endarterectomy  History of hysterectomy  History of breast reconstruction  Bilateral cataracts  Status post total knee replacement      Family history:  Family history of cancer (Father, Sibling)  No pertinent family history in first degree relatives      Social History: no etoh no cigs no ivda    ROS:     General:  No wt loss, fevers, chills, night sweats, fatigue,   Eyes:  Good vision, no reported pain  ENT:  No sore throat, pain, runny nose, dysphagia  CV:  No pain, palpitations, hypo/hypertension  Resp:  No dyspnea, cough, tachypnea, wheezing  GI:  No pain, No nausea, No vomiting, No diarrhea, No constipation, No weight loss, No fever, No pruritis, No rectal bleeding, No tarry stools, No dysphagia,  :  No pain, bleeding, incontinence, nocturia  Muscle:  No pain, weakness  Neuro:  No weakness, tingling, memory problems  Psych:  No fatigue, insomnia, mood problems, depression  Endocrine:  No polyuria, polydipsia, cold/heat intolerance  Heme:  No petechiae, ecchymosis, easy bruisability  Skin:  No rash, tattoos, scars, edema      PHYSICAL EXAM:   Vital Signs:  Vital Signs Last 24 Hrs  T(C): 36.8 (15 Feb 2019 22:28), Max: 36.8 (15 Feb 2019 22:28)  T(F): 98.2 (15 Feb 2019 22:28), Max: 98.2 (15 Feb 2019 22:28)  HR: 77 (15 Feb 2019 22:28) (68 - 78)  BP: 133/72 (15 Feb 2019 22:28) (115/50 - 135/72)  BP(mean): --  RR: 17 (15 Feb 2019 22:28) (17 - 20)  SpO2: 96% (15 Feb 2019 22:28) (96% - 98%)  Daily Height in cm: 152.4 (15 Feb 2019 15:20)    Daily     GENERAL:  Appears stated age, well-groomed, well-nourished, no distress  HEENT:  NC/AT,  conjunctivae clear and pink, no thyromegaly, nodules, adenopathy, no JVD, sclera -anicteric  CHEST:  Full & symmetric excursion, no increased effort, breath sounds clear  HEART:  Regular rhythm, S1, S2, no murmur/rub/S3/S4, no abdominal bruit, no edema  ABDOMEN:  Soft, non-tender, non-distended, normoactive bowel sounds,  no masses ,no hepato-splenomegaly, no signs of chronic liver disease  EXTEREMITIES:  no cyanosis,clubbing or edema  SKIN:  No rash/erythema/ecchymoses/petechiae/wounds/abscess/warm/dry  NEURO:  Alert, oriented, no asterixis, no tremor, no encephalopathy    LABS:                        8.8    14.43 )-----------( 399      ( 15 Feb 2019 17:08 )             29.1     02-15    144  |  109<H>  |  26<H>  ----------------------------<  112<H>  4.8   |  23  |  0.84    Ca    9.1      15 Feb 2019 17:08    TPro  6.6  /  Alb  3.5  /  TBili  0.1<L>  /  DBili  x   /  AST  9<L>  /  ALT  18  /  AlkPhos  55  02-15    LIVER FUNCTIONS - ( 15 Feb 2019 17:08 )  Alb: 3.5 g/dL / Pro: 6.6 g/dL / ALK PHOS: 55 U/L / ALT: 18 U/L DA / AST: 9 U/L / GGT: x           PT/INR - ( 15 Feb 2019 17:08 )   PT: 11.1 sec;   INR: 1.02 ratio         PTT - ( 15 Feb 2019 17:08 )  PTT:26.0 sec        Imaging:
Poor Historian     CHIEF COMPLAINT:    HPI:  85F with CAD s/p 13 stents, PVD s/p 1 stent in left side LCX 11/2018 recent , s/p PPM (patient doesn't remember why she has it), arthritis, diverticulosis, HLD, HTN who presents with weakness for the past 2-3 days.  Felt weak but denied any headaches or dizziness.  Today, she felt even more weak and decided to come to the ED.  Patient also started to have some slight left lower chest discomfort yesterday though it was fleeting.  Denied any radiation.  Patient thought she needed "another stent" and decided to come here. Otherwise, patient denied any fevers, nausea/vomiting, diarrhea.  Did admit to have dark tarry stools for at least a week and patient would noticed some blood tinged stools when she would self-disimpact herself.  In the ED, patient was found to have a hgb of 8.8 (last known hgb was 10 on 12/29/2018).  Was occult positive as well.  Patient has been on Plavix for her stents as well as prednisone for her right shoulder pain.  Has been on steroids for a couple of months (slow taper according to the patient).  Troponin was negative x1.   Dr. Walters from GI was consulted who is recommended endoscopy , called for pre procedure cardiac evaluation ,  Patient is not very good historian , denies any chest pain or shortness of breath or dizziness ,  patient is able to walk around without any chest pain , her CE negative       PAST MEDICAL & SURGICAL HISTORY:  Acute arthritis  High cholesterol  Stress incontinence in female  Rectocele  Diverticulosis  Hiatal hernia  Hypertension  Hypothyroidism  Dyslipidemia  CAD (coronary artery disease)  Carpal tunnel syndrome of right wrist  Cardiac pacemaker  Stented coronary artery: 12 heart stents,   1 left leg stents  H/O carotid endarterectomy: right  History of hysterectomy  History of breast reconstruction  Bilateral cataracts: surgery  Status post total knee replacement: right      Allergies    No Known Drug Allergies  Originally Entered as [Unknown see Comment: pt unable to remember] reaction to [Mold] (Unknown)  stolazine eye med- pt doesn&#x27;t remember (Unknown)    Intolerances        SOCIAL HISTORY: non smoker    FAMILY HISTORY:  Family history of cancer (Father, Sibling)      MEDICATIONS:  MEDICATIONS  (STANDING):  amLODIPine   Tablet 5 milliGRAM(s) Oral daily  atorvastatin 10 milliGRAM(s) Oral at bedtime  lactated ringers. 1000 milliLiter(s) (100 mL/Hr) IV Continuous <Continuous>  lisinopril 20 milliGRAM(s) Oral daily  metoprolol succinate  milliGRAM(s) Oral daily  pantoprazole  Injectable 40 milliGRAM(s) IV Push daily  sucralfate suspension 1 Gram(s) Oral two times a day    MEDICATIONS  (PRN):  artificial tears (preservative free) Ophthalmic Solution 1 Drop(s) Both EYES two times a day PRN for dry eyes  oxyCODONE    5 mG/acetaminophen 325 mG 1 Tablet(s) Oral every 4 hours PRN Moderate Pain (4 - 6)      REVIEW OF SYSTEMS:    CONSTITUTIONAL: mild fatigue  no fevers or chills  EYES/ENT: No visual changes;  No vertigo or throat pain   NECK: No pain or stiffness  RESPIRATORY: No cough, wheezing, hemoptysis; No shortness of breath  CARDIOVASCULAR: No chest pain or palpitations  GASTROINTESTINAL: No abdominal or epigastric pain. No nausea, vomiting, or hematemesis; No diarrhea or constipation. No melena or hematochezia.  GENITOURINARY: No dysuria, frequency or hematuria  NEUROLOGICAL: No numbness or weakness forgetful ?  SKIN: No itching, burning, rashes, or lesions   All other review of systems is negative unless indicated above    Vital Signs Last 24 Hrs  T(C): 36.5 (16 Feb 2019 07:47), Max: 36.8 (15 Feb 2019 22:28)  T(F): 97.7 (16 Feb 2019 07:47), Max: 98.2 (15 Feb 2019 22:28)  HR: 67 (16 Feb 2019 07:47) (67 - 78)  BP: 143/64 (16 Feb 2019 07:47) (115/50 - 147/72)  BP(mean): --  RR: 18 (16 Feb 2019 07:47) (17 - 20)  SpO2: 99% (16 Feb 2019 07:47) (96% - 99%)    I&O's Summary      PHYSICAL EXAM:    Constitutional: NAD, awake and alert, well-developed  HEENT: PERR, EOMI,  No oral cyananosis.  Neck:  supple,  No JVD  Respiratory: Breath sounds are clear bilaterally, No wheezing, rales or rhonchi  Cardiovascular: S1 and S2, regular rate and rhythm, ESM   Gastrointestinal: Bowel Sounds present, soft, nontender.   Extremities: No peripheral edema. No clubbing or cyanosis.  Vascular: 2+ peripheral pulses  Neurological: A/O x 3, no focal deficits  Musculoskeletal: no calf tenderness.  Skin: No rashes.      LABS: All Labs Reviewed:                        8.1    10.33 )-----------( 367      ( 16 Feb 2019 06:35 )             27.2                         8.2    12.03 )-----------( 404      ( 16 Feb 2019 00:09 )             27.8                         8.8    14.43 )-----------( 399      ( 15 Feb 2019 17:08 )             29.1     16 Feb 2019 06:35    147    |  113    |  21     ----------------------------<  84     4.1     |  25     |  0.72   15 Feb 2019 17:08    144    |  109    |  26     ----------------------------<  112    4.8     |  23     |  0.84     Ca    8.8        16 Feb 2019 06:35  Ca    9.1        15 Feb 2019 17:08  Phos  3.2       16 Feb 2019 06:35  Mg     2.1       16 Feb 2019 06:35    TPro  5.5    /  Alb  2.9    /  TBili  0.2    /  DBili  x      /  AST  8      /  ALT  17     /  AlkPhos  43     16 Feb 2019 06:35  TPro  6.6    /  Alb  3.5    /  TBili  0.1    /  DBili  x      /  AST  9      /  ALT  18     /  AlkPhos  55     15 Feb 2019 17:08    PT/INR - ( 16 Feb 2019 06:35 )   PT: 11.9 sec;   INR: 1.09 ratio         PTT - ( 16 Feb 2019 06:35 )  PTT:25.6 sec  CARDIAC MARKERS ( 16 Feb 2019 11:54 )  .012 ng/mL / x     / x     / x     / x      CARDIAC MARKERS ( 16 Feb 2019 06:35 )  .009 ng/mL / x     / x     / x     / x      CARDIAC MARKERS ( 16 Feb 2019 00:57 )  .011 ng/mL / x     / x     / x     / x      CARDIAC MARKERS ( 15 Feb 2019 17:08 )  .003 ng/mL / x     / x     / x     / x          Blood Culture:         RADIOLOGY/EKG: normal sinus rhythm  artifacts     < from: Cardiac Cath Lab - Adult (11.16.18 @ 14:52) >  ardiac Arteries and Lesion Findings    LMCA: Diffuse irregularity.     LMCA: Mid subsection.25% stenosis 12 mm length.Pre procedure ADA III   flow   was noted. Good runoff was present.The lesion was diagnosed as a low risk   lesion.     Devices used    LAD: Diffuse irregularity.     Prox LAD: Proximal subsection.10% stenosis 20 mm length.Pre procedure   ADA   III flow was noted.     The lesion was previously treated with the following devices: drug   eluting   stent.     Devices used     Mid LAD: Mid subsection.20% stenosis 30 mm length.Pre procedure ADA III   flow was noted. The lesion was diagnosed as a low risk lesion.     The lesion was previously treated with the following devices: drug   eluting   stent.     Devices used    LCx: Diffuse irregularity.     Mid CX: Proximal subsection.85% stenosis 8 mm length reduced to 0%.Pre   procedure ADA III flow was noted. The lesion was discrete.The lesion   showed moderate angulation and mild tortuosity.     Post Procedure ADA III flow was present.     Treatment results:Interventional treatment was successful.     Comments:IVUS showed suboptimal stent apposition post deployment so it   was   post dilated.     Devices used      < end of copied text >    < from: Transthoracic Echocardiogram (01.12.18 @ 09:31) >  ummary     Fibrocalcific changes noted to the mitral valve leaflets with preserved   leaflet excursion.   mitral annular calcification is present.   Mild (1+) mitral regurgitation is present.   There are fibrocalcific changes noted to the aortic valve leaflets with   restriction in leaflet excursion. Transaortic gradients are elevated but   do notmeet the criteria for aortic stenosis. MIld to Moderate aortic   insufficiency noted.   Moderate (2+) tricuspid valve regurgitation is present.   Mild pulmonary hypertension.   The left atrium is mildly dilated.   The left ventricle is normal in size, wall thickness, wall motion and   contractility.   Estimated left ventricular ejection fraction is 55-60 %.     Signature    < end of copied text >

## 2019-02-20 NOTE — CONSULT NOTE ADULT - PROBLEM SELECTOR RECOMMENDATION 5
controlled continue medication ,
fever episode  eval in progress  cxr reviewed  poss aspiration - hx of Hiatal hernia - risk for aspiration  started on Augmentin - cont curr regimen while work up is in progress  monitor vs and HD and Sat  keep sat > 88 pct  I meño may help  labs and cx pending  increase activity  tylenol PRN for fever  will follow and monitor  discussed plan of care with the patient

## 2019-02-20 NOTE — PROGRESS NOTE ADULT - ATTENDING COMMENTS
Discussed with cardiology safety of surgery at present time. Will try to postpone it for another 3 month when risk of stent closure will be much less.  Pt will need a close follow up.
Pt was seen this AM as requested by Medicine and GI. for a large paraesophageal hernia with bleeding that required transfusion.   I spoke , examined the pt. reviewed chart and all available studies, including esophagogram.  Ideally pt should undergo repair of a large hernia, possibly laparoscopically and this was communicated with the patient and medical team. We are currently evaluating together with Medicine and Cardiology if she is a candidate for a procedure.  Consult to follow.
surgical consult requested by GI., Dr. Mora for UGI bleed hiatal hernia and [place] ulcers.   Chart reviewed, would obtain esophagogram and have the pt. evaluated by cardiology .  Pt will be seen tomorrow.
Advanced care planning was discussed with patient and family.  Advanced care planning forms were reviewed and discussed.  Risks, benefits and alternatives of gastroenterologic procedures were discussed in detail and all questions were answered.    30 minutes spent.

## 2019-02-21 LAB
ANION GAP SERPL CALC-SCNC: 10 MMOL/L — SIGNIFICANT CHANGE UP (ref 5–17)
ANION GAP SERPL CALC-SCNC: 8 MMOL/L — SIGNIFICANT CHANGE UP (ref 5–17)
BUN SERPL-MCNC: 11 MG/DL — SIGNIFICANT CHANGE UP (ref 7–23)
BUN SERPL-MCNC: 15 MG/DL — SIGNIFICANT CHANGE UP (ref 7–23)
CALCIUM SERPL-MCNC: 8.5 MG/DL — SIGNIFICANT CHANGE UP (ref 8.4–10.5)
CALCIUM SERPL-MCNC: 8.8 MG/DL — SIGNIFICANT CHANGE UP (ref 8.4–10.5)
CHLORIDE SERPL-SCNC: 101 MMOL/L — SIGNIFICANT CHANGE UP (ref 96–108)
CHLORIDE SERPL-SCNC: 106 MMOL/L — SIGNIFICANT CHANGE UP (ref 96–108)
CO2 SERPL-SCNC: 24 MMOL/L — SIGNIFICANT CHANGE UP (ref 22–31)
CO2 SERPL-SCNC: 25 MMOL/L — SIGNIFICANT CHANGE UP (ref 22–31)
CREAT SERPL-MCNC: 0.79 MG/DL — SIGNIFICANT CHANGE UP (ref 0.5–1.3)
CREAT SERPL-MCNC: 0.87 MG/DL — SIGNIFICANT CHANGE UP (ref 0.5–1.3)
CULTURE RESULTS: SIGNIFICANT CHANGE UP
GLUCOSE SERPL-MCNC: 113 MG/DL — HIGH (ref 70–99)
GLUCOSE SERPL-MCNC: 154 MG/DL — HIGH (ref 70–99)
HCT VFR BLD CALC: 30 % — LOW (ref 34.5–45)
HCT VFR BLD CALC: 32.4 % — LOW (ref 34.5–45)
HGB BLD-MCNC: 10.2 G/DL — LOW (ref 11.5–15.5)
HGB BLD-MCNC: 9.3 G/DL — LOW (ref 11.5–15.5)
MAGNESIUM SERPL-MCNC: 1.7 MG/DL — SIGNIFICANT CHANGE UP (ref 1.6–2.6)
MCHC RBC-ENTMCNC: 24.6 PG — LOW (ref 27–34)
MCHC RBC-ENTMCNC: 25.1 PG — LOW (ref 27–34)
MCHC RBC-ENTMCNC: 31 GM/DL — LOW (ref 32–36)
MCHC RBC-ENTMCNC: 31.5 GM/DL — LOW (ref 32–36)
MCV RBC AUTO: 79.4 FL — LOW (ref 80–100)
MCV RBC AUTO: 79.6 FL — LOW (ref 80–100)
NRBC # BLD: 0 /100 WBCS — SIGNIFICANT CHANGE UP (ref 0–0)
NRBC # BLD: 0 /100 WBCS — SIGNIFICANT CHANGE UP (ref 0–0)
PHOSPHATE SERPL-MCNC: 2.1 MG/DL — LOW (ref 2.5–4.5)
PLATELET # BLD AUTO: 301 K/UL — SIGNIFICANT CHANGE UP (ref 150–400)
PLATELET # BLD AUTO: 346 K/UL — SIGNIFICANT CHANGE UP (ref 150–400)
POTASSIUM SERPL-MCNC: 3.5 MMOL/L — SIGNIFICANT CHANGE UP (ref 3.5–5.3)
POTASSIUM SERPL-MCNC: 3.5 MMOL/L — SIGNIFICANT CHANGE UP (ref 3.5–5.3)
POTASSIUM SERPL-SCNC: 3.5 MMOL/L — SIGNIFICANT CHANGE UP (ref 3.5–5.3)
POTASSIUM SERPL-SCNC: 3.5 MMOL/L — SIGNIFICANT CHANGE UP (ref 3.5–5.3)
RBC # BLD: 3.78 M/UL — LOW (ref 3.8–5.2)
RBC # BLD: 4.07 M/UL — SIGNIFICANT CHANGE UP (ref 3.8–5.2)
RBC # FLD: 17 % — HIGH (ref 10.3–14.5)
RBC # FLD: 17.1 % — HIGH (ref 10.3–14.5)
SODIUM SERPL-SCNC: 135 MMOL/L — SIGNIFICANT CHANGE UP (ref 135–145)
SODIUM SERPL-SCNC: 139 MMOL/L — SIGNIFICANT CHANGE UP (ref 135–145)
SPECIMEN SOURCE: SIGNIFICANT CHANGE UP
WBC # BLD: 10.59 K/UL — HIGH (ref 3.8–10.5)
WBC # BLD: 13.17 K/UL — HIGH (ref 3.8–10.5)
WBC # FLD AUTO: 10.59 K/UL — HIGH (ref 3.8–10.5)
WBC # FLD AUTO: 13.17 K/UL — HIGH (ref 3.8–10.5)

## 2019-02-21 PROCEDURE — 99232 SBSQ HOSP IP/OBS MODERATE 35: CPT

## 2019-02-21 PROCEDURE — 99233 SBSQ HOSP IP/OBS HIGH 50: CPT

## 2019-02-21 RX ORDER — SODIUM,POTASSIUM PHOSPHATES 278-250MG
1 POWDER IN PACKET (EA) ORAL
Qty: 0 | Refills: 0 | Status: DISCONTINUED | OUTPATIENT
Start: 2019-02-21 | End: 2019-02-22

## 2019-02-21 RX ORDER — PANTOPRAZOLE SODIUM 20 MG/1
1 TABLET, DELAYED RELEASE ORAL
Qty: 0 | Refills: 0 | COMMUNITY
Start: 2019-02-21

## 2019-02-21 RX ORDER — SUCRALFATE 1 G
10 TABLET ORAL
Qty: 0 | Refills: 0 | COMMUNITY
Start: 2019-02-21

## 2019-02-21 RX ORDER — LOVASTATIN 20 MG
1 TABLET ORAL
Qty: 0 | Refills: 0 | COMMUNITY

## 2019-02-21 RX ORDER — ATORVASTATIN CALCIUM 80 MG/1
1 TABLET, FILM COATED ORAL
Qty: 0 | Refills: 0 | COMMUNITY
Start: 2019-02-21

## 2019-02-21 RX ORDER — MAGNESIUM SULFATE 500 MG/ML
1 VIAL (ML) INJECTION ONCE
Qty: 0 | Refills: 0 | Status: COMPLETED | OUTPATIENT
Start: 2019-02-21 | End: 2019-02-21

## 2019-02-21 RX ORDER — POTASSIUM CHLORIDE 20 MEQ
20 PACKET (EA) ORAL ONCE
Qty: 0 | Refills: 0 | Status: COMPLETED | OUTPATIENT
Start: 2019-02-21 | End: 2019-02-21

## 2019-02-21 RX ADMIN — Medication 1 TABLET(S): at 05:58

## 2019-02-21 RX ADMIN — Medication 1 TABLET(S): at 18:01

## 2019-02-21 RX ADMIN — Medication 1 GRAM(S): at 18:01

## 2019-02-21 RX ADMIN — CLOPIDOGREL BISULFATE 75 MILLIGRAM(S): 75 TABLET, FILM COATED ORAL at 14:31

## 2019-02-21 RX ADMIN — ATORVASTATIN CALCIUM 10 MILLIGRAM(S): 80 TABLET, FILM COATED ORAL at 21:12

## 2019-02-21 RX ADMIN — AMLODIPINE BESYLATE 5 MILLIGRAM(S): 2.5 TABLET ORAL at 05:58

## 2019-02-21 RX ADMIN — PANTOPRAZOLE SODIUM 40 MILLIGRAM(S): 20 TABLET, DELAYED RELEASE ORAL at 14:32

## 2019-02-21 RX ADMIN — Medication 1 TABLET(S): at 21:12

## 2019-02-21 RX ADMIN — OXYCODONE AND ACETAMINOPHEN 1 TABLET(S): 5; 325 TABLET ORAL at 04:40

## 2019-02-21 RX ADMIN — Medication 200 MILLIGRAM(S): at 05:59

## 2019-02-21 RX ADMIN — Medication 1 GRAM(S): at 05:58

## 2019-02-21 RX ADMIN — OXYCODONE AND ACETAMINOPHEN 1 TABLET(S): 5; 325 TABLET ORAL at 05:10

## 2019-02-21 RX ADMIN — LISINOPRIL 20 MILLIGRAM(S): 2.5 TABLET ORAL at 05:59

## 2019-02-21 RX ADMIN — Medication 100 GRAM(S): at 19:37

## 2019-02-21 RX ADMIN — Medication 20 MILLIEQUIVALENT(S): at 18:01

## 2019-02-21 NOTE — PROGRESS NOTE ADULT - SUBJECTIVE AND OBJECTIVE BOX
INTERVAL HPI/OVERNIGHT EVENTS:  No new overnight event.  No N/V/D.  Tolerating diet.  no melena    Allergies    No Known Drug Allergies  Originally Entered as [Unknown see Comment: pt unable to remember] reaction to [Mold] (Unknown)  stolazine eye med- pt doesn&#x27;t remember (Unknown)    Intolerances    General:  No wt loss, fevers, chills, night sweats, fatigue,   Eyes:  Good vision, no reported pain  ENT:  No sore throat, pain, runny nose, dysphagia  CV:  No pain, palpitations, hypo/hypertension  Resp:  No dyspnea, cough, tachypnea, wheezing  GI:  No pain, No nausea, No vomiting, No diarrhea, No constipation, No weight loss, No fever, No pruritis, No rectal bleeding, No tarry stools, No dysphagia,  :  No pain, bleeding, incontinence, nocturia  Muscle:  No pain, weakness  Neuro:  No weakness, tingling, memory problems  Psych:  No fatigue, insomnia, mood problems, depression  Endocrine:  No polyuria, polydipsia, cold/heat intolerance  Heme:  No petechiae, ecchymosis, easy bruisability  Skin:  No rash, tattoos, scars, edema      PHYSICAL EXAM:   Vital Signs:  Vital Signs Last 24 Hrs  T(C): 36.6 (2019 07:30), Max: 38.7 (2019 15:30)  T(F): 97.9 (2019 07:30), Max: 101.7 (2019 15:30)  HR: 78 (2019 07:30) (78 - 102)  BP: 135/63 (2019 07:30) (121/62 - 135/63)  BP(mean): --  RR: 20 (2019 07:30) (18 - 20)  SpO2: 96% (2019 07:30) (94% - 96%)  Daily     Daily I&O's Summary      GENERAL:  Appears stated age, well-groomed, well-nourished, no distress  HEENT:  NC/AT,  conjunctivae clear and pink, no thyromegaly, nodules, adenopathy, no JVD, sclera -anicteric  CHEST:  Full & symmetric excursion, no increased effort, breath sounds clear  HEART:  Regular rhythm, S1, S2, no murmur/rub/S3/S4, no abdominal bruit, no edema  ABDOMEN:  Soft, non-tender, non-distended, normoactive bowel sounds,  no masses ,no hepato-splenomegaly, no signs of chronic liver disease  EXTEREMITIES:  no cyanosis,clubbing or edema  SKIN:  No rash/erythema/ecchymoses/petechiae/wounds/abscess/warm/dry  NEURO:  Alert, oriented, no asterixis, no tremor, no encephalopathy      LABS:                        9.3    10.59 )-----------( 301      ( 2019 08:19 )             30.0     02-21    139  |  106  |  11  ----------------------------<  113<H>  3.5   |  25  |  0.79    Ca    8.5      2019 08:19        Urinalysis Basic - ( 2019 18:44 )    Color: Yellow / Appearance: Clear / S.015 / pH: x  Gluc: x / Ketone: Small  / Bili: Negative / Urobili: 1 mg/dL   Blood: x / Protein: 15 mg/dL / Nitrite: Negative   Leuk Esterase: Trace / RBC: 0-2 /HPF / WBC 3-5   Sq Epi: x / Non Sq Epi: Few / Bacteria: Few      amylase   lipase  RADIOLOGY & ADDITIONAL TESTS:

## 2019-02-21 NOTE — PROGRESS NOTE ADULT - SUBJECTIVE AND OBJECTIVE BOX
REASON FOR VISIT: CAD; Rx management    HPI:   85 year old woman with a history of multivessel CAD s/p numerous coronary stents (most recent PCI was in 2018), PPM, HTN, HLD, carotid atherosclerosis, diverticulosis, hypothyroidism, anemia, insomnia, peripheral artery disease s/p stent, arthritis,  admitted 2/15/19 with GI bleeding and now s/p EGD (), found to have gastric linear ulcerations.    19:  No cardiac complaints; denies angina, dyspnea, orthopnea.  19  Patient is feeling fine ,  had endoscopy  showed hiatal hernia with bleeding ulcer , recommended surgery . hemoglobin in stable     MEDICATIONS  (STANDING):  amLODIPine   Tablet 5 milliGRAM(s) Oral daily  amoxicillin  875 milliGRAM(s)/clavulanate 1 Tablet(s) Oral two times a day  atorvastatin 10 milliGRAM(s) Oral at bedtime  clopidogrel Tablet 75 milliGRAM(s) Oral daily  lisinopril 20 milliGRAM(s) Oral daily  metoprolol succinate  milliGRAM(s) Oral daily  pantoprazole  Injectable 40 milliGRAM(s) IV Push daily  sucralfate suspension 1 Gram(s) Oral two times a day    MEDICATIONS  (PRN):  acetaminophen    Suspension .. 650 milliGRAM(s) Oral every 6 hours PRN Temp greater or equal to 38C (100.4F)  artificial tears (preservative free) Ophthalmic Solution 1 Drop(s) Both EYES two times a day PRN for dry eyes  oxyCODONE    5 mG/acetaminophen 325 mG 1 Tablet(s) Oral every 4 hours PRN Moderate Pain (4 - 6)      Vital Signs Last 24 Hrs  T(C): 36.6 (2019 07:30), Max: 38.7 (2019 15:30)  T(F): 97.9 (2019 07:30), Max: 101.7 (2019 15:30)  HR: 78 (2019 07:30) (78 - 102)  BP: 135/63 (2019 07:30) (121/62 - 135/63)  BP(mean): --  RR: 20 (2019 07:30) (18 - 20)  SpO2: 96% (2019 07:30) (94% - 96%)    I&O's Summary    PHYSICAL EXAM:  Constitutional: NAD, awake and alert  Neck:  supple,  No JVD  Respiratory: Breath sounds are clear bilaterally, No wheezing, rales or rhonchi  Cardiovascular: S1 and S2, regular rate and rhythm  Gastrointestinal: Bowel Sounds present, soft, nontender.   Extremities: No peripheral edema.     LABS:                               9.3    10.59 )-----------( 301      ( 2019 08:19 )             30.0     02-    139  |  106  |  11  ----------------------------<  113<H>  3.5   |  25  |  0.79    Ca    8.5      2019 08:19              Urinalysis Basic - ( 2019 18:44 )    Color: Yellow / Appearance: Clear / S.015 / pH: x    Gluc: x / Ketone: Small  / Bili: Negative / Urobili: 1 mg/dL   Blood: x / Protein: 15 mg/dL / Nitrite: Negative   Leuk Esterase: Trace / RBC: 0-2 /HPF / WBC 3-5   Sq Epi: x / Non Sq Epi: Few / Bacteria: Few      02-16 Chol 152 LDL 90 HDL 48<L> Trig 71    Cardiac Cath Lab - Adult (18 @ 14:52):  Mid LCx stent    Transthoracic Echocardiogram (18 @ 09:31):   Mild (1+) mitral regurgitation .   There are fibrocalcific changes noted to the aortic valve leaflets. Mild to Moderate aortic insufficiency.   Moderate tricuspid valve regurgitation.   Mild pulmonary hypertension.   The left atrium is mildly dilated.   The left ventricle is normal in size, wall thickness, wall motion and contractility. Estimated left ventricular ejection fraction is 55-60 %.

## 2019-02-21 NOTE — PROGRESS NOTE ADULT - SUBJECTIVE AND OBJECTIVE BOX
Date/Time Patient Seen:  		  Referring MD:   Data Reviewed	       Patient is a 85y old  Female who presents with a chief complaint of weakness (20 Feb 2019 21:28)      Subjective/HPI     PAST MEDICAL & SURGICAL HISTORY:  Acute arthritis  High cholesterol  Stress incontinence in female  Rectocele  Diverticulosis  Hiatal hernia  Hypertension  Hypothyroidism  Dyslipidemia  CAD (coronary artery disease)  Carpal tunnel syndrome of right wrist  Cardiac pacemaker  Stented coronary artery: 12 heart stents,   1 left leg stents  H/O carotid endarterectomy: right  History of hysterectomy  History of breast reconstruction  Bilateral cataracts: surgery  Status post total knee replacement: right        Medication list         MEDICATIONS  (STANDING):  amLODIPine   Tablet 5 milliGRAM(s) Oral daily  amoxicillin  875 milliGRAM(s)/clavulanate 1 Tablet(s) Oral two times a day  atorvastatin 10 milliGRAM(s) Oral at bedtime  clopidogrel Tablet 75 milliGRAM(s) Oral daily  lisinopril 20 milliGRAM(s) Oral daily  metoprolol succinate  milliGRAM(s) Oral daily  pantoprazole  Injectable 40 milliGRAM(s) IV Push daily  sucralfate suspension 1 Gram(s) Oral two times a day    MEDICATIONS  (PRN):  acetaminophen    Suspension .. 650 milliGRAM(s) Oral every 6 hours PRN Temp greater or equal to 38C (100.4F)  artificial tears (preservative free) Ophthalmic Solution 1 Drop(s) Both EYES two times a day PRN for dry eyes  oxyCODONE    5 mG/acetaminophen 325 mG 1 Tablet(s) Oral every 4 hours PRN Moderate Pain (4 - 6)         Vitals log        ICU Vital Signs Last 24 Hrs  T(C): 36.8 (20 Feb 2019 23:21), Max: 38.7 (20 Feb 2019 15:30)  T(F): 98.2 (20 Feb 2019 23:21), Max: 101.7 (20 Feb 2019 15:30)  HR: 86 (21 Feb 2019 06:43) (79 - 102)  BP: 121/62 (21 Feb 2019 06:43) (121/62 - 133/68)  BP(mean): --  ABP: --  ABP(mean): --  RR: 18 (20 Feb 2019 23:21) (18 - 20)  SpO2: 96% (20 Feb 2019 23:21) (94% - 96%)           Input and Output:  I&O's Detail      Lab Data                        9.3    12.17 )-----------( 343      ( 20 Feb 2019 07:17 )             31.6     02-20    137  |  102  |  11  ----------------------------<  86  3.1<L>   |  21<L>  |  0.66    Ca    8.6      20 Feb 2019 07:17              Review of Systems	      Objective     Physical Examination    heart s1s2  lung dec BS  abd soft      Pertinent Lab findings & Imaging      Dwain:  NO   Adequate UO     I&O's Detail           Discussed with:     Cultures:	        Radiology

## 2019-02-21 NOTE — PROGRESS NOTE ADULT - ASSESSMENT
85F with CAD s/p 13 stents, PVD s/p 1 stent in left side, s/p PPM (patient doesn't remember why she has it), arthritis, diverticulosis, HLD, HTN who presents with weakness for the past 2-3 days.  Also with chest discomfort though now chest discomfort resolved.        1.  Gastrointestinal hemorrhage,   -H/H stable.     -Continue with PPI, and Carafate   - hold anticoagulants  - transfuse if <8  - EGD shows hiatial hernia with ulcer.  Surgery recommended esophagram.  Esophogram shows no evidence of reflex esophagitis.    -Cardiology recommended to hold off Surgery for another 3 months.  surgery in agreement  -Further care as per GI, and surgery      2.  Coronary artery disease   -Cont with amlodipine and benazepril (or equivalent)  - Continue with plavix.  Hold ASA given bleed (patient said her stents were placed at least >6 months ago if not longer).      3.   Dyslipidemia.  cont lovastatin (or equivalent).     4.  Weakness.  PT/OT      5.  Fever.  Unclear Etiology.  Atelectasis. Vs possible aspiration pna.  Continue with PO augmentin  -UA negative   Plan of care was discussed with patient,   in great details, All questions were answered to their satisfaction  Seems to understand, and in agreement

## 2019-02-21 NOTE — PROVIDER CONTACT NOTE (OTHER) - ACTION/TREATMENT ORDERED:
MD given the cardiac strip and ordered labs (see result).  Pt will be reevaluate by Dr Palla in the AM
Pt has blood work ordered in am  Md will re-evaluate at that time

## 2019-02-21 NOTE — PROVIDER CONTACT NOTE (OTHER) - ASSESSMENT
Denies any chest pain or discomfort
repeat CBC @ 2200 =  H/H= 7.9/  Pt Patient VSS, asymptomatic, denies any distress

## 2019-02-21 NOTE — PROGRESS NOTE ADULT - SUBJECTIVE AND OBJECTIVE BOX
CC. Weakness     HPI.  Patient reports that she feels better.  No further blood per stool noticed.  Tolerating po intake well .  Reports generalized weakness.  Offers no other complaints      Constitutional: No   fatigue or weight loss.  Skin: No rash.  Eyes: No recent vision problems or eye pain.  ENT: No congestion, ear pain, or sore throat.  Endocrine: No thyroid problems.  Cardiovascular: No chest pain or palpation.  Respiratory: No cough, shortness of breath, congestion, or wheezing.  Gastrointestinal: No abdominal pain, nausea, vomiting, or diarrhea.  Genitourinary: No dysuria.  Musculoskeletal: No joint swelling.  Neurologic: No headache.      Vital Signs Last 24 Hrs  T(C): 36.6 (2019 07:30), Max: 38.7 (2019 15:30)  T(F): 97.9 (2019 07:30), Max: 101.7 (2019 15:30)  HR: 78 (2019 07:30) (78 - 97)  BP: 135/63 (2019 07:30) (121/62 - 135/63)  BP(mean): --  RR: 20 (2019 07:30) (18 - 20)  SpO2: 96% (2019 07:30) (94% - 96%)      PHYSICAL EXAM-  GENERAL: NAD, well-groomed, well-developed  HEAD:  Atraumatic, Normocephalic  EYES: EOMI, PERRLA, conjunctiva and sclera clear  NECK: Supple, No JVD, Normal thyroid  NERVOUS SYSTEM:  Alert & Oriented X3, Motor Strength 5/5 B/L upper and lower extremities; DTRs 2+ intact and symmetric  CHEST/LUNG: Clear to percussion bilaterally; No rales, rhonchi, wheezing, or rubs  HEART: Regular rate and rhythm; No murmurs, rubs, or gallops  ABDOMEN: Soft, Nontender, Nondistended; Bowel sounds present  EXTREMITIES:  2+ Peripheral Pulses, No clubbing, cyanosis, or edema  SKIN: No rashes or lesions                               9.3    10.59 )-----------( 301      ( 2019 08:19 )             30.0     02-    139  |  106  |  11  ----------------------------<  113<H>  3.5   |  25  |  0.79    Ca    8.5      2019 08:19            Urinalysis Basic - ( 2019 18:44 )    Color: Yellow / Appearance: Clear / S.015 / pH: x  Gluc: x / Ketone: Small  / Bili: Negative / Urobili: 1 mg/dL   Blood: x / Protein: 15 mg/dL / Nitrite: Negative   Leuk Esterase: Trace / RBC: 0-2 /HPF / WBC 3-5   Sq Epi: x / Non Sq Epi: Few / Bacteria: Few    MEDICATIONS  (STANDING):  amLODIPine   Tablet 5 milliGRAM(s) Oral daily  amoxicillin  875 milliGRAM(s)/clavulanate 1 Tablet(s) Oral two times a day  atorvastatin 10 milliGRAM(s) Oral at bedtime  clopidogrel Tablet 75 milliGRAM(s) Oral daily  lisinopril 20 milliGRAM(s) Oral daily  metoprolol succinate  milliGRAM(s) Oral daily  pantoprazole  Injectable 40 milliGRAM(s) IV Push daily  sucralfate suspension 1 Gram(s) Oral two times a day    MEDICATIONS  (PRN):  acetaminophen    Suspension .. 650 milliGRAM(s) Oral every 6 hours PRN Temp greater or equal to 38C (100.4F)  artificial tears (preservative free) Ophthalmic Solution 1 Drop(s) Both EYES two times a day PRN for dry eyes  oxyCODONE    5 mG/acetaminophen 325 mG 1 Tablet(s) Oral every 4 hours PRN Moderate Pain (4 - 6)  Imaging Personally Reviewed:     [x ] YES  [ ] NO    Consultant(s) Notes Reviewed:  [x ] YES  [ ] NO    Care Discussed with Consultants/Other Providers [x ] YES  [ ] No medical contraindication for discharge

## 2019-02-22 VITALS
RESPIRATION RATE: 14 BRPM | SYSTOLIC BLOOD PRESSURE: 127 MMHG | HEART RATE: 88 BPM | TEMPERATURE: 98 F | OXYGEN SATURATION: 94 % | DIASTOLIC BLOOD PRESSURE: 66 MMHG

## 2019-02-22 LAB
ANION GAP SERPL CALC-SCNC: 9 MMOL/L — SIGNIFICANT CHANGE UP (ref 5–17)
BUN SERPL-MCNC: 11 MG/DL — SIGNIFICANT CHANGE UP (ref 7–23)
CALCIUM SERPL-MCNC: 8.6 MG/DL — SIGNIFICANT CHANGE UP (ref 8.4–10.5)
CHLORIDE SERPL-SCNC: 105 MMOL/L — SIGNIFICANT CHANGE UP (ref 96–108)
CO2 SERPL-SCNC: 25 MMOL/L — SIGNIFICANT CHANGE UP (ref 22–31)
CREAT SERPL-MCNC: 0.75 MG/DL — SIGNIFICANT CHANGE UP (ref 0.5–1.3)
GLUCOSE SERPL-MCNC: 116 MG/DL — HIGH (ref 70–99)
MAGNESIUM SERPL-MCNC: 1.8 MG/DL — SIGNIFICANT CHANGE UP (ref 1.6–2.6)
PHOSPHATE SERPL-MCNC: 2.4 MG/DL — LOW (ref 2.5–4.5)
POTASSIUM SERPL-MCNC: 3.5 MMOL/L — SIGNIFICANT CHANGE UP (ref 3.5–5.3)
POTASSIUM SERPL-SCNC: 3.5 MMOL/L — SIGNIFICANT CHANGE UP (ref 3.5–5.3)
SODIUM SERPL-SCNC: 139 MMOL/L — SIGNIFICANT CHANGE UP (ref 135–145)
TSH SERPL-MCNC: 2.02 UIU/ML — SIGNIFICANT CHANGE UP (ref 0.27–4.2)

## 2019-02-22 PROCEDURE — 36415 COLL VENOUS BLD VENIPUNCTURE: CPT

## 2019-02-22 PROCEDURE — 97116 GAIT TRAINING THERAPY: CPT

## 2019-02-22 PROCEDURE — 87086 URINE CULTURE/COLONY COUNT: CPT

## 2019-02-22 PROCEDURE — 84443 ASSAY THYROID STIM HORMONE: CPT

## 2019-02-22 PROCEDURE — 93005 ELECTROCARDIOGRAM TRACING: CPT

## 2019-02-22 PROCEDURE — 99232 SBSQ HOSP IP/OBS MODERATE 35: CPT

## 2019-02-22 PROCEDURE — 86900 BLOOD TYPING SEROLOGIC ABO: CPT

## 2019-02-22 PROCEDURE — 85730 THROMBOPLASTIN TIME PARTIAL: CPT

## 2019-02-22 PROCEDURE — 86901 BLOOD TYPING SEROLOGIC RH(D): CPT

## 2019-02-22 PROCEDURE — 80053 COMPREHEN METABOLIC PANEL: CPT

## 2019-02-22 PROCEDURE — 99285 EMERGENCY DEPT VISIT HI MDM: CPT | Mod: 25

## 2019-02-22 PROCEDURE — 80061 LIPID PANEL: CPT

## 2019-02-22 PROCEDURE — 82272 OCCULT BLD FECES 1-3 TESTS: CPT

## 2019-02-22 PROCEDURE — 97110 THERAPEUTIC EXERCISES: CPT

## 2019-02-22 PROCEDURE — 97530 THERAPEUTIC ACTIVITIES: CPT

## 2019-02-22 PROCEDURE — 83735 ASSAY OF MAGNESIUM: CPT

## 2019-02-22 PROCEDURE — 84100 ASSAY OF PHOSPHORUS: CPT

## 2019-02-22 PROCEDURE — 71045 X-RAY EXAM CHEST 1 VIEW: CPT

## 2019-02-22 PROCEDURE — 81001 URINALYSIS AUTO W/SCOPE: CPT

## 2019-02-22 PROCEDURE — 85027 COMPLETE CBC AUTOMATED: CPT

## 2019-02-22 PROCEDURE — 86850 RBC ANTIBODY SCREEN: CPT

## 2019-02-22 PROCEDURE — 71046 X-RAY EXAM CHEST 2 VIEWS: CPT

## 2019-02-22 PROCEDURE — 99239 HOSP IP/OBS DSCHRG MGMT >30: CPT

## 2019-02-22 PROCEDURE — P9016: CPT

## 2019-02-22 PROCEDURE — 80048 BASIC METABOLIC PNL TOTAL CA: CPT

## 2019-02-22 PROCEDURE — C1889: CPT

## 2019-02-22 PROCEDURE — 97161 PT EVAL LOW COMPLEX 20 MIN: CPT

## 2019-02-22 PROCEDURE — 85610 PROTHROMBIN TIME: CPT

## 2019-02-22 PROCEDURE — 86923 COMPATIBILITY TEST ELECTRIC: CPT

## 2019-02-22 PROCEDURE — 97165 OT EVAL LOW COMPLEX 30 MIN: CPT

## 2019-02-22 PROCEDURE — 36430 TRANSFUSION BLD/BLD COMPNT: CPT

## 2019-02-22 PROCEDURE — 84484 ASSAY OF TROPONIN QUANT: CPT

## 2019-02-22 PROCEDURE — 74220 X-RAY XM ESOPHAGUS 1CNTRST: CPT

## 2019-02-22 RX ORDER — POTASSIUM CHLORIDE 20 MEQ
40 PACKET (EA) ORAL ONCE
Qty: 0 | Refills: 0 | Status: COMPLETED | OUTPATIENT
Start: 2019-02-22 | End: 2019-02-22

## 2019-02-22 RX ORDER — SUCRALFATE 1 G
10 TABLET ORAL
Qty: 600 | Refills: 0
Start: 2019-02-22 | End: 2019-03-23

## 2019-02-22 RX ORDER — PANTOPRAZOLE SODIUM 20 MG/1
1 TABLET, DELAYED RELEASE ORAL
Qty: 30 | Refills: 0 | OUTPATIENT
Start: 2019-02-22 | End: 2019-03-23

## 2019-02-22 RX ORDER — FERROUS SULFATE 325(65) MG
1 TABLET ORAL
Qty: 60 | Refills: 0
Start: 2019-02-22 | End: 2019-03-23

## 2019-02-22 RX ORDER — FERROUS SULFATE 325(65) MG
1 TABLET ORAL
Qty: 60 | Refills: 0 | OUTPATIENT
Start: 2019-02-22 | End: 2019-03-23

## 2019-02-22 RX ORDER — LOVASTATIN 20 MG
1 TABLET ORAL
Qty: 0 | Refills: 0 | DISCHARGE
Start: 2019-02-22

## 2019-02-22 RX ORDER — SUCRALFATE 1 G
10 TABLET ORAL
Qty: 600 | Refills: 0 | OUTPATIENT
Start: 2019-02-22 | End: 2019-03-23

## 2019-02-22 RX ORDER — FERROUS SULFATE 325(65) MG
1 TABLET ORAL
Qty: 0 | Refills: 0 | DISCHARGE
Start: 2019-02-22 | End: 2019-03-23

## 2019-02-22 RX ADMIN — OXYCODONE AND ACETAMINOPHEN 1 TABLET(S): 5; 325 TABLET ORAL at 10:15

## 2019-02-22 RX ADMIN — LISINOPRIL 20 MILLIGRAM(S): 2.5 TABLET ORAL at 05:29

## 2019-02-22 RX ADMIN — Medication 1 GRAM(S): at 17:22

## 2019-02-22 RX ADMIN — AMLODIPINE BESYLATE 5 MILLIGRAM(S): 2.5 TABLET ORAL at 05:28

## 2019-02-22 RX ADMIN — Medication 40 MILLIEQUIVALENT(S): at 10:11

## 2019-02-22 RX ADMIN — Medication 1 TABLET(S): at 05:29

## 2019-02-22 RX ADMIN — CLOPIDOGREL BISULFATE 75 MILLIGRAM(S): 75 TABLET, FILM COATED ORAL at 09:37

## 2019-02-22 RX ADMIN — PANTOPRAZOLE SODIUM 40 MILLIGRAM(S): 20 TABLET, DELAYED RELEASE ORAL at 09:37

## 2019-02-22 RX ADMIN — Medication 1 GRAM(S): at 05:28

## 2019-02-22 RX ADMIN — Medication 1 TABLET(S): at 13:27

## 2019-02-22 RX ADMIN — Medication 1 TABLET(S): at 17:23

## 2019-02-22 RX ADMIN — Medication 200 MILLIGRAM(S): at 05:29

## 2019-02-22 RX ADMIN — OXYCODONE AND ACETAMINOPHEN 1 TABLET(S): 5; 325 TABLET ORAL at 09:41

## 2019-02-22 RX ADMIN — Medication 1 TABLET(S): at 09:37

## 2019-02-22 NOTE — PROGRESS NOTE ADULT - PROVIDER SPECIALTY LIST ADULT
Cardiology
Gastroenterology
Hospitalist
Pulmonology
Surgery
Pulmonology
Cardiology
Hospitalist
Hospitalist

## 2019-02-22 NOTE — PROGRESS NOTE ADULT - PROBLEM SELECTOR PLAN 1
hiatal hernia  GI Bleed  anemia  aspiration event  OP  OA  Marijuana Use  on oral ABX - complete 7 days total  smoking cess ed  poss OR in 3 months for Hiatal hernia repair  Surgery and GI follow up noted  pt needs to cont PPI  keep HOB elev  monitor for sx of GERD  will follow  discussed above with pt  planned for T[facility]YA at [facility]
Stable; no angina; continue Clopidogrel (recent stent deployment november 16 th ), metoprolol, and atorvastatin; aspirin held due to GI bleed.
Stable; no angina; continue Clopidogrel (recent stent deployment), metoprolol, and atorvastatin; aspirin held due to GI bleed
hiatal hernia, atelectasis, GERD,   eval GI Bleed  Anemia  poss aspiration event - atelectasis  may need CT chest  on emp PO ABX - for poss asp event  GI and Surgery eval and follow up noted  I meño  tolerating room air  monitor vs and HD and Sat  poss Hiatal hernia surgery on this admission  will follow and monitor  prognosis guarded  marijuana smoke - counseling and ed - will need PFT as outpatient
monitor cbc, transfuse prn,  carafate 1 gram bid   PPI bid  hold A/C and NSAIDs  cardiology clearance for  upper gastrointestinal endoscopy and colonoscopy on monday/tuesday  EGD once optimized in am
monitor cbc, transfuse prn,  carafate 1 gram bid   PPI bid  hold A/C and NSAIDs  cardiology clearance for  upper gastrointestinal endoscopy on monday/tuesday  EGD once optimized
monitor cbc, transfuse prn,  carafate 1 gram bid   PPI bid  hold A/C and NSAIDs  cardiology clearance for  upper gastrointestinal endoscopy on monday/tuesday  EGD once optimized
monitor cbc, transfuse prn,  carafate 1 gram bid   PPI bid  large hernia ? surgical intervention
monitor cbc, transfuse prn,  carafate 1 gram bid   PPI bid  large hernia ? surgical intervention after 6 months active stents
monitor cbc, transfuse prn,  carafate 1 gram bid   PPI bid  large hernia ? surgical intervention after 6 months active stents
monitor cbc, transfuse prn,  carafate 1 gram bid   PPI drip.  hold A/C and NSAIDs  cardiology clearance for  upper gastrointestinal endoscopy on monday/tuesday  EGD once optimized
patient with CAD PCI last stent 11/2018 . LCX stent ,  who had atypical chest pain , without exertional chest pain , fatigue mostly due to anemia ,   clinically appears optimal stable for low risk procedure.
Stable . ASA on Hold   Hb is 9.2 now   - GI  evaluation appreciated.   - Plan for EGD   - Monitor  CBC closely.   - continue with protonix and crafate.  Hold anticoagulation.
Stable; no angina; continue Clopidogrel (recent stent deployment november 16 th ), metoprolol, and atorvastatin; aspirin held due to GI bleed. hemoglobin  cardiac wise stable , follow up as op with dr henry

## 2019-02-22 NOTE — CHART NOTE - NSCHARTNOTEFT_GEN_A_CORE
Assessment:         85F with CAD s/p 13 stents, PVD s/p 1 stent in left side, s/p PPM  arthritis, diverticulosis, HLD, HTN who was admitted c with weakness. Pt found to have Gastrointestinal hemorrhage,  EGD shows hiatial hernia with ulcer.  Surgery recommended esophagram which showed no evidence of reflex esophagitis.  Cardiology recommended to hold off Surgery for another 6 months. SLP assessed pt today and recommended regular diet/thin liquids. Pt currently denies trouble chewing/swallowing and reports good appetite. D/C planning in progress as pt medically stable now.            Factors impacting intake: [x ] none [ ] nausea  [ ] vomiting [ ] diarrhea [ ] constipation  [ ]chewing problems [ ] swallowing issues  [ ] other:     Diet Presciption: Diet, DASH/TLC:   Sodium & Cholesterol Restricted (02-22-19 @ 12:58)    Intake: >50%    Current Weight: Weight (kg): 67.6 (02-19 @ 10:30)  % Weight Change    Pertinent Medications: MEDICATIONS  (STANDING):  amLODIPine   Tablet 5 milliGRAM(s) Oral daily  amoxicillin  875 milliGRAM(s)/clavulanate 1 Tablet(s) Oral two times a day  atorvastatin 10 milliGRAM(s) Oral at bedtime  clopidogrel Tablet 75 milliGRAM(s) Oral daily  lisinopril 20 milliGRAM(s) Oral daily  metoprolol succinate  milliGRAM(s) Oral daily  pantoprazole  Injectable 40 milliGRAM(s) IV Push daily  potassium acid phosphate/sodium acid phosphate tablet (K-PHOS No. 2) 1 Tablet(s) Oral four times a day with meals  sucralfate suspension 1 Gram(s) Oral two times a day    MEDICATIONS  (PRN):  acetaminophen    Suspension .. 650 milliGRAM(s) Oral every 6 hours PRN Temp greater or equal to 38C (100.4F)  artificial tears (preservative free) Ophthalmic Solution 1 Drop(s) Both EYES two times a day PRN for dry eyes  oxyCODONE    5 mG/acetaminophen 325 mG 1 Tablet(s) Oral every 4 hours PRN Moderate Pain (4 - 6)    Pertinent Labs: 02-22 Na139 mmol/L Glu 116 mg/dL<H> K+ 3.5 mmol/L Cr  0.75 mg/dL BUN 11 mg/dL 02-22 Phos 2.4 mg/dL<L> 02-16 Alb 2.9 g/dL<L> 02-16 Chol 152 mg/dL LDL 90 mg/dL HDL 48 mg/dL<L> Trig 71 mg/dL     CAPILLARY BLOOD GLUCOSE        Skin: Intact    Estimated Needs:   [ ] no change since previous assessment  [ ] recalculated:     Previous Nutrition Diagnosis:   [ ] Inadequate Energy Intake [ ]Inadequate Oral Intake [ ] Excessive Energy Intake   [ ] Underweight [ ] Increased Nutrient Needs [ ] Overweight/Obesity   [ ] Altered GI Function [ ] Unintended Weight Loss [ ] Food & Nutrition Related Knowledge Deficit [ ] Malnutrition     Nutrition Diagnosis is [ ] ongoing  [ ] resolved [ ] not applicable     New Nutrition Diagnosis: [ ] not applicable       Interventions:   Recommend  [ ] Change Diet To:  [ ] Nutrition Supplement  [ ] Nutrition Support  [ ] Other:     Monitoring and Evaluation:   [ ] PO intake [ x ] Tolerance to diet prescription [ x ] weights [ x ] labs[ x ] follow up per protocol  [ ] other: Assessment:         85F with CAD s/p 13 stents, PVD s/p 1 stent in left side, s/p PPM  arthritis, diverticulosis, HLD, HTN who was admitted c with weakness. Pt found to have Gastrointestinal hemorrhage,  EGD shows hiatial hernia with ulcer.  Surgery recommended esophagram which showed no evidence of reflex esophagitis.  Cardiology recommended to hold off Surgery for another 6 months. SLP assessed pt today and recommended regular diet/thin liquids. Pt currently denies trouble chewing/swallowing and reports good appetite. D/C planning in progress as pt medically stable now.            Factors impacting intake: [x ] none [ ] nausea  [ ] vomiting [ ] diarrhea [ ] constipation  [ ]chewing problems [ ] swallowing issues  [ ] other:     Diet Presciption: Diet, DASH/TLC:   Sodium & Cholesterol Restricted (02-22-19 @ 12:58)    Intake: >50%    Current Weight: Weight (kg): 67.6 (02-19 @ 10:30)  % Weight Change  no new wts    Pertinent Medications: MEDICATIONS  (STANDING):  amLODIPine   Tablet 5 milliGRAM(s) Oral daily  amoxicillin  875 milliGRAM(s)/clavulanate 1 Tablet(s) Oral two times a day  atorvastatin 10 milliGRAM(s) Oral at bedtime  clopidogrel Tablet 75 milliGRAM(s) Oral daily  lisinopril 20 milliGRAM(s) Oral daily  metoprolol succinate  milliGRAM(s) Oral daily  pantoprazole  Injectable 40 milliGRAM(s) IV Push daily  potassium acid phosphate/sodium acid phosphate tablet (K-PHOS No. 2) 1 Tablet(s) Oral four times a day with meals  sucralfate suspension 1 Gram(s) Oral two times a day    MEDICATIONS  (PRN):  acetaminophen    Suspension .. 650 milliGRAM(s) Oral every 6 hours PRN Temp greater or equal to 38C (100.4F)  artificial tears (preservative free) Ophthalmic Solution 1 Drop(s) Both EYES two times a day PRN for dry eyes  oxyCODONE    5 mG/acetaminophen 325 mG 1 Tablet(s) Oral every 4 hours PRN Moderate Pain (4 - 6)    Pertinent Labs: 02-22 Na139 mmol/L Glu 116 mg/dL<H> K+ 3.5 mmol/L Cr  0.75 mg/dL BUN 11 mg/dL 02-22 Phos 2.4 mg/dL<L> 02-16 Alb 2.9 g/dL<L> 02-16 Chol 152 mg/dL LDL 90 mg/dL HDL 48 mg/dL<L> Trig 71 mg/dL     CAPILLARY BLOOD GLUCOSE        Skin: Intact    Estimated Needs:   [ ] no change since previous assessment  [ ] recalculated:     Previous Nutrition Diagnosis:   [ ] Inadequate Energy Intake [x ]Inadequate Oral Intake [ ] Excessive Energy Intake   [ ] Underweight [ ] Increased Nutrient Needs [ ] Overweight/Obesity   [ ] Altered GI Function [ ] Unintended Weight Loss [ ] Food & Nutrition Related Knowledge Deficit [ ] Malnutrition     Nutrition Diagnosis is [ ] ongoing  [x ] resolved [ ] not applicable     New Nutrition Diagnosis: [ ] not applicable       Interventions:   Recommend   continue regular diet/thin liquids as recommedned by SLP  [ ] Change Diet To:  [ ] Nutrition Supplement  [ ] Nutrition Support  [ ] Other:     Monitoring and Evaluation:   [ ] PO intake [ x ] Tolerance to diet prescription [ x ] weights [ x ] labs[ x ] follow up per protocol  [ ] other:

## 2019-02-22 NOTE — PROGRESS NOTE ADULT - PROBLEM SELECTOR PLAN 2
Management deferred to medicine / GI.
Management deferred to medicine / GI. patient has hiatal hernia with bleeding ulcer , patient has stable hemoglobin from yesterday , preferably would defer surgery until 6 months  as patient recent PCI ,  if possible ,   continue IRON supplement , monitor for bleeding ,
patient had multivessel stenting , most recent LCX 11/2018 , RCA  jan 2018, patient stents  on cath  NOV 2018   except LCX which was stented. continue BB , statin , antiplatelet plavix, hold ecotrin.
- continue metoprolol  - cont with amlodipine and lisinopril  and Plavix.   - holding p ASA given bleed (patient said her stents were placed at least >6 months ago if not longer)  Cardiology evaluation appreciated.
Management deferred to medicine / GI. patient has hiatal hernia with bleeding ulcer , patient has stable hemoglobin from yesterday , preferably would defer surgery until 6 months  as patient recent PCI ,  if possible ,   continue IRON supplement , monitor for bleeding ,

## 2019-02-22 NOTE — PROGRESS NOTE ADULT - ASSESSMENT
85F with CAD s/p 13 stents, PVD s/p 1 stent in left side, s/p PPM (patient doesn't remember why she has it), arthritis, diverticulosis, HLD, HTN who presents with weakness for the past 2-3 days.  Also with chest discomfort though now chest discomfort resolved.        1.  Gastrointestinal hemorrhage,   -H/H stable.     -Continue with PPI, and Carafate   - hold anticoagulants  - transfuse if <8  - EGD shows hiatial hernia with ulcer.  Surgery recommended esophagram.  Esophogram shows no evidence of reflex esophagitis.    -Cardiology recommended to hold off Surgery for another 6 months.  surgery in agreement  -Further care as per GI, and surgery      2.  Coronary artery disease   -Cont with amlodipine and benazepril (or equivalent)  - Continue with plavix.  Hold ASA given bleed (patient said her stents were placed at least >6 months ago if not longer).      3.   Dyslipidemia.  cont lovastatin (or equivalent).     4.  Weakness.  PT/OT      5.  Fever.  Unclear Etiology.  Atelectasis. Vs possible aspiration pna.  Continue with PO augmentin  -UA negative   Plan of care was discussed with patient,   in great details, All questions were answered to their satisfaction  Seems to understand, and in agreement

## 2019-02-22 NOTE — PROGRESS NOTE ADULT - SUBJECTIVE AND OBJECTIVE BOX
Date/Time Patient Seen:  		  Referring MD:   Data Reviewed	       Patient is a 85y old  Female who presents with a chief complaint of weakness (21 Feb 2019 14:12)      Subjective/HPI     PAST MEDICAL & SURGICAL HISTORY:  Acute arthritis  High cholesterol  Stress incontinence in female  Rectocele  Diverticulosis  Hiatal hernia  Hypertension  Hypothyroidism  Dyslipidemia  CAD (coronary artery disease)  Carpal tunnel syndrome of right wrist  Cardiac pacemaker  Stented coronary artery: 12 heart stents,   1 left leg stents  H/O carotid endarterectomy: right  History of hysterectomy  History of breast reconstruction  Bilateral cataracts: surgery  Status post total knee replacement: right        Medication list         MEDICATIONS  (STANDING):  amLODIPine   Tablet 5 milliGRAM(s) Oral daily  amoxicillin  875 milliGRAM(s)/clavulanate 1 Tablet(s) Oral two times a day  atorvastatin 10 milliGRAM(s) Oral at bedtime  clopidogrel Tablet 75 milliGRAM(s) Oral daily  lisinopril 20 milliGRAM(s) Oral daily  metoprolol succinate  milliGRAM(s) Oral daily  pantoprazole  Injectable 40 milliGRAM(s) IV Push daily  potassium acid phosphate/sodium acid phosphate tablet (K-PHOS No. 2) 1 Tablet(s) Oral four times a day with meals  sucralfate suspension 1 Gram(s) Oral two times a day    MEDICATIONS  (PRN):  acetaminophen    Suspension .. 650 milliGRAM(s) Oral every 6 hours PRN Temp greater or equal to 38C (100.4F)  artificial tears (preservative free) Ophthalmic Solution 1 Drop(s) Both EYES two times a day PRN for dry eyes  oxyCODONE    5 mG/acetaminophen 325 mG 1 Tablet(s) Oral every 4 hours PRN Moderate Pain (4 - 6)         Vitals log        ICU Vital Signs Last 24 Hrs  T(C): 36.6 (21 Feb 2019 23:25), Max: 36.9 (21 Feb 2019 15:22)  T(F): 97.9 (21 Feb 2019 23:25), Max: 98.4 (21 Feb 2019 15:22)  HR: 92 (22 Feb 2019 05:27) (78 - 99)  BP: 137/57 (22 Feb 2019 05:27) (112/62 - 137/57)  BP(mean): --  ABP: --  ABP(mean): --  RR: 15 (21 Feb 2019 23:25) (15 - 20)  SpO2: 96% (21 Feb 2019 23:25) (96% - 97%)           Input and Output:  I&O's Detail      Lab Data                        10.2   13.17 )-----------( 346      ( 21 Feb 2019 17:54 )             32.4     02-21    135  |  101  |  15  ----------------------------<  154<H>  3.5   |  24  |  0.87    Ca    8.8      21 Feb 2019 17:54  Phos  2.1     02-21  Mg     1.7     02-21              Review of Systems	      Objective     Physical Examination  heart s1s2  lung dec BS        Pertinent Lab findings & Imaging      Dwain:  NO   Adequate UO     I&O's Detail           Discussed with:     Cultures:	        Radiology

## 2019-02-22 NOTE — PROGRESS NOTE ADULT - PROBLEM SELECTOR PLAN 3
Controlled
severe anemia , possibly gastritic , would benefit from transfusion if drops below *8.
- continue Atorvastatin .

## 2019-02-22 NOTE — PROGRESS NOTE ADULT - REASON FOR ADMISSION
weakness
GI Bleed
weakness

## 2019-02-22 NOTE — PROGRESS NOTE ADULT - PROBLEM SELECTOR PROBLEM 1
Coronary artery disease involving native heart without angina pectoris, unspecified vessel or lesion type
Coronary artery disease involving native heart without angina pectoris, unspecified vessel or lesion type
Gastrointestinal hemorrhage, unspecified gastrointestinal hemorrhage type
Hiatal hernia
Preop cardiovascular exam
Gastrointestinal hemorrhage, unspecified gastrointestinal hemorrhage type
Hiatal hernia
Coronary artery disease involving native heart without angina pectoris, unspecified vessel or lesion type

## 2019-02-22 NOTE — PROGRESS NOTE ADULT - SUBJECTIVE AND OBJECTIVE BOX
REASON FOR VISIT: CAD; Rx management    HPI:   85 year old woman with a history of multivessel CAD s/p numerous coronary stents (most recent PCI was in 2018), PPM, HTN, HLD, carotid atherosclerosis, diverticulosis, hypothyroidism, anemia, insomnia, peripheral artery disease s/p stent, arthritis,  admitted 2/15/19 with GI bleeding and now s/p EGD (), found to have gastric linear ulcerations.    19:  No cardiac complaints; denies angina, dyspnea, orthopnea.  19  Patient is feeling fine ,  had endoscopy  showed hiatal hernia with bleeding ulcer , recommended surgery . hemoglobin in stable   19 Patient is feeling  better  no chest pain or shortness of breath , was called by hospitalist questionable VT however it is artifact  , was hypokalemic   which is repleted ,    MEDICATIONS  (STANDING):  amLODIPine   Tablet 5 milliGRAM(s) Oral daily  amoxicillin  875 milliGRAM(s)/clavulanate 1 Tablet(s) Oral two times a day  atorvastatin 10 milliGRAM(s) Oral at bedtime  clopidogrel Tablet 75 milliGRAM(s) Oral daily  lisinopril 20 milliGRAM(s) Oral daily  metoprolol succinate  milliGRAM(s) Oral daily  pantoprazole  Injectable 40 milliGRAM(s) IV Push daily  potassium acid phosphate/sodium acid phosphate tablet (K-PHOS No. 2) 1 Tablet(s) Oral four times a day with meals  sucralfate suspension 1 Gram(s) Oral two times a day    MEDICATIONS  (PRN):  acetaminophen    Suspension .. 650 milliGRAM(s) Oral every 6 hours PRN Temp greater or equal to 38C (100.4F)  artificial tears (preservative free) Ophthalmic Solution 1 Drop(s) Both EYES two times a day PRN for dry eyes  oxyCODONE    5 mG/acetaminophen 325 mG 1 Tablet(s) Oral every 4 hours PRN Moderate Pain (4 - 6)      Vital Signs Last 24 Hrs  T(C): 37.5 (2019 07:50), Max: 37.5 (2019 07:50)  T(F): 99.5 (2019 07:50), Max: 99.5 (2019 07:50)  HR: 91 (2019 07:50) (91 - 99)  BP: 144/70 (2019 07:50) (112/62 - 144/70)  BP(mean): --  RR: 26 (2019 07:50) (15 - 26)  SpO2: 94% (2019 07:50) (94% - 97%)    I&O's Summary    PHYSICAL EXAM:  Constitutional: NAD, awake and alert  Neck:  supple,  No JVD  Respiratory: Breath sounds are clear bilaterally, No wheezing, rales or rhonchi  Cardiovascular: S1 and S2, regular rate and rhythm  Gastrointestinal: Bowel Sounds present, soft, nontender.   Extremities: No peripheral edema.     LABS:                                10.2   13.17 )-----------( 346      ( 2019 17:54 )             32.4         139  |  105  |  11  ----------------------------<  116<H>  3.5   |  25  |  0.75    Ca    8.6      2019 07:42  Phos  2.4       Mg     1.8       Urinalysis Basic - ( 2019 18:44 )    Color: Yellow / Appearance: Clear / S.015 / pH: x    Gluc: x / Ketone: Small  / Bili: Negative / Urobili: 1 mg/dL   Blood: x / Protein: 15 mg/dL / Nitrite: Negative   Leuk Esterase: Trace / RBC: 0-2 /HPF / WBC 3-5   Sq Epi: x / Non Sq Epi: Few / Bacteria: Few      - Chol 152 LDL 90 HDL 48<L> Trig 71    Cardiac Cath Lab - Adult (18 @ 14:52):  Mid LCx stent    Transthoracic Echocardiogram (18 @ 09:31):   Mild (1+) mitral regurgitation .   There are fibrocalcific changes noted to the aortic valve leaflets. Mild to Moderate aortic insufficiency.   Moderate tricuspid valve regurgitation.   Mild pulmonary hypertension.   The left atrium is mildly dilated.   The left ventricle is normal in size, wall thickness, wall motion and contractility. Estimated left ventricular ejection fraction is 55-60 %.

## 2019-02-22 NOTE — PROGRESS NOTE ADULT - PROBLEM SELECTOR PROBLEM 3
Hypertension
Gastrointestinal hemorrhage, unspecified gastrointestinal hemorrhage type
Hypertension
Hypertension
Dyslipidemia

## 2019-02-22 NOTE — PROGRESS NOTE ADULT - PROBLEM SELECTOR PROBLEM 2
Coronary artery disease involving native heart without angina pectoris, unspecified vessel or lesion type
Gastrointestinal hemorrhage, unspecified gastrointestinal hemorrhage type
Gastrointestinal hemorrhage, unspecified gastrointestinal hemorrhage type
Coronary artery disease involving native heart without angina pectoris, unspecified vessel or lesion type
Gastrointestinal hemorrhage, unspecified gastrointestinal hemorrhage type

## 2019-02-22 NOTE — PROGRESS NOTE ADULT - SUBJECTIVE AND OBJECTIVE BOX
CC. Weakness     HPI.  Patient reports that she feels better.  No further blood per stool noticed.  Tolerating po intake well .  Reports generalized weakness.  Offers no other complaints  Patient is refusing rehab, discussed case with daughter hoping that she will convince her, await response      Constitutional: No   fatigue or weight loss.  Skin: No rash.  Eyes: No recent vision problems or eye pain.  ENT: No congestion, ear pain, or sore throat.  Endocrine: No thyroid problems.  Cardiovascular: No chest pain or palpation.  Respiratory: No cough, shortness of breath, congestion, or wheezing.  Gastrointestinal: No abdominal pain, nausea, vomiting, or diarrhea.  Genitourinary: No dysuria.  Musculoskeletal: No joint swelling.  Neurologic: No headache.    Vital Signs Last 24 Hrs  T(C): 37.5 (2019 07:50), Max: 37.5 (2019 07:50)  T(F): 99.5 (2019 07:50), Max: 99.5 (2019 07:50)  HR: 91 (2019 07:50) (91 - 99)  BP: 144/70 (2019 07:50) (112/62 - 144/70)  BP(mean): --  RR: 26 (2019 07:50) (15 - 26)  SpO2: 94% (2019 07:50) (94% - 97%)      PHYSICAL EXAM-  GENERAL: NAD, well-groomed, well-developed  HEAD:  Atraumatic, Normocephalic  EYES: EOMI, PERRLA, conjunctiva and sclera clear  NECK: Supple, No JVD, Normal thyroid  NERVOUS SYSTEM:  Alert & Oriented X3, Motor Strength 5/5 B/L upper and lower extremities; DTRs 2+ intact and symmetric  CHEST/LUNG: Clear to percussion bilaterally; No rales, rhonchi, wheezing, or rubs  HEART: Regular rate and rhythm; No murmurs, rubs, or gallops  ABDOMEN: Soft, Nontender, Nondistended; Bowel sounds present  EXTREMITIES:  2+ Peripheral Pulses, No clubbing, cyanosis, or edema  SKIN: No rashes or lesions                            10.2   13.17 )-----------( 346      ( 2019 17:54 )             32.4     02-    139  |  105  |  11  ----------------------------<  116<H>  3.5   |  25  |  0.75    Ca    8.6      2019 07:42  Phos  2.4       Mg     1.8                 Urinalysis Basic - ( 2019 18:44 )    Color: Yellow / Appearance: Clear / S.015 / pH: x  Gluc: x / Ketone: Small  / Bili: Negative / Urobili: 1 mg/dL   Blood: x / Protein: 15 mg/dL / Nitrite: Negative   Leuk Esterase: Trace / RBC: 0-2 /HPF / WBC 3-5   Sq Epi: x / Non Sq Epi: Few / Bacteria: Few      MEDICATIONS  (STANDING):  amLODIPine   Tablet 5 milliGRAM(s) Oral daily  amoxicillin  875 milliGRAM(s)/clavulanate 1 Tablet(s) Oral two times a day  atorvastatin 10 milliGRAM(s) Oral at bedtime  clopidogrel Tablet 75 milliGRAM(s) Oral daily  lisinopril 20 milliGRAM(s) Oral daily  metoprolol succinate  milliGRAM(s) Oral daily  pantoprazole  Injectable 40 milliGRAM(s) IV Push daily  potassium acid phosphate/sodium acid phosphate tablet (K-PHOS No. 2) 1 Tablet(s) Oral four times a day with meals  sucralfate suspension 1 Gram(s) Oral two times a day    MEDICATIONS  (PRN):  acetaminophen    Suspension .. 650 milliGRAM(s) Oral every 6 hours PRN Temp greater or equal to 38C (100.4F)  artificial tears (preservative free) Ophthalmic Solution 1 Drop(s) Both EYES two times a day PRN for dry eyes  oxyCODONE    5 mG/acetaminophen 325 mG 1 Tablet(s) Oral every 4 hours PRN Moderate Pain (4 - 6)        Imaging Personally Reviewed:     [x ] YES  [ ] NO    Consultant(s) Notes Reviewed:  [x ] YES  [ ] NO    Care Discussed with Consultants/Other Providers [x ] YES  [ ] NO

## 2019-02-22 NOTE — PROGRESS NOTE ADULT - NSHPATTENDINGPLANDISCUSS_GEN_ALL_CORE
GI
medicine
RN , cardiology , patient.GI
RN , cardiology , patient.GI
dr ruffin, dr henry aware
Medicine and the patient
RN , cardiology , patient.
RN , cardiology , patient.GI
team
surgeon , GI , hospitalist
RN , cardiology , patient.

## 2019-03-19 ENCOUNTER — EMERGENCY (EMERGENCY)
Facility: HOSPITAL | Age: 84
LOS: 1 days | Discharge: HOME | End: 2019-03-19
Attending: EMERGENCY MEDICINE | Admitting: EMERGENCY MEDICINE
Payer: MEDICARE

## 2019-03-19 VITALS
HEART RATE: 89 BPM | RESPIRATION RATE: 20 BRPM | OXYGEN SATURATION: 99 % | TEMPERATURE: 98 F | DIASTOLIC BLOOD PRESSURE: 80 MMHG | SYSTOLIC BLOOD PRESSURE: 161 MMHG

## 2019-03-19 VITALS
DIASTOLIC BLOOD PRESSURE: 95 MMHG | RESPIRATION RATE: 16 BRPM | SYSTOLIC BLOOD PRESSURE: 187 MMHG | WEIGHT: 143.96 LBS | HEART RATE: 90 BPM | OXYGEN SATURATION: 99 % | TEMPERATURE: 98 F | HEIGHT: 60 IN

## 2019-03-19 DIAGNOSIS — Z98.82 BREAST IMPLANT STATUS: Chronic | ICD-10-CM

## 2019-03-19 DIAGNOSIS — H26.9 UNSPECIFIED CATARACT: Chronic | ICD-10-CM

## 2019-03-19 DIAGNOSIS — Z96.659 PRESENCE OF UNSPECIFIED ARTIFICIAL KNEE JOINT: Chronic | ICD-10-CM

## 2019-03-19 DIAGNOSIS — Z98.89 OTHER SPECIFIED POSTPROCEDURAL STATES: Chronic | ICD-10-CM

## 2019-03-19 DIAGNOSIS — Z90.710 ACQUIRED ABSENCE OF BOTH CERVIX AND UTERUS: Chronic | ICD-10-CM

## 2019-03-19 DIAGNOSIS — Z95.0 PRESENCE OF CARDIAC PACEMAKER: Chronic | ICD-10-CM

## 2019-03-19 DIAGNOSIS — Z95.5 PRESENCE OF CORONARY ANGIOPLASTY IMPLANT AND GRAFT: Chronic | ICD-10-CM

## 2019-03-19 DIAGNOSIS — G56.01 CARPAL TUNNEL SYNDROME, RIGHT UPPER LIMB: Chronic | ICD-10-CM

## 2019-03-19 LAB
ALBUMIN SERPL ELPH-MCNC: 3.5 G/DL — SIGNIFICANT CHANGE UP (ref 3.3–5)
ALP SERPL-CCNC: 69 U/L — SIGNIFICANT CHANGE UP (ref 40–120)
ALT FLD-CCNC: 14 U/L — SIGNIFICANT CHANGE UP (ref 12–78)
ANION GAP SERPL CALC-SCNC: 9 MMOL/L — SIGNIFICANT CHANGE UP (ref 5–17)
APTT BLD: 28.3 SEC — LOW (ref 28.5–37)
AST SERPL-CCNC: 13 U/L — LOW (ref 15–37)
BASOPHILS # BLD AUTO: 0.03 K/UL — SIGNIFICANT CHANGE UP (ref 0–0.2)
BASOPHILS NFR BLD AUTO: 0.5 % — SIGNIFICANT CHANGE UP (ref 0–2)
BILIRUB SERPL-MCNC: 0.3 MG/DL — SIGNIFICANT CHANGE UP (ref 0.2–1.2)
BLD GP AB SCN SERPL QL: SIGNIFICANT CHANGE UP
BUN SERPL-MCNC: 16 MG/DL — SIGNIFICANT CHANGE UP (ref 7–23)
CALCIUM SERPL-MCNC: 8.6 MG/DL — SIGNIFICANT CHANGE UP (ref 8.5–10.1)
CHLORIDE SERPL-SCNC: 107 MMOL/L — SIGNIFICANT CHANGE UP (ref 96–108)
CO2 SERPL-SCNC: 24 MMOL/L — SIGNIFICANT CHANGE UP (ref 22–31)
CREAT SERPL-MCNC: 0.69 MG/DL — SIGNIFICANT CHANGE UP (ref 0.5–1.3)
EOSINOPHIL # BLD AUTO: 0.01 K/UL — SIGNIFICANT CHANGE UP (ref 0–0.5)
EOSINOPHIL NFR BLD AUTO: 0.2 % — SIGNIFICANT CHANGE UP (ref 0–6)
GLUCOSE SERPL-MCNC: 115 MG/DL — HIGH (ref 70–99)
HCT VFR BLD CALC: 36.8 % — SIGNIFICANT CHANGE UP (ref 34.5–45)
HGB BLD-MCNC: 11.3 G/DL — LOW (ref 11.5–15.5)
IMM GRANULOCYTES NFR BLD AUTO: 0.4 % — SIGNIFICANT CHANGE UP (ref 0–1.5)
INR BLD: 1.05 RATIO — SIGNIFICANT CHANGE UP (ref 0.88–1.16)
LYMPHOCYTES # BLD AUTO: 1.15 K/UL — SIGNIFICANT CHANGE UP (ref 1–3.3)
LYMPHOCYTES # BLD AUTO: 20.9 % — SIGNIFICANT CHANGE UP (ref 13–44)
MCHC RBC-ENTMCNC: 24 PG — LOW (ref 27–34)
MCHC RBC-ENTMCNC: 30.7 GM/DL — LOW (ref 32–36)
MCV RBC AUTO: 78.3 FL — LOW (ref 80–100)
MONOCYTES # BLD AUTO: 0.36 K/UL — SIGNIFICANT CHANGE UP (ref 0–0.9)
MONOCYTES NFR BLD AUTO: 6.5 % — SIGNIFICANT CHANGE UP (ref 2–14)
NEUTROPHILS # BLD AUTO: 3.93 K/UL — SIGNIFICANT CHANGE UP (ref 1.8–7.4)
NEUTROPHILS NFR BLD AUTO: 71.5 % — SIGNIFICANT CHANGE UP (ref 43–77)
NRBC # BLD: 0 /100 WBCS — SIGNIFICANT CHANGE UP (ref 0–0)
OB PNL STL: NEGATIVE — SIGNIFICANT CHANGE UP
PLATELET # BLD AUTO: 260 K/UL — SIGNIFICANT CHANGE UP (ref 150–400)
POTASSIUM SERPL-MCNC: 4 MMOL/L — SIGNIFICANT CHANGE UP (ref 3.5–5.3)
POTASSIUM SERPL-SCNC: 4 MMOL/L — SIGNIFICANT CHANGE UP (ref 3.5–5.3)
PROT SERPL-MCNC: 6.8 G/DL — SIGNIFICANT CHANGE UP (ref 6–8.3)
PROTHROM AB SERPL-ACNC: 12 SEC — SIGNIFICANT CHANGE UP (ref 10–12.9)
RBC # BLD: 4.7 M/UL — SIGNIFICANT CHANGE UP (ref 3.8–5.2)
RBC # FLD: 18.4 % — HIGH (ref 10.3–14.5)
SODIUM SERPL-SCNC: 140 MMOL/L — SIGNIFICANT CHANGE UP (ref 135–145)
WBC # BLD: 5.5 K/UL — SIGNIFICANT CHANGE UP (ref 3.8–10.5)
WBC # FLD AUTO: 5.5 K/UL — SIGNIFICANT CHANGE UP (ref 3.8–10.5)

## 2019-03-19 PROCEDURE — 71045 X-RAY EXAM CHEST 1 VIEW: CPT | Mod: 26

## 2019-03-19 PROCEDURE — 85730 THROMBOPLASTIN TIME PARTIAL: CPT

## 2019-03-19 PROCEDURE — 93010 ELECTROCARDIOGRAM REPORT: CPT

## 2019-03-19 PROCEDURE — 99284 EMERGENCY DEPT VISIT MOD MDM: CPT | Mod: 25

## 2019-03-19 PROCEDURE — 93005 ELECTROCARDIOGRAM TRACING: CPT

## 2019-03-19 PROCEDURE — 80053 COMPREHEN METABOLIC PANEL: CPT

## 2019-03-19 PROCEDURE — 36415 COLL VENOUS BLD VENIPUNCTURE: CPT

## 2019-03-19 PROCEDURE — 86850 RBC ANTIBODY SCREEN: CPT

## 2019-03-19 PROCEDURE — 82272 OCCULT BLD FECES 1-3 TESTS: CPT

## 2019-03-19 PROCEDURE — 85610 PROTHROMBIN TIME: CPT

## 2019-03-19 PROCEDURE — 99284 EMERGENCY DEPT VISIT MOD MDM: CPT

## 2019-03-19 PROCEDURE — 86901 BLOOD TYPING SEROLOGIC RH(D): CPT

## 2019-03-19 PROCEDURE — 86900 BLOOD TYPING SEROLOGIC ABO: CPT

## 2019-03-19 PROCEDURE — 71045 X-RAY EXAM CHEST 1 VIEW: CPT

## 2019-03-19 PROCEDURE — 85027 COMPLETE CBC AUTOMATED: CPT

## 2019-03-19 NOTE — ED ADULT NURSE NOTE - OBJECTIVE STATEMENT
Pt received in bed alert and oriented and resting in bed. Pt has c/o dark stools x 2 months and states that she was sent to the ER by her MD. As per Md's orders IV tommy placed blood specimen obtained and sent to the lab. Nursing care ongoing and safety maintained. No active bleeding noted. nursing care ongoing and safety maintained

## 2019-03-19 NOTE — ED ADULT NURSE NOTE - NSIMPLEMENTINTERV_GEN_ALL_ED
Implemented All Fall with Harm Risk Interventions:  [place] to call system. Call bell, personal items and telephone within reach. Instruct patient to call for assistance. Room bathroom lighting operational. Non-slip footwear when patient is off stretcher. Physically safe environment: no spills, clutter or unnecessary equipment. Stretcher in lowest position, wheels locked, appropriate side rails in place. Provide visual cue, wrist band, yellow gown, etc. Monitor gait and stability. Monitor for mental status changes and reorient to person, place, and time. Review medications for side effects contributing to fall risk. Reinforce activity limits and safety measures with patient and family. Provide visual clues: red socks.

## 2019-03-19 NOTE — ED PROVIDER NOTE - ATTENDING CONTRIBUTION TO CARE
pt referred by pmd for blood in stool x >1 week. no fever, ha, cp, sob, cough, abd pain, d/n/v.  wd, wn female, nad, pink conjunctiva, mmm, s1s2 rrr, lungs cta, abd soft, nt, nd, no rebound or guarding, skin warm dry

## 2019-03-19 NOTE — ED PROVIDER NOTE - OBJECTIVE STATEMENT
85 y female presents with states she has had blood in her stool for a "few weeks" 85 y female presents with states she has had blood in her stool for a "few weeks", states she went to see her pmd Dr Martinez today, was sent to ED for evaluation.  denies abdominal pain, denies sob, denies pain. states she is on plavix.    PMH:   Acute arthritis    CAD (coronary artery disease)    Diverticulosis    Dyslipidemia    Hiatal hernia    High cholesterol    Hypertension    Hypothyroidism    Rectocele    Stress incontinence in female

## 2019-03-19 NOTE — ED PROVIDER NOTE - PROGRESS NOTE DETAILS
spoke with Dr Hickman, associate of Dr Cardona, case discussed, guaiac neg, cxr  small basilar infiltrate, no cough, no fever, normal wbc, hgb 11.3, hct 36.8.  advised have patient follow up with Dr Cardona tomorrow.  copy of results given to patient, results discussed with patient.   advised follow up with Dr Cardona

## 2019-03-26 ENCOUNTER — EMERGENCY (EMERGENCY)
Facility: HOSPITAL | Age: 84
LOS: 1 days | Discharge: HOME | End: 2019-03-26
Attending: STUDENT IN AN ORGANIZED HEALTH CARE EDUCATION/TRAINING PROGRAM | Admitting: STUDENT IN AN ORGANIZED HEALTH CARE EDUCATION/TRAINING PROGRAM
Payer: MEDICARE

## 2019-03-26 VITALS
DIASTOLIC BLOOD PRESSURE: 79 MMHG | OXYGEN SATURATION: 96 % | SYSTOLIC BLOOD PRESSURE: 162 MMHG | TEMPERATURE: 99 F | RESPIRATION RATE: 14 BRPM | WEIGHT: 147.05 LBS | HEART RATE: 95 BPM

## 2019-03-26 DIAGNOSIS — Z96.659 PRESENCE OF UNSPECIFIED ARTIFICIAL KNEE JOINT: Chronic | ICD-10-CM

## 2019-03-26 DIAGNOSIS — G56.01 CARPAL TUNNEL SYNDROME, RIGHT UPPER LIMB: Chronic | ICD-10-CM

## 2019-03-26 DIAGNOSIS — Z95.5 PRESENCE OF CORONARY ANGIOPLASTY IMPLANT AND GRAFT: Chronic | ICD-10-CM

## 2019-03-26 DIAGNOSIS — Z98.82 BREAST IMPLANT STATUS: Chronic | ICD-10-CM

## 2019-03-26 DIAGNOSIS — H26.9 UNSPECIFIED CATARACT: Chronic | ICD-10-CM

## 2019-03-26 DIAGNOSIS — Z98.89 OTHER SPECIFIED POSTPROCEDURAL STATES: Chronic | ICD-10-CM

## 2019-03-26 DIAGNOSIS — Z95.0 PRESENCE OF CARDIAC PACEMAKER: Chronic | ICD-10-CM

## 2019-03-26 DIAGNOSIS — Z90.710 ACQUIRED ABSENCE OF BOTH CERVIX AND UTERUS: Chronic | ICD-10-CM

## 2019-03-26 LAB
ALBUMIN SERPL ELPH-MCNC: 3.2 G/DL — LOW (ref 3.3–5)
ALP SERPL-CCNC: 65 U/L — SIGNIFICANT CHANGE UP (ref 40–120)
ALT FLD-CCNC: 13 U/L — SIGNIFICANT CHANGE UP (ref 12–78)
ANION GAP SERPL CALC-SCNC: 9 MMOL/L — SIGNIFICANT CHANGE UP (ref 5–17)
AST SERPL-CCNC: 14 U/L — LOW (ref 15–37)
BASOPHILS # BLD AUTO: 0.05 K/UL — SIGNIFICANT CHANGE UP (ref 0–0.2)
BASOPHILS NFR BLD AUTO: 0.6 % — SIGNIFICANT CHANGE UP (ref 0–2)
BILIRUB SERPL-MCNC: 0.5 MG/DL — SIGNIFICANT CHANGE UP (ref 0.2–1.2)
BUN SERPL-MCNC: 12 MG/DL — SIGNIFICANT CHANGE UP (ref 7–23)
CALCIUM SERPL-MCNC: 9.1 MG/DL — SIGNIFICANT CHANGE UP (ref 8.5–10.1)
CHLORIDE SERPL-SCNC: 108 MMOL/L — SIGNIFICANT CHANGE UP (ref 96–108)
CO2 SERPL-SCNC: 23 MMOL/L — SIGNIFICANT CHANGE UP (ref 22–31)
CREAT SERPL-MCNC: 0.64 MG/DL — SIGNIFICANT CHANGE UP (ref 0.5–1.3)
EOSINOPHIL # BLD AUTO: 0.09 K/UL — SIGNIFICANT CHANGE UP (ref 0–0.5)
EOSINOPHIL NFR BLD AUTO: 1.1 % — SIGNIFICANT CHANGE UP (ref 0–6)
FLU A RESULT: SIGNIFICANT CHANGE UP
FLU A RESULT: SIGNIFICANT CHANGE UP
FLUAV AG NPH QL: SIGNIFICANT CHANGE UP
FLUBV AG NPH QL: SIGNIFICANT CHANGE UP
GLUCOSE SERPL-MCNC: 97 MG/DL — SIGNIFICANT CHANGE UP (ref 70–99)
HCT VFR BLD CALC: 34.2 % — LOW (ref 34.5–45)
HGB BLD-MCNC: 10.4 G/DL — LOW (ref 11.5–15.5)
IMM GRANULOCYTES NFR BLD AUTO: 0.7 % — SIGNIFICANT CHANGE UP (ref 0–1.5)
LACTATE SERPL-SCNC: 1 MMOL/L — SIGNIFICANT CHANGE UP (ref 0.7–2)
LYMPHOCYTES # BLD AUTO: 1.58 K/UL — SIGNIFICANT CHANGE UP (ref 1–3.3)
LYMPHOCYTES # BLD AUTO: 18.9 % — SIGNIFICANT CHANGE UP (ref 13–44)
MCHC RBC-ENTMCNC: 24.1 PG — LOW (ref 27–34)
MCHC RBC-ENTMCNC: 30.4 GM/DL — LOW (ref 32–36)
MCV RBC AUTO: 79.4 FL — LOW (ref 80–100)
MONOCYTES # BLD AUTO: 1.02 K/UL — HIGH (ref 0–0.9)
MONOCYTES NFR BLD AUTO: 12.2 % — SIGNIFICANT CHANGE UP (ref 2–14)
NEUTROPHILS # BLD AUTO: 5.56 K/UL — SIGNIFICANT CHANGE UP (ref 1.8–7.4)
NEUTROPHILS NFR BLD AUTO: 66.5 % — SIGNIFICANT CHANGE UP (ref 43–77)
NRBC # BLD: 0 /100 WBCS — SIGNIFICANT CHANGE UP (ref 0–0)
PLATELET # BLD AUTO: 308 K/UL — SIGNIFICANT CHANGE UP (ref 150–400)
POTASSIUM SERPL-MCNC: 4.1 MMOL/L — SIGNIFICANT CHANGE UP (ref 3.5–5.3)
POTASSIUM SERPL-SCNC: 4.1 MMOL/L — SIGNIFICANT CHANGE UP (ref 3.5–5.3)
PROT SERPL-MCNC: 6.4 G/DL — SIGNIFICANT CHANGE UP (ref 6–8.3)
RBC # BLD: 4.31 M/UL — SIGNIFICANT CHANGE UP (ref 3.8–5.2)
RBC # FLD: 19.5 % — HIGH (ref 10.3–14.5)
RSV RESULT: SIGNIFICANT CHANGE UP
RSV RNA RESP QL NAA+PROBE: SIGNIFICANT CHANGE UP
SODIUM SERPL-SCNC: 140 MMOL/L — SIGNIFICANT CHANGE UP (ref 135–145)
WBC # BLD: 8.36 K/UL — SIGNIFICANT CHANGE UP (ref 3.8–10.5)
WBC # FLD AUTO: 8.36 K/UL — SIGNIFICANT CHANGE UP (ref 3.8–10.5)

## 2019-03-26 PROCEDURE — 71046 X-RAY EXAM CHEST 2 VIEWS: CPT | Mod: 26

## 2019-03-26 PROCEDURE — 80053 COMPREHEN METABOLIC PANEL: CPT

## 2019-03-26 PROCEDURE — 87631 RESP VIRUS 3-5 TARGETS: CPT

## 2019-03-26 PROCEDURE — 85027 COMPLETE CBC AUTOMATED: CPT

## 2019-03-26 PROCEDURE — 71046 X-RAY EXAM CHEST 2 VIEWS: CPT

## 2019-03-26 PROCEDURE — 36415 COLL VENOUS BLD VENIPUNCTURE: CPT

## 2019-03-26 PROCEDURE — 99283 EMERGENCY DEPT VISIT LOW MDM: CPT | Mod: 25

## 2019-03-26 PROCEDURE — 87040 BLOOD CULTURE FOR BACTERIA: CPT

## 2019-03-26 PROCEDURE — 99283 EMERGENCY DEPT VISIT LOW MDM: CPT

## 2019-03-26 PROCEDURE — 83605 ASSAY OF LACTIC ACID: CPT

## 2019-03-26 NOTE — ED ADULT NURSE NOTE - OBJECTIVE STATEMENT
patient c/o coughing for "a few days", "I did not want to take a chance with this cough so I'm here to get it checked". No resp distress, breathing easily, occasional moist, non-productive cough heard

## 2019-03-27 VITALS
RESPIRATION RATE: 16 BRPM | SYSTOLIC BLOOD PRESSURE: 166 MMHG | HEART RATE: 78 BPM | DIASTOLIC BLOOD PRESSURE: 77 MMHG | TEMPERATURE: 99 F | OXYGEN SATURATION: 96 %

## 2019-03-27 NOTE — ED PROVIDER NOTE - CLINICAL SUMMARY MEDICAL DECISION MAKING FREE TEXT BOX
85 year old female with cough x 2.5 weeks.  Presents in ED requesting flu test.  Labs, CXR, flu test, reassess

## 2019-03-27 NOTE — ED PROVIDER NOTE - OBJECTIVE STATEMENT
85 year old female with a history of HTN, HLD, CAD presents with cough x 2.5 weeks.  She states the cough is productive, no fever.  Denies chest pain, SOB, weakness. She is tolerating PO.  She has not seen her PMD for this cough.  States she was watching TV and saw a segment on the flu.  She decided to call 911 and come to the ER for be evaluated for the flu.  PMD Dr. Cardona

## 2019-04-01 LAB
CULTURE RESULTS: SIGNIFICANT CHANGE UP
CULTURE RESULTS: SIGNIFICANT CHANGE UP
SPECIMEN SOURCE: SIGNIFICANT CHANGE UP
SPECIMEN SOURCE: SIGNIFICANT CHANGE UP

## 2019-04-02 ENCOUNTER — MEDICATION RENEWAL (OUTPATIENT)
Age: 84
End: 2019-04-02

## 2019-04-02 RX ORDER — LOVASTATIN 40 MG/1
40 TABLET ORAL DAILY
Qty: 180 | Refills: 2 | Status: DISCONTINUED | COMMUNITY
Start: 2018-05-23 | End: 2019-04-02

## 2019-04-04 ENCOUNTER — INPATIENT (INPATIENT)
Facility: HOSPITAL | Age: 84
LOS: 1 days | Discharge: HOME | DRG: 378 | End: 2019-04-06
Attending: STUDENT IN AN ORGANIZED HEALTH CARE EDUCATION/TRAINING PROGRAM | Admitting: INTERNAL MEDICINE
Payer: MEDICARE

## 2019-04-04 VITALS
WEIGHT: 149.03 LBS | DIASTOLIC BLOOD PRESSURE: 75 MMHG | HEART RATE: 69 BPM | RESPIRATION RATE: 16 BRPM | OXYGEN SATURATION: 99 % | SYSTOLIC BLOOD PRESSURE: 154 MMHG | TEMPERATURE: 97 F

## 2019-04-04 DIAGNOSIS — I73.9 PERIPHERAL VASCULAR DISEASE, UNSPECIFIED: ICD-10-CM

## 2019-04-04 DIAGNOSIS — K92.2 GASTROINTESTINAL HEMORRHAGE, UNSPECIFIED: ICD-10-CM

## 2019-04-04 DIAGNOSIS — H26.9 UNSPECIFIED CATARACT: Chronic | ICD-10-CM

## 2019-04-04 DIAGNOSIS — Z95.0 PRESENCE OF CARDIAC PACEMAKER: Chronic | ICD-10-CM

## 2019-04-04 DIAGNOSIS — D47.3 ESSENTIAL (HEMORRHAGIC) THROMBOCYTHEMIA: ICD-10-CM

## 2019-04-04 DIAGNOSIS — Z95.5 PRESENCE OF CORONARY ANGIOPLASTY IMPLANT AND GRAFT: Chronic | ICD-10-CM

## 2019-04-04 DIAGNOSIS — Z98.82 BREAST IMPLANT STATUS: Chronic | ICD-10-CM

## 2019-04-04 DIAGNOSIS — D72.829 ELEVATED WHITE BLOOD CELL COUNT, UNSPECIFIED: ICD-10-CM

## 2019-04-04 DIAGNOSIS — K92.1 MELENA: ICD-10-CM

## 2019-04-04 DIAGNOSIS — Z96.659 PRESENCE OF UNSPECIFIED ARTIFICIAL KNEE JOINT: Chronic | ICD-10-CM

## 2019-04-04 DIAGNOSIS — Z29.9 ENCOUNTER FOR PROPHYLACTIC MEASURES, UNSPECIFIED: ICD-10-CM

## 2019-04-04 DIAGNOSIS — Z98.89 OTHER SPECIFIED POSTPROCEDURAL STATES: Chronic | ICD-10-CM

## 2019-04-04 DIAGNOSIS — G56.01 CARPAL TUNNEL SYNDROME, RIGHT UPPER LIMB: Chronic | ICD-10-CM

## 2019-04-04 DIAGNOSIS — I25.10 ATHEROSCLEROTIC HEART DISEASE OF NATIVE CORONARY ARTERY WITHOUT ANGINA PECTORIS: ICD-10-CM

## 2019-04-04 DIAGNOSIS — Z90.710 ACQUIRED ABSENCE OF BOTH CERVIX AND UTERUS: Chronic | ICD-10-CM

## 2019-04-04 DIAGNOSIS — I10 ESSENTIAL (PRIMARY) HYPERTENSION: ICD-10-CM

## 2019-04-04 LAB
ALBUMIN SERPL ELPH-MCNC: 2.9 G/DL — LOW (ref 3.3–5)
ALP SERPL-CCNC: 50 U/L — SIGNIFICANT CHANGE UP (ref 40–120)
ALT FLD-CCNC: 19 U/L — SIGNIFICANT CHANGE UP (ref 12–78)
ANION GAP SERPL CALC-SCNC: 7 MMOL/L — SIGNIFICANT CHANGE UP (ref 5–17)
APTT BLD: 23.5 SEC — LOW (ref 28.5–37)
AST SERPL-CCNC: 11 U/L — LOW (ref 15–37)
BASOPHILS # BLD AUTO: 0 K/UL — SIGNIFICANT CHANGE UP (ref 0–0.2)
BASOPHILS NFR BLD AUTO: 0 % — SIGNIFICANT CHANGE UP (ref 0–2)
BILIRUB SERPL-MCNC: 0.2 MG/DL — SIGNIFICANT CHANGE UP (ref 0.2–1.2)
BUN SERPL-MCNC: 32 MG/DL — HIGH (ref 7–23)
CALCIUM SERPL-MCNC: 8.2 MG/DL — LOW (ref 8.5–10.1)
CHLORIDE SERPL-SCNC: 109 MMOL/L — HIGH (ref 96–108)
CO2 SERPL-SCNC: 25 MMOL/L — SIGNIFICANT CHANGE UP (ref 22–31)
CREAT SERPL-MCNC: 0.7 MG/DL — SIGNIFICANT CHANGE UP (ref 0.5–1.3)
EOSINOPHIL # BLD AUTO: 0 K/UL — SIGNIFICANT CHANGE UP (ref 0–0.5)
EOSINOPHIL NFR BLD AUTO: 0 % — SIGNIFICANT CHANGE UP (ref 0–6)
FERRITIN SERPL-MCNC: 36 NG/ML — SIGNIFICANT CHANGE UP (ref 15–150)
GLUCOSE SERPL-MCNC: 103 MG/DL — HIGH (ref 70–99)
HCT VFR BLD CALC: 25 % — LOW (ref 34.5–45)
HCT VFR BLD CALC: 27.2 % — LOW (ref 34.5–45)
HGB BLD-MCNC: 7.8 G/DL — LOW (ref 11.5–15.5)
HGB BLD-MCNC: 8.2 G/DL — LOW (ref 11.5–15.5)
INR BLD: 0.98 RATIO — SIGNIFICANT CHANGE UP (ref 0.88–1.16)
LYMPHOCYTES # BLD AUTO: 1.8 K/UL — SIGNIFICANT CHANGE UP (ref 1–3.3)
LYMPHOCYTES # BLD AUTO: 10 % — LOW (ref 13–44)
MCHC RBC-ENTMCNC: 24.1 PG — LOW (ref 27–34)
MCHC RBC-ENTMCNC: 24.8 PG — LOW (ref 27–34)
MCHC RBC-ENTMCNC: 30.1 GM/DL — LOW (ref 32–36)
MCHC RBC-ENTMCNC: 31.2 GM/DL — LOW (ref 32–36)
MCV RBC AUTO: 79.4 FL — LOW (ref 80–100)
MCV RBC AUTO: 80 FL — SIGNIFICANT CHANGE UP (ref 80–100)
MONOCYTES # BLD AUTO: 0.18 K/UL — SIGNIFICANT CHANGE UP (ref 0–0.9)
MONOCYTES NFR BLD AUTO: 1 % — LOW (ref 2–14)
NEUTROPHILS # BLD AUTO: 15.63 K/UL — HIGH (ref 1.8–7.4)
NEUTROPHILS NFR BLD AUTO: 87 % — HIGH (ref 43–77)
NRBC # BLD: 0 /100 WBCS — SIGNIFICANT CHANGE UP (ref 0–0)
NRBC # BLD: SIGNIFICANT CHANGE UP /100 WBCS (ref 0–0)
OB PNL STL: POSITIVE
PLATELET # BLD AUTO: 479 K/UL — HIGH (ref 150–400)
PLATELET # BLD AUTO: 551 K/UL — HIGH (ref 150–400)
POTASSIUM SERPL-MCNC: 4.4 MMOL/L — SIGNIFICANT CHANGE UP (ref 3.5–5.3)
POTASSIUM SERPL-SCNC: 4.4 MMOL/L — SIGNIFICANT CHANGE UP (ref 3.5–5.3)
PROT SERPL-MCNC: 5.6 G/DL — LOW (ref 6–8.3)
PROTHROM AB SERPL-ACNC: 11.2 SEC — SIGNIFICANT CHANGE UP (ref 10–12.9)
RBC # BLD: 3.15 M/UL — LOW (ref 3.8–5.2)
RBC # BLD: 3.4 M/UL — LOW (ref 3.8–5.2)
RBC # FLD: 19.7 % — HIGH (ref 10.3–14.5)
RBC # FLD: 19.8 % — HIGH (ref 10.3–14.5)
SODIUM SERPL-SCNC: 141 MMOL/L — SIGNIFICANT CHANGE UP (ref 135–145)
WBC # BLD: 17.96 K/UL — HIGH (ref 3.8–10.5)
WBC # BLD: 18.85 K/UL — HIGH (ref 3.8–10.5)
WBC # FLD AUTO: 17.96 K/UL — HIGH (ref 3.8–10.5)
WBC # FLD AUTO: 18.85 K/UL — HIGH (ref 3.8–10.5)

## 2019-04-04 PROCEDURE — 99285 EMERGENCY DEPT VISIT HI MDM: CPT

## 2019-04-04 PROCEDURE — 71045 X-RAY EXAM CHEST 1 VIEW: CPT | Mod: 26

## 2019-04-04 PROCEDURE — 93010 ELECTROCARDIOGRAM REPORT: CPT

## 2019-04-04 PROCEDURE — 99223 1ST HOSP IP/OBS HIGH 75: CPT | Mod: AI

## 2019-04-04 RX ORDER — PANTOPRAZOLE SODIUM 20 MG/1
80 TABLET, DELAYED RELEASE ORAL ONCE
Qty: 0 | Refills: 0 | Status: COMPLETED | OUTPATIENT
Start: 2019-04-04 | End: 2019-04-04

## 2019-04-04 RX ORDER — SODIUM CHLORIDE 9 MG/ML
1000 INJECTION INTRAMUSCULAR; INTRAVENOUS; SUBCUTANEOUS
Qty: 0 | Refills: 0 | Status: DISCONTINUED | OUTPATIENT
Start: 2019-04-04 | End: 2019-04-06

## 2019-04-04 RX ORDER — LANOLIN ALCOHOL/MO/W.PET/CERES
3 CREAM (GRAM) TOPICAL ONCE
Qty: 0 | Refills: 0 | Status: COMPLETED | OUTPATIENT
Start: 2019-04-04 | End: 2019-04-04

## 2019-04-04 RX ORDER — ACETAMINOPHEN 500 MG
650 TABLET ORAL EVERY 6 HOURS
Qty: 0 | Refills: 0 | Status: DISCONTINUED | OUTPATIENT
Start: 2019-04-04 | End: 2019-04-06

## 2019-04-04 RX ORDER — METOPROLOL TARTRATE 50 MG
200 TABLET ORAL DAILY
Qty: 0 | Refills: 0 | Status: DISCONTINUED | OUTPATIENT
Start: 2019-04-04 | End: 2019-04-06

## 2019-04-04 RX ORDER — ONDANSETRON 8 MG/1
4 TABLET, FILM COATED ORAL EVERY 6 HOURS
Qty: 0 | Refills: 0 | Status: DISCONTINUED | OUTPATIENT
Start: 2019-04-04 | End: 2019-04-06

## 2019-04-04 RX ORDER — CLOPIDOGREL BISULFATE 75 MG/1
75 TABLET, FILM COATED ORAL DAILY
Qty: 0 | Refills: 0 | Status: DISCONTINUED | OUTPATIENT
Start: 2019-04-04 | End: 2019-04-06

## 2019-04-04 RX ORDER — SODIUM CHLORIDE 9 MG/ML
500 INJECTION INTRAMUSCULAR; INTRAVENOUS; SUBCUTANEOUS ONCE
Qty: 0 | Refills: 0 | Status: COMPLETED | OUTPATIENT
Start: 2019-04-04 | End: 2019-04-04

## 2019-04-04 RX ORDER — ATORVASTATIN CALCIUM 80 MG/1
40 TABLET, FILM COATED ORAL AT BEDTIME
Qty: 0 | Refills: 0 | Status: DISCONTINUED | OUTPATIENT
Start: 2019-04-04 | End: 2019-04-06

## 2019-04-04 RX ORDER — PANTOPRAZOLE SODIUM 20 MG/1
40 TABLET, DELAYED RELEASE ORAL
Qty: 0 | Refills: 0 | Status: DISCONTINUED | OUTPATIENT
Start: 2019-04-04 | End: 2019-04-06

## 2019-04-04 RX ORDER — AMLODIPINE BESYLATE 2.5 MG/1
5 TABLET ORAL DAILY
Qty: 0 | Refills: 0 | Status: DISCONTINUED | OUTPATIENT
Start: 2019-04-04 | End: 2019-04-06

## 2019-04-04 RX ORDER — FERROUS SULFATE 325(65) MG
325 TABLET ORAL THREE TIMES A DAY
Qty: 0 | Refills: 0 | Status: DISCONTINUED | OUTPATIENT
Start: 2019-04-04 | End: 2019-04-06

## 2019-04-04 RX ORDER — SUCRALFATE 1 G
1 TABLET ORAL EVERY 6 HOURS
Qty: 0 | Refills: 0 | Status: DISCONTINUED | OUTPATIENT
Start: 2019-04-04 | End: 2019-04-06

## 2019-04-04 RX ADMIN — ATORVASTATIN CALCIUM 40 MILLIGRAM(S): 80 TABLET, FILM COATED ORAL at 21:41

## 2019-04-04 RX ADMIN — PANTOPRAZOLE SODIUM 40 MILLIGRAM(S): 20 TABLET, DELAYED RELEASE ORAL at 19:05

## 2019-04-04 RX ADMIN — PANTOPRAZOLE SODIUM 80 MILLIGRAM(S): 20 TABLET, DELAYED RELEASE ORAL at 14:05

## 2019-04-04 RX ADMIN — Medication 3 MILLIGRAM(S): at 23:20

## 2019-04-04 RX ADMIN — Medication 1 GRAM(S): at 19:05

## 2019-04-04 RX ADMIN — Medication 325 MILLIGRAM(S): at 21:41

## 2019-04-04 RX ADMIN — Medication 1 GRAM(S): at 23:23

## 2019-04-04 RX ADMIN — SODIUM CHLORIDE 500 MILLILITER(S): 9 INJECTION INTRAMUSCULAR; INTRAVENOUS; SUBCUTANEOUS at 14:05

## 2019-04-04 NOTE — CONSULT NOTE ADULT - ASSESSMENT
The patient is an 85 year old female with a history of HTN, hypothyroidism, CAD s/p multiple PCI, PAD s/p stent, carotid endarterectomy, pacemaker who is admitted with GI bleed.    Plan:  - Hold aspirin  - Continue clopidogrel 75 mg daily due to recent PCI  - Continue atorvastatin (formulary equivalent)  - Continue amlodipine and metoprolol succinate for HTN  - Trend hemoglobin. May need to be transfused if continued decrease in hemoglobin.  - GI evaluation

## 2019-04-04 NOTE — H&P ADULT - PROBLEM SELECTOR PLAN 1
admit to medicine w/ remote telemetry  - acute blood loss anemia  - pt likely to have slow upper GI bleeding at site of large hiatal hernia  - PPI IV bid for now  - carafate  - h/h q6 for the next 24hrs  - clear liquid diet (I informed the pt that NPO for first 12-24hrs of hospitalization may be beneficial but she reports "I'm not staying here if you don't feed me") - clear liquids for now  - less than maintenance IVF if tolerating clear liquid diet  - maintain 2 large bore iv access  - type/screen  - GI consult (Anny), spoke w/ him regarding tx plan  - pt consented to blood products. I confirmed this and obtained consent w/ the paperwork to support this discussion over the phone w/ the pt's son Moshe Harvey admit to medicine w/ remote telemetry  - acute blood loss anemia  - pt likely to have slow upper GI bleeding at site of large hiatal hernia  - PPI IV bid for now  - carafate  - h/h q6 for the next 24hrs  - clear liquid diet (I informed the pt that NPO for first 12-24hrs of hospitalization may be beneficial but she reports "I'm not staying here if you don't feed me") - clear liquids for now  - less than maintenance IVF if tolerating clear liquid diet  - maintain 2 large bore iv access  - type/screen  - elevated BUN which is consistent w/ likely upper GIB  - GI consult (Anny), spoke w/ him regarding tx plan  - pt consented to blood products. I confirmed this and obtained consent w/ the paperwork to support this discussion over the phone w/ the pt's son Moshe Harvey  - threshold for transfusion would likely be hgb<7.5   - if tachycardia develops or active bleeding recurs, will have to transfuse

## 2019-04-04 NOTE — H&P ADULT - NSHPPHYSICALEXAM_GEN_ALL_CORE
T(C): 36.2 (04-04-19 @ 18:03), Max: 36.3 (04-04-19 @ 13:03)  HR: 71 (04-04-19 @ 18:03) (69 - 78)  BP: 166/69 (04-04-19 @ 18:03) (154/75 - 194/75)  RR: 14 (04-04-19 @ 18:03) (14 - 16)  SpO2: 97% (04-04-19 @ 18:03) (97% - 99%)    GENERAL: patient appears well, no acute distress, lying flat in bed, comfortable, poor historian  EYES: sclera clear, no exudates, mild pallor noted  ENMT: oropharynx clear without erythema, no exudates, moist mucous membranes  NECK: supple, soft, no thyromegaly noted  LUNGS: good air entry bilaterally, clear to auscultation, symmetric breath sounds, no wheezing or rhonchi appreciated  HEART: soft S1/S2, regular rate and rhythm, soft systolic murmur noted in R 2nd ICS, no lower extremity edema  GASTROINTESTINAL: abdomen is soft, nontender, nondistended, normoactive bowel sounds, no palpable masses  INTEGUMENT: good skin turgor, no lesions noted, mild pallor noted  MUSCULOSKELETAL: no clubbing or cyanosis, no obvious deformity  NEUROLOGIC: awake, alert, oriented x3 but has poor recent recall and poor insight, good muscle tone in 4 extremities, no obvious sensory deficits  HEME/LYMPH: no palpable supraclavicular nodules, no obvious ecchymosis or petechiae

## 2019-04-04 NOTE — ED ADULT NURSE NOTE - NSIMPLEMENTINTERV_GEN_ALL_ED
Implemented All Fall Risk Interventions:  [place] to call system. Call bell, personal items and telephone within reach. Instruct patient to call for assistance. Room bathroom lighting operational. Non-slip footwear when patient is off stretcher. Physically safe environment: no spills, clutter or unnecessary equipment. Stretcher in lowest position, wheels locked, appropriate side rails in place. Provide visual cue, wrist band, yellow gown, etc. Monitor gait and stability. Monitor for mental status changes and reorient to person, place, and time. Review medications for side effects contributing to fall risk. Reinforce activity limits and safety measures with patient and family.

## 2019-04-04 NOTE — H&P ADULT - PROBLEM SELECTOR PLAN 6
VTE ppx w/ SCD's  GI ppx w/ IV protonix - supportive care, no sequela on exam suggestive of active process  - pt has stent in LLE, asa has been held due to recent/current GIB, will continue to hold for now

## 2019-04-04 NOTE — H&P ADULT - NSICDXPASTMEDICALHX_GEN_ALL_CORE_FT
PAST MEDICAL HISTORY:  Acute arthritis     CAD (coronary artery disease)     Diverticulosis     Dyslipidemia     Hiatal hernia     High cholesterol     Hypertension     Hypothyroidism     Rectocele     Stress incontinence in female

## 2019-04-04 NOTE — H&P ADULT - ASSESSMENT
84yo F w/ PMHx HTN, CAD s/p 12 stents, LLE stent, arthritis, diverticulosis, hypothyroidism, PPM,h/o carotid endarterectomy presents w/ melena 86yo F w/ PMHx HTN, CAD s/p 12 stents, LLE stent, arthritis, diverticulosis, hypothyroidism, PPM,h/o carotid endarterectomy presents w/ melena and suspected upper GIB

## 2019-04-04 NOTE — CONSULT NOTE ADULT - PROBLEM SELECTOR RECOMMENDATION 9
monitor cbc   transfuse prn  pt has had recent egd and colon  monitor conservatively for 24 hours  if hgb stable can dc home tomorrow

## 2019-04-04 NOTE — H&P ADULT - NSHPREVIEWOFSYSTEMS_GEN_ALL_CORE
CONSTITUTIONAL: denies fever, chills - admits severe weakness  HEENT: denies blurred vision, sore throat  SKIN: denies new lesions, rash  CARDIOVASCULAR: denies chest pain, chest pressure, palpitations  RESPIRATORY: denies shortness of breath, sputum production  GASTROINTESTINAL: denies nausea, vomiting, diarrhea, abdominal pain - admits good appetite which is at baseline  GENITOURINARY: denies dysuria, discharge, hematuria, frequency  NEUROLOGICAL: denies numbness, headache - admits weakness in b/l legs  MUSCULOSKELETAL: denies new joint pain, muscle aches  HEMATOLOGIC: as per HPI  LYMPHATICS: denies enlarged lymph nodes, extremity swelling

## 2019-04-04 NOTE — H&P ADULT - HISTORY OF PRESENT ILLNESS
84yo F w/ PMHx HTN, CAD s/p 12 stents, LLE stent, arthritis, diverticulosis, hypothyroidism, PPM,h/o carotid endarterectomy presents w/ melena 86yo F w/ PMHx HTN, CAD s/p 12 stents (most recently 1/2019), hiatal hernia w/ gastritis, LLE stent, arthritis, diverticulosis, hypothyroidism, PPM, h/o carotid endarterectomy presents w/ melena and weakness. The pt is a bit of a poor historian and states that everything feels "fuzzy" and her memory has "been going" for a number of weeks. I spoke w/ son (HCP) over the phone and he informed me that her memory has been worsening for an extended period of time and what I described her current state to be is apparently her chornic stable state as per him. She was recently hospitalized at [facility] with chest pain. Pain was felt to be gastrointestinal in etiology. She underwent EGD/colonoscopy and was found to have large hiatal hernia and gastritis. Due to recent stent placement, any surgical intervention was delayed as she cannot come off plavix w/ recent stents. She was discharged about 6wks ago and had been doing well. She states taht for the past few days she's noticed worsening generalized weakness. She feels "weak in the knees" when trying to ambulate. She lives alone and usually ambulates independently without any assistive devices but she's been noticing increasing difficulty with normal adl's. Denies CP. Denies feeling like she may faint but she states that she has no energy whatsoever. Yesterday and today she noted black tarry stool w/ blood tinge in the toilet bowl. Also of note, she was seen about 1 week ago for flu like symptoms and was rapid flu test was negative. No URI complaints at this time. Denies any falls but feels like she may fall when she ambulates.    In the ED, she was givne 80mg iv protonix and 500cc NS bolus. No other interventions.

## 2019-04-04 NOTE — ED PROVIDER NOTE - OBJECTIVE STATEMENT
84 yo white female with H/O Acute arthritis    CAD (coronary artery disease)    Diverticulosis    Dyslipidemia    Hiatal hernia    High cholesterol    Hypertension    Hypothyroidism    Rectocele and    Stress incontinence in female who was well up until one week ago when she recalls beginning to feel somewhat weak and tires with some degree of SOB. 3-4 days ago patient first noted painless dark black stools w/o any abdominal pain, nausea, vomiting or diarrhea. Does not recall if that ever happened before. Patient also states that she has received blood transfusions for unexplained anemia. No chest pain or back pain. Does not take NSAID or anticoagulants.

## 2019-04-04 NOTE — ED ADULT NURSE NOTE - OBJECTIVE STATEMENT
patient came in ED from home with c/o dark red stool X 2 days and weakness with shortness of breath on exertion X 1.5 weeks. alert and oriented X 4. afebrile. denies chest pain or discomfort. denies chest pain. non-labored respiration noted. lungs clear and equal on auscultation. Dr Wasserman s/e patient at the bedside.

## 2019-04-04 NOTE — H&P ADULT - PROBLEM SELECTOR PLAN 4
- will continue w/ plavix for now  - reviewed recent cardiology notes  - consulted Dr. Yates for input - will continue w/ plavix for now  - reviewed recent cardiology notes  - consulted Dr. Yates for input  - continue w/ statin

## 2019-04-04 NOTE — H&P ADULT - PROBLEM SELECTOR PLAN 2
- likely reactive  - no complaints to suggest active infectious process (no URI complaints, no urinary complaitns, no abd discomfort, afebrile)  - will monitor leukocytosis

## 2019-04-04 NOTE — H&P ADULT - PROBLEM SELECTOR PLAN 5
- supportive care, no sequela on exam suggestive of active process  - pt has stent in LLE, asa has been held due to recent/current GIB, will continue to hold for now - d/c acei in setting of possible volume depletion from poor po intake and active bleeding  - continue w/ metoprolol/norvasc w/ holding parameters

## 2019-04-04 NOTE — CONSULT NOTE ADULT - SUBJECTIVE AND OBJECTIVE BOX
Chief Complaint:  Patient is a 85y old  Female who presents with a chief complaint of dark stools and weakness (2019 16:43)    Acute arthritis  High cholesterol  Stress incontinence in female  Rectocele  Diverticulosis  Hiatal hernia  Hypertension  Hypothyroidism  Dyslipidemia  CAD (coronary artery disease)  Carpal tunnel syndrome of right wrist  Cardiac pacemaker  Stented coronary artery  H/O carotid endarterectomy  History of hysterectomy  History of breast reconstruction  Bilateral cataracts  Status post total knee replacement     HPI:  84yo F w/ PMHx HTN, CAD s/p 12 stents (most recently 2019), hiatal hernia w/ gastritis, LLE stent, arthritis, diverticulosis, hypothyroidism, PPM, h/o carotid endarterectomy presents w/ melena and weakness. The pt is a bit of a poor historian and states that everything feels "fuzzy" and her memory has "been going" for a number of weeks. I spoke w/ son (HCP) over the phone and he informed me that her memory has been worsening for an extended period of time and what I described her current state to be is apparently her chornic stable state as per him. She was recently hospitalized at [facility] with chest pain. Pain was felt to be gastrointestinal in etiology. She underwent EGD/colonoscopy and was found to have large hiatal hernia and gastritis. Due to recent stent placement, any surgical intervention was delayed as she cannot come off plavix w/ recent stents. She was discharged about 6wks ago and had been doing well. She states taht for the past few days she's noticed worsening generalized weakness. She feels "weak in the knees" when trying to ambulate. She lives alone and usually ambulates independently without any assistive devices but she's been noticing increasing difficulty with normal adl's. Denies CP. Denies feeling like she may faint but she states that she has no energy whatsoever. Yesterday and today she noted black tarry stool w/ blood tinge in the toilet bowl. Also of note, she was seen about 1 week ago for flu like symptoms and was rapid flu test was negative. No URI complaints at this time. Denies any falls but feels like she may fall when she ambulates.    In the ED, she was givne 80mg iv protonix and 500cc NS bolus. No other interventions. (2019 16:43)      No Known Drug Allergies  Originally Entered as [Unknown see Comment: pt unable to remember] reaction to [Mold] (Unknown)  stolazine eye med- pt doesn&#x27;t remember (Unknown)      acetaminophen   Tablet .. 650 milliGRAM(s) Oral every 6 hours PRN  aluminum hydroxide/magnesium hydroxide/simethicone Suspension 30 milliLiter(s) Oral every 4 hours PRN  amLODIPine   Tablet 5 milliGRAM(s) Oral daily  artificial  tears Solution 1 Drop(s) Both EYES two times a day PRN  atorvastatin 40 milliGRAM(s) Oral at bedtime  clopidogrel Tablet 75 milliGRAM(s) Oral daily  ferrous    sulfate 325 milliGRAM(s) Oral three times a day  metoprolol succinate  milliGRAM(s) Oral daily  ondansetron Injectable 4 milliGRAM(s) IV Push every 6 hours PRN  pantoprazole  Injectable 40 milliGRAM(s) IV Push two times a day  predniSONE   Tablet 20 milliGRAM(s) Oral daily  sodium chloride 0.9%. 1000 milliLiter(s) IV Continuous <Continuous>  sucralfate 1 Gram(s) Oral every 6 hours        FAMILY HISTORY:  Family history of cancer (Sibling)        Review of Systems:    General:  No wt loss, fevers, chills, night sweats,fatigue,   Eyes:  Good vision, no reported pain  ENT:  No sore throat, pain, runny nose, dysphagia  CV:  No pain, palpitatioins, hypo/hypertension  Resp:  No dyspnea, cough, tachypnea, wheezing  :  No pain, bleeding, incontinence, nocturia  Muscle:  No pain, weakness  Neuro:  No weakness, tingling, memory problems  Psych:  No fatigue, insomnia, mood problems, depression  Endocrine:  No polyuria, polydypsia, cold/heat intolerance  Heme:  No petechiae, ecchymosis, easy bruisability  Skin:  No rash, tattoos, scars, edema    Relevant Family History:       Relevant Social History:       Physical Exam:    Vital Signs:  Vital Signs Last 24 Hrs  T(C): 36.6 (2019 19:59), Max: 36.6 (2019 19:59)  T(F): 97.8 (2019 19:59), Max: 97.8 (2019 19:59)  HR: 64 (2019 19:59) (64 - 78)  BP: 167/62 (2019 19:59) (154/75 - 194/75)  BP(mean): --  RR: 16 (2019 19:59) (14 - 16)  SpO2: 99% (2019 19:59) (97% - 99%)  Daily     Daily Weight in k.2 (2019 19:59)    General:  Appears stated age, well-groomed, well-nourished, no distress  HEENT:  NC/AT,  conjunctivae clear and pink, no thyromegaly, nodules, adenopathy, no JVD  Chest:  Full & symmetric excursion, no increased effort, breath sounds clear  Cardiovascular:  Regular rhythm, S1, S2, no murmur/rub/S3/S4, no abdominal bruit, no edema  Abdomen:  Soft, non-tender, non-distended, normoactive bowel sounds,  no masses ,no hepatosplenomeagaly, no signs of chronic liver disease  Extremities:  no cyanosis,clubbing or edema  Skin:  No rash/erythema/ecchymoses/petechiae/wounds/abscess/warm/dry  Neuro/Psych:  Alert, oriented, no asterixis, no tremor, no encephalopathy    Laboratory:                            7.8    17.96 )-----------( 479      ( 2019 17:25 )             25.0     04-04    141  |  109<H>  |  32<H>  ----------------------------<  103<H>  4.4   |  25  |  0.70    Ca    8.2<L>      2019 17:25    TPro  5.6<L>  /  Alb  2.9<L>  /  TBili  0.2  /  DBili  x   /  AST  11<L>  /  ALT  19  /  AlkPhos  50  04-04    LIVER FUNCTIONS - ( 2019 17:25 )  Alb: 2.9 g/dL / Pro: 5.6 g/dL / ALK PHOS: 50 U/L / ALT: 19 U/L / AST: 11 U/L / GGT: x           PT/INR - ( 2019 14:54 )   PT: 11.2 sec;   INR: 0.98 ratio         PTT - ( 2019 14:54 )  PTT:23.5 sec      Imaging:
History of Present Illness: The patient is an 85 year old female with a history of HTN, hypothyroidism, CAD s/p multiple PCI, PAD s/p stent, carotid endarterectomy, pacemaker who presents with melena mixed with gross blood. The patient is a poor historian. She has felt weak and short of breath. No chest pain. Her last PCI was 11/2018.    Past Medical/Surgical History:  HTN, hypothyroidism, CAD s/p multiple PCI, PAD s/p stent, carotid endarterectomy, pacemaker    Medications:  Home Medications:  amlodipine-benazepril 5 mg-20 mg oral capsule: 1 cap(s) orally once a day (04 Apr 2019 18:20)  lovastatin 40 mg oral tablet: 1 tab(s) orally once a day (04 Apr 2019 18:20)  metoprolol succinate 200 mg oral tablet, extended release: 1 tab(s) orally once a day (04 Apr 2019 18:20)  ocular lubricant ophthalmic solution: 1 drop(s) to each affected eye 2 times a day, As Needed - for dry eyes (04 Apr 2019 18:20)  predniSONE 20 mg oral tablet: 1 tab(s) orally once a day (04 Apr 2019 18:20)      Family History: Non-contributory family history of premature cardiovascular atherosclerotic disease    Social History: No tobacco, alcohol or drug use    Review of Systems:  General: No fevers, chills, weight loss or gain  Skin: No rashes, color changes  Cardiovascular: No chest pain, orthopnea  Respiratory: No shortness of breath, cough  Gastrointestinal: No nausea, abdominal pain  Genitourinary: No incontinence, pain with urination  Musculoskeletal: No pain, swelling, decreased range of motion  Neurological: No headache, weakness  Psychiatric: No depression, anxiety  Endocrine: No weight loss or gain, increased thirst  All other systems are comprehensively negative.    Physical Exam:  Vitals:        Vital Signs Last 24 Hrs  T(C): 36.2 (04 Apr 2019 18:03), Max: 36.3 (04 Apr 2019 13:03)  T(F): 97.1 (04 Apr 2019 18:03), Max: 97.3 (04 Apr 2019 13:03)  HR: 71 (04 Apr 2019 18:03) (69 - 78)  BP: 166/69 (04 Apr 2019 18:03) (154/75 - 194/75)  BP(mean): --  RR: 14 (04 Apr 2019 18:03) (14 - 16)  SpO2: 97% (04 Apr 2019 18:03) (97% - 99%)  General: NAD  HEENT: MMM  Neck: No JVD, no carotid bruit  Lungs: CTAB  CV: RRR, nl S1/S2, no M/R/G  Abdomen: S/NT/ND, +BS  Extremities: No LE edema, no cyanosis  Neuro: AAOx3, non-focal  Skin: No rash    Labs:                        7.8    17.96 )-----------( 479      ( 04 Apr 2019 17:25 )             25.0     04-04    141  |  109<H>  |  32<H>  ----------------------------<  103<H>  4.4   |  25  |  0.70    Ca    8.2<L>      04 Apr 2019 17:25    TPro  5.6<L>  /  Alb  2.9<L>  /  TBili  0.2  /  DBili  x   /  AST  11<L>  /  ALT  19  /  AlkPhos  50  04-04        PT/INR - ( 04 Apr 2019 14:54 )   PT: 11.2 sec;   INR: 0.98 ratio         PTT - ( 04 Apr 2019 14:54 )  PTT:23.5 sec    ECG: NSR, normal axis, no ST abnormality

## 2019-04-04 NOTE — H&P ADULT - ATTENDING COMMENTS
The admission plan was discussed in detail with the patient and family members at the bedside. Their questions and concerns were addressed to the best of my ability. They are in agreement with the plan as detailed above. They demonstrated adequate understanding of the counseling which I have provided.      Pt is poor historian so I spoke w/ the pt's son to obtain remainder of history. Pt is AAOx3 but has poor recall of recent/historic events and cannot provide majority of history. Discussed the above listed plan w/ son over the phone and he is in agreement.

## 2019-04-04 NOTE — H&P ADULT - NSHPATTENDINGPLANDISCUSS_GEN_ALL_CORE
pt, son, GI attending, carido attending, ED RN - re: tx plan, dispo planning, anticipated dispo timeline

## 2019-04-04 NOTE — PATIENT PROFILE ADULT - NSPROMUTINFOINDIVIDFT_GEN_A_NUR
"I am claustrophobic, I really don't like being by the window" " make sure I have a bed away from the window tomorrow"

## 2019-04-04 NOTE — H&P ADULT - NSICDXFAMILYHX_GEN_ALL_CORE_FT
FAMILY HISTORY:  Sibling  Still living? Unknown  Family history of cancer, Age at diagnosis: Age Unknown

## 2019-04-04 NOTE — H&P ADULT - NSHPLABSRESULTS_GEN_ALL_CORE
EKG personally reviewed and my interpretation is: normal sinus rhythm, normal axis, intervals within normal limits, no significant chamber enlargement, no ST segment abnormalities, no clinically significant Q waves, good R wave progression    CXR personally reviewed and my interpretation is: grossly clear lungs, costophrenic angles without blunting, enlarged cardiac silhouette

## 2019-04-04 NOTE — H&P ADULT - NSHPSOCIALHISTORY_GEN_ALL_CORE
Social:   - denies any recent etoh or h/o abuse  - denies any h/o smoking  - denies recreational drug use  - lives alone and performs all adl's independently @ baseline

## 2019-04-04 NOTE — H&P ADULT - NSICDXPASTSURGICALHX_GEN_ALL_CORE_FT
PAST SURGICAL HISTORY:  Bilateral cataracts surgery    Cardiac pacemaker     Carpal tunnel syndrome of right wrist     H/O carotid endarterectomy right    History of breast reconstruction     History of hysterectomy     Status post total knee replacement right    Stented coronary artery 12 heart stents,   1 left leg stents

## 2019-04-04 NOTE — H&P ADULT - NSHPOUTPATIENTPROVIDERS_GEN_ALL_CORE
Fermin Cardona Md (PCP)  Ector Veloz MD (Cardio)  Alejandro Vargas MD (GI) Fermin Cardona Md (PCP)  Ector Welsh MD (Cardio)  Alejandro Vargas MD (GI)

## 2019-04-05 ENCOUNTER — TRANSCRIPTION ENCOUNTER (OUTPATIENT)
Age: 84
End: 2019-04-05

## 2019-04-05 DIAGNOSIS — K44.9 DIAPHRAGMATIC HERNIA WITHOUT OBSTRUCTION OR GANGRENE: ICD-10-CM

## 2019-04-05 LAB
ANION GAP SERPL CALC-SCNC: 8 MMOL/L — SIGNIFICANT CHANGE UP (ref 5–17)
BASOPHILS # BLD AUTO: 0 K/UL — SIGNIFICANT CHANGE UP (ref 0–0.2)
BASOPHILS NFR BLD AUTO: 0 % — SIGNIFICANT CHANGE UP (ref 0–2)
BUN SERPL-MCNC: 19 MG/DL — SIGNIFICANT CHANGE UP (ref 7–23)
CALCIUM SERPL-MCNC: 8 MG/DL — LOW (ref 8.5–10.1)
CHLORIDE SERPL-SCNC: 110 MMOL/L — HIGH (ref 96–108)
CO2 SERPL-SCNC: 25 MMOL/L — SIGNIFICANT CHANGE UP (ref 22–31)
CREAT SERPL-MCNC: 0.68 MG/DL — SIGNIFICANT CHANGE UP (ref 0.5–1.3)
EOSINOPHIL # BLD AUTO: 0.14 K/UL — SIGNIFICANT CHANGE UP (ref 0–0.5)
EOSINOPHIL NFR BLD AUTO: 1 % — SIGNIFICANT CHANGE UP (ref 0–6)
GLUCOSE SERPL-MCNC: 92 MG/DL — SIGNIFICANT CHANGE UP (ref 70–99)
HCT VFR BLD CALC: 23.5 % — LOW (ref 34.5–45)
HCT VFR BLD CALC: 28.2 % — LOW (ref 34.5–45)
HCT VFR BLD CALC: 31.1 % — LOW (ref 34.5–45)
HCT VFR BLD CALC: 33.6 % — LOW (ref 34.5–45)
HGB BLD-MCNC: 10.3 G/DL — LOW (ref 11.5–15.5)
HGB BLD-MCNC: 7.2 G/DL — LOW (ref 11.5–15.5)
HGB BLD-MCNC: 8.6 G/DL — LOW (ref 11.5–15.5)
HGB BLD-MCNC: 9.6 G/DL — LOW (ref 11.5–15.5)
INR BLD: 1.04 RATIO — SIGNIFICANT CHANGE UP (ref 0.88–1.16)
IRON SATN MFR SERPL: 335 UG/DL — HIGH (ref 30–160)
IRON SATN MFR SERPL: 90 % — HIGH (ref 14–50)
LDH SERPL L TO P-CCNC: 259 U/L — HIGH (ref 50–242)
LYMPHOCYTES # BLD AUTO: 2.84 K/UL — SIGNIFICANT CHANGE UP (ref 1–3.3)
LYMPHOCYTES # BLD AUTO: 21 % — SIGNIFICANT CHANGE UP (ref 13–44)
MAGNESIUM SERPL-MCNC: 2 MG/DL — SIGNIFICANT CHANGE UP (ref 1.6–2.6)
MCHC RBC-ENTMCNC: 25 PG — LOW (ref 27–34)
MCHC RBC-ENTMCNC: 30.5 GM/DL — LOW (ref 32–36)
MCV RBC AUTO: 82 FL — SIGNIFICANT CHANGE UP (ref 80–100)
MONOCYTES # BLD AUTO: 1.35 K/UL — HIGH (ref 0–0.9)
MONOCYTES NFR BLD AUTO: 10 % — SIGNIFICANT CHANGE UP (ref 2–14)
NEUTROPHILS # BLD AUTO: 8.78 K/UL — HIGH (ref 1.8–7.4)
NEUTROPHILS NFR BLD AUTO: 65 % — SIGNIFICANT CHANGE UP (ref 43–77)
NRBC # BLD: SIGNIFICANT CHANGE UP /100 WBCS (ref 0–0)
PLATELET # BLD AUTO: 451 K/UL — HIGH (ref 150–400)
POTASSIUM SERPL-MCNC: 3.9 MMOL/L — SIGNIFICANT CHANGE UP (ref 3.5–5.3)
POTASSIUM SERPL-SCNC: 3.9 MMOL/L — SIGNIFICANT CHANGE UP (ref 3.5–5.3)
PROTHROM AB SERPL-ACNC: 11.8 SEC — SIGNIFICANT CHANGE UP (ref 10–12.9)
RBC # BLD: 3.44 M/UL — LOW (ref 3.8–5.2)
RBC # FLD: 19.8 % — HIGH (ref 10.3–14.5)
SODIUM SERPL-SCNC: 143 MMOL/L — SIGNIFICANT CHANGE UP (ref 135–145)
TIBC SERPL-MCNC: 373 UG/DL — SIGNIFICANT CHANGE UP (ref 220–430)
UIBC SERPL-MCNC: 38 UG/DL — LOW (ref 110–370)
WBC # BLD: 13.51 K/UL — HIGH (ref 3.8–10.5)
WBC # FLD AUTO: 13.51 K/UL — HIGH (ref 3.8–10.5)

## 2019-04-05 PROCEDURE — 99233 SBSQ HOSP IP/OBS HIGH 50: CPT | Mod: GC

## 2019-04-05 RX ORDER — ZOLPIDEM TARTRATE 10 MG/1
5 TABLET ORAL ONCE
Qty: 0 | Refills: 0 | Status: DISCONTINUED | OUTPATIENT
Start: 2019-04-05 | End: 2019-04-05

## 2019-04-05 RX ORDER — HYDROCORTISONE 1 %
1 OINTMENT (GRAM) TOPICAL DAILY
Qty: 0 | Refills: 0 | Status: DISCONTINUED | OUTPATIENT
Start: 2019-04-05 | End: 2019-04-06

## 2019-04-05 RX ORDER — ZOLPIDEM TARTRATE 10 MG/1
5 TABLET ORAL AT BEDTIME
Qty: 0 | Refills: 0 | Status: DISCONTINUED | OUTPATIENT
Start: 2019-04-05 | End: 2019-04-06

## 2019-04-05 RX ADMIN — PANTOPRAZOLE SODIUM 40 MILLIGRAM(S): 20 TABLET, DELAYED RELEASE ORAL at 18:19

## 2019-04-05 RX ADMIN — PANTOPRAZOLE SODIUM 40 MILLIGRAM(S): 20 TABLET, DELAYED RELEASE ORAL at 06:25

## 2019-04-05 RX ADMIN — CLOPIDOGREL BISULFATE 75 MILLIGRAM(S): 75 TABLET, FILM COATED ORAL at 12:51

## 2019-04-05 RX ADMIN — ZOLPIDEM TARTRATE 5 MILLIGRAM(S): 10 TABLET ORAL at 01:17

## 2019-04-05 RX ADMIN — ATORVASTATIN CALCIUM 40 MILLIGRAM(S): 80 TABLET, FILM COATED ORAL at 22:49

## 2019-04-05 RX ADMIN — Medication 1 GRAM(S): at 23:02

## 2019-04-05 RX ADMIN — ZOLPIDEM TARTRATE 5 MILLIGRAM(S): 10 TABLET ORAL at 22:49

## 2019-04-05 RX ADMIN — Medication 325 MILLIGRAM(S): at 14:26

## 2019-04-05 RX ADMIN — Medication 1 GRAM(S): at 06:25

## 2019-04-05 RX ADMIN — Medication 325 MILLIGRAM(S): at 22:49

## 2019-04-05 RX ADMIN — AMLODIPINE BESYLATE 5 MILLIGRAM(S): 2.5 TABLET ORAL at 06:25

## 2019-04-05 RX ADMIN — Medication 1 GRAM(S): at 18:19

## 2019-04-05 RX ADMIN — Medication 325 MILLIGRAM(S): at 06:25

## 2019-04-05 RX ADMIN — Medication 20 MILLIGRAM(S): at 06:25

## 2019-04-05 RX ADMIN — Medication 1 GRAM(S): at 12:51

## 2019-04-05 RX ADMIN — Medication 200 MILLIGRAM(S): at 06:26

## 2019-04-05 NOTE — PROGRESS NOTE ADULT - SUBJECTIVE AND OBJECTIVE BOX
INTERVAL HPI/OVERNIGHT EVENTS:  pt seen and examined  denies n/v/abd pain  per overnight rn no s/s active gib no bm  1u  prbc pending  afebrile overnight labs noted    MEDICATIONS  (STANDING):  amLODIPine   Tablet 5 milliGRAM(s) Oral daily  atorvastatin 40 milliGRAM(s) Oral at bedtime  clopidogrel Tablet 75 milliGRAM(s) Oral daily  ferrous    sulfate 325 milliGRAM(s) Oral three times a day  metoprolol succinate  milliGRAM(s) Oral daily  pantoprazole  Injectable 40 milliGRAM(s) IV Push two times a day  predniSONE   Tablet 20 milliGRAM(s) Oral daily  sodium chloride 0.9%. 1000 milliLiter(s) (75 mL/Hr) IV Continuous <Continuous>  sucralfate 1 Gram(s) Oral every 6 hours    MEDICATIONS  (PRN):  acetaminophen   Tablet .. 650 milliGRAM(s) Oral every 6 hours PRN Temp greater or equal to 38C (100.4F), Mild Pain (1 - 3)  aluminum hydroxide/magnesium hydroxide/simethicone Suspension 30 milliLiter(s) Oral every 4 hours PRN Dyspepsia  artificial  tears Solution 1 Drop(s) Both EYES two times a day PRN for dry eyes  ondansetron Injectable 4 milliGRAM(s) IV Push every 6 hours PRN Nausea      Allergies    No Known Drug Allergies  Originally Entered as [Unknown see Comment: pt unable to remember] reaction to [Mold] (Unknown)  stolazine eye med- pt doesn&#x27;t remember (Unknown)    Intolerances        Review of Systems:    General:  No wt loss, fevers, chills, night sweats, fatigue   Eyes:  Good vision, no reported pain  ENT:  No sore throat, pain, runny nose, dysphagia  CV:  No pain, palpitations, hypo/hypertension  Resp:  No dyspnea, cough, tachypnea, wheezing  GI:  No pain, No nausea, No vomiting, No diarrhea, No constipation, No weight loss, No fever, No pruritis, No rectal bleeding, No melena, No dysphagia  :  No pain, bleeding, incontinence, nocturia  Muscle:  No pain, weakness  Neuro:  No weakness, tingling, memory problems  Psych:  No fatigue, insomnia, mood problems, depression  Endocrine:  No polyuria, polydypsia, cold/heat intolerance  Heme:  No petechiae, ecchymosis, easy bruisability  Skin:  No rash, tattoos, scars, edema      Vital Signs Last 24 Hrs  T(C): 36.7 (05 Apr 2019 05:24), Max: 36.7 (05 Apr 2019 05:24)  T(F): 98 (05 Apr 2019 05:24), Max: 98 (05 Apr 2019 05:24)  HR: 69 (05 Apr 2019 05:24) (64 - 78)  BP: 120/63 (05 Apr 2019 05:24) (120/63 - 194/75)  BP(mean): --  RR: 18 (05 Apr 2019 05:24) (14 - 18)  SpO2: 97% (05 Apr 2019 05:24) (97% - 99%)    PHYSICAL EXAM:    Constitutional: NAD  HEENT: ncat  Neck: No LAD  Respiratory: dec bs  Cardiovascular: S1 and S2, RRR  Gastrointestinal: soft nt nd  Extremities: No peripheral edema  Vascular: 2+ peripheral pulses  Neurological: awake alert   Skin: No rashes      LABS:                        8.6    13.51 )-----------( 451      ( 05 Apr 2019 08:59 )             28.2     04-05    143  |  110<H>  |  19  ----------------------------<  92  3.9   |  25  |  0.68    Ca    8.0<L>      05 Apr 2019 08:59  Mg     2.0     04-05    TPro  5.6<L>  /  Alb  2.9<L>  /  TBili  0.2  /  DBili  x   /  AST  11<L>  /  ALT  19  /  AlkPhos  50  04-04    PT/INR - ( 05 Apr 2019 08:59 )   PT: 11.8 sec;   INR: 1.04 ratio         PTT - ( 04 Apr 2019 14:54 )  PTT:23.5 sec      RADIOLOGY & ADDITIONAL TESTS:

## 2019-04-05 NOTE — PROGRESS NOTE ADULT - PROBLEM SELECTOR PLAN 4
- pt with recent PCI  - reviewed recent cardiology notes  - cardio consulted (Dr. Yates), recs holding ASA, continue Plavix, statin, amlodipine, Toprol XL

## 2019-04-05 NOTE — CHART NOTE - NSCHARTNOTEFT_GEN_A_CORE
On follow up of H/H, patient noted to have Hgb of 7.2 from 7.8. Patient seen and examined, states she feels short of breath but is not uncomfortable. Per RN, blood consent in chart invalid as it was a telephone consent that was not witnessed. Consent for blood obtained, 1 unit PRBC ordered. Will continue to follow, RN to call with changes.

## 2019-04-05 NOTE — DISCHARGE NOTE PROVIDER - CARE PROVIDERS DIRECT ADDRESSES
,DirectAddress_Unknown,[email or web address]

## 2019-04-05 NOTE — DISCHARGE NOTE PROVIDER - CARE PROVIDER_API CALL
Fermin Cardona)  Internal Medicine  [address], [address]  [place] [address]  Phone: [phone]  Fax: [phone]  Follow Up Time:     Alejandro Vargas)  Gastroenterology  [address], [address]  [place] [address]  Phone: [phone]  Fax: [phone]  Follow Up Time:     Ector Welsh)  Cardiovascular Disease  [address] [place] [address]  Phone: [phone]  Fax: [phone]  Follow Up Time:

## 2019-04-05 NOTE — DISCHARGE NOTE PROVIDER - NSDCCPCAREPLAN_GEN_ALL_CORE_FT
PRINCIPAL DISCHARGE DIAGNOSIS  Diagnosis: Gastrointestinal hemorrhage, unspecified gastrointestinal hemorrhage type  Assessment and Plan of Treatment: Stable, slow bleeding. Likely to continue slowly and will likely require future assessments. Please follow up closely with your gastroenterologist. Take meds as prescribed. Continue plavix but please hold aspirin.

## 2019-04-05 NOTE — PHYSICAL THERAPY INITIAL EVALUATION ADULT - ADDITIONAL COMMENTS
Pt lives alone in private home, 3 [address] with railing.  Pt was independent in all ADLs and ambulated independently without a device.  Pt has access to RW and SAC if needed.

## 2019-04-05 NOTE — PROGRESS NOTE ADULT - SUBJECTIVE AND OBJECTIVE BOX
Chief Complaint: GI bleed    Interval Events: No events overnight. No complaints.    Review of Systems:  General: No fevers, chills, weight loss or gain  Skin: No rashes, color changes  Cardiovascular: No chest pain, orthopnea  Respiratory: No shortness of breath, cough  Gastrointestinal: No nausea, abdominal pain  Genitourinary: No incontinence, pain with urination  Musculoskeletal: No pain, swelling, decreased range of motion  Neurological: No headache, weakness  Psychiatric: No depression, anxiety  Endocrine: No weight loss or gain, increased thirst  All other systems are comprehensively negative.    Physical Exam:  Vitals:        Vital Signs Last 24 Hrs  T(C): 36.7 (05 Apr 2019 05:24), Max: 36.7 (05 Apr 2019 05:24)  T(F): 98 (05 Apr 2019 05:24), Max: 98 (05 Apr 2019 05:24)  HR: 69 (05 Apr 2019 05:24) (64 - 78)  BP: 120/63 (05 Apr 2019 05:24) (120/63 - 194/75)  BP(mean): --  RR: 18 (05 Apr 2019 05:24) (14 - 18)  SpO2: 97% (05 Apr 2019 05:24) (97% - 99%)  General: NAD  HEENT: MMM  Neck: No JVD, no carotid bruit  Lungs: CTAB  CV: RRR, nl S1/S2, no M/R/G  Abdomen: S/NT/ND, +BS  Extremities: No LE edema, no cyanosis  Neuro: AAOx3, non-focal  Skin: No rash    Labs:                        7.2    x     )-----------( x        ( 05 Apr 2019 00:01 )             23.5     04-04    141  |  109<H>  |  32<H>  ----------------------------<  103<H>  4.4   |  25  |  0.70    Ca    8.2<L>      04 Apr 2019 17:25    TPro  5.6<L>  /  Alb  2.9<L>  /  TBili  0.2  /  DBili  x   /  AST  11<L>  /  ALT  19  /  AlkPhos  50  04-04        PT/INR - ( 04 Apr 2019 14:54 )   PT: 11.2 sec;   INR: 0.98 ratio         PTT - ( 04 Apr 2019 14:54 )  PTT:23.5 sec    Telemetry: Sinus rhythm

## 2019-04-05 NOTE — DISCHARGE NOTE PROVIDER - HOSPITAL COURSE
FROM ADMISSION H+P:     HPI:    84yo F w/ PMHx HTN, CAD s/p 12 stents (most recently 1/2019), hiatal hernia w/ gastritis, LLE stent, arthritis, diverticulosis, hypothyroidism, PPM, h/o carotid endarterectomy presents w/ melena and weakness. The pt is a bit of a poor historian and states that everything feels "fuzzy" and her memory has "been going" for a number of weeks. I spoke w/ son (HCP) over the phone and he informed me that her memory has been worsening for an extended period of time and what I described her current state to be is apparently her chornic stable state as per him. She was recently hospitalized at [facility] with chest pain. Pain was felt to be gastrointestinal in etiology. She underwent EGD/colonoscopy and was found to have large hiatal hernia and gastritis. Due to recent stent placement, any surgical intervention was delayed as she cannot come off plavix w/ recent stents. She was discharged about 6wks ago and had been doing well. She states taht for the past few days she's noticed worsening generalized weakness. She feels "weak in the knees" when trying to ambulate. She lives alone and usually ambulates independently without any assistive devices but she's been noticing increasing difficulty with normal adl's. Denies CP. Denies feeling like she may faint but she states that she has no energy whatsoever. Yesterday and today she noted black tarry stool w/ blood tinge in the toilet bowl. Also of note, she was seen about 1 week ago for flu like symptoms and was rapid flu test was negative. No URI complaints at this time. Denies any falls but feels like she may fall when she ambulates.        In the ED, she was givne 80mg iv protonix and 500cc NS bolus. No other interventions. (04 Apr 2019 16:43)            ---    HOSPITAL COURSE: Admitted for upper GIB. Patient received 1 unit pRBC. Cardiology, Dr. Berry, consulted ASA was held, Plavix continued due to recent PCI. GI, Dr. Mccormick evaluated patient, patient with recent EGD/colonoscopy, recommend conservative management. H/H trended and remained stable. Physical therapy evaluated patient, no skilled PT needed.             ---    CONSULTANTS: GI - Dr. Mccormick    Cardio - Dr. Berry         ---    TIME SPENT:    The total amount of time spent reviewing the hospital notes, laboratory values, imaging findings, assessing/counseling the patient, discussing with consultant physicians, social work, nursing staff took -- minutes        ---    Primary care provider was made aware of plan for discharge:      [  ] NO     [  ] YES FROM ADMISSION H+P:     HPI:    86yo F w/ PMHx HTN, CAD s/p 12 stents (most recently 1/2019), hiatal hernia w/ gastritis, LLE stent, arthritis, diverticulosis, hypothyroidism, PPM, h/o carotid endarterectomy presents w/ melena and weakness. The pt is a bit of a poor historian and states that everything feels "fuzzy" and her memory has "been going" for a number of weeks. I spoke w/ son (HCP) over the phone and he informed me that her memory has been worsening for an extended period of time and what I described her current state to be is apparently her chornic stable state as per him. She was recently hospitalized at [facility] with chest pain. Pain was felt to be gastrointestinal in etiology. She underwent EGD/colonoscopy and was found to have large hiatal hernia and gastritis. Due to recent stent placement, any surgical intervention was delayed as she cannot come off plavix w/ recent stents. She was discharged about 6wks ago and had been doing well. She states taht for the past few days she's noticed worsening generalized weakness. She feels "weak in the knees" when trying to ambulate. She lives alone and usually ambulates independently without any assistive devices but she's been noticing increasing difficulty with normal adl's. Denies CP. Denies feeling like she may faint but she states that she has no energy whatsoever. Yesterday and today she noted black tarry stool w/ blood tinge in the toilet bowl. Also of note, she was seen about 1 week ago for flu like symptoms and was rapid flu test was negative. No URI complaints at this time. Denies any falls but feels like she may fall when she ambulates.        In the ED, she was givne 80mg iv protonix and 500cc NS bolus. No other interventions. (04 Apr 2019 16:43)            ---    HOSPITAL COURSE: Admitted for upper GIB. Patient received 1 unit pRBC. Cardiology, Dr. Berry, consulted ASA was held, Plavix continued due to recent PCI. GI, Dr. Mccormick evaluated patient, patient with recent EGD/colonoscopy, recommend conservative management. H/H trended and remained stable. Physical therapy evaluated patient, no skilled PT needed.             ---    CONSULTANTS:     GI - Dr. Mccormick    Cardio - Dr. Berry         ---    TIME SPENT:    The total amount of time spent reviewing the hospital notes, laboratory values, imaging findings, assessing/counseling the patient, discussing with consultant physicians, social work, nursing staff took 61 minutes        ---    Primary care provider was made aware of plan for discharge:      [  ] NO     [  ] YES FROM ADMISSION H+P:     HPI:    86yo F w/ PMHx HTN, CAD s/p 12 stents (most recently 1/2019), hiatal hernia w/ gastritis, LLE stent, arthritis, diverticulosis, hypothyroidism, PPM, h/o carotid endarterectomy presents w/ melena and weakness. The pt is a bit of a poor historian and states that everything feels "fuzzy" and her memory has "been going" for a number of weeks. I spoke w/ son (HCP) over the phone and he informed me that her memory has been worsening for an extended period of time and what I described her current state to be is apparently her chornic stable state as per him. She was recently hospitalized at [facility] with chest pain. Pain was felt to be gastrointestinal in etiology. She underwent EGD/colonoscopy and was found to have large hiatal hernia and gastritis. Due to recent stent placement, any surgical intervention was delayed as she cannot come off plavix w/ recent stents. She was discharged about 6wks ago and had been doing well. She states taht for the past few days she's noticed worsening generalized weakness. She feels "weak in the knees" when trying to ambulate. She lives alone and usually ambulates independently without any assistive devices but she's been noticing increasing difficulty with normal adl's. Denies CP. Denies feeling like she may faint but she states that she has no energy whatsoever. Yesterday and today she noted black tarry stool w/ blood tinge in the toilet bowl. Also of note, she was seen about 1 week ago for flu like symptoms and was rapid flu test was negative. No URI complaints at this time. Denies any falls but feels like she may fall when she ambulates.        In the ED, she was givne 80mg iv protonix and 500cc NS bolus. No other interventions. (04 Apr 2019 16:43)            ---    HOSPITAL COURSE: Admitted for upper GIB. Patient received 1 unit pRBC. Cardiology, Dr. Berry, consulted ASA was held, Plavix continued due to recent PCI. GI, Dr. Mccormick evaluated patient, patient with recent EGD/colonoscopy, recommend conservative management. H/H trended and remained stable. Physical therapy evaluated patient, no skilled PT needed. Pt is stable for d/c home with outpatient follow up. Still has slow suspected upper GI and may require hospitalization but has no active bleeding at this time. No BRBPR since time of admission. No other complaints. Spoke w/ son/daughter, they area aware that pt will likely require future hospitlizations for this issue but is stable for d/c home now. Monitor for bleeding. Close outpt GI f/u recommended all in agreement.             ---    CONSULTANTS:     GI - Dr. Mccormick    Cardio - Dr. Berry         ---    TIME SPENT:    The total amount of time spent reviewing the hospital notes, laboratory values, imaging findings, assessing/counseling the patient, discussing with consultant physicians, social work, nursing staff took 61 minutes        ---    T(C): 36.4 (04-06-19 @ 13:04), Max: 36.7 (04-06-19 @ 05:22)    HR: 81 (04-06-19 @ 13:04) (70 - 81)    BP: 150/74 (04-06-19 @ 13:04) (150/74 - 165/70)    RR: 18 (04-06-19 @ 13:04) (17 - 18)    SpO2: 98% (04-06-19 @ 13:04) (96% - 98%)        PHYSICAL EXAM:    General: F in NAD, comfortable, appropriately interactive, smiling and appears happy    HEENT: NC/AT, PERRLA, EOMI B/L, moist mucous membranes     Neck: Supple, nontender, no masses    CV: RRR, +S1/S2, no murmurs, rubs or gallops    Respiratory: CTA B/L, No W/R/R    Abdominal: Soft, NT, ND +BSx4    Extremities: No C/C/E, + peripheral pulses    Neurology: AAOx3, nonfocal, CN II-XII grossly intact, sensation intact    Psych: Recent memory impaired

## 2019-04-05 NOTE — PROGRESS NOTE ADULT - PROBLEM SELECTOR PLAN 6
- supportive care, no sequela on exam suggestive of active process  - pt has stent in LLE, asa has been held due to recent/current GIB, will continue to hold for now

## 2019-04-05 NOTE — PROGRESS NOTE ADULT - PROBLEM SELECTOR PLAN 5
- ACEi held in setting of possible volume depletion from poor po intake and active bleeding  - continue w/ metoprolol/norvasc w/ hold parameters

## 2019-04-05 NOTE — PHYSICAL THERAPY INITIAL EVALUATION ADULT - PERTINENT HX OF CURRENT PROBLEM, REHAB EVAL
84yo F w/ PMHx HTN, CAD s/p 12 stents (most recently 1/2019), hiatal hernia w/ gastritis, LLE stent, arthritis, diverticulosis, hypothyroidism, PPM, h/o carotid endarterectomy presents w/ melena and weakness. The pt is a bit of a poor historian and states that everything feels "fuzzy" and her memory has "been going" for a number of weeks.

## 2019-04-05 NOTE — PROGRESS NOTE ADULT - PROBLEM SELECTOR PLAN 2
- likely reactive, improved at 13.51  - no complaints to suggest active infectious process (no URI complaints, no urinary complaints, no abd discomfort, afebrile)  - will monitor leukocytosis - improved at 13.51, per chart review pt is on prednisone 20mg qd for RA  - no complaints to suggest active infectious process (no URI complaints, no urinary complaints, no abd discomfort, afebrile)  - will monitor leukocytosis

## 2019-04-05 NOTE — PROGRESS NOTE ADULT - ASSESSMENT
The patient is an 85 year old female with a history of HTN, hypothyroidism, CAD s/p multiple PCI, PAD s/p stent, carotid endarterectomy, pacemaker who is admitted with GI bleed.    Plan:  - Hold aspirin  - Continue clopidogrel 75 mg daily due to recent PCI  - Continue atorvastatin (formulary equivalent)  - Continue amlodipine and metoprolol succinate for HTN. If BP trends down, stop amlodipine.  - Hemoglobin down to 7.2 this morning. Received transfusion.  - GI evaluation

## 2019-04-05 NOTE — PROGRESS NOTE ADULT - PROBLEM SELECTOR PLAN 1
2/19/19- sp w/u for gib; on egd findings of juan ulcerations, large hh, schatzkis ring and gastritis; on colon- colonic lipoma, diverticulosis, large internal hemorrhoids and resection of colonic polyp   path neg for acute  no evidence of overt gib overnight per nursing  monitor cbc, transfuse prn  cont ppi/carafate  anusol prn  monitor conservatively   advance diet as tolerated  if hgb stable/no further gib, rec dc home w op f/u 2/19/19- sp w/u for gib; on egd findings of juan ulcerations, large hh, schatzkis ring and gastritis; on colon- colonic lipoma, diverticulosis, large internal hemorrhoids and resection of colonic polyp   path neg for acute  no evidence of overt gib overnight per nursing  monitor cbc, transfuse prn  cont ppi/carafate  anusol prn  monitor conservatively   rec to advance diet as tolerated  if hgb stable/no further gib, rec dc home w op f/u

## 2019-04-05 NOTE — PROGRESS NOTE ADULT - ASSESSMENT
84yo F w/ PMHx HTN, CAD s/p 12 stents, LLE stent, arthritis, diverticulosis, hypothyroidism, PPM,h/o carotid endarterectomy presents w/ melena and suspected upper GIB.

## 2019-04-05 NOTE — DISCHARGE NOTE PROVIDER - PROVIDER TOKENS
PROVIDER:[TOKEN:[[id number]]],PROVIDER:[TOKEN:[[id number]]],PROVIDER:[TOKEN:[[id number]]]

## 2019-04-05 NOTE — DISCHARGE NOTE PROVIDER - NSDCFUADDINST_GEN_ALL_CORE_FT
Please call to schedule your follow up appointments with your doctors (primary care doctor, gastroenterologist, cardiologist)  Please take your medications as detailed in your medication reconciliation  Please return to the ED for worsening of your medical condition

## 2019-04-05 NOTE — PROGRESS NOTE ADULT - PROBLEM SELECTOR PLAN 1
- pt w/ elevated BUN which is consistent w/ likely upper GIB, with recent hx melena  - acute blood loss anemia, Hgb 7.2 overnight now s/p 1 unit pRBCs. AM Hgb 8.6, with repeat Hgb 9.6.   - pt likely to have slow upper GI bleeding at site of large hiatal hernia  - GI following, states pt is s/p GIB workup 2/19/19, with egd findings of juan ulcerations, large HH, Schatzki's ring and gastritis; on colon- colonic lipoma, diverticulosis, large internal hemorrhoids and resection of colonic polyp.   - no evidence of overt GIB overnight per nursing  - monitor cbc, transfuse PRN  - cont ppi/carafate, Anusol PRN per GI recs  - f/u H/H at 1800  - diet advanced to full liquids  - maintain 2 large bore iv access  - type/screen obtained  - consent for blood products obtained  - threshold for further transfusion would likely be hgb<7.5   - if tachycardia develops or active bleeding recurs, will have to transfuse again

## 2019-04-05 NOTE — CHART NOTE - NSCHARTNOTEFT_GEN_A_CORE
Called by RN as patient was requesting sleep aide. At home, patient takes Ambien 5 mg (as stated by son; no indication of Ambien in med rec/ pharmacy is closed). Patient had no sleep aide ordered on admission. Patient seen and examined in bed, very anxious about being in the hospital and anxious that she cannot sleep.     Vitals  T(F): 97.8 (04-04-19 @ 19:59), Max: 97.8 (04-04-19 @ 19:59)  HR: 64 (04-04-19 @ 19:59) (64 - 78)  BP: 167/62 (04-04-19 @ 19:59) (154/75 - 194/75)  RR: 16 (04-04-19 @ 19:59) (14 - 16)  SpO2: 99% (04-04-19 @ 19:59) (97% - 99%)    Physical Exam:  General: well-developed, well-nourished, anxious, tearful about being in hospital  HEENT: normocephalic, atraumatic, EOMI, moist mucous membranes   Neck: supple, non-tender, no masses  Neurology: awake, alert, and oriented, cursing/anxious, sensation intact  Respiratory: clear to auscultation bilaterally; no wheezes, rhonchi, or rales  CV: regular rate and rhythm, soft S1/S2, no murmurs, rubs, or gallops  Abdominal: soft, non-tender, non-distended, bowel sounds present  Extremities: no clubbing, cyanosis, or edema  Musculoskeletal: no joint erythema or warmth, no joint swelling   Skin: warm, dry, normal color    Assessment/Plan:  84yo F w/ PMHx HTN, CAD s/p 12 stents, LLE stent, arthritis, diverticulosis, hypothyroidism, PPM,h/o carotid endarterectomy presents w/ melena and suspected upper GIB  - Will give Ambien 5 mg x 1 dose; day team to reassess outpatient medications/need for nightly PRN Ambien.  - Discussed with senior resident, agrees with plan.  - Will continue to follow, RN to call with changes.

## 2019-04-05 NOTE — PROGRESS NOTE ADULT - SUBJECTIVE AND OBJECTIVE BOX
Patient is a 85y old  Female who presents with a chief complaint of dark stools and weakness (05 Apr 2019 13:53)      INTERVAL HPI/OVERNIGHT EVENTS: Per overnight team, pt Hgb noted to be 7.2 overnight, and was given 1 unit pRBCs. Pt seen and examined at bedside. Pt has no acute complaints. States she did not sleep well last night. Denies any BM's overnight. Denies any HA, dizziness, weakness, abd pain, or n/v/d.    MEDICATIONS  (STANDING):  amLODIPine   Tablet 5 milliGRAM(s) Oral daily  atorvastatin 40 milliGRAM(s) Oral at bedtime  clopidogrel Tablet 75 milliGRAM(s) Oral daily  ferrous    sulfate 325 milliGRAM(s) Oral three times a day  metoprolol succinate  milliGRAM(s) Oral daily  pantoprazole  Injectable 40 milliGRAM(s) IV Push two times a day  predniSONE   Tablet 20 milliGRAM(s) Oral daily  sodium chloride 0.9%. 1000 milliLiter(s) (75 mL/Hr) IV Continuous <Continuous>  sucralfate 1 Gram(s) Oral every 6 hours    MEDICATIONS  (PRN):  acetaminophen   Tablet .. 650 milliGRAM(s) Oral every 6 hours PRN Temp greater or equal to 38C (100.4F), Mild Pain (1 - 3)  aluminum hydroxide/magnesium hydroxide/simethicone Suspension 30 milliLiter(s) Oral every 4 hours PRN Dyspepsia  artificial  tears Solution 1 Drop(s) Both EYES two times a day PRN for dry eyes  ondansetron Injectable 4 milliGRAM(s) IV Push every 6 hours PRN Nausea      Allergies    No Known Drug Allergies  Originally Entered as [Unknown see Comment: pt unable to remember] reaction to [Mold] (Unknown)  stolazine eye med- pt doesn't remember (Unknown)    Intolerances        REVIEW OF SYSTEMS:  CONSTITUTIONAL: No fever or chills  HEENT:  No headache, no sore throat  RESPIRATORY: No cough, wheezing, or shortness of breath  CARDIOVASCULAR: No chest pain, palpitations, or leg swelling  GASTROINTESTINAL: No abd pain, nausea, vomiting, or diarrhea  GENITOURINARY: No dysuria, frequency, or hematuria  NEUROLOGICAL: no focal weakness or dizziness  MUSCULOSKELETAL: no myalgias     Vital Signs Last 24 Hrs  T(C): 36.3 (05 Apr 2019 13:44), Max: 36.7 (05 Apr 2019 05:24)  T(F): 97.3 (05 Apr 2019 13:44), Max: 98 (05 Apr 2019 05:24)  HR: 73 (05 Apr 2019 13:44) (64 - 73)  BP: 155/68 (05 Apr 2019 13:44) (120/63 - 167/62)  BP(mean): --  RR: 18 (05 Apr 2019 13:44) (14 - 18)  SpO2: 97% (05 Apr 2019 13:44) (97% - 99%)    PHYSICAL EXAM:  General: F in NAD  HEENT: NC/AT, PERRLA, EOMI B/L, moist mucous membranes   Neck: Supple, nontender, no masses  CV: RRR, +S1/S2, no murmurs, rubs or gallops  Respiratory: CTA B/L, No W/R/R  Abdominal: Soft, NT, ND +BSx4  Extremities: No C/C/E, + peripheral pulses  Neurology: AAOx3, nonfocal, CN II-XII grossly intact, sensation intact  Psych: Recent memory impaired        LABS:                        9.6    x     )-----------( x        ( 05 Apr 2019 13:07 )             31.1     CBC Full  -  ( 05 Apr 2019 13:07 )  WBC Count : x  Hemoglobin : 9.6 g/dL  Hematocrit : 31.1 %  Platelet Count - Automated : x  Mean Cell Volume : x  Mean Cell Hemoglobin : x  Mean Cell Hemoglobin Concentration : x  Auto Neutrophil # : x  Auto Lymphocyte # : x  Auto Monocyte # : x  Auto Eosinophil # : x  Auto Basophil # : x  Auto Neutrophil % : x  Auto Lymphocyte % : x  Auto Monocyte % : x  Auto Eosinophil % : x  Auto Basophil % : x    05 Apr 2019 08:59    143    |  110    |  19     ----------------------------<  92     3.9     |  25     |  0.68     Ca    8.0        05 Apr 2019 08:59  Mg     2.0       05 Apr 2019 08:59    TPro  5.6    /  Alb  2.9    /  TBili  0.2    /  DBili  x      /  AST  11     /  ALT  19     /  AlkPhos  50     04 Apr 2019 17:25    PT/INR - ( 05 Apr 2019 08:59 )   PT: 11.8 sec;   INR: 1.04 ratio         PTT - ( 04 Apr 2019 14:54 )  PTT:23.5 sec    CAPILLARY BLOOD GLUCOSE              RADIOLOGY & ADDITIONAL TESTS:    Personally reviewed.     Consultant(s) Notes Reviewed:  [x] YES  [ ] NO    Care Discussed with [x] Consultants  [x] Patient  [ ] Family  [ ]      [ ] Other; RN  DVT ppx Patient is a 85y old  Female who presents with a chief complaint of dark stools and weakness (05 Apr 2019 13:53)      INTERVAL HPI/OVERNIGHT EVENTS: Per overnight team, pt Hgb noted to be 7.2 overnight, and was given 1 unit pRBCs. Pt seen and examined at bedside. Pt has no acute complaints. States she did not sleep well last night. Denies any BM's overnight. Denies any HA, dizziness, weakness, abd pain, or n/v/d.    MEDICATIONS  (STANDING):  amLODIPine   Tablet 5 milliGRAM(s) Oral daily  atorvastatin 40 milliGRAM(s) Oral at bedtime  clopidogrel Tablet 75 milliGRAM(s) Oral daily  ferrous    sulfate 325 milliGRAM(s) Oral three times a day  metoprolol succinate  milliGRAM(s) Oral daily  pantoprazole  Injectable 40 milliGRAM(s) IV Push two times a day  predniSONE   Tablet 20 milliGRAM(s) Oral daily  sodium chloride 0.9%. 1000 milliLiter(s) (75 mL/Hr) IV Continuous <Continuous>  sucralfate 1 Gram(s) Oral every 6 hours    MEDICATIONS  (PRN):  acetaminophen   Tablet .. 650 milliGRAM(s) Oral every 6 hours PRN Temp greater or equal to 38C (100.4F), Mild Pain (1 - 3)  aluminum hydroxide/magnesium hydroxide/simethicone Suspension 30 milliLiter(s) Oral every 4 hours PRN Dyspepsia  artificial  tears Solution 1 Drop(s) Both EYES two times a day PRN for dry eyes  ondansetron Injectable 4 milliGRAM(s) IV Push every 6 hours PRN Nausea      Allergies    No Known Drug Allergies  Originally Entered as [Unknown see Comment: pt unable to remember] reaction to [Mold] (Unknown)  stolazine eye med- pt doesn't remember (Unknown)    Intolerances        REVIEW OF SYSTEMS:  CONSTITUTIONAL: No fever or chills  HEENT:  No headache, no sore throat  RESPIRATORY: No cough, wheezing, or shortness of breath  CARDIOVASCULAR: No chest pain, palpitations, or leg swelling  GASTROINTESTINAL: No abd pain, nausea, vomiting, or diarrhea  GENITOURINARY: No dysuria, frequency, or hematuria  NEUROLOGICAL: no focal weakness or dizziness  MUSCULOSKELETAL: no myalgias   10 systems reviewed and negative unless otherwise noted     Vital Signs Last 24 Hrs  T(C): 36.3 (05 Apr 2019 13:44), Max: 36.7 (05 Apr 2019 05:24)  T(F): 97.3 (05 Apr 2019 13:44), Max: 98 (05 Apr 2019 05:24)  HR: 73 (05 Apr 2019 13:44) (64 - 73)  BP: 155/68 (05 Apr 2019 13:44) (120/63 - 167/62)  BP(mean): --  RR: 18 (05 Apr 2019 13:44) (14 - 18)  SpO2: 97% (05 Apr 2019 13:44) (97% - 99%)    PHYSICAL EXAM:  General: F in NAD  HEENT: NC/AT, PERRLA, EOMI B/L, moist mucous membranes   Neck: Supple, nontender, no masses  CV: RRR, +S1/S2, no murmurs, rubs or gallops  Respiratory: CTA B/L, No W/R/R  Abdominal: Soft, NT, ND +BSx4  Extremities: No C/C/E, + peripheral pulses  Neurology: AAOx3, nonfocal, CN II-XII grossly intact, sensation intact  Psych: Recent memory impaired        LABS:                        9.6    x     )-----------( x        ( 05 Apr 2019 13:07 )             31.1     CBC Full  -  ( 05 Apr 2019 13:07 )  WBC Count : x  Hemoglobin : 9.6 g/dL  Hematocrit : 31.1 %  Platelet Count - Automated : x  Mean Cell Volume : x  Mean Cell Hemoglobin : x  Mean Cell Hemoglobin Concentration : x  Auto Neutrophil # : x  Auto Lymphocyte # : x  Auto Monocyte # : x  Auto Eosinophil # : x  Auto Basophil # : x  Auto Neutrophil % : x  Auto Lymphocyte % : x  Auto Monocyte % : x  Auto Eosinophil % : x  Auto Basophil % : x    05 Apr 2019 08:59    143    |  110    |  19     ----------------------------<  92     3.9     |  25     |  0.68     Ca    8.0        05 Apr 2019 08:59  Mg     2.0       05 Apr 2019 08:59    TPro  5.6    /  Alb  2.9    /  TBili  0.2    /  DBili  x      /  AST  11     /  ALT  19     /  AlkPhos  50     04 Apr 2019 17:25    PT/INR - ( 05 Apr 2019 08:59 )   PT: 11.8 sec;   INR: 1.04 ratio         PTT - ( 04 Apr 2019 14:54 )  PTT:23.5 sec    CAPILLARY BLOOD GLUCOSE              RADIOLOGY & ADDITIONAL TESTS:    Personally reviewed.     Consultant(s) Notes Reviewed:  [x] YES  [ ] NO    Care Discussed with [x] Consultants  [x] Patient  [ ] Family  [ ]      [ ] Other; RN  DVT ppx

## 2019-04-06 ENCOUNTER — TRANSCRIPTION ENCOUNTER (OUTPATIENT)
Age: 84
End: 2019-04-06

## 2019-04-06 VITALS
DIASTOLIC BLOOD PRESSURE: 74 MMHG | TEMPERATURE: 98 F | HEART RATE: 81 BPM | RESPIRATION RATE: 18 BRPM | OXYGEN SATURATION: 98 % | SYSTOLIC BLOOD PRESSURE: 150 MMHG

## 2019-04-06 LAB
ALBUMIN SERPL ELPH-MCNC: 2.6 G/DL — LOW (ref 3.3–5)
ALP SERPL-CCNC: 53 U/L — SIGNIFICANT CHANGE UP (ref 40–120)
ALT FLD-CCNC: 14 U/L — SIGNIFICANT CHANGE UP (ref 12–78)
ANION GAP SERPL CALC-SCNC: 6 MMOL/L — SIGNIFICANT CHANGE UP (ref 5–17)
AST SERPL-CCNC: 9 U/L — LOW (ref 15–37)
BASOPHILS # BLD AUTO: 0 K/UL — SIGNIFICANT CHANGE UP (ref 0–0.2)
BASOPHILS NFR BLD AUTO: 0 % — SIGNIFICANT CHANGE UP (ref 0–2)
BILIRUB SERPL-MCNC: 0.3 MG/DL — SIGNIFICANT CHANGE UP (ref 0.2–1.2)
BUN SERPL-MCNC: 13 MG/DL — SIGNIFICANT CHANGE UP (ref 7–23)
CALCIUM SERPL-MCNC: 8 MG/DL — LOW (ref 8.5–10.1)
CHLORIDE SERPL-SCNC: 108 MMOL/L — SIGNIFICANT CHANGE UP (ref 96–108)
CO2 SERPL-SCNC: 28 MMOL/L — SIGNIFICANT CHANGE UP (ref 22–31)
CREAT SERPL-MCNC: 0.71 MG/DL — SIGNIFICANT CHANGE UP (ref 0.5–1.3)
EOSINOPHIL # BLD AUTO: 0.36 K/UL — SIGNIFICANT CHANGE UP (ref 0–0.5)
EOSINOPHIL NFR BLD AUTO: 3 % — SIGNIFICANT CHANGE UP (ref 0–6)
GLUCOSE SERPL-MCNC: 90 MG/DL — SIGNIFICANT CHANGE UP (ref 70–99)
HCT VFR BLD CALC: 29.6 % — LOW (ref 34.5–45)
HCT VFR BLD CALC: 31.8 % — LOW (ref 34.5–45)
HGB BLD-MCNC: 9.1 G/DL — LOW (ref 11.5–15.5)
HGB BLD-MCNC: 9.8 G/DL — LOW (ref 11.5–15.5)
LYMPHOCYTES # BLD AUTO: 2.62 K/UL — SIGNIFICANT CHANGE UP (ref 1–3.3)
LYMPHOCYTES # BLD AUTO: 22 % — SIGNIFICANT CHANGE UP (ref 13–44)
MCHC RBC-ENTMCNC: 25.3 PG — LOW (ref 27–34)
MCHC RBC-ENTMCNC: 30.7 GM/DL — LOW (ref 32–36)
MCV RBC AUTO: 82.5 FL — SIGNIFICANT CHANGE UP (ref 80–100)
MONOCYTES # BLD AUTO: 0.71 K/UL — SIGNIFICANT CHANGE UP (ref 0–0.9)
MONOCYTES NFR BLD AUTO: 6 % — SIGNIFICANT CHANGE UP (ref 2–14)
NEUTROPHILS # BLD AUTO: 7.61 K/UL — HIGH (ref 1.8–7.4)
NEUTROPHILS NFR BLD AUTO: 63 % — SIGNIFICANT CHANGE UP (ref 43–77)
NRBC # BLD: SIGNIFICANT CHANGE UP /100 WBCS (ref 0–0)
PLATELET # BLD AUTO: 453 K/UL — HIGH (ref 150–400)
POTASSIUM SERPL-MCNC: 3.9 MMOL/L — SIGNIFICANT CHANGE UP (ref 3.5–5.3)
POTASSIUM SERPL-SCNC: 3.9 MMOL/L — SIGNIFICANT CHANGE UP (ref 3.5–5.3)
PROT SERPL-MCNC: 4.9 G/DL — LOW (ref 6–8.3)
RBC # BLD: 3.59 M/UL — LOW (ref 3.8–5.2)
RBC # FLD: 20.7 % — HIGH (ref 10.3–14.5)
SODIUM SERPL-SCNC: 142 MMOL/L — SIGNIFICANT CHANGE UP (ref 135–145)
WBC # BLD: 11.89 K/UL — HIGH (ref 3.8–10.5)
WBC # FLD AUTO: 11.89 K/UL — HIGH (ref 3.8–10.5)

## 2019-04-06 PROCEDURE — 86923 COMPATIBILITY TEST ELECTRIC: CPT

## 2019-04-06 PROCEDURE — 83540 ASSAY OF IRON: CPT

## 2019-04-06 PROCEDURE — 86850 RBC ANTIBODY SCREEN: CPT

## 2019-04-06 PROCEDURE — 85610 PROTHROMBIN TIME: CPT

## 2019-04-06 PROCEDURE — 94640 AIRWAY INHALATION TREATMENT: CPT

## 2019-04-06 PROCEDURE — 85014 HEMATOCRIT: CPT

## 2019-04-06 PROCEDURE — 86900 BLOOD TYPING SEROLOGIC ABO: CPT

## 2019-04-06 PROCEDURE — 82272 OCCULT BLD FECES 1-3 TESTS: CPT

## 2019-04-06 PROCEDURE — 83615 LACTATE (LD) (LDH) ENZYME: CPT

## 2019-04-06 PROCEDURE — 85018 HEMOGLOBIN: CPT

## 2019-04-06 PROCEDURE — 85027 COMPLETE CBC AUTOMATED: CPT

## 2019-04-06 PROCEDURE — 99239 HOSP IP/OBS DSCHRG MGMT >30: CPT

## 2019-04-06 PROCEDURE — P9016: CPT

## 2019-04-06 PROCEDURE — 36415 COLL VENOUS BLD VENIPUNCTURE: CPT

## 2019-04-06 PROCEDURE — 85730 THROMBOPLASTIN TIME PARTIAL: CPT

## 2019-04-06 PROCEDURE — 83550 IRON BINDING TEST: CPT

## 2019-04-06 PROCEDURE — 99285 EMERGENCY DEPT VISIT HI MDM: CPT | Mod: 25

## 2019-04-06 PROCEDURE — 97161 PT EVAL LOW COMPLEX 20 MIN: CPT

## 2019-04-06 PROCEDURE — 80053 COMPREHEN METABOLIC PANEL: CPT

## 2019-04-06 PROCEDURE — 80048 BASIC METABOLIC PNL TOTAL CA: CPT

## 2019-04-06 PROCEDURE — 93005 ELECTROCARDIOGRAM TRACING: CPT

## 2019-04-06 PROCEDURE — 86901 BLOOD TYPING SEROLOGIC RH(D): CPT

## 2019-04-06 PROCEDURE — 83735 ASSAY OF MAGNESIUM: CPT

## 2019-04-06 PROCEDURE — 36430 TRANSFUSION BLD/BLD COMPNT: CPT

## 2019-04-06 PROCEDURE — 82728 ASSAY OF FERRITIN: CPT

## 2019-04-06 PROCEDURE — 71045 X-RAY EXAM CHEST 1 VIEW: CPT

## 2019-04-06 RX ORDER — ACETAMINOPHEN 500 MG
2 TABLET ORAL
Qty: 0 | Refills: 0 | DISCHARGE
Start: 2019-04-06

## 2019-04-06 RX ORDER — ZOLPIDEM TARTRATE 10 MG/1
1 TABLET ORAL
Qty: 0 | Refills: 0 | DISCHARGE
Start: 2019-04-06

## 2019-04-06 RX ORDER — PANTOPRAZOLE SODIUM 20 MG/1
1 TABLET, DELAYED RELEASE ORAL
Qty: 60 | Refills: 0
Start: 2019-04-06 | End: 2019-05-05

## 2019-04-06 RX ORDER — LANOLIN ALCOHOL/MO/W.PET/CERES
5 CREAM (GRAM) TOPICAL ONCE
Qty: 0 | Refills: 0 | Status: COMPLETED | OUTPATIENT
Start: 2019-04-06 | End: 2019-04-06

## 2019-04-06 RX ADMIN — Medication 325 MILLIGRAM(S): at 05:44

## 2019-04-06 RX ADMIN — Medication 200 MILLIGRAM(S): at 05:44

## 2019-04-06 RX ADMIN — Medication 1 GRAM(S): at 05:44

## 2019-04-06 RX ADMIN — Medication 20 MILLIGRAM(S): at 05:44

## 2019-04-06 RX ADMIN — Medication 325 MILLIGRAM(S): at 13:16

## 2019-04-06 RX ADMIN — AMLODIPINE BESYLATE 5 MILLIGRAM(S): 2.5 TABLET ORAL at 05:45

## 2019-04-06 RX ADMIN — Medication 1 GRAM(S): at 12:23

## 2019-04-06 RX ADMIN — CLOPIDOGREL BISULFATE 75 MILLIGRAM(S): 75 TABLET, FILM COATED ORAL at 12:23

## 2019-04-06 RX ADMIN — Medication 5 MILLIGRAM(S): at 00:42

## 2019-04-06 RX ADMIN — PANTOPRAZOLE SODIUM 40 MILLIGRAM(S): 20 TABLET, DELAYED RELEASE ORAL at 05:45

## 2019-04-06 NOTE — CHART NOTE - NSCHARTNOTEFT_GEN_A_CORE
The patient was seen and examined on the day of discharge by the attending physician. Pt stable for discharge. Please see discharge note for additional information regarding the hospital course and the day of discharge. Spoke w/ son. Spoke w/ daughter. Spoke w/ patient. All in agreement w/ tx plan.

## 2019-04-06 NOTE — PROGRESS NOTE ADULT - SUBJECTIVE AND OBJECTIVE BOX
INTERVAL HPI/OVERNIGHT EVENTS:  No new overnight event.  No N/V/D.  Tolerating diet.    Allergies    No Known Drug Allergies  Originally Entered as [Unknown see Comment: pt unable to remember] reaction to [Mold] (Unknown)  stolazine eye med- pt doesn&#x27;t remember (Unknown)    Intolerances          General:  No wt loss, fevers, chills, night sweats, fatigue,   Eyes:  Good vision, no reported pain  ENT:  No sore throat, pain, runny nose, dysphagia  CV:  No pain, palpitations, hypo/hypertension  Resp:  No dyspnea, cough, tachypnea, wheezing  GI:  No pain, No nausea, No vomiting, No diarrhea, No constipation, No weight loss, No fever, No pruritis, No rectal bleeding, No tarry stools, No dysphagia,  :  No pain, bleeding, incontinence, nocturia  Muscle:  No pain, weakness  Neuro:  No weakness, tingling, memory problems  Psych:  No fatigue, insomnia, mood problems, depression  Endocrine:  No polyuria, polydipsia, cold/heat intolerance  Heme:  No petechiae, ecchymosis, easy bruisability  Skin:  No rash, tattoos, scars, edema      PHYSICAL EXAM:   Vital Signs:  Vital Signs Last 24 Hrs  T(C): 36.4 (06 Apr 2019 13:04), Max: 36.7 (06 Apr 2019 05:22)  T(F): 97.5 (06 Apr 2019 13:04), Max: 98.1 (06 Apr 2019 05:22)  HR: 81 (06 Apr 2019 13:04) (70 - 81)  BP: 150/74 (06 Apr 2019 13:04) (150/74 - 165/70)  BP(mean): --  RR: 18 (06 Apr 2019 13:04) (17 - 18)  SpO2: 98% (06 Apr 2019 13:04) (96% - 98%)  Daily     Daily I&O's Summary      GENERAL:  Appears stated age, well-groomed, well-nourished, no distress  HEENT:  NC/AT,  conjunctivae clear and pink, no thyromegaly, nodules, adenopathy, no JVD, sclera -anicteric  CHEST:  Full & symmetric excursion, no increased effort, breath sounds clear  HEART:  Regular rhythm, S1, S2, no murmur/rub/S3/S4, no abdominal bruit, no edema  ABDOMEN:  Soft, non-tender, non-distended, normoactive bowel sounds,  no masses ,no hepato-splenomegaly, no signs of chronic liver disease  EXTEREMITIES:  no cyanosis,clubbing or edema  SKIN:  No rash/erythema/ecchymoses/petechiae/wounds/abscess/warm/dry  NEURO:  Alert, oriented, no asterixis, no tremor, no encephalopathy      LABS:                        9.8    x     )-----------( x        ( 06 Apr 2019 12:39 )             31.8     04-06    142  |  108  |  13  ----------------------------<  90  3.9   |  28  |  0.71    Ca    8.0<L>      06 Apr 2019 08:47  Mg     2.0     04-05    TPro  4.9<L>  /  Alb  2.6<L>  /  TBili  0.3  /  DBili  x   /  AST  9<L>  /  ALT  14  /  AlkPhos  53  04-06    PT/INR - ( 05 Apr 2019 08:59 )   PT: 11.8 sec;   INR: 1.04 ratio             amylase   lipase  RADIOLOGY & ADDITIONAL TESTS:

## 2019-04-06 NOTE — PROGRESS NOTE ADULT - ATTENDING COMMENTS
Advanced care planning was discussed with patient and family.  Advanced care planning forms were reviewed and discussed.  Risks, benefits and alternatives of gastroenterologic procedures were discussed in detail and all questions were answered.    30 minutes spent.
Advanced care planning was discussed with patient and family.  Advanced care planning forms were reviewed and discussed.  Risks, benefits and alternatives of gastroenterologic procedures were discussed in detail and all questions were answered.    30 minutes spent.
I personally conducted a physical examination of the patient. I personally gathered the patient's history. I edited the above listed findings which were prepared by the listed resident physician. I personally discussed the plan of care with the patient. The questions and concerns were addressed to the best of my ability. The patient is in agreement with the listed treatment plan.     - h/h is responding nicely  - d/c planning  - added ambien to regimen

## 2019-04-06 NOTE — PROGRESS NOTE ADULT - PROBLEM SELECTOR PLAN 1
2/19/19- sp w/u for gib; on egd findings of juan ulcerations, large hh, schatzkis ring and gastritis; on colon- colonic lipoma, diverticulosis, large internal hemorrhoids and resection of colonic polyp   path neg for acute  no evidence of overt gib overnight per nursing  monitor cbc, transfuse prn  cont ppi/carafate  anusol prn  monitor conservatively   rec to advance diet as tolerated  if hgb stable/no further gib, rec dc home w op f/u

## 2019-04-06 NOTE — DISCHARGE NOTE NURSING/CASE MANAGEMENT/SOCIAL WORK - NSDCDPATPORTLINK_GEN_ALL_CORE
You can access the [facility] Patient Portal, offered by [facility], by registering with the following website: [email or web address]
You can access the [facility] Patient Portal, offered by [facility], by registering with the following website: [email or web address]

## 2019-04-10 ENCOUNTER — APPOINTMENT (OUTPATIENT)
Dept: CARDIOLOGY | Facility: CLINIC | Age: 84
End: 2019-04-10

## 2019-04-29 ENCOUNTER — APPOINTMENT (OUTPATIENT)
Dept: ORTHOPEDIC SURGERY | Facility: CLINIC | Age: 84
End: 2019-04-29

## 2019-04-29 ENCOUNTER — APPOINTMENT (OUTPATIENT)
Dept: CARDIOLOGY | Facility: CLINIC | Age: 84
End: 2019-04-29

## 2019-05-01 ENCOUNTER — RX RENEWAL (OUTPATIENT)
Age: 84
End: 2019-05-01

## 2019-05-02 ENCOUNTER — RX RENEWAL (OUTPATIENT)
Age: 84
End: 2019-05-02

## 2019-05-03 ENCOUNTER — APPOINTMENT (OUTPATIENT)
Dept: CARDIOLOGY | Facility: CLINIC | Age: 84
End: 2019-05-03

## 2019-05-22 ENCOUNTER — APPOINTMENT (OUTPATIENT)
Dept: CARDIOLOGY | Facility: CLINIC | Age: 84
End: 2019-05-22
Payer: MEDICARE

## 2019-05-22 ENCOUNTER — NON-APPOINTMENT (OUTPATIENT)
Age: 84
End: 2019-05-22

## 2019-05-22 VITALS
HEIGHT: 59 IN | OXYGEN SATURATION: 95 % | WEIGHT: 148 LBS | DIASTOLIC BLOOD PRESSURE: 75 MMHG | HEART RATE: 78 BPM | BODY MASS INDEX: 29.84 KG/M2 | SYSTOLIC BLOOD PRESSURE: 123 MMHG

## 2019-05-22 DIAGNOSIS — K57.90 DIVERTICULOSIS OF INTESTINE, PART UNSPECIFIED, W/OUT PERFORATION OR ABSCESS W/OUT BLEEDING: ICD-10-CM

## 2019-05-22 DIAGNOSIS — Z87.898 PERSONAL HISTORY OF OTHER SPECIFIED CONDITIONS: ICD-10-CM

## 2019-05-22 PROCEDURE — 93000 ELECTROCARDIOGRAM COMPLETE: CPT

## 2019-05-22 PROCEDURE — 36415 COLL VENOUS BLD VENIPUNCTURE: CPT

## 2019-05-22 PROCEDURE — 99215 OFFICE O/P EST HI 40 MIN: CPT | Mod: 25

## 2019-05-22 RX ORDER — TRAZODONE HYDROCHLORIDE 50 MG/1
50 TABLET ORAL DAILY
Refills: 0 | Status: ACTIVE | COMMUNITY

## 2019-05-22 NOTE — REASON FOR VISIT
[Follow-Up - From Hospitalization] : follow-up of a recent hospitalization for [Angina Pectoris] : angina pectoris [Coronary Artery Disease] : coronary artery disease [Hyperlipidemia] : hyperlipidemia [Hypertension] : hypertension

## 2019-05-22 NOTE — DISCUSSION/SUMMARY
[FreeTextEntry1] : SOB/Chest pressure/Coronary artery disease/Angina pectoris: Unclear if symptoms from anemia/HH/or CAD.  blood work today. Stress test orderd. \par Hypertension: Well controlled on current regime. Low-sodium diet recommend.\par Hyperlipidemia: Check levels with todays blood work. Continue low-fat diet.\par Follow up in 3-4 weeks.

## 2019-05-22 NOTE — CARDIOLOGY SUMMARY
[No Ischemia] : no Ischemia [No Exercise Ind Arr] : no exercise induced arrhythmias [No Symptoms] : no Symptoms [LVEF ___%] : LVEF [unfilled]% [None] : normal LV function [Moderate] : moderate pulmonary hypertension [Enlarged] : enlarged LA size [Mild] : mild mitral regurgitation [___] : [unfilled]

## 2019-05-22 NOTE — REVIEW OF SYSTEMS
[see HPI] : see HPI [Memory Lapses Or Loss] : memory lapses or loss [Under Stress] : under stress [Negative] : Heme/Lymph [Feeling Fatigued] : not feeling fatigued [Shortness Of Breath] : no shortness of breath [Dyspnea on exertion] : not dyspnea during exertion [Chest  Pressure] : no chest pressure [Chest Pain] : no chest pain [Lower Ext Edema] : no extremity edema [Leg Claudication] : no intermittent leg claudication [Palpitations] : no palpitations [Confusion] : no confusion was observed [Depression] : no depression [Anxiety] : no anxiety [Suicidal] : not suicidal

## 2019-05-22 NOTE — PHYSICAL EXAM
[General Appearance - Well Developed] : well developed [Normal Appearance] : normal appearance [Well Groomed] : well groomed [General Appearance - Well Nourished] : well nourished [No Deformities] : no deformities [General Appearance - In No Acute Distress] : no acute distress [Normal Conjunctiva] : the conjunctiva exhibited no abnormalities [Eyelids - No Xanthelasma] : the eyelids demonstrated no xanthelasmas [Normal Oral Mucosa] : normal oral mucosa [No Oral Pallor] : no oral pallor [No Oral Cyanosis] : no oral cyanosis [Normal Jugular Venous A Waves Present] : normal jugular venous A waves present [Normal Jugular Venous V Waves Present] : normal jugular venous V waves present [No Jugular Venous Fenton A Waves] : no jugular venous fenton A waves [Exaggerated Use Of Accessory Muscles For Inspiration] : no accessory muscle use [Abdomen Soft] : soft [Abdomen Tenderness] : non-tender [Abdomen Mass (___ Cm)] : no abdominal mass palpated [Abnormal Walk] : normal gait [Gait - Sufficient For Exercise Testing] : the gait was sufficient for exercise testing [Nail Clubbing] : no clubbing of the fingernails [Cyanosis, Localized] : no localized cyanosis [Petechial Hemorrhages (___cm)] : no petechial hemorrhages [Skin Color & Pigmentation] : normal skin color and pigmentation [] : no rash [No Venous Stasis] : no venous stasis [Skin Lesions] : no skin lesions [No Skin Ulcers] : no skin ulcer [No Xanthoma] : no  xanthoma was observed [Oriented To Time, Place, And Person] : oriented to person, place, and time [Affect] : the affect was normal [Mood] : the mood was normal [No Anxiety] : not feeling anxious [5th Left ICS - MCL] : palpated at the 5th LICS in the midclavicular line [Normal] : normal [Normal Rate] : normal [Rhythm Regular] : regular [Normal S1] : normal S1 [Normal S2] : normal S2 [S4] : an S4 was heard [II] : a grade 2 [Right Carotid Bruit] : right carotid bruit heard [Left Carotid Bruit] : left carotid bruit heard [1+] : left 1+ [2+] : left 2+ [No Abnormalities] : the abdominal aorta was not enlarged and no bruit was heard [No Pitting Edema] : no pitting edema present [FreeTextEntry1] : Prolonged expiratory phase. No rales, Wheezing, but scant rhonchi at left base [Rt] : no varicose veins of the right leg [Lt] : no varicose veins of the left leg

## 2019-05-24 ENCOUNTER — APPOINTMENT (OUTPATIENT)
Dept: UROGYNECOLOGY | Facility: CLINIC | Age: 84
End: 2019-05-24
Payer: MEDICARE

## 2019-05-24 DIAGNOSIS — Z87.448 PERSONAL HISTORY OF OTHER DISEASES OF URINARY SYSTEM: ICD-10-CM

## 2019-05-24 DIAGNOSIS — Z87.898 PERSONAL HISTORY OF OTHER SPECIFIED CONDITIONS: ICD-10-CM

## 2019-05-24 DIAGNOSIS — N81.9 FEMALE GENITAL PROLAPSE, UNSPECIFIED: ICD-10-CM

## 2019-05-24 DIAGNOSIS — N39.3 STRESS INCONTINENCE (FEMALE) (MALE): ICD-10-CM

## 2019-05-24 LAB
25(OH)D3 SERPL-MCNC: 28.9 NG/ML
ALBUMIN SERPL ELPH-MCNC: 3.6 G/DL
ALP BLD-CCNC: 75 U/L
ALT SERPL-CCNC: <5 U/L
ANION GAP SERPL CALC-SCNC: 13 MMOL/L
AST SERPL-CCNC: 9 U/L
BASOPHILS # BLD AUTO: 0.08 K/UL
BASOPHILS NFR BLD AUTO: 0.9 %
BILIRUB SERPL-MCNC: <0.2 MG/DL
BUN SERPL-MCNC: 16 MG/DL
CALCIUM SERPL-MCNC: 9.9 MG/DL
CHLORIDE SERPL-SCNC: 102 MMOL/L
CHOLEST SERPL-MCNC: 163 MG/DL
CHOLEST/HDLC SERPL: 6 RATIO
CK SERPL-CCNC: 22 U/L
CO2 SERPL-SCNC: 21 MMOL/L
CREAT SERPL-MCNC: 0.79 MG/DL
EOSINOPHIL # BLD AUTO: 0.23 K/UL
EOSINOPHIL NFR BLD AUTO: 2.4 %
ESTIMATED AVERAGE GLUCOSE: 108 MG/DL
GLUCOSE SERPL-MCNC: 102 MG/DL
HBA1C MFR BLD HPLC: 5.4 %
HCT VFR BLD CALC: 36.2 %
HDLC SERPL-MCNC: 27 MG/DL
HGB BLD-MCNC: 11.4 G/DL
IMM GRANULOCYTES NFR BLD AUTO: 0.6 %
LDLC SERPL CALC-MCNC: 110 MG/DL
LDLC SERPL DIRECT ASSAY-MCNC: 116 MG/DL
LYMPHOCYTES # BLD AUTO: 1.21 K/UL
LYMPHOCYTES NFR BLD AUTO: 12.9 %
MAN DIFF?: NORMAL
MCHC RBC-ENTMCNC: 27.7 PG
MCHC RBC-ENTMCNC: 31.5 GM/DL
MCV RBC AUTO: 88.1 FL
MONOCYTES # BLD AUTO: 0.7 K/UL
MONOCYTES NFR BLD AUTO: 7.4 %
NEUTROPHILS # BLD AUTO: 7.13 K/UL
NEUTROPHILS NFR BLD AUTO: 75.8 %
PLATELET # BLD AUTO: 492 K/UL
POTASSIUM SERPL-SCNC: 4.9 MMOL/L
PROT SERPL-MCNC: 6.5 G/DL
RBC # BLD: 4.11 M/UL
RBC # FLD: 17.5 %
SODIUM SERPL-SCNC: 136 MMOL/L
TRIGL SERPL-MCNC: 131 MG/DL
TSH SERPL-ACNC: 2.62 UIU/ML
WBC # FLD AUTO: 9.41 K/UL

## 2019-05-24 PROCEDURE — 99213 OFFICE O/P EST LOW 20 MIN: CPT

## 2019-05-24 PROCEDURE — A4561: CPT

## 2019-06-01 ENCOUNTER — MOBILE ON CALL (OUTPATIENT)
Age: 84
End: 2019-06-01

## 2019-06-05 ENCOUNTER — NON-APPOINTMENT (OUTPATIENT)
Age: 84
End: 2019-06-05

## 2019-06-05 ENCOUNTER — APPOINTMENT (OUTPATIENT)
Dept: CARDIOLOGY | Facility: CLINIC | Age: 84
End: 2019-06-05
Payer: MEDICARE

## 2019-06-05 VITALS
SYSTOLIC BLOOD PRESSURE: 123 MMHG | OXYGEN SATURATION: 95 % | BODY MASS INDEX: 29.64 KG/M2 | HEART RATE: 66 BPM | WEIGHT: 147 LBS | HEIGHT: 59 IN | DIASTOLIC BLOOD PRESSURE: 76 MMHG

## 2019-06-05 DIAGNOSIS — Z86.39 PERSONAL HISTORY OF OTHER ENDOCRINE, NUTRITIONAL AND METABOLIC DISEASE: ICD-10-CM

## 2019-06-05 DIAGNOSIS — I51.9 HEART DISEASE, UNSPECIFIED: ICD-10-CM

## 2019-06-05 DIAGNOSIS — R07.89 OTHER CHEST PAIN: ICD-10-CM

## 2019-06-05 DIAGNOSIS — R06.09 OTHER FORMS OF DYSPNEA: ICD-10-CM

## 2019-06-05 PROCEDURE — 99214 OFFICE O/P EST MOD 30 MIN: CPT | Mod: 25

## 2019-06-05 PROCEDURE — 93000 ELECTROCARDIOGRAM COMPLETE: CPT

## 2019-06-05 NOTE — REVIEW OF SYSTEMS
[see HPI] : see HPI [Memory Lapses Or Loss] : memory lapses or loss [Under Stress] : under stress [Negative] : Heme/Lymph [Feeling Fatigued] : not feeling fatigued [Shortness Of Breath] : no shortness of breath [Dyspnea on exertion] : not dyspnea during exertion [Chest  Pressure] : no chest pressure [Chest Pain] : no chest pain [Lower Ext Edema] : no extremity edema [Palpitations] : no palpitations [Leg Claudication] : no intermittent leg claudication [Confusion] : no confusion was observed [Depression] : no depression [Anxiety] : no anxiety [Suicidal] : not suicidal

## 2019-06-05 NOTE — CARDIOLOGY SUMMARY
[No Ischemia] : no Ischemia [No Exercise Ind Arr] : no exercise induced arrhythmias [No Symptoms] : no Symptoms [LVEF ___%] : LVEF [unfilled]% [Moderate] : moderate pulmonary hypertension [None] : normal LV function [Enlarged] : enlarged LA size [Mild] : mild mitral regurgitation [___] : [unfilled]

## 2019-06-05 NOTE — PHYSICAL EXAM
[General Appearance - Well Developed] : well developed [Normal Appearance] : normal appearance [Well Groomed] : well groomed [General Appearance - Well Nourished] : well nourished [No Deformities] : no deformities [General Appearance - In No Acute Distress] : no acute distress [Normal Conjunctiva] : the conjunctiva exhibited no abnormalities [Eyelids - No Xanthelasma] : the eyelids demonstrated no xanthelasmas [Normal Oral Mucosa] : normal oral mucosa [No Oral Pallor] : no oral pallor [No Oral Cyanosis] : no oral cyanosis [Normal Jugular Venous A Waves Present] : normal jugular venous A waves present [Normal Jugular Venous V Waves Present] : normal jugular venous V waves present [No Jugular Venous Fenton A Waves] : no jugular venous fenton A waves [Exaggerated Use Of Accessory Muscles For Inspiration] : no accessory muscle use [Abdomen Soft] : soft [Abdomen Tenderness] : non-tender [Abdomen Mass (___ Cm)] : no abdominal mass palpated [Abnormal Walk] : normal gait [Gait - Sufficient For Exercise Testing] : the gait was sufficient for exercise testing [Nail Clubbing] : no clubbing of the fingernails [Petechial Hemorrhages (___cm)] : no petechial hemorrhages [Cyanosis, Localized] : no localized cyanosis [Skin Color & Pigmentation] : normal skin color and pigmentation [] : no rash [Skin Lesions] : no skin lesions [No Venous Stasis] : no venous stasis [No Skin Ulcers] : no skin ulcer [No Xanthoma] : no  xanthoma was observed [Oriented To Time, Place, And Person] : oriented to person, place, and time [Affect] : the affect was normal [Mood] : the mood was normal [No Anxiety] : not feeling anxious [5th Left ICS - MCL] : palpated at the 5th LICS in the midclavicular line [Normal] : normal [Normal Rate] : normal [Rhythm Regular] : regular [Normal S1] : normal S1 [Normal S2] : normal S2 [S4] : an S4 was heard [II] : a grade 2 [Right Carotid Bruit] : right carotid bruit heard [Left Carotid Bruit] : left carotid bruit heard [1+] : left 1+ [2+] : left 2+ [No Abnormalities] : the abdominal aorta was not enlarged and no bruit was heard [No Pitting Edema] : no pitting edema present [Rt] : no varicose veins of the right leg [FreeTextEntry1] : Prolonged expiratory phase. No rales, Wheezing, but scant rhonchi at left base [Lt] : no varicose veins of the left leg

## 2019-06-05 NOTE — DISCUSSION/SUMMARY
[FreeTextEntry1] : SOB/Chest pressure/Coronary artery disease/Angina pectoris: chest pain and sob resolved on own and never had stress test. No issues in this regard at this time. \par Blood work reviewed and anemia better on iron.\par Hypertension: Well controlled on current regime. Low-sodium diet recommend.\par Hyperlipidemia: recent levels with mildly elevated LDL and discussed diet to help this.\par Follow up in 4 months.

## 2019-06-05 NOTE — HISTORY OF PRESENT ILLNESS
[FreeTextEntry1] : Chest pain and SOB resolved and never had stress test. Feels fatigue but getting up 3-4 x a night to urinate and has seen urology and is trying to solve this issue with urology. Fatigue is likely from this.  Anemia on recent blood work is good. and lipids ok.

## 2019-06-10 ENCOUNTER — MEDICATION RENEWAL (OUTPATIENT)
Age: 84
End: 2019-06-10

## 2019-07-22 ENCOUNTER — INPATIENT (INPATIENT)
Facility: HOSPITAL | Age: 84
LOS: 0 days | Discharge: HOME | End: 2019-07-23
Attending: INTERNAL MEDICINE | Admitting: INTERNAL MEDICINE
Payer: MEDICARE

## 2019-07-22 ENCOUNTER — EMERGENCY (EMERGENCY)
Facility: HOSPITAL | Age: 84
LOS: 1 days | Discharge: ANOTHER HOSPITAL | End: 2019-07-22
Attending: EMERGENCY MEDICINE | Admitting: EMERGENCY MEDICINE
Payer: MEDICARE

## 2019-07-22 VITALS
WEIGHT: 149.03 LBS | OXYGEN SATURATION: 96 % | DIASTOLIC BLOOD PRESSURE: 74 MMHG | SYSTOLIC BLOOD PRESSURE: 168 MMHG | RESPIRATION RATE: 16 BRPM | TEMPERATURE: 98 F | HEART RATE: 78 BPM

## 2019-07-22 VITALS — TEMPERATURE: 98 F | SYSTOLIC BLOOD PRESSURE: 158 MMHG | DIASTOLIC BLOOD PRESSURE: 74 MMHG | HEART RATE: 74 BPM

## 2019-07-22 VITALS
DIASTOLIC BLOOD PRESSURE: 65 MMHG | OXYGEN SATURATION: 98 % | RESPIRATION RATE: 16 BRPM | TEMPERATURE: 97 F | SYSTOLIC BLOOD PRESSURE: 156 MMHG | HEART RATE: 71 BPM

## 2019-07-22 DIAGNOSIS — G56.01 CARPAL TUNNEL SYNDROME, RIGHT UPPER LIMB: Chronic | ICD-10-CM

## 2019-07-22 DIAGNOSIS — Z95.5 PRESENCE OF CORONARY ANGIOPLASTY IMPLANT AND GRAFT: Chronic | ICD-10-CM

## 2019-07-22 DIAGNOSIS — Z90.710 ACQUIRED ABSENCE OF BOTH CERVIX AND UTERUS: Chronic | ICD-10-CM

## 2019-07-22 DIAGNOSIS — H26.9 UNSPECIFIED CATARACT: Chronic | ICD-10-CM

## 2019-07-22 DIAGNOSIS — Z98.89 OTHER SPECIFIED POSTPROCEDURAL STATES: Chronic | ICD-10-CM

## 2019-07-22 DIAGNOSIS — I25.10 ATHEROSCLEROTIC HEART DISEASE OF NATIVE CORONARY ARTERY WITHOUT ANGINA PECTORIS: ICD-10-CM

## 2019-07-22 DIAGNOSIS — Z98.82 BREAST IMPLANT STATUS: Chronic | ICD-10-CM

## 2019-07-22 DIAGNOSIS — R07.2 PRECORDIAL PAIN: ICD-10-CM

## 2019-07-22 DIAGNOSIS — Z96.659 PRESENCE OF UNSPECIFIED ARTIFICIAL KNEE JOINT: Chronic | ICD-10-CM

## 2019-07-22 DIAGNOSIS — Z95.0 PRESENCE OF CARDIAC PACEMAKER: Chronic | ICD-10-CM

## 2019-07-22 LAB
ALBUMIN SERPL ELPH-MCNC: 3.2 G/DL — LOW (ref 3.3–5)
ALP SERPL-CCNC: 60 U/L — SIGNIFICANT CHANGE UP (ref 30–120)
ALT FLD-CCNC: 14 U/L DA — SIGNIFICANT CHANGE UP (ref 10–60)
ANION GAP SERPL CALC-SCNC: 8 MMOL/L — SIGNIFICANT CHANGE UP (ref 5–17)
AST SERPL-CCNC: 11 U/L — SIGNIFICANT CHANGE UP (ref 10–40)
BILIRUB SERPL-MCNC: 0.2 MG/DL — SIGNIFICANT CHANGE UP (ref 0.2–1.2)
BLD GP AB SCN SERPL QL: SIGNIFICANT CHANGE UP
BUN SERPL-MCNC: 27 MG/DL — HIGH (ref 7–23)
CALCIUM SERPL-MCNC: 9.4 MG/DL — SIGNIFICANT CHANGE UP (ref 8.4–10.5)
CHLORIDE SERPL-SCNC: 110 MMOL/L — HIGH (ref 96–108)
CO2 SERPL-SCNC: 24 MMOL/L — SIGNIFICANT CHANGE UP (ref 22–31)
CREAT SERPL-MCNC: 0.82 MG/DL — SIGNIFICANT CHANGE UP (ref 0.5–1.3)
GLUCOSE SERPL-MCNC: 129 MG/DL — HIGH (ref 70–99)
HCT VFR BLD CALC: 38.8 % — SIGNIFICANT CHANGE UP (ref 34.5–45)
HGB BLD-MCNC: 12.2 G/DL — SIGNIFICANT CHANGE UP (ref 11.5–15.5)
MCHC RBC-ENTMCNC: 28.1 PG — SIGNIFICANT CHANGE UP (ref 27–34)
MCHC RBC-ENTMCNC: 31.4 GM/DL — LOW (ref 32–36)
MCV RBC AUTO: 89.4 FL — SIGNIFICANT CHANGE UP (ref 80–100)
NRBC # BLD: 0 /100 WBCS — SIGNIFICANT CHANGE UP (ref 0–0)
PLATELET # BLD AUTO: 313 K/UL — SIGNIFICANT CHANGE UP (ref 150–400)
POTASSIUM SERPL-MCNC: 4.5 MMOL/L — SIGNIFICANT CHANGE UP (ref 3.5–5.3)
POTASSIUM SERPL-SCNC: 4.5 MMOL/L — SIGNIFICANT CHANGE UP (ref 3.5–5.3)
PROT SERPL-MCNC: 6.2 G/DL — SIGNIFICANT CHANGE UP (ref 6–8.3)
RBC # BLD: 4.34 M/UL — SIGNIFICANT CHANGE UP (ref 3.8–5.2)
RBC # FLD: 16.4 % — HIGH (ref 10.3–14.5)
SODIUM SERPL-SCNC: 142 MMOL/L — SIGNIFICANT CHANGE UP (ref 135–145)
TROPONIN I SERPL-MCNC: 0.01 NG/ML — LOW (ref 0.02–0.06)
TROPONIN I SERPL-MCNC: 0.01 NG/ML — LOW (ref 0.02–0.06)
WBC # BLD: 15.34 K/UL — HIGH (ref 3.8–10.5)
WBC # FLD AUTO: 15.34 K/UL — HIGH (ref 3.8–10.5)

## 2019-07-22 PROCEDURE — 93010 ELECTROCARDIOGRAM REPORT: CPT

## 2019-07-22 PROCEDURE — 99223 1ST HOSP IP/OBS HIGH 75: CPT | Mod: 25

## 2019-07-22 PROCEDURE — 71045 X-RAY EXAM CHEST 1 VIEW: CPT | Mod: 26

## 2019-07-22 PROCEDURE — 93571 IV DOP VEL&/PRESS C FLO 1ST: CPT | Mod: 26,RC

## 2019-07-22 PROCEDURE — 92928 PRQ TCAT PLMT NTRAC ST 1 LES: CPT | Mod: RC

## 2019-07-22 PROCEDURE — 99285 EMERGENCY DEPT VISIT HI MDM: CPT

## 2019-07-22 PROCEDURE — 36415 COLL VENOUS BLD VENIPUNCTURE: CPT

## 2019-07-22 PROCEDURE — 93005 ELECTROCARDIOGRAM TRACING: CPT

## 2019-07-22 PROCEDURE — 93458 L HRT ARTERY/VENTRICLE ANGIO: CPT | Mod: 26,XU

## 2019-07-22 PROCEDURE — 99285 EMERGENCY DEPT VISIT HI MDM: CPT | Mod: 25

## 2019-07-22 PROCEDURE — 80053 COMPREHEN METABOLIC PANEL: CPT

## 2019-07-22 PROCEDURE — 84484 ASSAY OF TROPONIN QUANT: CPT

## 2019-07-22 PROCEDURE — 99223 1ST HOSP IP/OBS HIGH 75: CPT

## 2019-07-22 PROCEDURE — 71045 X-RAY EXAM CHEST 1 VIEW: CPT

## 2019-07-22 PROCEDURE — 85027 COMPLETE CBC AUTOMATED: CPT

## 2019-07-22 PROCEDURE — 93010 ELECTROCARDIOGRAM REPORT: CPT | Mod: 77

## 2019-07-22 PROCEDURE — 92978 ENDOLUMINL IVUS OCT C 1ST: CPT | Mod: 26,RC

## 2019-07-22 RX ORDER — ATORVASTATIN CALCIUM 80 MG/1
40 TABLET, FILM COATED ORAL AT BEDTIME
Refills: 0 | Status: DISCONTINUED | OUTPATIENT
Start: 2019-07-22 | End: 2019-07-23

## 2019-07-22 RX ORDER — SUCRALFATE 1 G
1 TABLET ORAL
Refills: 0 | Status: DISCONTINUED | OUTPATIENT
Start: 2019-07-22 | End: 2019-07-23

## 2019-07-22 RX ORDER — METOPROLOL TARTRATE 50 MG
200 TABLET ORAL DAILY
Refills: 0 | Status: DISCONTINUED | OUTPATIENT
Start: 2019-07-22 | End: 2019-07-23

## 2019-07-22 RX ORDER — ZOLPIDEM TARTRATE 10 MG/1
5 TABLET ORAL AT BEDTIME
Refills: 0 | Status: DISCONTINUED | OUTPATIENT
Start: 2019-07-22 | End: 2019-07-23

## 2019-07-22 RX ORDER — NITROGLYCERIN 6.5 MG
0.4 CAPSULE, EXTENDED RELEASE ORAL
Refills: 0 | Status: DISCONTINUED | OUTPATIENT
Start: 2019-07-22 | End: 2019-07-26

## 2019-07-22 RX ORDER — AMLODIPINE BESYLATE 2.5 MG/1
5 TABLET ORAL DAILY
Refills: 0 | Status: DISCONTINUED | OUTPATIENT
Start: 2019-07-22 | End: 2019-07-23

## 2019-07-22 RX ORDER — SODIUM CHLORIDE 9 MG/ML
1000 INJECTION INTRAMUSCULAR; INTRAVENOUS; SUBCUTANEOUS
Refills: 0 | Status: DISCONTINUED | OUTPATIENT
Start: 2019-07-22 | End: 2019-07-23

## 2019-07-22 RX ORDER — ASPIRIN/CALCIUM CARB/MAGNESIUM 324 MG
81 TABLET ORAL DAILY
Refills: 0 | Status: DISCONTINUED | OUTPATIENT
Start: 2019-07-22 | End: 2019-07-23

## 2019-07-22 RX ORDER — CLOPIDOGREL BISULFATE 75 MG/1
75 TABLET, FILM COATED ORAL DAILY
Refills: 0 | Status: DISCONTINUED | OUTPATIENT
Start: 2019-07-23 | End: 2019-07-23

## 2019-07-22 RX ORDER — LISINOPRIL 2.5 MG/1
20 TABLET ORAL DAILY
Refills: 0 | Status: DISCONTINUED | OUTPATIENT
Start: 2019-07-22 | End: 2019-07-23

## 2019-07-22 RX ORDER — DOCUSATE SODIUM 100 MG
100 CAPSULE ORAL THREE TIMES A DAY
Refills: 0 | Status: DISCONTINUED | OUTPATIENT
Start: 2019-07-22 | End: 2019-07-23

## 2019-07-22 RX ORDER — ACETAMINOPHEN 500 MG
650 TABLET ORAL EVERY 4 HOURS
Refills: 0 | Status: DISCONTINUED | OUTPATIENT
Start: 2019-07-22 | End: 2019-07-23

## 2019-07-22 RX ADMIN — Medication 0.4 MILLIGRAM(S): at 02:15

## 2019-07-22 RX ADMIN — ATORVASTATIN CALCIUM 40 MILLIGRAM(S): 80 TABLET, FILM COATED ORAL at 23:23

## 2019-07-22 NOTE — ED ADULT NURSE NOTE - NSIMPLEMENTINTERV_GEN_ALL_ED
Implemented All Fall with Harm Risk Interventions:  [place] to call system. Call bell, personal items and telephone within reach. Instruct patient to call for assistance. Room bathroom lighting operational. Non-slip footwear when patient is off stretcher. Physically safe environment: no spills, clutter or unnecessary equipment. Stretcher in lowest position, wheels locked, appropriate side rails in place. Provide visual cue, wrist band, yellow gown, etc. Monitor gait and stability. Monitor for mental status changes and reorient to person, place, and time. Review medications for side effects contributing to fall risk. Reinforce activity limits and safety measures with patient and family. Provide visual clues: red socks.

## 2019-07-22 NOTE — ED PROVIDER NOTE - OBJECTIVE STATEMENT
Patient states she is having "heart pain." Has a history of 14 stents, but denies ever having heart pain before. Patient states she felt a little something in her chest 3 days ago, but now has a pressure for about an hour and a half. Also feels SOB. No sweats, no dizziness, no palpitations. No n/v. Has had a cough for a few weeks.

## 2019-07-22 NOTE — H&P ADULT - HISTORY OF PRESENT ILLNESS
85 y/o F with PMH of HTN, CAD s/p PCI, hiatal hernia, h/o GI bleed, gastirtis, LLE stent, arthritis, diverticulosis, hypothyroidism, PPM, h/o CEA, p/w weakness. As per ED chart, patient had c/o CP at [place] [facility]. However, patient doesn't recall whether she had CP upon my questioning. Patient states the only thing she remembers is that she felt so weak that she had trouble coming down the 3 steps from her stoop. States she feels well at this time and doesn't have any complaints. Patient was transferred to [facility] for cardiac cath and is s/p 2 stents. Denies any CP, SOB, cough, runny nose, sore throat, fever, chills, palpitations, abdominal pain, diarrhea    PSH: Cataracts surgery, carpal tunnel surgery, CEA, breast reconstruction, hysterectomy, R total knee replacement, PCI    Social hx: Denies tobacco / etoh, +marijuana use 2x per month    Family hx: Father / brother - cancer (uncertain what type)

## 2019-07-22 NOTE — ED ADULT NURSE NOTE - OBJECTIVE STATEMENT
87yo female walked into ED, pt c/o "I have shortness of breathe and a heavy feeling in my chest" as per pt

## 2019-07-22 NOTE — CONSULT NOTE ADULT - SUBJECTIVE AND OBJECTIVE BOX
PCP:    REQUESTING PHYSICIAN:    REASON FOR CONSULT:    CHIEF COMPLAINT:    HPI: 85 yo female presents with chest pain last night while at rest. Pt reports that she has not felt well lately and has bilateral arm pain. She denies shortness of breath with limited exercise. She has a history of multiple stent placements, the last greater than a year ago. She complains of bilateral leg pain but is unclear if exertion exacerbates her symptoms.       PAST MEDICAL & SURGICAL HISTORY:  Acute arthritis  High cholesterol  Stress incontinence in female  Rectocele  Diverticulosis  Hiatal hernia  Hypertension  Hypothyroidism  Dyslipidemia  CAD (coronary artery disease)  Carpal tunnel syndrome of right wrist  Cardiac pacemaker  Stented coronary artery: 12 heart stents,   1 left leg stents  H/O carotid endarterectomy: right  History of hysterectomy  History of breast reconstruction  Bilateral cataracts: surgery  Status post total knee replacement: right      Allergies    No Known Drug Allergies  Originally Entered as [Unknown see Comment: pt unable to remember] reaction to [Mold] (Unknown)  stolazine eye med- pt doesn&#x27;t remember (Unknown)    Intolerances        SOCIAL HISTORY:    FAMILY HISTORY:  Family history of cancer (Sibling)      MEDICATIONS:  MEDICATIONS  (STANDING):    MEDICATIONS  (PRN):  nitroglycerin     SubLingual 0.4 milliGRAM(s) SubLingual every 5 minutes PRN Chest Pain      REVIEW OF SYSTEMS:    CONSTITUTIONAL: No weakness, fevers or chills  EYES/ENT: No visual changes;  No vertigo or throat pain   NECK: No pain or stiffness  RESPIRATORY: No cough, wheezing, hemoptysis; No shortness of breath  CARDIOVASCULAR: Chest pain  GASTROINTESTINAL: No abdominal or epigastric pain. No nausea, vomiting, or hematemesis; No diarrhea or constipation. No melena or hematochezia.  GENITOURINARY: No dysuria, frequency or hematuria  NEUROLOGICAL: No numbness or weakness  SKIN: No itching, burning, rashes, or lesions   All other review of systems is negative unless indicated above    Vital Signs Last 24 Hrs  T(C): 36.6 (22 Jul 2019 07:15), Max: 36.7 (22 Jul 2019 01:44)  T(F): 97.8 (22 Jul 2019 07:15), Max: 98.1 (22 Jul 2019 01:44)  HR: 72 (22 Jul 2019 07:15) (68 - 78)  BP: 152/74 (22 Jul 2019 07:15) (125/76 - 172/73)  BP(mean): --  RR: 16 (22 Jul 2019 07:15) (15 - 18)  SpO2: 98% (22 Jul 2019 07:15) (95% - 99%)    I&O's Summary      PHYSICAL EXAM:    Constitutional: NAD, awake and alert, well-developed  HEENT: PERR, EOMI,  No oral cyananosis.  Neck:  supple,  No JVD  Respiratory: Breath sounds are clear bilaterally, No wheezing, rales or rhonchi  Cardiovascular: S1 and S2, regular rate and rhythm, 1/6 systolic murmur  Gastrointestinal: Bowel Sounds present, soft, nontender.   Extremities: No peripheral edema. No clubbing or cyanosis.  Vascular: 2+ peripheral pulses  Neurological: A/O x 3, no focal deficits  Musculoskeletal: no calf tenderness.  Skin: No rashes.      LABS: All Labs Reviewed:                        12.2   15.34 )-----------( 313      ( 22 Jul 2019 02:26 )             38.8     22 Jul 2019 02:26    142    |  110    |  27     ----------------------------<  129    4.5     |  24     |  0.82     Ca    9.4        22 Jul 2019 02:26    TPro  6.2    /  Alb  3.2    /  TBili  0.2    /  DBili  x      /  AST  11     /  ALT  14     /  AlkPhos  60     22 Jul 2019 02:26      CARDIAC MARKERS ( 22 Jul 2019 02:26 )  .005 ng/mL / x     / x     / x     / x          Blood Culture:         RADIOLOGY/EKG: NSR lead reversal. non specific st t changes

## 2019-07-22 NOTE — ED PROVIDER NOTE - NSRISKOFTRANSFER_ED_A_ED
Increased Pain/Transportation Risk (There is always a risk of traffic delays resulting in deterioration of condition.)/Death or Disability/Deterioration of Condition En Route

## 2019-07-22 NOTE — CHART NOTE - NSCHARTNOTEFT_GEN_A_CORE
Nurse Practitioner Progress note:   s/p PCI. Denies chest pain, shortness of breath, dizziness or palpitations at this time    PHYSICAL EXAM:    Constitutional: NAD  Neuro: Alert and oriented x 3, forgetful at times. Speech clear No focal deficits  Respiratory: CTAB  Cardiovascular: S1 and S2, RRR,   Extremities: No clubbing cyanosis or edema No varicosities  Vascular: 2+ peripheral pulses  Musculoskeletal: 5/5 strength b/l upper and lower extremities  Right groin procedure  sheath size Fr pulled at 2110. Manual pressure applied for 25 mns. Hemostasis achieved; no bleeding, no hematoma, site soft, non tender, positive pedal pulses bilaterally        T(C): 36.1 (07-22-19 @ 19:25), Max: 36.8 (07-22-19 @ 11:39)  HR: 75 (07-22-19 @ 21:00) (68 - 78)  BP: 148/65 (07-22-19 @ 21:00) (125/76 - 174/79)  RR: 17 (07-22-19 @ 21:00) (15 - 23)  SpO2: 97% (07-22-19 @ 21:00) (95% - 100%)  Wt(kg): --  CBC Full  -  ( 22 Jul 2019 02:26 )  WBC Count : 15.34 K/uL  RBC Count : 4.34 M/uL  Hemoglobin : 12.2 g/dL  Hematocrit : 38.8 %  Platelet Count - Automated : 313 K/uL  Mean Cell Volume : 89.4 fl  Mean Cell Hemoglobin : 28.1 pg  Mean Cell Hemoglobin Concentration : 31.4 gm/dL  Auto Neutrophil # : x  Auto Lymphocyte # : x  Auto Monocyte # : x  Auto Eosinophil # : x  Auto Basophil # : x  Auto Neutrophil % : x  Auto Lymphocyte % : x  Auto Monocyte % : x  Auto Eosinophil % : x  Auto Basophil % : x    07-22    142  |  110<H>  |  27<H>  ----------------------------<  129<H>  4.5   |  24  |  0.82    Ca    9.4      22 Jul 2019 02:26    TPro  6.2  /  Alb  3.2<L>  /  TBili  0.2  /  DBili  x   /  AST  11  /  ALT  14  /  AlkPhos  60  07-22    CARDIAC MARKERS ( 22 Jul 2019 08:11 )  .007 ng/mL / x     / x     / x     / x      CARDIAC MARKERS ( 22 Jul 2019 02:26 )  .005 ng/mL / x     / x     / x     / x              MEDICATIONS  (STANDING):  amLODIPine   Tablet 5 milliGRAM(s) Oral daily  aspirin  chewable 81 milliGRAM(s) Oral daily  atorvastatin 40 milliGRAM(s) Oral at bedtime  lisinopril 20 milliGRAM(s) Oral daily  metoprolol succinate  milliGRAM(s) Oral daily  predniSONE   Tablet 9 milliGRAM(s) Oral daily  sodium chloride 0.9%. 1000 milliLiter(s) (75 mL/Hr) IV Continuous <Continuous>  sodium chloride 0.9%. 1000 milliLiter(s) (75 mL/Hr) IV Continuous <Continuous>  sucralfate suspension 1 Gram(s) Oral two times a day    MEDICATIONS  (PRN):  acetaminophen   Tablet .. 650 milliGRAM(s) Oral every 4 hours PRN Mild Pain (1 - 3)  docusate sodium 100 milliGRAM(s) Oral three times a day PRN Constipation  zolpidem 5 milliGRAM(s) Oral at bedtime PRN Insomnia        HPI:  87 y/o F with PMH of HTN, CAD s/p PCI, hiatal hernia, h/o GI bleed, gastirtis, LLE stent, arthritis, diverticulosis, hypothyroidism, PPM, h/o CEA, p/w weakness. As per ED chart, patient had c/o CP at [place] ED. However, patient doesn't recall whether she had CP upon my questioning. Patient states the only thing she remembers is that she felt so weak that she had trouble coming down the 3 steps from her stoop. States she feels well at this time and doesn't have any complaints. Patient was transferred to [facility] for cardiac cath and is s/p 2 stents. Denies any CP, SOB, cough, runny nose, sore throat, fever, chills, palpitations, abdominal pain, diarrhea  Pt was transferred from [facility] for LHC and possible PCI    S/P BJORN to RCA x2    ASSESSMENT/PLAN:    Coronary artery disease  -Admit to CICU  -VS, labs, diet, EKG, activity as per PCI orders  -IV hydration  -Encourage PO fluids  -Bed rest for 6 hours  -Post procedure instructions reviewed  -Aspirin 81mg PO daily  -Plavix 75mg PO daily  -Continue BB,statin, ACEI  -Plan of care discussed with patient, family and MD  -Follow-up with attending MD   within 1 week after discharge  -Discussed therapeutic lifestyle changes to reduce risk factors such as following a cardiac diet, weight loss, maintaining a healthy weight, exercise, smoking cessation, medication compliance, and regular follow-up  with MD

## 2019-07-22 NOTE — PROGRESS NOTE ADULT - SUBJECTIVE AND OBJECTIVE BOX
HPI: 87 yo female presents with chest pain last night while at rest. Pt reports that she has not felt well lately and has bilateral arm pain. She denies shortness of breath with limited exercise. She has a history of multiple stent placements, the last greater than a year ago. She complains of bilateral leg pain but is unclear if exertion exacerbates her symptoms.     7/22/19: Underwent cardiac cath which showed patent stents in LCx and LAD. New lession in RCA with FFR 0.81.  In addition, developed transient ST elevations       PAST MEDICAL & SURGICAL HISTORY:  Acute arthritis  High cholesterol  Stress incontinence in female  Rectocele  Diverticulosis  Hiatal hernia  Hypertension  Hypothyroidism  Dyslipidemia  CAD (coronary artery disease)  Carpal tunnel syndrome of right wrist  Cardiac pacemaker  Stented coronary artery: 12 heart stents,   1 left leg stents  H/O carotid endarterectomy: right  History of hysterectomy  History of breast reconstruction  Bilateral cataracts: surgery  Status post total knee replacement: right      Allergies    No Known Drug Allergies  Originally Entered as [Unknown see Comment: pt unable to remember] reaction to [Mold] (Unknown)  stolazine eye med- pt doesn&#x27;t remember (Unknown)    Intolerances        SOCIAL HISTORY:    FAMILY HISTORY:  Family history of cancer (Sibling)      MEDICATIONS:  MEDICATIONS  (STANDING):    MEDICATIONS  (PRN):  nitroglycerin     SubLingual 0.4 milliGRAM(s) SubLingual every 5 minutes PRN Chest Pain      REVIEW OF SYSTEMS:    CONSTITUTIONAL: No weakness, fevers or chills  EYES/ENT: No visual changes;  No vertigo or throat pain   NECK: No pain or stiffness  RESPIRATORY: No cough, wheezing, hemoptysis; No shortness of breath  CARDIOVASCULAR: Chest pain  GASTROINTESTINAL: No abdominal or epigastric pain. No nausea, vomiting, or hematemesis; No diarrhea or constipation. No melena or hematochezia.  GENITOURINARY: No dysuria, frequency or hematuria  NEUROLOGICAL: No numbness or weakness  SKIN: No itching, burning, rashes, or lesions   All other review of systems is negative unless indicated above    Vital Signs Last 24 Hrs  T(C): 36.6 (22 Jul 2019 07:15), Max: 36.7 (22 Jul 2019 01:44)  T(F): 97.8 (22 Jul 2019 07:15), Max: 98.1 (22 Jul 2019 01:44)  HR: 72 (22 Jul 2019 07:15) (68 - 78)  BP: 152/74 (22 Jul 2019 07:15) (125/76 - 172/73)  BP(mean): --  RR: 16 (22 Jul 2019 07:15) (15 - 18)  SpO2: 98% (22 Jul 2019 07:15) (95% - 99%)    I&O's Summary      PHYSICAL EXAM:    Constitutional: NAD, awake and alert, well-developed  HEENT: PERR, EOMI,  No oral cyananosis.  Neck:  supple,  No JVD  Respiratory: Breath sounds are clear bilaterally, No wheezing, rales or rhonchi  Cardiovascular: S1 and S2, regular rate and rhythm, 1/6 systolic murmur  Gastrointestinal: Bowel Sounds present, soft, nontender.   Extremities: No peripheral edema. No clubbing or cyanosis.  Vascular: 2+ peripheral pulses  Neurological: A/O x 3, no focal deficits  Musculoskeletal: no calf tenderness.  Skin: No rashes.      LABS: All Labs Reviewed:                        12.2   15.34 )-----------( 313      ( 22 Jul 2019 02:26 )             38.8     22 Jul 2019 02:26    142    |  110    |  27     ----------------------------<  129    4.5     |  24     |  0.82     Ca    9.4        22 Jul 2019 02:26    TPro  6.2    /  Alb  3.2    /  TBili  0.2    /  DBili  x      /  AST  11     /  ALT  14     /  AlkPhos  60     22 Jul 2019 02:26      CARDIAC MARKERS ( 22 Jul 2019 02:26 )  .005 ng/mL / x     / x     / x     / x          Blood Culture:         RADIOLOGY/EKG: NSR lead reversal. non specific st t changes HPI: 85 yo female presents with chest pain last night while at rest. Pt reports that she has not felt well lately and has bilateral arm pain. She denies shortness of breath with limited exercise. She has a history of multiple stent placements, the last greater than a year ago. She complains of bilateral leg pain but is unclear if exertion exacerbates her symptoms.     7/22/19: Underwent cardiac cath which showed patent stents in LCx and LAD. New lession in RCA with FFR 0.81.  In addition, developed transient ST elevations with adensoine infusion.  She had successful PCI of RCA.  Feels well post procedure.       PAST MEDICAL & SURGICAL HISTORY:  Acute arthritis  High cholesterol  Stress incontinence in female  Rectocele  Diverticulosis  Hiatal hernia  Hypertension  Hypothyroidism  Dyslipidemia  CAD (coronary artery disease)  Carpal tunnel syndrome of right wrist  Cardiac pacemaker  Stented coronary artery: 12 heart stents,   1 left leg stents  H/O carotid endarterectomy: right  History of hysterectomy  History of breast reconstruction  Bilateral cataracts: surgery  Status post total knee replacement: right      Allergies    No Known Drug Allergies  Originally Entered as [Unknown see Comment: pt unable to remember] reaction to [Mold] (Unknown)  stolazine eye med- pt doesn&#x27;t remember (Unknown)    Intolerances        SOCIAL HISTORY:    MEDICATIONS  (STANDING):  amLODIPine   Tablet 5 milliGRAM(s) Oral daily  aspirin  chewable 81 milliGRAM(s) Oral daily  atorvastatin 40 milliGRAM(s) Oral at bedtime  lisinopril 20 milliGRAM(s) Oral daily  metoprolol succinate  milliGRAM(s) Oral daily  predniSONE   Tablet 9 milliGRAM(s) Oral daily  sodium chloride 0.9%. 1000 milliLiter(s) (75 mL/Hr) IV Continuous <Continuous>  sodium chloride 0.9%. 1000 milliLiter(s) (75 mL/Hr) IV Continuous <Continuous>  sucralfate suspension 1 Gram(s) Oral two times a day    MEDICATIONS  (PRN):  acetaminophen   Tablet .. 650 milliGRAM(s) Oral every 4 hours PRN Mild Pain (1 - 3)  docusate sodium 100 milliGRAM(s) Oral three times a day PRN Constipation  zolpidem 5 milliGRAM(s) Oral at bedtime PRN Insomnia      Vital Signs Last 24 Hrs  T(C): 36.1 (22 Jul 2019 19:25), Max: 36.8 (22 Jul 2019 11:39)  T(F): 97 (22 Jul 2019 19:25), Max: 98.3 (22 Jul 2019 11:39)  HR: 73 (22 Jul 2019 22:00) (68 - 78)  BP: 151/60 (22 Jul 2019 22:00) (125/76 - 174/79)  BP(mean): 84 (22 Jul 2019 22:00) (84 - 88)  RR: 18 (22 Jul 2019 22:00) (15 - 23)  SpO2: 93% (22 Jul 2019 22:00) (93% - 100%)    I&O's Detail      Daily     Daily FAMILY HISTORY:  Family history of cancer (Sibling)    PHYSICAL EXAM:    Constitutional: NAD, awake and alert, well-developed  HEENT: PERR, EOMI,  No oral cyananosis.  Neck:  supple,  No JVD  Respiratory: Breath sounds are clear bilaterally, No wheezing, rales or rhonchi  Cardiovascular: S1 and S2, regular rate and rhythm, 1/6 systolic murmur  Gastrointestinal: Bowel Sounds present, soft, nontender.   Extremities: No peripheral edema. No clubbing or cyanosis.  Vascular: 2+ peripheral pulses. Rt. groin catheter in place. no hematoma.   Neurological: A/O x 3, no focal deficits  Musculoskeletal: no calf tenderness.  Skin: No rashes.      LABS: All Labs Reviewed:                                   12.2   15.34 )-----------( 313      ( 22 Jul 2019 02:26 )             38.8     07-22    142  |  110<H>  |  27<H>  ----------------------------<  129<H>  4.5   |  24  |  0.82    Ca    9.4      22 Jul 2019 02:26    TPro  6.2  /  Alb  3.2<L>  /  TBili  0.2  /  DBili  x   /  AST  11  /  ALT  14  /  AlkPhos  60  07-22    CARDIAC MARKERS ( 22 Jul 2019 08:11 )  .007 ng/mL / x     / x     / x     / x      CARDIAC MARKERS ( 22 Jul 2019 02:26 )  .005 ng/mL / x     / x     / x     / x          LIVER FUNCTIONS - ( 22 Jul 2019 02:26 )  Alb: 3.2 g/dL / Pro: 6.2 g/dL / ALK PHOS: 60 U/L / ALT: 14 U/L DA / AST: 11 U/L / GGT: x           EKG: NSR lead reversal. non specific st t changes    < from: Transthoracic Echocardiogram (01.12.18 @ 09:31) >   Summary     Fibrocalcific changes noted to the mitral valve leaflets with preserved   leaflet excursion.   mitral annular calcification is present.   Mild (1+) mitral regurgitation is present.   There are fibrocalcific changes noted to the aortic valve leaflets with   restriction in leaflet excursion. Transaortic gradients are elevated but   do notmeet the criteria for aortic stenosis. MIld to Moderate aortic   insufficiency noted.   Moderate (2+) tricuspid valve regurgitation is present.   Mild pulmonary hypertension.   The left atrium is mildly dilated.   The left ventricle is normal in size, wall thickness, wall motion and   contractility.   Estimated left ventricular ejection fraction is 55-60 %.    < end of copied text >      Cath 2019: 3 vessel CAD with patent LCX and LAD stents. New lession in RCA.

## 2019-07-22 NOTE — H&P ADULT - ASSESSMENT
87 y/o F with PMH of HTN, CAD s/p PCI, hiatal hernia, h/o GI bleed, gastirtis, LLE stent, arthritis, diverticulosis, hypothyroidism, PPM, h/o CEA, p/w weakness    *CAD s/p cath w/ 2 new stents today  -ASA / Plavix / Statin / BB / ACEi  -Cardio consult  -Tele monitoring  -Hemodynamically stable at this time    *Leukocytosis  -No signs / symptoms of infection at this time  -Will get UA  -CXR - No infiltrates reported   -Possibly reactive, will repeat WBC in AM to check for improvement     *Patient does not know why she is on prednisone  -Will need to confirm history with PCP in AM     *H/o HTN / hiatal hernia/ GI bleed / gastritis / LLE stent / arthritis / diverticulosis / hypothyroidism / PPM   -C/w home meds and f/u outpatient for further management if conditions remain stable     *DVT ppx  -SCDs

## 2019-07-22 NOTE — CONSULT NOTE ADULT - PROBLEM SELECTOR RECOMMENDATION 9
Pt with suspicious pain and a history of CAD, PCI. Non specific St t changes. Plan cardiac cath this afternoon at . [place] and Dr. Welsh notified.

## 2019-07-22 NOTE — ED ADULT NURSE NOTE - CHPI ED NUR SYMPTOMS NEG
no chills/no cough/no diaphoresis/no edema/no wheezing/no fever/no headache/no body aches/no hemoptysis/no chest pain

## 2019-07-22 NOTE — PACU DISCHARGE NOTE - COMMENTS
patient discharged to CICU. Right groin site benign, Sheath in place. site soft nontender. no hematoma. dressing clean/dry/intact. IVL site benign. Patient without any complaints. Vital signs stable. post procedure instructions given and understood by patient via teach back. Will continue to monitor patient status. Report given to Jamie MULLEN

## 2019-07-23 ENCOUNTER — TRANSCRIPTION ENCOUNTER (OUTPATIENT)
Age: 84
End: 2019-07-23

## 2019-07-23 VITALS — HEART RATE: 80 BPM | DIASTOLIC BLOOD PRESSURE: 65 MMHG | SYSTOLIC BLOOD PRESSURE: 125 MMHG

## 2019-07-23 DIAGNOSIS — I25.10 ATHEROSCLEROTIC HEART DISEASE OF NATIVE CORONARY ARTERY WITHOUT ANGINA PECTORIS: ICD-10-CM

## 2019-07-23 LAB
ALBUMIN SERPL ELPH-MCNC: 2.8 G/DL — LOW (ref 3.3–5)
ALP SERPL-CCNC: 44 U/L — SIGNIFICANT CHANGE UP (ref 40–120)
ALT FLD-CCNC: 15 U/L — SIGNIFICANT CHANGE UP (ref 12–78)
ANION GAP SERPL CALC-SCNC: 10 MMOL/L — SIGNIFICANT CHANGE UP (ref 5–17)
APTT BLD: 26.5 SEC — LOW (ref 27.5–36.3)
AST SERPL-CCNC: 10 U/L — LOW (ref 15–37)
BASOPHILS # BLD AUTO: 0.07 K/UL — SIGNIFICANT CHANGE UP (ref 0–0.2)
BASOPHILS NFR BLD AUTO: 0.5 % — SIGNIFICANT CHANGE UP (ref 0–2)
BILIRUB SERPL-MCNC: 0.1 MG/DL — LOW (ref 0.2–1.2)
BUN SERPL-MCNC: 26 MG/DL — HIGH (ref 7–23)
CALCIUM SERPL-MCNC: 8.2 MG/DL — LOW (ref 8.5–10.1)
CHLORIDE SERPL-SCNC: 113 MMOL/L — HIGH (ref 96–108)
CHOLEST SERPL-MCNC: 156 MG/DL — SIGNIFICANT CHANGE UP (ref 10–199)
CO2 SERPL-SCNC: 22 MMOL/L — SIGNIFICANT CHANGE UP (ref 22–31)
CREAT SERPL-MCNC: 0.66 MG/DL — SIGNIFICANT CHANGE UP (ref 0.5–1.3)
EOSINOPHIL # BLD AUTO: 0.3 K/UL — SIGNIFICANT CHANGE UP (ref 0–0.5)
EOSINOPHIL NFR BLD AUTO: 2.1 % — SIGNIFICANT CHANGE UP (ref 0–6)
GLUCOSE SERPL-MCNC: 93 MG/DL — SIGNIFICANT CHANGE UP (ref 70–99)
HCT VFR BLD CALC: 33 % — LOW (ref 34.5–45)
HCT VFR BLD CALC: 33.4 % — LOW (ref 34.5–45)
HDLC SERPL-MCNC: 33 MG/DL — LOW
HGB BLD-MCNC: 10.4 G/DL — LOW (ref 11.5–15.5)
HGB BLD-MCNC: 10.6 G/DL — LOW (ref 11.5–15.5)
IMM GRANULOCYTES NFR BLD AUTO: 1 % — SIGNIFICANT CHANGE UP (ref 0–1.5)
INR BLD: 1.07 RATIO — SIGNIFICANT CHANGE UP (ref 0.88–1.16)
LIPID PNL WITH DIRECT LDL SERPL: 77 MG/DL — SIGNIFICANT CHANGE UP
LYMPHOCYTES # BLD AUTO: 17.5 % — SIGNIFICANT CHANGE UP (ref 13–44)
LYMPHOCYTES # BLD AUTO: 2.47 K/UL — SIGNIFICANT CHANGE UP (ref 1–3.3)
MAGNESIUM SERPL-MCNC: 2 MG/DL — SIGNIFICANT CHANGE UP (ref 1.6–2.6)
MCHC RBC-ENTMCNC: 27.3 PG — SIGNIFICANT CHANGE UP (ref 27–34)
MCHC RBC-ENTMCNC: 28 PG — SIGNIFICANT CHANGE UP (ref 27–34)
MCHC RBC-ENTMCNC: 31.1 GM/DL — LOW (ref 32–36)
MCHC RBC-ENTMCNC: 32.1 GM/DL — SIGNIFICANT CHANGE UP (ref 32–36)
MCV RBC AUTO: 87.3 FL — SIGNIFICANT CHANGE UP (ref 80–100)
MCV RBC AUTO: 87.7 FL — SIGNIFICANT CHANGE UP (ref 80–100)
MONOCYTES # BLD AUTO: 1.23 K/UL — HIGH (ref 0–0.9)
MONOCYTES NFR BLD AUTO: 8.7 % — SIGNIFICANT CHANGE UP (ref 2–14)
NEUTROPHILS # BLD AUTO: 9.88 K/UL — HIGH (ref 1.8–7.4)
NEUTROPHILS NFR BLD AUTO: 70.2 % — SIGNIFICANT CHANGE UP (ref 43–77)
PHOSPHATE SERPL-MCNC: 2.9 MG/DL — SIGNIFICANT CHANGE UP (ref 2.5–4.5)
PLATELET # BLD AUTO: 262 K/UL — SIGNIFICANT CHANGE UP (ref 150–400)
PLATELET # BLD AUTO: 272 K/UL — SIGNIFICANT CHANGE UP (ref 150–400)
POTASSIUM SERPL-MCNC: 3.6 MMOL/L — SIGNIFICANT CHANGE UP (ref 3.5–5.3)
POTASSIUM SERPL-SCNC: 3.6 MMOL/L — SIGNIFICANT CHANGE UP (ref 3.5–5.3)
PROT SERPL-MCNC: 5.1 GM/DL — LOW (ref 6–8.3)
PROTHROM AB SERPL-ACNC: 11.9 SEC — SIGNIFICANT CHANGE UP (ref 10–12.9)
RBC # BLD: 3.78 M/UL — LOW (ref 3.8–5.2)
RBC # BLD: 3.81 M/UL — SIGNIFICANT CHANGE UP (ref 3.8–5.2)
RBC # FLD: 16.5 % — HIGH (ref 10.3–14.5)
RBC # FLD: 16.6 % — HIGH (ref 10.3–14.5)
SODIUM SERPL-SCNC: 145 MMOL/L — SIGNIFICANT CHANGE UP (ref 135–145)
TOTAL CHOLESTEROL/HDL RATIO MEASUREMENT: 4.7 RATIO — SIGNIFICANT CHANGE UP (ref 3.3–7.1)
TRIGL SERPL-MCNC: 230 MG/DL — HIGH (ref 10–149)
WBC # BLD: 14.09 K/UL — HIGH (ref 3.8–10.5)
WBC # BLD: 14.34 K/UL — HIGH (ref 3.8–10.5)
WBC # FLD AUTO: 14.09 K/UL — HIGH (ref 3.8–10.5)
WBC # FLD AUTO: 14.34 K/UL — HIGH (ref 3.8–10.5)

## 2019-07-23 PROCEDURE — 93010 ELECTROCARDIOGRAM REPORT: CPT

## 2019-07-23 PROCEDURE — 99233 SBSQ HOSP IP/OBS HIGH 50: CPT

## 2019-07-23 RX ADMIN — Medication 200 MILLIGRAM(S): at 06:11

## 2019-07-23 RX ADMIN — LISINOPRIL 20 MILLIGRAM(S): 2.5 TABLET ORAL at 06:10

## 2019-07-23 RX ADMIN — Medication 650 MILLIGRAM(S): at 06:04

## 2019-07-23 RX ADMIN — Medication 1 GRAM(S): at 06:10

## 2019-07-23 RX ADMIN — Medication 81 MILLIGRAM(S): at 15:22

## 2019-07-23 RX ADMIN — AMLODIPINE BESYLATE 5 MILLIGRAM(S): 2.5 TABLET ORAL at 06:09

## 2019-07-23 RX ADMIN — CLOPIDOGREL BISULFATE 75 MILLIGRAM(S): 75 TABLET, FILM COATED ORAL at 15:22

## 2019-07-23 RX ADMIN — Medication 9 MILLIGRAM(S): at 06:05

## 2019-07-23 RX ADMIN — Medication 650 MILLIGRAM(S): at 07:04

## 2019-07-23 NOTE — PROGRESS NOTE ADULT - SUBJECTIVE AND OBJECTIVE BOX
Pt is resting in the bed, NAD. No event overnight.    Tele: SR    ROS:  General: Pt denies recent weight loss/fever/chills    Neurological: denies numbness or  sensation loss    Cardiovascular: denies chest pain/palpitations/leg edema    Respiratory and Thorax: denies SOB/cough/wheezing    Gastrointestinal: denies abdominal pain/diarrhea/constipation/bloody stool    Genitourinary: denies urinary frequency/urgency/ dysuria    Musculoskeletal: denies joint pain or swelling, denies restricted motion    Hematologic: denies abnormal bleeding  	    Physical Exam:  Vital Signs Last 24 Hrs  T(C): 36.2 (23 Jul 2019 06:43), Max: 36.8 (22 Jul 2019 11:39)  T(F): 97.1 (23 Jul 2019 06:43), Max: 98.3 (22 Jul 2019 11:39)  HR: 74 (23 Jul 2019 08:00) (71 - 88)  BP: 111/39 (23 Jul 2019 08:00) (111/39 - 174/79)  RR: 19 (23 Jul 2019 08:00) (15 - 24)  SpO2: 98% (23 Jul 2019 08:00) (93% - 100%)            Constitutional: well developed, well nourished, no deformities and no acute distress    Neurological: Alert & Oriented x 3, BRADSHAW, no focal deficits    HEENT: NC/AT, PERRLA, EOMI,  Neck supple.    Respiratory: CTA B/L, No wheezing/crackles/rhonchi    Cardiovascular: (+) S1 & S2, RRR, No m/r/g, (+) Lt side PPM    Gastrointestinal: soft, NT, nondistended, (+) BS    Genitourinary: non distended bladder, voiding freely    Extremities: Rt groin : soft, NT, no hematoma, 2+ Rt DP      Allergies    No Known Drug Allergies  Originally Entered as [Unknown see Comment: pt unable to remember] reaction to [Mold] (Unknown)  stolazine eye med- pt doesn't remember (Unknown)      MEDICATIONS  (STANDING):  amLODIPine   Tablet 5 milliGRAM(s) Oral daily  aspirin  chewable 81 milliGRAM(s) Oral daily  atorvastatin 40 milliGRAM(s) Oral at bedtime  clopidogrel Tablet 75 milliGRAM(s) Oral daily  lisinopril 20 milliGRAM(s) Oral daily  metoprolol succinate  milliGRAM(s) Oral daily  predniSONE   Tablet 9 milliGRAM(s) Oral daily  sodium chloride 0.9%. 1000 milliLiter(s) (75 mL/Hr) IV Continuous <Continuous>  sodium chloride 0.9%. 1000 milliLiter(s) (75 mL/Hr) IV Continuous <Continuous>  sucralfate suspension 1 Gram(s) Oral two times a day    MEDICATIONS  (PRN):  acetaminophen   Tablet .. 650 milliGRAM(s) Oral every 4 hours PRN Mild Pain (1 - 3)  docusate sodium 100 milliGRAM(s) Oral three times a day PRN Constipation  zolpidem 5 milliGRAM(s) Oral at bedtime PRN Insomnia    LABS:                        10.4   14.09 )-----------( 262      ( 23 Jul 2019 05:54 )             33.4     07-23    145  |  113<H>  |  26<H>  ----------------------------<  93  3.6   |  22  |  0.66    Ca    8.2<L>      23 Jul 2019 05:54  Phos  2.9     07-23  Mg     2.0     07-23    TPro  5.1<L>  /  Alb  2.8<L>  /  TBili  0.1<L>  /  DBili  x   /  AST  10<L>  /  ALT  15  /  AlkPhos  44  07-23    PT/INR - ( 23 Jul 2019 05:54 )   PT: 11.9 sec;   INR: 1.07 ratio         PTT - ( 23 Jul 2019 05:54 )  PTT:26.5 sec      EKG: SR 81 bpm, no ischemic changes

## 2019-07-23 NOTE — PROGRESS NOTE ADULT - ASSESSMENT
85 yo female with PMHx of HTN, HLD, s/p PPM, CAD, multiple PCI presented with chest pain while at rest.   Pt transferred from [facility] for Cardiac cath, s/p [facility] with 2 BJORN to RCA on 7/22/19.  Post PCI, pt remains stable.

## 2019-07-23 NOTE — DISCHARGE NOTE NURSING/CASE MANAGEMENT/SOCIAL WORK - NSDCDPATPORTLINK_GEN_ALL_CORE
You can access the [facility] Patient Portal, offered by [facility], by registering with the following website: [email or web address]

## 2019-07-23 NOTE — DISCHARGE NOTE PROVIDER - CARE PROVIDER_API CALL
Ector Welsh (MD)  Cardiovascular Disease  [address] [place] [address]  Phone: [phone]  Fax: [phone]  Follow Up Time:

## 2019-07-23 NOTE — PHYSICAL THERAPY INITIAL EVALUATION ADULT - DISCHARGE DISPOSITION, PT EVAL
No further PT services needed; Pt already at independent baseline function/home No further PT services needed; Pt already at baseline function/home

## 2019-07-23 NOTE — DISCHARGE NOTE PROVIDER - NSDCCPCAREPLAN_GEN_ALL_CORE_FT
PRINCIPAL DISCHARGE DIAGNOSIS  Diagnosis: Non-ST elevation MI (NSTEMI)  Assessment and Plan of Treatment:       SECONDARY DISCHARGE DIAGNOSES  Diagnosis: CAD (coronary artery disease)  Assessment and Plan of Treatment: CAD (coronary artery disease)

## 2019-07-23 NOTE — PROGRESS NOTE ADULT - PROBLEM SELECTOR PLAN 1
- repeat H/HCT at 12:00  - Rt groin dressing removed & band aid applied, soft, NT, no hematoma  -continue ASA/plavix/b-blocker/statin  -Discharge planning if H/Hct remains stable  -f/u with Dr. Welsh in 7-10 days  -post d/c instruction given to pt.
chest pain with +FFR and Successful PCI of RCA. Asa and plavix given and to be continued. Monitor in CICU.  Possible dc in am.

## 2019-07-23 NOTE — PHYSICAL THERAPY INITIAL EVALUATION ADULT - MODALITIES TREATMENT COMMENTS
Pt left OOB in chair; Chair alarm donned; CBIR; HM intact; Pulse Oxym intact; HR 85; SaO2 99%, RR 19 s/p ambulation; Pt instructed to not get up alone; PAZ Romero made aware pt left OOB in chair

## 2019-07-23 NOTE — PHYSICAL THERAPY INITIAL EVALUATION ADULT - GENERAL OBSERVATIONS, REHAB EVAL
Pt rec'd sitting on edge of bed; HM intact; Pulse Oxym intact; Pt denied any pain or discomfort; HR 80; SaO2 95%, RR 20

## 2019-07-23 NOTE — DISCHARGE NOTE PROVIDER - HOSPITAL COURSE
85 y/o F with PMH of HTN, CAD s/p PCI, hiatal hernia, h/o GI bleed, gastirtis, LLE stent, arthritis, diverticulosis, hypothyroidism, PPM, h/o CEA, p/w weakness. As per ED chart, patient had c/o CP at [place] ED. However, patient doesn't recall whether she had CP upon my questioning. Patient states the only thing she remembers is that she felt so weak that she had trouble coming down the 3 steps from her stoop. States she feels well at this time and doesn't have any complaints. Patient was transferred to [facility] for cardiac cath and is s/p 2 stents. Denies any CP, SOB, cough, runny nose, sore throat, fever, chills, palpitations, abdominal pain, diarrhea    -s/p BJORN x 2 to RCA        7.23: doing well, no distress                        REVIEW OF SYSTEMS:        CONSTITUTIONAL: No weakness, No fevers or chills    ENT: No ear ache, No sorethroat    NECK: No pain, No stiffness    RESPIRATORY: No cough, No wheezing, No hemoptysis; No dyspnea    CARDIOVASCULAR: No chest pain, No palpitations    GASTROINTESTINAL: No abd pain, No nausea, No vomiting, No hematemesis, No diarrhea or constipation. No melena, No hematochezia.    GENITOURINARY: No dysuria, No  hematuria    NEUROLOGICAL: No diplopia, No paresthesia, No motor dysfunction    MUSCULOSKELETAL: No arthralgia, No myalgia    SKIN: No rashes, or lesions     PSYCH: no anxiety, no suicidal ideation        All other review of systems is negative unless indicated above        Vital Signs Last 24 Hrs    T(C): 36.8 (23 Jul 2019 11:35), Max: 36.8 (23 Jul 2019 11:35)    T(F): 98.2 (23 Jul 2019 11:35), Max: 98.2 (23 Jul 2019 11:35)    HR: 87 (23 Jul 2019 11:00) (72 - 88)    BP: 138/51 (23 Jul 2019 11:00) (101/53 - 172/71)    BP(mean): 65 (23 Jul 2019 09:00) (58 - 88)    RR: 20 (23 Jul 2019 11:00) (15 - 24)    SpO2: 98% (23 Jul 2019 10:00) (93% - 100%)        PHYSICAL EXAM:        GENERAL: NAD, Well nourished    HEENT:  NC/AT, EOMI, PERRLA, No scleral icterus, Moist mucous membranes    NECK: Supple, No JVD    CNS:  Alert & Oriented X3, Motor Strength 5/5 B/L upper and lower extremities; DTRs 2+ intact     LUNG: Normal Breath sounds, Clear to auscultation bilaterally, No rales, No rhonchi, No wheezing    HEART: RRR; No murmurs, No rubs    ABDOMEN: +BS, ST/ND/NT    GENITOURINARY: Voiding, Bladder not distended    EXTREMITIES:  2+ Peripheral Pulses, No clubbing, No cyanosis, No tibial edema    MUSCULOSKELTAL: Joints normal ROM, No TTP, No effusion    VAGINAL: deferred    SKIN: no rashes    RECTAL: deferred, not indicated    BREAST: deferred                                10.6     14.34 )-----------( 272      ( 23 Jul 2019 12:18 )               33.0         07-23        145  |  113<H>  |  26<H>    ----------------------------<  93    3.6   |  22  |  0.66        a/p:        1. NSTEMI:    s/p PCI of RCA    c/w ASA/Plavix/Statins    cleared for d/c by cardiology    f/u in office         time 35min

## 2019-07-30 ENCOUNTER — APPOINTMENT (OUTPATIENT)
Dept: CARDIOLOGY | Facility: CLINIC | Age: 84
End: 2019-07-30

## 2019-08-05 ENCOUNTER — MEDICATION RENEWAL (OUTPATIENT)
Age: 84
End: 2019-08-05

## 2019-08-05 DIAGNOSIS — I21.4 NON-ST ELEVATION (NSTEMI) MYOCARDIAL INFARCTION: ICD-10-CM

## 2019-08-05 DIAGNOSIS — I10 ESSENTIAL (PRIMARY) HYPERTENSION: ICD-10-CM

## 2019-08-05 DIAGNOSIS — D72.829 ELEVATED WHITE BLOOD CELL COUNT, UNSPECIFIED: ICD-10-CM

## 2019-08-05 DIAGNOSIS — E03.9 HYPOTHYROIDISM, UNSPECIFIED: ICD-10-CM

## 2019-08-05 DIAGNOSIS — R53.1 WEAKNESS: ICD-10-CM

## 2019-08-05 DIAGNOSIS — Z95.0 PRESENCE OF CARDIAC PACEMAKER: ICD-10-CM

## 2019-08-05 DIAGNOSIS — Z79.82 LONG TERM (CURRENT) USE OF ASPIRIN: ICD-10-CM

## 2019-08-05 DIAGNOSIS — Z79.899 OTHER LONG TERM (CURRENT) DRUG THERAPY: ICD-10-CM

## 2019-08-05 DIAGNOSIS — I25.10 ATHEROSCLEROTIC HEART DISEASE OF NATIVE CORONARY ARTERY WITHOUT ANGINA PECTORIS: ICD-10-CM

## 2019-08-06 ENCOUNTER — NON-APPOINTMENT (OUTPATIENT)
Age: 84
End: 2019-08-06

## 2019-08-06 ENCOUNTER — APPOINTMENT (OUTPATIENT)
Dept: CARDIOLOGY | Facility: CLINIC | Age: 84
End: 2019-08-06
Payer: MEDICARE

## 2019-08-06 VITALS
WEIGHT: 149 LBS | HEART RATE: 75 BPM | OXYGEN SATURATION: 98 % | RESPIRATION RATE: 14 BRPM | TEMPERATURE: 98.3 F | SYSTOLIC BLOOD PRESSURE: 185 MMHG | HEIGHT: 59 IN | DIASTOLIC BLOOD PRESSURE: 77 MMHG | BODY MASS INDEX: 30.04 KG/M2

## 2019-08-06 PROCEDURE — 93000 ELECTROCARDIOGRAM COMPLETE: CPT

## 2019-08-06 PROCEDURE — 99214 OFFICE O/P EST MOD 30 MIN: CPT | Mod: 25

## 2019-08-06 NOTE — CARDIOLOGY SUMMARY
[No Ischemia] : no Ischemia [No Symptoms] : no Symptoms [No Exercise Ind Arr] : no exercise induced arrhythmias [LVEF ___%] : LVEF [unfilled]% [None] : normal LV function [Moderate] : moderate pulmonary hypertension [Enlarged] : enlarged LA size [Mild] : mild mitral regurgitation [___] : [unfilled]

## 2019-08-13 NOTE — HISTORY OF PRESENT ILLNESS
[FreeTextEntry1] : Presented to office with complaints of chest pain once a day, feels like a pinch. Patient is very emotionally upset as she has missing her box of medications from her home. Did not take any of her medications today. \par Denies  shortness of breath, dyspnea on exertion, palpitations, orthopnea, paroxysmal nocturnal dyspnea, claudication, dizziness, lightheadedness, presyncopal or syncopal symptoms.

## 2019-08-13 NOTE — PHYSICAL EXAM
[General Appearance - Well Developed] : well developed [Normal Appearance] : normal appearance [Well Groomed] : well groomed [General Appearance - Well Nourished] : well nourished [No Deformities] : no deformities [General Appearance - In No Acute Distress] : no acute distress [Normal Conjunctiva] : the conjunctiva exhibited no abnormalities [Eyelids - No Xanthelasma] : the eyelids demonstrated no xanthelasmas [Normal Oral Mucosa] : normal oral mucosa [No Oral Pallor] : no oral pallor [No Oral Cyanosis] : no oral cyanosis [Normal Jugular Venous A Waves Present] : normal jugular venous A waves present [Normal Jugular Venous V Waves Present] : normal jugular venous V waves present [No Jugular Venous Fenton A Waves] : no jugular venous fenton A waves [Exaggerated Use Of Accessory Muscles For Inspiration] : no accessory muscle use [Abdomen Tenderness] : non-tender [Abdomen Soft] : soft [Abdomen Mass (___ Cm)] : no abdominal mass palpated [Abnormal Walk] : normal gait [Gait - Sufficient For Exercise Testing] : the gait was sufficient for exercise testing [Nail Clubbing] : no clubbing of the fingernails [Cyanosis, Localized] : no localized cyanosis [Petechial Hemorrhages (___cm)] : no petechial hemorrhages [Skin Color & Pigmentation] : normal skin color and pigmentation [] : no rash [No Venous Stasis] : no venous stasis [Skin Lesions] : no skin lesions [No Xanthoma] : no  xanthoma was observed [No Skin Ulcers] : no skin ulcer [Oriented To Time, Place, And Person] : oriented to person, place, and time [Affect] : the affect was normal [No Anxiety] : not feeling anxious [Mood] : the mood was normal [5th Left ICS - MCL] : palpated at the 5th LICS in the midclavicular line [Normal] : normal [Normal Rate] : normal [Rhythm Regular] : regular [Normal S1] : normal S1 [Normal S2] : normal S2 [S4] : an S4 was heard [II] : a grade 2 [Right Carotid Bruit] : right carotid bruit heard [Left Carotid Bruit] : left carotid bruit heard [1+] : left 1+ [No Abnormalities] : the abdominal aorta was not enlarged and no bruit was heard [2+] : left 2+ [No Pitting Edema] : no pitting edema present [FreeTextEntry1] : Prolonged expiratory phase. No rales, Wheezing, but scant rhonchi at left base [Rt] : no varicose veins of the right leg [Lt] : no varicose veins of the left leg

## 2019-08-13 NOTE — DISCUSSION/SUMMARY
[FreeTextEntry1] : SOB/Chest pressure/Coronary artery disease/Angina pectoris: Will continue to monitor. \par .Hypertension: Sub optimal control , possible not taking had medications today. Will sent out medications to pharmacy. Low-sodium diet recommend.\par Hyperlipidemia: recent levels with mildly elevated LDL and discussed diet to help this.\par Follow up in 1  month for blood pressure check.

## 2019-09-05 NOTE — PATIENT PROFILE ADULT. - AS SC BRADEN ACTIVITY
Medications e-scribe to pharmacy of pt's choice. appt made for [facility] on 10/30/19 at 9 am for pt. An After Visit Summary was printed and given to the patient.  LEV (3) walks occasionally

## 2019-09-13 ENCOUNTER — APPOINTMENT (OUTPATIENT)
Dept: UROGYNECOLOGY | Facility: CLINIC | Age: 84
End: 2019-09-13

## 2019-09-25 ENCOUNTER — APPOINTMENT (OUTPATIENT)
Dept: CARDIOLOGY | Facility: CLINIC | Age: 84
End: 2019-09-25
Payer: MEDICARE

## 2019-09-25 ENCOUNTER — NON-APPOINTMENT (OUTPATIENT)
Age: 84
End: 2019-09-25

## 2019-09-25 VITALS — BODY MASS INDEX: 29.23 KG/M2 | HEIGHT: 59 IN | WEIGHT: 145 LBS

## 2019-09-25 VITALS — HEART RATE: 60 BPM | SYSTOLIC BLOOD PRESSURE: 130 MMHG | OXYGEN SATURATION: 96 % | DIASTOLIC BLOOD PRESSURE: 70 MMHG

## 2019-09-25 PROCEDURE — 99215 OFFICE O/P EST HI 40 MIN: CPT | Mod: 25

## 2019-09-25 PROCEDURE — 93000 ELECTROCARDIOGRAM COMPLETE: CPT

## 2019-09-25 NOTE — HISTORY OF PRESENT ILLNESS
[FreeTextEntry1] : Complaining of fatigue (extreme) that she is exhausted and this seems to be most when she is doing activity. Rare pains on left side of chest but no SOB.

## 2019-09-25 NOTE — REVIEW OF SYSTEMS
[Feeling Fatigued] : not feeling fatigued [see HPI] : see HPI [Dyspnea on exertion] : not dyspnea during exertion [Shortness Of Breath] : no shortness of breath [Chest  Pressure] : no chest pressure [Chest Pain] : no chest pain [Lower Ext Edema] : no extremity edema [Leg Claudication] : no intermittent leg claudication [Palpitations] : no palpitations [Confusion] : no confusion was observed [Memory Lapses Or Loss] : memory lapses or loss [Depression] : no depression [Anxiety] : no anxiety [Under Stress] : under stress [Suicidal] : not suicidal [Negative] : Heme/Lymph

## 2019-09-25 NOTE — DISCUSSION/SUMMARY
[FreeTextEntry1] : Exertional dyspnea/Coronary artery disease/atypical chest pain/ hx. of Angina pectoris: Stress test fopr further evaluation.\par Hypertension: Well controlled on current regime. Low-sodium diet recommend.\par Hyperlipidemia: recent levels Good. Continue current diet.\par Follow up after test..

## 2019-09-25 NOTE — PHYSICAL EXAM
[General Appearance - Well Developed] : well developed [Normal Appearance] : normal appearance [Well Groomed] : well groomed [General Appearance - Well Nourished] : well nourished [No Deformities] : no deformities [General Appearance - In No Acute Distress] : no acute distress [Normal Conjunctiva] : the conjunctiva exhibited no abnormalities [Eyelids - No Xanthelasma] : the eyelids demonstrated no xanthelasmas [Normal Oral Mucosa] : normal oral mucosa [No Oral Pallor] : no oral pallor [No Oral Cyanosis] : no oral cyanosis [Normal Jugular Venous A Waves Present] : normal jugular venous A waves present [Normal Jugular Venous V Waves Present] : normal jugular venous V waves present [No Jugular Venous Fenton A Waves] : no jugular venous fenton A waves [Exaggerated Use Of Accessory Muscles For Inspiration] : no accessory muscle use [FreeTextEntry1] : Prolonged expiratory phase. No rales, Wheezing, but scant rhonchi at left base [Abdomen Soft] : soft [Abdomen Tenderness] : non-tender [Abnormal Walk] : normal gait [Abdomen Mass (___ Cm)] : no abdominal mass palpated [Gait - Sufficient For Exercise Testing] : the gait was sufficient for exercise testing [Nail Clubbing] : no clubbing of the fingernails [Cyanosis, Localized] : no localized cyanosis [Petechial Hemorrhages (___cm)] : no petechial hemorrhages [Skin Color & Pigmentation] : normal skin color and pigmentation [] : no rash [No Venous Stasis] : no venous stasis [Skin Lesions] : no skin lesions [No Skin Ulcers] : no skin ulcer [No Xanthoma] : no  xanthoma was observed [Oriented To Time, Place, And Person] : oriented to person, place, and time [Affect] : the affect was normal [Mood] : the mood was normal [No Anxiety] : not feeling anxious [5th Left ICS - MCL] : palpated at the 5th LICS in the midclavicular line [Normal] : normal [Rhythm Regular] : regular [Normal Rate] : normal [Normal S2] : normal S2 [Normal S1] : normal S1 [S4] : an S4 was heard [II] : a grade 2 [Right Carotid Bruit] : right carotid bruit heard [Left Carotid Bruit] : left carotid bruit heard [1+] : left 1+ [2+] : left 2+ [No Pitting Edema] : no pitting edema present [No Abnormalities] : the abdominal aorta was not enlarged and no bruit was heard [Lt] : no varicose veins of the left leg [Rt] : no varicose veins of the right leg

## 2019-10-07 ENCOUNTER — INPATIENT (INPATIENT)
Facility: HOSPITAL | Age: 84
LOS: 3 days | Discharge: HOME HEALTH | DRG: 392 | End: 2019-10-11
Attending: FAMILY MEDICINE | Admitting: HOSPITALIST
Payer: MEDICARE

## 2019-10-07 VITALS
DIASTOLIC BLOOD PRESSURE: 77 MMHG | HEIGHT: 60 IN | HEART RATE: 97 BPM | OXYGEN SATURATION: 94 % | RESPIRATION RATE: 18 BRPM | TEMPERATURE: 99 F | SYSTOLIC BLOOD PRESSURE: 155 MMHG | WEIGHT: 147.93 LBS

## 2019-10-07 DIAGNOSIS — Z98.89 OTHER SPECIFIED POSTPROCEDURAL STATES: Chronic | ICD-10-CM

## 2019-10-07 DIAGNOSIS — Z98.82 BREAST IMPLANT STATUS: Chronic | ICD-10-CM

## 2019-10-07 DIAGNOSIS — G56.01 CARPAL TUNNEL SYNDROME, RIGHT UPPER LIMB: Chronic | ICD-10-CM

## 2019-10-07 DIAGNOSIS — H26.9 UNSPECIFIED CATARACT: Chronic | ICD-10-CM

## 2019-10-07 DIAGNOSIS — Z95.5 PRESENCE OF CORONARY ANGIOPLASTY IMPLANT AND GRAFT: Chronic | ICD-10-CM

## 2019-10-07 DIAGNOSIS — Z90.710 ACQUIRED ABSENCE OF BOTH CERVIX AND UTERUS: Chronic | ICD-10-CM

## 2019-10-07 DIAGNOSIS — Z96.659 PRESENCE OF UNSPECIFIED ARTIFICIAL KNEE JOINT: Chronic | ICD-10-CM

## 2019-10-07 DIAGNOSIS — Z95.0 PRESENCE OF CARDIAC PACEMAKER: Chronic | ICD-10-CM

## 2019-10-07 LAB
HCT VFR BLD CALC: 39.4 % — SIGNIFICANT CHANGE UP (ref 34.5–45)
HGB BLD-MCNC: 12.3 G/DL — SIGNIFICANT CHANGE UP (ref 11.5–15.5)
MCHC RBC-ENTMCNC: 28.8 PG — SIGNIFICANT CHANGE UP (ref 27–34)
MCHC RBC-ENTMCNC: 31.2 GM/DL — LOW (ref 32–36)
MCV RBC AUTO: 92.3 FL — SIGNIFICANT CHANGE UP (ref 80–100)
PLATELET # BLD AUTO: 327 K/UL — SIGNIFICANT CHANGE UP (ref 150–400)
RBC # BLD: 4.27 M/UL — SIGNIFICANT CHANGE UP (ref 3.8–5.2)
RBC # FLD: 14.3 % — SIGNIFICANT CHANGE UP (ref 10.3–14.5)
WBC # BLD: 16.77 K/UL — HIGH (ref 3.8–10.5)
WBC # FLD AUTO: 16.77 K/UL — HIGH (ref 3.8–10.5)

## 2019-10-07 PROCEDURE — 71045 X-RAY EXAM CHEST 1 VIEW: CPT | Mod: 26

## 2019-10-07 RX ORDER — SODIUM CHLORIDE 9 MG/ML
1000 INJECTION INTRAMUSCULAR; INTRAVENOUS; SUBCUTANEOUS ONCE
Refills: 0 | Status: COMPLETED | OUTPATIENT
Start: 2019-10-07 | End: 2019-10-07

## 2019-10-07 NOTE — ED PROVIDER NOTE - PROGRESS NOTE DETAILS
pt with diverticulitis case dw Dr. Diaz tba, pt now has fever tba cultures and iv antibiotics B Leidy MONTOYA

## 2019-10-07 NOTE — ED ADULT TRIAGE NOTE - CHIEF COMPLAINT QUOTE
Patient c/o abd pain, + nausea, denies v/d. Patient states she has not had a bowel movement in a few days.

## 2019-10-08 ENCOUNTER — APPOINTMENT (OUTPATIENT)
Dept: CARDIOLOGY | Facility: CLINIC | Age: 84
End: 2019-10-08

## 2019-10-08 DIAGNOSIS — R10.32 LEFT LOWER QUADRANT PAIN: ICD-10-CM

## 2019-10-08 DIAGNOSIS — K57.92 DIVERTICULITIS OF INTESTINE, PART UNSPECIFIED, WITHOUT PERFORATION OR ABSCESS WITHOUT BLEEDING: ICD-10-CM

## 2019-10-08 DIAGNOSIS — I25.118 ATHEROSCLEROTIC HEART DISEASE OF NATIVE CORONARY ARTERY WITH OTHER FORMS OF ANGINA PECTORIS: ICD-10-CM

## 2019-10-08 LAB
ALBUMIN SERPL ELPH-MCNC: 3.3 G/DL — SIGNIFICANT CHANGE UP (ref 3.3–5)
ALP SERPL-CCNC: 61 U/L — SIGNIFICANT CHANGE UP (ref 40–120)
ALT FLD-CCNC: 21 U/L — SIGNIFICANT CHANGE UP (ref 12–78)
ANION GAP SERPL CALC-SCNC: 7 MMOL/L — SIGNIFICANT CHANGE UP (ref 5–17)
APPEARANCE UR: CLEAR — SIGNIFICANT CHANGE UP
AST SERPL-CCNC: 45 U/L — HIGH (ref 15–37)
BASOPHILS # BLD AUTO: 0.07 K/UL — SIGNIFICANT CHANGE UP (ref 0–0.2)
BASOPHILS NFR BLD AUTO: 0.4 % — SIGNIFICANT CHANGE UP (ref 0–2)
BILIRUB SERPL-MCNC: 0.4 MG/DL — SIGNIFICANT CHANGE UP (ref 0.2–1.2)
BILIRUB UR-MCNC: NEGATIVE — SIGNIFICANT CHANGE UP
BUN SERPL-MCNC: 18 MG/DL — SIGNIFICANT CHANGE UP (ref 7–23)
CALCIUM SERPL-MCNC: 8.9 MG/DL — SIGNIFICANT CHANGE UP (ref 8.5–10.1)
CHLORIDE SERPL-SCNC: 109 MMOL/L — HIGH (ref 96–108)
CO2 SERPL-SCNC: 23 MMOL/L — SIGNIFICANT CHANGE UP (ref 22–31)
COLOR SPEC: YELLOW — SIGNIFICANT CHANGE UP
CREAT SERPL-MCNC: 0.71 MG/DL — SIGNIFICANT CHANGE UP (ref 0.5–1.3)
DIFF PNL FLD: NEGATIVE — SIGNIFICANT CHANGE UP
EOSINOPHIL # BLD AUTO: 0.1 K/UL — SIGNIFICANT CHANGE UP (ref 0–0.5)
EOSINOPHIL NFR BLD AUTO: 0.6 % — SIGNIFICANT CHANGE UP (ref 0–6)
GLUCOSE SERPL-MCNC: 92 MG/DL — SIGNIFICANT CHANGE UP (ref 70–99)
GLUCOSE UR QL: NEGATIVE MG/DL — SIGNIFICANT CHANGE UP
IMM GRANULOCYTES NFR BLD AUTO: 0.5 % — SIGNIFICANT CHANGE UP (ref 0–1.5)
KETONES UR-MCNC: NEGATIVE — SIGNIFICANT CHANGE UP
LACTATE SERPL-SCNC: 1.6 MMOL/L — SIGNIFICANT CHANGE UP (ref 0.7–2)
LEUKOCYTE ESTERASE UR-ACNC: NEGATIVE — SIGNIFICANT CHANGE UP
LIDOCAIN IGE QN: 71 U/L — LOW (ref 73–393)
LYMPHOCYTES # BLD AUTO: 1.62 K/UL — SIGNIFICANT CHANGE UP (ref 1–3.3)
LYMPHOCYTES # BLD AUTO: 9.7 % — LOW (ref 13–44)
MONOCYTES # BLD AUTO: 1.54 K/UL — HIGH (ref 0–0.9)
MONOCYTES NFR BLD AUTO: 9.2 % — SIGNIFICANT CHANGE UP (ref 2–14)
NEUTROPHILS # BLD AUTO: 13.35 K/UL — HIGH (ref 1.8–7.4)
NEUTROPHILS NFR BLD AUTO: 79.6 % — HIGH (ref 43–77)
NITRITE UR-MCNC: NEGATIVE — SIGNIFICANT CHANGE UP
PH UR: 7 — SIGNIFICANT CHANGE UP (ref 5–8)
POTASSIUM SERPL-MCNC: 4.8 MMOL/L — SIGNIFICANT CHANGE UP (ref 3.5–5.3)
POTASSIUM SERPL-SCNC: 4.8 MMOL/L — SIGNIFICANT CHANGE UP (ref 3.5–5.3)
PROT SERPL-MCNC: 6.8 GM/DL — SIGNIFICANT CHANGE UP (ref 6–8.3)
PROT UR-MCNC: NEGATIVE MG/DL — SIGNIFICANT CHANGE UP
SODIUM SERPL-SCNC: 139 MMOL/L — SIGNIFICANT CHANGE UP (ref 135–145)
SP GR SPEC: 1 — LOW (ref 1.01–1.02)
UROBILINOGEN FLD QL: NEGATIVE MG/DL — SIGNIFICANT CHANGE UP

## 2019-10-08 PROCEDURE — 80053 COMPREHEN METABOLIC PANEL: CPT

## 2019-10-08 PROCEDURE — 99223 1ST HOSP IP/OBS HIGH 75: CPT

## 2019-10-08 PROCEDURE — 73120 X-RAY EXAM OF HAND: CPT | Mod: RT

## 2019-10-08 PROCEDURE — 93010 ELECTROCARDIOGRAM REPORT: CPT

## 2019-10-08 PROCEDURE — 36415 COLL VENOUS BLD VENIPUNCTURE: CPT

## 2019-10-08 PROCEDURE — 85652 RBC SED RATE AUTOMATED: CPT

## 2019-10-08 PROCEDURE — 84550 ASSAY OF BLOOD/URIC ACID: CPT

## 2019-10-08 PROCEDURE — 97161 PT EVAL LOW COMPLEX 20 MIN: CPT | Mod: GP

## 2019-10-08 PROCEDURE — 86140 C-REACTIVE PROTEIN: CPT

## 2019-10-08 PROCEDURE — 87040 BLOOD CULTURE FOR BACTERIA: CPT

## 2019-10-08 PROCEDURE — 71045 X-RAY EXAM CHEST 1 VIEW: CPT

## 2019-10-08 PROCEDURE — 74177 CT ABD & PELVIS W/CONTRAST: CPT | Mod: 26

## 2019-10-08 PROCEDURE — 85027 COMPLETE CBC AUTOMATED: CPT

## 2019-10-08 RX ORDER — CLOPIDOGREL BISULFATE 75 MG/1
75 TABLET, FILM COATED ORAL DAILY
Refills: 0 | Status: DISCONTINUED | OUTPATIENT
Start: 2019-10-08 | End: 2019-10-11

## 2019-10-08 RX ORDER — ACETAMINOPHEN 500 MG
650 TABLET ORAL EVERY 6 HOURS
Refills: 0 | Status: DISCONTINUED | OUTPATIENT
Start: 2019-10-08 | End: 2019-10-11

## 2019-10-08 RX ORDER — ATORVASTATIN CALCIUM 80 MG/1
10 TABLET, FILM COATED ORAL AT BEDTIME
Refills: 0 | Status: DISCONTINUED | OUTPATIENT
Start: 2019-10-08 | End: 2019-10-11

## 2019-10-08 RX ORDER — ONDANSETRON 8 MG/1
4 TABLET, FILM COATED ORAL EVERY 6 HOURS
Refills: 0 | Status: DISCONTINUED | OUTPATIENT
Start: 2019-10-08 | End: 2019-10-11

## 2019-10-08 RX ORDER — SODIUM CHLORIDE 9 MG/ML
1000 INJECTION INTRAMUSCULAR; INTRAVENOUS; SUBCUTANEOUS
Refills: 0 | Status: DISCONTINUED | OUTPATIENT
Start: 2019-10-08 | End: 2019-10-08

## 2019-10-08 RX ORDER — CIPROFLOXACIN LACTATE 400MG/40ML
400 VIAL (ML) INTRAVENOUS ONCE
Refills: 0 | Status: COMPLETED | OUTPATIENT
Start: 2019-10-08 | End: 2019-10-08

## 2019-10-08 RX ORDER — ASPIRIN/CALCIUM CARB/MAGNESIUM 324 MG
81 TABLET ORAL DAILY
Refills: 0 | Status: DISCONTINUED | OUTPATIENT
Start: 2019-10-08 | End: 2019-10-11

## 2019-10-08 RX ORDER — MORPHINE SULFATE 50 MG/1
2 CAPSULE, EXTENDED RELEASE ORAL EVERY 4 HOURS
Refills: 0 | Status: DISCONTINUED | OUTPATIENT
Start: 2019-10-08 | End: 2019-10-11

## 2019-10-08 RX ORDER — SODIUM CHLORIDE 9 MG/ML
1000 INJECTION INTRAMUSCULAR; INTRAVENOUS; SUBCUTANEOUS
Refills: 0 | Status: DISCONTINUED | OUTPATIENT
Start: 2019-10-08 | End: 2019-10-11

## 2019-10-08 RX ORDER — CHOLECALCIFEROL (VITAMIN D3) 125 MCG
1 CAPSULE ORAL
Qty: 0 | Refills: 0 | DISCHARGE

## 2019-10-08 RX ORDER — HEPARIN SODIUM 5000 [USP'U]/ML
5000 INJECTION INTRAVENOUS; SUBCUTANEOUS
Refills: 0 | Status: DISCONTINUED | OUTPATIENT
Start: 2019-10-08 | End: 2019-10-11

## 2019-10-08 RX ORDER — TRAZODONE HCL 50 MG
1 TABLET ORAL
Qty: 0 | Refills: 0 | DISCHARGE

## 2019-10-08 RX ORDER — CIPROFLOXACIN LACTATE 400MG/40ML
400 VIAL (ML) INTRAVENOUS EVERY 12 HOURS
Refills: 0 | Status: DISCONTINUED | OUTPATIENT
Start: 2019-10-08 | End: 2019-10-10

## 2019-10-08 RX ORDER — METOPROLOL TARTRATE 50 MG
200 TABLET ORAL DAILY
Refills: 0 | Status: DISCONTINUED | OUTPATIENT
Start: 2019-10-08 | End: 2019-10-11

## 2019-10-08 RX ORDER — FERROUS SULFATE 325(65) MG
325 TABLET ORAL
Refills: 0 | Status: DISCONTINUED | OUTPATIENT
Start: 2019-10-08 | End: 2019-10-11

## 2019-10-08 RX ORDER — METRONIDAZOLE 500 MG
500 TABLET ORAL EVERY 8 HOURS
Refills: 0 | Status: DISCONTINUED | OUTPATIENT
Start: 2019-10-08 | End: 2019-10-10

## 2019-10-08 RX ADMIN — SODIUM CHLORIDE 1000 MILLILITER(S): 9 INJECTION INTRAMUSCULAR; INTRAVENOUS; SUBCUTANEOUS at 00:24

## 2019-10-08 RX ADMIN — HEPARIN SODIUM 5000 UNIT(S): 5000 INJECTION INTRAVENOUS; SUBCUTANEOUS at 18:41

## 2019-10-08 RX ADMIN — SODIUM CHLORIDE 1000 MILLILITER(S): 9 INJECTION INTRAMUSCULAR; INTRAVENOUS; SUBCUTANEOUS at 01:24

## 2019-10-08 RX ADMIN — CLOPIDOGREL BISULFATE 75 MILLIGRAM(S): 75 TABLET, FILM COATED ORAL at 12:41

## 2019-10-08 RX ADMIN — SODIUM CHLORIDE 125 MILLILITER(S): 9 INJECTION INTRAMUSCULAR; INTRAVENOUS; SUBCUTANEOUS at 05:19

## 2019-10-08 RX ADMIN — Medication 325 MILLIGRAM(S): at 18:41

## 2019-10-08 RX ADMIN — Medication 200 MILLIGRAM(S): at 10:14

## 2019-10-08 RX ADMIN — Medication 100 MILLIGRAM(S): at 05:19

## 2019-10-08 RX ADMIN — Medication 200 MILLIGRAM(S): at 18:41

## 2019-10-08 RX ADMIN — SODIUM CHLORIDE 75 MILLILITER(S): 9 INJECTION INTRAMUSCULAR; INTRAVENOUS; SUBCUTANEOUS at 10:14

## 2019-10-08 RX ADMIN — Medication 100 MILLIGRAM(S): at 17:57

## 2019-10-08 RX ADMIN — MORPHINE SULFATE 2 MILLIGRAM(S): 50 CAPSULE, EXTENDED RELEASE ORAL at 05:10

## 2019-10-08 RX ADMIN — MORPHINE SULFATE 2 MILLIGRAM(S): 50 CAPSULE, EXTENDED RELEASE ORAL at 04:52

## 2019-10-08 RX ADMIN — ATORVASTATIN CALCIUM 10 MILLIGRAM(S): 80 TABLET, FILM COATED ORAL at 21:42

## 2019-10-08 RX ADMIN — ONDANSETRON 4 MILLIGRAM(S): 8 TABLET, FILM COATED ORAL at 04:52

## 2019-10-08 RX ADMIN — Medication 200 MILLIGRAM(S): at 06:47

## 2019-10-08 RX ADMIN — Medication 81 MILLIGRAM(S): at 12:41

## 2019-10-08 RX ADMIN — Medication 100 MILLIGRAM(S): at 21:42

## 2019-10-08 NOTE — ED ADULT NURSE NOTE - CHPI ED NUR SYMPTOMS NEG
no vomiting/no diarrhea/no nausea/no fever/no dysuria/no hematuria/no blood in stool/no burning urination/no chills

## 2019-10-08 NOTE — ED ADULT NURSE NOTE - OBJECTIVE STATEMENT
pt reports abdominal pain across B/L lower quadrants for the past two days, pt reports she tried to rest hoping the pain would subside but she had no relief which prompted her to visit the ED, pt denies NVD, fevers, chest pain, SOB, BENJAMIN, sick contacts,

## 2019-10-08 NOTE — H&P ADULT - ASSESSMENT
*LLQ ABd pain 2ndry to Acute Sigmoid Diverticulitis   -Admit to medical floors  -IVF  -clear Fluids  -Continue Cipro and Flagyl  -FU stoool Cx and cdiff  -GI consult    *CAD    *Hypothyroidism    *DVT ppx *LLQ ABd pain 2ndry to Acute Sigmoid Diverticulitis   *Leukocytosis  -Admit to medical floors  -IVF  -clear Fluids  -Continue Cipro and Flagyl  -FU stool Cx and cdiff  -GI consult    *CAD  -continue ASA, plavix, Statin and BB    *HTN  -continue Metoprolol  -Hold norvasc/ACEI    *Hyperlipidemia  -continue statin    *DVT ppx *LLQ ABd pain 2ndry to Acute Sigmoid Diverticulitis   *Leukocytosis  -Admit to medical floors  -CT abd noted  -UA Neg  -FU CXR  -IVF  -clear Fluids  -Continue Cipro and Flagyl  -FU stool Cx and cdiff  -GI consult    *CAD  -continue ASA, plavix, Statin and BB    *HTN  -continue Metoprolol  -Hold norvasc/ACEI    *Hyperlipidemia  -continue statin    *DVT ppx  -Heparin SQ    *Dispo - Home meds need to be confirmed from [facility] in [place]

## 2019-10-08 NOTE — H&P ADULT - NSHPLABSRESULTS_GEN_ALL_CORE
Lab Results:  CBC  CBC Full  -  ( 07 Oct 2019 22:52 )  WBC Count : 16.77 K/uL  RBC Count : 4.27 M/uL  Hemoglobin : 12.3 g/dL  Hematocrit : 39.4 %  Platelet Count - Automated : 327 K/uL  Mean Cell Volume : 92.3 fl  Mean Cell Hemoglobin : 28.8 pg  Mean Cell Hemoglobin Concentration : 31.2 gm/dL  Auto Neutrophil # : 13.35 K/uL  Auto Lymphocyte # : 1.62 K/uL  Auto Monocyte # : 1.54 K/uL  Auto Eosinophil # : 0.10 K/uL  Auto Basophil # : 0.07 K/uL  Auto Neutrophil % : 79.6 %  Auto Lymphocyte % : 9.7 %  Auto Monocyte % : 9.2 %  Auto Eosinophil % : 0.6 %  Auto Basophil % : 0.4 %    .		Differential:	[] Automated		[] Manual  Chemistry                        12.3   16.77 )-----------( 327      ( 07 Oct 2019 22:52 )             39.4     10-    139  |  109<H>  |  18  ----------------------------<  92  4.8   |  23  |  0.71    Ca    8.9      07 Oct 2019 22:52    TPro  6.8  /  Alb  3.3  /  TBili  0.4  /  DBili  x   /  AST  45<H>  /  ALT  21  /  AlkPhos  61  10-07    LIVER FUNCTIONS - ( 07 Oct 2019 22:52 )  Alb: 3.3 g/dL / Pro: 6.8 gm/dL / ALK PHOS: 61 U/L / ALT: 21 U/L / AST: 45 U/L / GGT: x             Urinalysis Basic - ( 08 Oct 2019 01:49 )    Color: Yellow / Appearance: Clear / S.005 / pH: x  Gluc: x / Ketone: Negative  / Bili: Negative / Urobili: Negative mg/dL   Blood: x / Protein: Negative mg/dL / Nitrite: Negative   Leuk Esterase: Negative / RBC: x / WBC x   Sq Epi: x / Non Sq Epi: x / Bacteria: x      RADIOLOGY RESULTS:    PRelim EKG  Prelim CXR    < from: CT Abdomen and Pelvis w/ IV Cont (10.08.19 @ 00:55) >      IMPRESSION:     Acute sigmoidcolonic diverticulitis without extraluminal gas or   drainable fluid collection. Lab Results:  CBC  CBC Full  -  ( 07 Oct 2019 22:52 )  WBC Count : 16.77 K/uL  RBC Count : 4.27 M/uL  Hemoglobin : 12.3 g/dL  Hematocrit : 39.4 %  Platelet Count - Automated : 327 K/uL  Mean Cell Volume : 92.3 fl  Mean Cell Hemoglobin : 28.8 pg  Mean Cell Hemoglobin Concentration : 31.2 gm/dL  Auto Neutrophil # : 13.35 K/uL  Auto Lymphocyte # : 1.62 K/uL  Auto Monocyte # : 1.54 K/uL  Auto Eosinophil # : 0.10 K/uL  Auto Basophil # : 0.07 K/uL  Auto Neutrophil % : 79.6 %  Auto Lymphocyte % : 9.7 %  Auto Monocyte % : 9.2 %  Auto Eosinophil % : 0.6 %  Auto Basophil % : 0.4 %    .		Differential:	[] Automated		[] Manual  Chemistry                        12.3   16.77 )-----------( 327      ( 07 Oct 2019 22:52 )             39.4     10-    139  |  109<H>  |  18  ----------------------------<  92  4.8   |  23  |  0.71    Ca    8.9      07 Oct 2019 22:52    TPro  6.8  /  Alb  3.3  /  TBili  0.4  /  DBili  x   /  AST  45<H>  /  ALT  21  /  AlkPhos  61  10-07    LIVER FUNCTIONS - ( 07 Oct 2019 22:52 )  Alb: 3.3 g/dL / Pro: 6.8 gm/dL / ALK PHOS: 61 U/L / ALT: 21 U/L / AST: 45 U/L / GGT: x             Urinalysis Basic - ( 08 Oct 2019 01:49 )    Color: Yellow / Appearance: Clear / S.005 / pH: x  Gluc: x / Ketone: Negative  / Bili: Negative / Urobili: Negative mg/dL   Blood: x / Protein: Negative mg/dL / Nitrite: Negative   Leuk Esterase: Negative / RBC: x / WBC x   Sq Epi: x / Non Sq Epi: x / Bacteria: x      RADIOLOGY RESULTS:    PRelim EKG- NSR @93bpm, t wave inversions in lead III, AVF  Prelim CXR     < from: CT Abdomen and Pelvis w/ IV Cont (10.08.19 @ 00:55) >      IMPRESSION:     Acute sigmoidcolonic diverticulitis without extraluminal gas or   drainable fluid collection. Lab Results:  CBC  CBC Full  -  ( 07 Oct 2019 22:52 )  WBC Count : 16.77 K/uL  RBC Count : 4.27 M/uL  Hemoglobin : 12.3 g/dL  Hematocrit : 39.4 %  Platelet Count - Automated : 327 K/uL  Mean Cell Volume : 92.3 fl  Mean Cell Hemoglobin : 28.8 pg  Mean Cell Hemoglobin Concentration : 31.2 gm/dL  Auto Neutrophil # : 13.35 K/uL  Auto Lymphocyte # : 1.62 K/uL  Auto Monocyte # : 1.54 K/uL  Auto Eosinophil # : 0.10 K/uL  Auto Basophil # : 0.07 K/uL  Auto Neutrophil % : 79.6 %  Auto Lymphocyte % : 9.7 %  Auto Monocyte % : 9.2 %  Auto Eosinophil % : 0.6 %  Auto Basophil % : 0.4 %    .		Differential:	[] Automated		[] Manual  Chemistry                        12.3   16.77 )-----------( 327      ( 07 Oct 2019 22:52 )             39.4     10-    139  |  109<H>  |  18  ----------------------------<  92  4.8   |  23  |  0.71    Ca    8.9      07 Oct 2019 22:52    TPro  6.8  /  Alb  3.3  /  TBili  0.4  /  DBili  x   /  AST  45<H>  /  ALT  21  /  AlkPhos  61  10-07    LIVER FUNCTIONS - ( 07 Oct 2019 22:52 )  Alb: 3.3 g/dL / Pro: 6.8 gm/dL / ALK PHOS: 61 U/L / ALT: 21 U/L / AST: 45 U/L / GGT: x             Urinalysis Basic - ( 08 Oct 2019 01:49 )    Color: Yellow / Appearance: Clear / S.005 / pH: x  Gluc: x / Ketone: Negative  / Bili: Negative / Urobili: Negative mg/dL   Blood: x / Protein: Negative mg/dL / Nitrite: Negative   Leuk Esterase: Negative / RBC: x / WBC x   Sq Epi: x / Non Sq Epi: x / Bacteria: x      RADIOLOGY RESULTS:    PRelim EKG- NSR @93bpm, t wave inversions in lead III, AVF  Prelim CXR - not available to view    < from: CT Abdomen and Pelvis w/ IV Cont (10.08.19 @ 00:55) >      IMPRESSION:     Acute sigmoidcolonic diverticulitis without extraluminal gas or   drainable fluid collection.

## 2019-10-08 NOTE — H&P ADULT - HISTORY OF PRESENT ILLNESS
87 y/o F with PMH of HTN, CAD s/p 2 stents hiatal hernia, h/o GI bleed, gastirtis, LLE stent, arthritis, diverticulosis, hypothyroidism, PPM, h/o CEA presents to the ER complaining of LLQ pain    PSH: Cataracts surgery, carpal tunnel surgery, CEA, breast reconstruction, hysterectomy, R total knee replacement, CAD S/P 2 stents     Social hx: Denies tobacco / etoh, +marijuana use 2x per month    Family hx: Father / brother - cancer (uncertain what type) 87 y/o F with PMH of HTN, CAD s/p 2 stents hiatal hernia, h/o GI bleed, gastirtis, LLE stent, arthritis, diverticulosis, hypothyroidism, PPM, h/o CEA presents to the ER complaining of LLQ pain that started 2 days ago; No appetite; No Hematochezia, fever, diarrhea, N/V, NO hx of abx use or travel hx. IN the ER, patient received IVF, Cipro and flagyl    PSH: Cataracts surgery, carpal tunnel surgery, CEA, breast reconstruction, hysterectomy, R total knee replacement, CAD S/P multiple stents     Social hx: Denies tobacco / etoh, +marijuana use 2x per month    Family hx: Father / brother - cancer (uncertain what type); Unknown age and cause of death of both parents 87 y/o F with PMH of HTN, CAD s/p 2 stents hiatal hernia, h/o GI bleed, gastirtis, LLE stent, arthritis, diverticulosis, hypothyroidism, PPM, h/o CEA presents to the ER complaining of LLQ pain that started 2 days ago; No appetite; No Hematochezia, fever, diarrhea, N/V, NO hx of abx use or travel hx. IN the ER, patient received IVF, Cipro and flagyl    PSH: Cataracts surgery, carpal tunnel surgery, CEA, breast reconstruction, hysterectomy, R total knee replacement, CAD S/P multiple stents     Social hx: Denies tobacco / etoh, Lives at home.    Family hx: Father / brother - cancer (uncertain what type); Unknown age and cause of death of both parents

## 2019-10-08 NOTE — PATIENT PROFILE ADULT - ABILITY TO HEAR (WITH HEARING AID OR HEARING APPLIANCE IF NORMALLY USED):
Mildly to Moderately Impaired: difficulty hearing in some environments or speaker may need to increase volume or speak distinctly/has hearing aids both ears, left at home

## 2019-10-08 NOTE — CONSULT NOTE ADULT - PROBLEM SELECTOR RECOMMENDATION 3
h/o multiple PCIs.  No active cardiac symptoms - chest pain, dyspnea.  If needs surgery OK to d/c plavix preop.  Low risk for cardiac events perioperatively.

## 2019-10-08 NOTE — ED ADULT NURSE REASSESSMENT NOTE - NSIMPLEMENTINTERV_GEN_ALL_ED
Implemented All Fall with Harm Risk Interventions:  [place] to call system. Call bell, personal items and telephone within reach. Instruct patient to call for assistance. Room bathroom lighting operational. Non-slip footwear when patient is off stretcher. Physically safe environment: no spills, clutter or unnecessary equipment. Stretcher in lowest position, wheels locked, appropriate side rails in place. Provide visual cue, wrist band, yellow gown, etc. Monitor gait and stability. Monitor for mental status changes and reorient to person, place, and time. Review medications for side effects contributing to fall risk. Reinforce activity limits and safety measures with patient and family. Provide visual clues: red socks.

## 2019-10-08 NOTE — ED ADULT NURSE REASSESSMENT NOTE - NS ED NURSE REASSESS COMMENT FT1
iv infiltrated to Right wrist, IV removed and new IV inserted to Left FA #20.
Assumed care of patient from Jamarcus ZULUAGA RN at 0530. Patient AxOx3, forgetful. Patient resting comfortably in bed. Patient in no acute signs of distress. All needs addressed at this time. Safety and comfort maintained. Will continue to monitor.
Pt alert and oriented x3 VSS afebrile. Pt resting comfortably denies pain/nausea. Breakfast ordered for patient-no appetite at this time. IVF infusing. Pt OOB to bathroom with standby assist, gait steady. Plan of care discussed and all needs address/safety maintained. Pt aware of delay in transfer to floor pending bed availability.

## 2019-10-09 LAB
ALBUMIN SERPL ELPH-MCNC: 2.5 G/DL — LOW (ref 3.3–5)
ALP SERPL-CCNC: 55 U/L — SIGNIFICANT CHANGE UP (ref 40–120)
ALT FLD-CCNC: 13 U/L — SIGNIFICANT CHANGE UP (ref 12–78)
ANION GAP SERPL CALC-SCNC: 6 MMOL/L — SIGNIFICANT CHANGE UP (ref 5–17)
AST SERPL-CCNC: 8 U/L — LOW (ref 15–37)
BILIRUB SERPL-MCNC: 0.4 MG/DL — SIGNIFICANT CHANGE UP (ref 0.2–1.2)
BUN SERPL-MCNC: 7 MG/DL — SIGNIFICANT CHANGE UP (ref 7–23)
CALCIUM SERPL-MCNC: 7.6 MG/DL — LOW (ref 8.5–10.1)
CHLORIDE SERPL-SCNC: 111 MMOL/L — HIGH (ref 96–108)
CO2 SERPL-SCNC: 23 MMOL/L — SIGNIFICANT CHANGE UP (ref 22–31)
CREAT SERPL-MCNC: 0.65 MG/DL — SIGNIFICANT CHANGE UP (ref 0.5–1.3)
GLUCOSE SERPL-MCNC: 124 MG/DL — HIGH (ref 70–99)
HCT VFR BLD CALC: 30.8 % — LOW (ref 34.5–45)
HGB BLD-MCNC: 9.7 G/DL — LOW (ref 11.5–15.5)
MCHC RBC-ENTMCNC: 29.1 PG — SIGNIFICANT CHANGE UP (ref 27–34)
MCHC RBC-ENTMCNC: 31.5 GM/DL — LOW (ref 32–36)
MCV RBC AUTO: 92.5 FL — SIGNIFICANT CHANGE UP (ref 80–100)
PLATELET # BLD AUTO: 230 K/UL — SIGNIFICANT CHANGE UP (ref 150–400)
POTASSIUM SERPL-MCNC: 3.5 MMOL/L — SIGNIFICANT CHANGE UP (ref 3.5–5.3)
POTASSIUM SERPL-SCNC: 3.5 MMOL/L — SIGNIFICANT CHANGE UP (ref 3.5–5.3)
PROT SERPL-MCNC: 5.5 GM/DL — LOW (ref 6–8.3)
RBC # BLD: 3.33 M/UL — LOW (ref 3.8–5.2)
RBC # FLD: 14.2 % — SIGNIFICANT CHANGE UP (ref 10.3–14.5)
SODIUM SERPL-SCNC: 140 MMOL/L — SIGNIFICANT CHANGE UP (ref 135–145)
WBC # BLD: 11.52 K/UL — HIGH (ref 3.8–10.5)
WBC # FLD AUTO: 11.52 K/UL — HIGH (ref 3.8–10.5)

## 2019-10-09 PROCEDURE — 73120 X-RAY EXAM OF HAND: CPT | Mod: 26,RT

## 2019-10-09 RX ORDER — COLCHICINE 0.6 MG
0.6 TABLET ORAL DAILY
Refills: 0 | Status: DISCONTINUED | OUTPATIENT
Start: 2019-10-09 | End: 2019-10-10

## 2019-10-09 RX ORDER — IBUPROFEN 200 MG
600 TABLET ORAL EVERY 8 HOURS
Refills: 0 | Status: DISCONTINUED | OUTPATIENT
Start: 2019-10-09 | End: 2019-10-10

## 2019-10-09 RX ADMIN — Medication 200 MILLIGRAM(S): at 05:25

## 2019-10-09 RX ADMIN — CLOPIDOGREL BISULFATE 75 MILLIGRAM(S): 75 TABLET, FILM COATED ORAL at 11:13

## 2019-10-09 RX ADMIN — Medication 100 MILLIGRAM(S): at 05:22

## 2019-10-09 RX ADMIN — Medication 81 MILLIGRAM(S): at 11:13

## 2019-10-09 RX ADMIN — Medication 600 MILLIGRAM(S): at 17:12

## 2019-10-09 RX ADMIN — Medication 100 MILLIGRAM(S): at 13:16

## 2019-10-09 RX ADMIN — Medication 200 MILLIGRAM(S): at 17:06

## 2019-10-09 RX ADMIN — ATORVASTATIN CALCIUM 10 MILLIGRAM(S): 80 TABLET, FILM COATED ORAL at 21:44

## 2019-10-09 RX ADMIN — Medication 20 MILLIGRAM(S): at 16:53

## 2019-10-09 RX ADMIN — Medication 20 MILLIGRAM(S): at 14:57

## 2019-10-09 RX ADMIN — HEPARIN SODIUM 5000 UNIT(S): 5000 INJECTION INTRAVENOUS; SUBCUTANEOUS at 17:06

## 2019-10-09 RX ADMIN — Medication 325 MILLIGRAM(S): at 05:23

## 2019-10-09 RX ADMIN — Medication 100 MILLIGRAM(S): at 21:45

## 2019-10-09 RX ADMIN — HEPARIN SODIUM 5000 UNIT(S): 5000 INJECTION INTRAVENOUS; SUBCUTANEOUS at 05:23

## 2019-10-09 RX ADMIN — Medication 325 MILLIGRAM(S): at 17:06

## 2019-10-09 RX ADMIN — SODIUM CHLORIDE 75 MILLILITER(S): 9 INJECTION INTRAMUSCULAR; INTRAVENOUS; SUBCUTANEOUS at 21:45

## 2019-10-09 RX ADMIN — Medication 200 MILLIGRAM(S): at 05:22

## 2019-10-09 RX ADMIN — Medication 0.6 MILLIGRAM(S): at 16:53

## 2019-10-09 NOTE — PROGRESS NOTE ADULT - SUBJECTIVE AND OBJECTIVE BOX
HOSPITALIST PROGRESS NOTE:  SUBJECTIVE:  PCP: PCP: Dr. Fermin Cardona  Cardio: Dr. Ector Welsh    Chief Complaint: Patient is a 86y old  Female who presents with a chief complaint of LLQ abd pain (08 Oct 2019 16:43)    HPI:  85 y/o F with PMH of HTN, CAD s/p 2 stents hiatal hernia, h/o GI bleed, gastirtis, LLE stent, arthritis, diverticulosis, hypothyroidism, PPM, h/o CEA presents to the ER complaining of LLQ pain that started 2 days ago; No appetite; No Hematochezia, fever, diarrhea, N/V, NO hx of abx use or travel hx. IN the ER, patient received IVF, Cipro and flagyl    10/9: ABove reviewed; overnight patient complaints of joint pain in fingers and wrist     Allergies:  No Known Drug Allergies  Originally Entered as [Unknown see Comment: pt unable to remember] reaction to [Mold] (Unknown)  stolazine eye med- pt doesn&#x27;t remember (Unknown)    REVIEW OF SYSTEMS:  See HPI. All other review of systems is negative unless indicated above.     OBJECTIVE  Physical Exam:  Vital Signs:    Vital Signs Last 24 Hrs  T(C): 37.4 (08 Oct 2019 22:10), Max: 37.4 (08 Oct 2019 10:12)  T(F): 99.4 (08 Oct 2019 22:10), Max: 99.4 (08 Oct 2019 22:10)  HR: 84 (09 Oct 2019 05:19) (80 - 93)  BP: 127/56 (09 Oct 2019 05:19) (99/79 - 129/75)  BP(mean): --  RR: 18 (09 Oct 2019 05:19) (16 - 18)  SpO2: 96% (09 Oct 2019 05:19) (94% - 96%)  I&O's Summary      Constitutional: NAD, awake and alert, well-developed  Neurological: AAO x 3, no focal deficits  HEENT: PERRLA, EOMI, MMM  Neck: Soft and supple, No LAD, No JVD  Respiratory: Breath sounds are clear bilaterally, No wheezing, rales or rhonchi  Cardiovascular: S1 and S2, regular rate and rhythm; no Murmurs, gallops or rubs  Gastrointestinal: Bowel Sounds present, soft, nontender, nondistended, no guarding, no rebound tenderness  Back: No CVA tenderness   Extremities: No peripheral edema  Vascular: 2+ peripheral pulses  Musculoskeletal: 5/5 strength b/l upper and lower extremities  Skin: No rashes  Breast: Deferred  Rectal: Deferred    MEDICATIONS  (STANDING):  aspirin  chewable 81 milliGRAM(s) Oral daily  atorvastatin 10 milliGRAM(s) Oral at bedtime  ciprofloxacin   IVPB 400 milliGRAM(s) IV Intermittent every 12 hours  clopidogrel Tablet 75 milliGRAM(s) Oral daily  ferrous    sulfate 325 milliGRAM(s) Oral two times a day  heparin  Injectable 5000 Unit(s) SubCutaneous two times a day  metoprolol succinate  milliGRAM(s) Oral daily  metroNIDAZOLE  IVPB 500 milliGRAM(s) IV Intermittent every 8 hours  sodium chloride 0.9%. 1000 milliLiter(s) (75 mL/Hr) IV Continuous <Continuous>      LABS: All Labs Reviewed:                        12.3   16.77 )-----------( 327      ( 07 Oct 2019 22:52 )             39.4     10-    139  |  109<H>  |  18  ----------------------------<  92  4.8   |  23  |  0.71    Ca    8.9      07 Oct 2019 22:52    TPro  6.8  /  Alb  3.3  /  TBili  0.4  /  DBili  x   /  AST  45<H>  /  ALT  21  /  AlkPhos  61  10-07      Urinalysis Basic - ( 08 Oct 2019 01:49 )    Color: Yellow / Appearance: Clear / S.005 / pH: x  Gluc: x / Ketone: Negative  / Bili: Negative / Urobili: Negative mg/dL   Blood: x / Protein: Negative mg/dL / Nitrite: Negative   Leuk Esterase: Negative / RBC: x / WBC x   Sq Epi: x / Non Sq Epi: x / Bacteria: x      RADIOLOGY/EKG:    < from: Xray Chest 1 View AP/PA. (10.07.19 @ 23:37) >      IMPRESSION:   No evidence of active chest disease.    Cardiomegaly. Cardiac device wire leads are within right atrium and right   ventricle.         < end of copied text >      	< from: CT Abdomen and Pelvis w/ IV Cont (10.08.19 @ 00:55) >      	IMPRESSION:     	Acute sigmoidcolonic diverticulitis without extraluminal gas or   drainable fluid collection HOSPITALIST PROGRESS NOTE:  SUBJECTIVE:  PCP: PCP: Dr. Fermin Cardona  Cardio: Dr. Ector Welsh    Chief Complaint: Patient is a 86y old  Female who presents with a chief complaint of LLQ abd pain (08 Oct 2019 16:43)    HPI:  85 y/o F with PMH of HTN, CAD s/p 2 stents hiatal hernia, h/o GI bleed, gastirtis, LLE stent, arthritis, diverticulosis, hypothyroidism, PPM, h/o CEA presents to the ER complaining of LLQ pain that started 2 days ago; No appetite; No Hematochezia, fever, diarrhea, N/V, NO hx of abx use or travel hx. IN the ER, patient received IVF, Cipro and flagyl    10/9: ABove reviewed; overnight patient complaints of joint pain in fingers and wrist    Allergies:  No Known Drug Allergies  Originally Entered as [Unknown see Comment: pt unable to remember] reaction to [Mold] (Unknown)  stolazine eye med- pt doesn&#x27;t remember (Unknown)    REVIEW OF SYSTEMS:  See HPI. All other review of systems is negative unless indicated above.     OBJECTIVE  Physical Exam:  Vital Signs Last 24 Hrs  T(C): 36.7 (09 Oct 2019 11:01), Max: 37.4 (08 Oct 2019 22:10)  T(F): 98.1 (09 Oct 2019 11:01), Max: 99.4 (08 Oct 2019 22:10)  HR: 82 (09 Oct 2019 11:01) (82 - 88)  BP: 134/51 (09 Oct 2019 11:01) (99/79 - 134/51)  BP(mean): --  RR: 16 (09 Oct 2019 11:01) (16 - 18)  SpO2: 95% (09 Oct 2019 11:01) (95% - 96%)      Constitutional: NAD, awake and alert, well-developed  Neurological: AAO x 3, no focal deficits  HEENT: PERRLA, EOMI, MMM  Neck: Soft and supple, No LAD, No JVD  Respiratory: Breath sounds are clear bilaterally, No wheezing, rales or rhonchi  Cardiovascular: S1 and S2, regular rate and rhythm; no Murmurs, gallops or rubs  Gastrointestinal: Bowel Sounds present, soft, nontender, nondistended, no guarding, no rebound tenderness  Back: No CVA tenderness   Extremities: No peripheral edema  Vascular: 2+ peripheral pulses  Musculoskeletal: 5/5 strength b/l upper and lower extremities  Skin: No rashes  Breast: Deferred  Rectal: Deferred    MEDICATIONS  (STANDING):  aspirin  chewable 81 milliGRAM(s) Oral daily  atorvastatin 10 milliGRAM(s) Oral at bedtime  ciprofloxacin   IVPB 400 milliGRAM(s) IV Intermittent every 12 hours  clopidogrel Tablet 75 milliGRAM(s) Oral daily  ferrous    sulfate 325 milliGRAM(s) Oral two times a day  heparin  Injectable 5000 Unit(s) SubCutaneous two times a day  metoprolol succinate  milliGRAM(s) Oral daily  metroNIDAZOLE  IVPB 500 milliGRAM(s) IV Intermittent every 8 hours  sodium chloride 0.9%. 1000 milliLiter(s) (75 mL/Hr) IV Continuous <Continuous>      LABS: All Labs Reviewed:                        12.3   16.77 )-----------( 327      ( 07 Oct 2019 22:52 )             39.4     10    139  |  109<H>  |  18  ----------------------------<  92  4.8   |  23  |  0.71    Ca    8.9      07 Oct 2019 22:52    TPro  6.8  /  Alb  3.3  /  TBili  0.4  /  DBili  x   /  AST  45<H>  /  ALT  21  /  AlkPhos  61  10-07      Urinalysis Basic - ( 08 Oct 2019 01:49 )    Color: Yellow / Appearance: Clear / S.005 / pH: x  Gluc: x / Ketone: Negative  / Bili: Negative / Urobili: Negative mg/dL   Blood: x / Protein: Negative mg/dL / Nitrite: Negative   Leuk Esterase: Negative / RBC: x / WBC x   Sq Epi: x / Non Sq Epi: x / Bacteria: x      RADIOLOGY/EKG:    < from: Xray Chest 1 View AP/PA. (10.07.19 @ 23:37) >      IMPRESSION:   No evidence of active chest disease.    Cardiomegaly. Cardiac device wire leads are within right atrium and right   ventricle.         < end of copied text >      	< from: CT Abdomen and Pelvis w/ IV Cont (10.08.19 @ 00:55) >      	IMPRESSION:     	Acute sigmoidcolonic diverticulitis without extraluminal gas or   drainable fluid collection

## 2019-10-09 NOTE — PROGRESS NOTE ADULT - ASSESSMENT
*LLQ ABd pain 2ndry to Acute Sigmoid Diverticulitis   *Leukocytosis  -CT abd noted  -UA, CXR Neg  -IVF  -clear Fluids  -Continue Cipro and Flagyl IV  -FU stool Cx   -GI consult    *CAD  -continue ASA, plavix, Statin and BB    *HTN  -continue Metoprolol  -Hold norvasc/ACEI    *Hyperlipidemia  -continue statin    *DVT ppx  -Heparin SQ    *Dispo - Home meds need to be confirmed from [facility] in [place] *LLQ ABd pain 2ndry to Acute Sigmoid Diverticulitis   *Leukocytosis  -CT abd noted  -UA, CXR Neg  -IVF  -tolerate full Fluids, advance to Low fiber diet if tolerated   -Continue Cipro and Flagyl IV  -FU stool Cx   -GI consult    *Right Hand Pain and swelling  most likely 2ndry to arthritis rheumatoid versus gout?  -start prednisone 40mg PO QD, Ibuprofen and Colchicine     *CAD  -continue ASA, plavix, Statin and BB    *HTN  -continue Metoprolol  -Hold norvasc/ACEI    *Hyperlipidemia  -continue statin    *DVT ppx  -Heparin SQ    *Dispo - Home meds need to be confirmed from [facility] in [place]

## 2019-10-09 NOTE — CONSULT NOTE ADULT - ASSESSMENT
86F with sigmoid diverticulitis, uncomplicated.  Improving with abx, WBC declining and pain improving.     Rec:  -cont low fiber diet as kiersten  -monitor CBC  -cont abx, consider changing abx to PO if pt kiersten PO in preparation for d/c  -serial abd exams  -consider outpatient colonoscopy, to be discussed in outpatient setting once acute diverticulitis resolved  -will follow

## 2019-10-09 NOTE — CHART NOTE - NSCHARTNOTEFT_GEN_A_CORE
86 YOF c/o sudden onset of pain rt wrist and hand /  No hx of injury, IV etc.     Mild swelling of PIP and MCP joint   mod restriction of motion    No deformity   sensory and neuro are intact     Imp  Arthritis  Plan analgesia   x- ray rt hand and wrist 86 YOF c/o sudden onset of pain rt wrist and hand /  No hx of injury, IV etc.     Mild swelling of PIP and MCP joint   Hypertrophic changes in MIP   mod restriction of motion    sensory and neuro are intact     Imp  Arthritis r/o gout   Plan analgesia   x- ray rt hand and wrist

## 2019-10-09 NOTE — CONSULT NOTE ADULT - SUBJECTIVE AND OBJECTIVE BOX
PCP:    REQUESTING PHYSICIAN:seymour benavides    REASON FOR CONSULT: admit for diverticulosis, h/o CAD s/p PCI    CHIEF COMPLAINT:    HPI:  87 y/o F with PMH of HTN, CAD s/p 2 stents hiatal hernia, h/o GI bleed, gastirtis, LLE stent, arthritis, diverticulosis, hypothyroidism, PPM, h/o CEA presents to the ER complaining of LLQ pain that started 2 days ago; No appetite; No Hematochezia, fever, diarrhea, N/V, NO hx of abx use or travel hx. IN the ER, patient received IVF, Cipro and flagyl    PSH: Cataracts surgery, carpal tunnel surgery, CEA, breast reconstruction, hysterectomy, R total knee replacement, CAD S/P multiple stents     10/8/19: consulted per patient request.  Known to cardiology group (Dr. Yoder).  H/o multiple PCIs last stent >1 yr ago.  12 stents per pt.  No chest pain, dyspnea, palpitations or other cardiac symptoms.  reports LLB pain as described above, improving since admit.  On clear liquid diet        Social hx: Denies tobacco / etoh, Lives at home.    Family hx: Father / brother - cancer (uncertain what type); Unknown age and cause of death of both parents (08 Oct 2019 07:36)      PAST MEDICAL & SURGICAL HISTORY:  Acute arthritis  High cholesterol  Stress incontinence in female  Rectocele  Diverticulosis  Hiatal hernia  Hypertension  Hypothyroidism  Dyslipidemia  CAD (coronary artery disease)  Carpal tunnel syndrome of right wrist  Cardiac pacemaker  Stented coronary artery: 12 heart stents,   1 left leg stents  H/O carotid endarterectomy: right  History of hysterectomy  History of breast reconstruction  Bilateral cataracts: surgery  Status post total knee replacement: right      Allergies    No Known Drug Allergies  Originally Entered as [Unknown see Comment: pt unable to remember] reaction to [Mold] (Unknown)  stolazine eye med- pt doesn&#x27;t remember (Unknown)    Intolerances        SOCIAL HISTORY:    FAMILY HISTORY:  Family history of cancer (Sibling)      MEDICATIONS:  MEDICATIONS  (STANDING):  aspirin  chewable 81 milliGRAM(s) Oral daily  atorvastatin 10 milliGRAM(s) Oral at bedtime  ciprofloxacin   IVPB 400 milliGRAM(s) IV Intermittent every 12 hours  clopidogrel Tablet 75 milliGRAM(s) Oral daily  ferrous    sulfate 325 milliGRAM(s) Oral two times a day  heparin  Injectable 5000 Unit(s) SubCutaneous two times a day  metoprolol succinate  milliGRAM(s) Oral daily  metroNIDAZOLE  IVPB 500 milliGRAM(s) IV Intermittent every 8 hours  sodium chloride 0.9%. 1000 milliLiter(s) (75 mL/Hr) IV Continuous <Continuous>    MEDICATIONS  (PRN):  acetaminophen   Tablet .. 650 milliGRAM(s) Oral every 6 hours PRN Temp greater or equal to 38C (100.4F), Mild Pain (1 - 3)  morphine  - Injectable 2 milliGRAM(s) IV Push every 4 hours PRN Severe Pain (7 - 10)  ondansetron Injectable 4 milliGRAM(s) IV Push every 6 hours PRN Nausea and/or Vomiting      REVIEW OF SYSTEMS:    CONSTITUTIONAL: No weakness, fevers or chills  EYES/ENT: No visual changes;  No vertigo or throat pain   NECK: No pain or stiffness  RESPIRATORY: No cough, wheezing, hemoptysis; No shortness of breath  CARDIOVASCULAR: No chest pain or palpitations  GASTROINTESTINAL: No abdominal or epigastric pain. No nausea, vomiting, or hematemesis; No diarrhea or constipation. No melena or hematochezia.  GENITOURINARY: No dysuria, frequency or hematuria  NEUROLOGICAL: No numbness or weakness  SKIN: No itching, burning, rashes, or lesions   All other review of systems is negative unless indicated above    Vital Signs Last 24 Hrs  T(C): 36.9 (08 Oct 2019 12:49), Max: 37.9 (08 Oct 2019 02:08)  T(F): 98.5 (08 Oct 2019 12:49), Max: 100.3 (08 Oct 2019 02:08)  HR: 80 (08 Oct 2019 12:49) (80 - 97)  BP: 129/75 (08 Oct 2019 12:49) (114/98 - 155/77)  BP(mean): --  RR: 17 (08 Oct 2019 12:49) (16 - 20)  SpO2: 95% (08 Oct 2019 12:49) (94% - 96%)    I&O's Summary      PHYSICAL EXAM:    Constitutional: NAD, awake and alert, well-developed  HEENT: PERR, EOMI,  No oral cyananosis.  Neck:  supple,  No JVD  Respiratory: Breath sounds are clear bilaterally, No wheezing, rales or rhonchi  Cardiovascular: S1 and S2, regular rate and rhythm, no Murmurs, gallops or rubs  Gastrointestinal: Bowel Sounds present, soft, nontender.   Extremities: No peripheral edema. No clubbing or cyanosis.  Vascular: 2+ peripheral pulses  Neurological: A/O x 3, no focal deficits  Musculoskeletal: no calf tenderness.  Skin: No rashes.      LABS: All Labs Reviewed:               < end of copied text >    16.77 )-----------( 327      ( 07 Oct 2019 22:52 )             39.4     07 Oct 2019 22:52    139    |  109    |  18     ----------------------------<  92     4.8     |  23     |  0.71     Ca    8.9        07 Oct 2019 22:52    TPro  6.8    /  Alb  3.3    /  TBili  0.4    /  DBili  x      /  AST  45     /  ALT  21     /  AlkPhos  61     07 Oct 2019 22:52            Blood Culture:         RADIOLOGY/EKG:      ECG NSR poor R wave progression      < from: Transthoracic Echocardiogram (01.12.18 @ 09:31) >   Summary     Fibrocalcific changes noted to the mitral valve leaflets with preserved   leaflet excursion.   mitral annular calcification is present.   Mild (1+) mitral regurgitation is present.   There are fibrocalcific changes noted to the aortic valve leaflets with   restriction in leaflet excursion. Transaortic gradients are elevated but   do notmeet the criteria for aortic stenosis. MIld to Moderate aortic   insufficiency noted.   Moderate (2+) tricuspid valve regurgitation is present.   Mild pulmonary hypertension.   The left atrium is mildly dilated.   The left ventricle is normal in size, wall thickness, wall motion and   contractility.   Estimated left ventricular ejection fraction is 55-60 %.     Signature     ----------------------------------------------------------------   Electronically signed by Angelo Cao MD(Interpreting  physician) on 01/12/2018 04:06 PM   ----------------------------------------------------------------
HPI:  87 y/o F with PMH of HTN, CAD s/p 2 stents hiatal hernia, h/o GI bleed, gastirtis, LLE stent, arthritis, diverticulosis, hypothyroidism, PPM, h/o CEA presents to the ER complaining of LLQ pain that started 2 days PTA; No appetite; No Hematochezia, fever, diarrhea, N/V, NO hx of abx use or travel hx. IN the ER, patient received IVF, Cipro and flagyl. CT shows uncomplicated sigmoid diverticulitis. She has a h/o diverticulitis in the past. She is tolerating liq diet currently. No BM. +flatus.    PAST MEDICAL & SURGICAL HISTORY:  Acute arthritis  High cholesterol  Stress incontinence in female  Rectocele  Diverticulosis  Hiatal hernia  Hypertension  Hypothyroidism  Dyslipidemia  CAD (coronary artery disease)  Carpal tunnel syndrome of right wrist  Cardiac pacemaker  Stented coronary artery: 12 heart stents,   1 left leg stents  H/O carotid endarterectomy: right  History of hysterectomy  History of breast reconstruction  Bilateral cataracts: surgery  Status post total knee replacement: right    MEDICATIONS  (STANDING):  aspirin  chewable 81 milliGRAM(s) Oral daily  atorvastatin 10 milliGRAM(s) Oral at bedtime  ciprofloxacin   IVPB 400 milliGRAM(s) IV Intermittent every 12 hours  clopidogrel Tablet 75 milliGRAM(s) Oral daily  colchicine 0.6 milliGRAM(s) Oral daily  ferrous    sulfate 325 milliGRAM(s) Oral two times a day  heparin  Injectable 5000 Unit(s) SubCutaneous two times a day  metoprolol succinate  milliGRAM(s) Oral daily  metroNIDAZOLE  IVPB 500 milliGRAM(s) IV Intermittent every 8 hours  sodium chloride 0.9%. 1000 milliLiter(s) (75 mL/Hr) IV Continuous <Continuous>    MEDICATIONS  (PRN):  acetaminophen   Tablet .. 650 milliGRAM(s) Oral every 6 hours PRN Temp greater or equal to 38C (100.4F), Mild Pain (1 - 3)  ibuprofen  Tablet. 600 milliGRAM(s) Oral every 8 hours PRN Temp greater or equal to 38C (100.4F), Mild Pain (1 - 3)  morphine  - Injectable 2 milliGRAM(s) IV Push every 4 hours PRN Severe Pain (7 - 10)  ondansetron Injectable 4 milliGRAM(s) IV Push every 6 hours PRN Nausea and/or Vomiting      Allergies    No Known Drug Allergies    Intolerances    Social hx: Denies tobacco / etoh, Lives at home.    Family hx: Father / brother - cancer (uncertain what type); Unknown age and cause of death of both parents (08 Oct 2019 07:36)    ROS  As above  Otherwise unremarkable, all systems reviewed    Vital Signs Last 24 Hrs  T(C): 36.7 (09 Oct 2019 11:01), Max: 37.4 (08 Oct 2019 22:10)  T(F): 98.1 (09 Oct 2019 11:01), Max: 99.4 (08 Oct 2019 22:10)  HR: 82 (09 Oct 2019 11:01) (82 - 88)  BP: 134/51 (09 Oct 2019 11:01) (99/79 - 134/51)  BP(mean): --  RR: 16 (09 Oct 2019 11:01) (16 - 18)  SpO2: 95% (09 Oct 2019 11:01) (95% - 96%)    Constitutional: NAD, well-developed, frail  Respiratory: CTAB  Cardiovascular: S1 and S2, RRR  Gastrointestinal: BS+, soft, ND, mild LLQ abd tenderness  Extremities: No peripheral edema  Psychiatric: Normal mood, normal affect  Skin: No rashes    LABS:                        9.7    11.52 )-----------( 230      ( 09 Oct 2019 08:19 )             30.8     10-09    140  |  111<H>  |  7   ----------------------------<  124<H>  3.5   |  23  |  0.65    Ca    7.6<L>      09 Oct 2019 08:19    TPro  5.5<L>  /  Alb  2.5<L>  /  TBili  0.4  /  DBili  x   /  AST  8<L>  /  ALT  13  /  AlkPhos  55  10-09      LIVER FUNCTIONS - ( 09 Oct 2019 08:19 )  Alb: 2.5 g/dL / Pro: 5.5 gm/dL / ALK PHOS: 55 U/L / ALT: 13 U/L / AST: 8 U/L / GGT: x             RADIOLOGY & ADDITIONAL STUDIES:    EXAM:  CT ABDOMEN AND PELVIS IC                            PROCEDURE DATE:  10/08/2019          INTERPRETATION:  CLINICAL INFORMATION: Left lower quadrant abdominal pain.    COMPARISON: CT abdomen/pelvis of 6/14/2016.    PROCEDURE:   CT of the Abdomen and Pelvis was performed with intravenous contrast.   Intravenous contrast: 95 ml Omnipaque 350. 5 ml discarded.  Oral contrast: None.  Sagittal and coronal reformats were performed.    FINDINGS:    LOWER CHEST: Minimal bilateral lower lobe peripheral reticular airspace   opacities, nonspecific. Coronary artery calcifications and/or stents.   Mitral annular calcification. Partially imaged cardiac pacemaker leads.   Moderate to large hiatal hernia containing the stomach and a portion of   the pancreas.    LIVER: Stable low-attenuation lesion within the left hepatic lobe, likely   represents a cyst.  BILE DUCTS: Normal caliber.  GALLBLADDER: Within normal limits.  SPLEEN: Within normal limits.  PANCREAS: Within normal limits.  ADRENALS: Within normal limits.  KIDNEYS/URETERS: Within normal limits.    BLADDER: Within normal limits.  REPRODUCTIVE ORGANS: Status post hysterectomy.    BOWEL: No bowel obstruction. Colonic diverticulosis with short segment   colonic bowel wall thickening and pericolonic inflammatory change   surrounding an enlarged inflamed diverticulum within the sigmoid colon.   No extraluminal gas or drainable fluid collection. Moderate to large   hiatal hernia.  PERITONEUM: Trace pelvic free fluid.  VESSELS: Atherosclerosis of the abdominal aorta and its branch vessels.  RETROPERITONEUM/LYMPH NODES: No lymphadenopathy.    ABDOMINAL WALL: Bilateral fat-containing inguinal hernias.  BONES: Multilevel degenerative changes of the spine.    IMPRESSION:     Acute sigmoid colonic diverticulitis without extraluminal gas or   drainable fluid collection.

## 2019-10-10 LAB
HCT VFR BLD CALC: 32 % — LOW (ref 34.5–45)
HGB BLD-MCNC: 10.4 G/DL — LOW (ref 11.5–15.5)
MCHC RBC-ENTMCNC: 29.4 PG — SIGNIFICANT CHANGE UP (ref 27–34)
MCHC RBC-ENTMCNC: 32.5 GM/DL — SIGNIFICANT CHANGE UP (ref 32–36)
MCV RBC AUTO: 90.4 FL — SIGNIFICANT CHANGE UP (ref 80–100)
PLATELET # BLD AUTO: 237 K/UL — SIGNIFICANT CHANGE UP (ref 150–400)
RBC # BLD: 3.54 M/UL — LOW (ref 3.8–5.2)
RBC # FLD: 13.4 % — SIGNIFICANT CHANGE UP (ref 10.3–14.5)
WBC # BLD: 16.73 K/UL — HIGH (ref 3.8–10.5)
WBC # FLD AUTO: 16.73 K/UL — HIGH (ref 3.8–10.5)

## 2019-10-10 PROCEDURE — 71045 X-RAY EXAM CHEST 1 VIEW: CPT | Mod: 26

## 2019-10-10 RX ORDER — ALPRAZOLAM 0.25 MG
0.25 TABLET ORAL ONCE
Refills: 0 | Status: DISCONTINUED | OUTPATIENT
Start: 2019-10-10 | End: 2019-10-10

## 2019-10-10 RX ORDER — LANOLIN ALCOHOL/MO/W.PET/CERES
5 CREAM (GRAM) TOPICAL AT BEDTIME
Refills: 0 | Status: DISCONTINUED | OUTPATIENT
Start: 2019-10-10 | End: 2019-10-11

## 2019-10-10 RX ORDER — LACTOBACILLUS ACIDOPHILUS 100MM CELL
1 CAPSULE ORAL
Refills: 0 | Status: DISCONTINUED | OUTPATIENT
Start: 2019-10-10 | End: 2019-10-11

## 2019-10-10 RX ORDER — PANTOPRAZOLE SODIUM 20 MG/1
40 TABLET, DELAYED RELEASE ORAL
Refills: 0 | Status: DISCONTINUED | OUTPATIENT
Start: 2019-10-10 | End: 2019-10-11

## 2019-10-10 RX ORDER — METRONIDAZOLE 500 MG
500 TABLET ORAL EVERY 8 HOURS
Refills: 0 | Status: DISCONTINUED | OUTPATIENT
Start: 2019-10-10 | End: 2019-10-11

## 2019-10-10 RX ORDER — CIPROFLOXACIN LACTATE 400MG/40ML
500 VIAL (ML) INTRAVENOUS EVERY 12 HOURS
Refills: 0 | Status: DISCONTINUED | OUTPATIENT
Start: 2019-10-10 | End: 2019-10-11

## 2019-10-10 RX ORDER — SODIUM CHLORIDE 9 MG/ML
1000 INJECTION INTRAMUSCULAR; INTRAVENOUS; SUBCUTANEOUS
Refills: 0 | Status: DISCONTINUED | OUTPATIENT
Start: 2019-10-10 | End: 2019-10-11

## 2019-10-10 RX ADMIN — Medication 100 MILLIGRAM(S): at 15:22

## 2019-10-10 RX ADMIN — HEPARIN SODIUM 5000 UNIT(S): 5000 INJECTION INTRAVENOUS; SUBCUTANEOUS at 06:05

## 2019-10-10 RX ADMIN — Medication 325 MILLIGRAM(S): at 06:06

## 2019-10-10 RX ADMIN — Medication 200 MILLIGRAM(S): at 06:05

## 2019-10-10 RX ADMIN — Medication 81 MILLIGRAM(S): at 12:03

## 2019-10-10 RX ADMIN — PANTOPRAZOLE SODIUM 40 MILLIGRAM(S): 20 TABLET, DELAYED RELEASE ORAL at 09:11

## 2019-10-10 RX ADMIN — Medication 5 MILLIGRAM(S): at 22:41

## 2019-10-10 RX ADMIN — Medication 650 MILLIGRAM(S): at 02:16

## 2019-10-10 RX ADMIN — Medication 0.6 MILLIGRAM(S): at 12:02

## 2019-10-10 RX ADMIN — Medication 325 MILLIGRAM(S): at 18:16

## 2019-10-10 RX ADMIN — Medication 650 MILLIGRAM(S): at 02:46

## 2019-10-10 RX ADMIN — Medication 500 MILLIGRAM(S): at 21:24

## 2019-10-10 RX ADMIN — ATORVASTATIN CALCIUM 10 MILLIGRAM(S): 80 TABLET, FILM COATED ORAL at 21:24

## 2019-10-10 RX ADMIN — Medication 100 MILLIGRAM(S): at 08:02

## 2019-10-10 RX ADMIN — Medication 1 TABLET(S): at 18:16

## 2019-10-10 RX ADMIN — SODIUM CHLORIDE 50 MILLILITER(S): 9 INJECTION INTRAMUSCULAR; INTRAVENOUS; SUBCUTANEOUS at 18:22

## 2019-10-10 RX ADMIN — Medication 500 MILLIGRAM(S): at 18:16

## 2019-10-10 RX ADMIN — Medication 600 MILLIGRAM(S): at 10:09

## 2019-10-10 RX ADMIN — CLOPIDOGREL BISULFATE 75 MILLIGRAM(S): 75 TABLET, FILM COATED ORAL at 12:03

## 2019-10-10 RX ADMIN — Medication 600 MILLIGRAM(S): at 18:17

## 2019-10-10 RX ADMIN — Medication 40 MILLIGRAM(S): at 06:06

## 2019-10-10 RX ADMIN — HEPARIN SODIUM 5000 UNIT(S): 5000 INJECTION INTRAVENOUS; SUBCUTANEOUS at 18:16

## 2019-10-10 NOTE — PROGRESS NOTE ADULT - ASSESSMENT
86F with sigmoid diverticulitis, uncomplicated.  Improving with abx, WBC declining and pain improving.     Rec:  -cont low fiber diet as kiersten  -monitor CBC  -would change abx to PO in preparation for d/c  -consider outpatient colonoscopy, to be discussed in outpatient setting once acute diverticulitis resolved  -no objection to d/c home in AM if continues to improve

## 2019-10-10 NOTE — PROGRESS NOTE ADULT - SUBJECTIVE AND OBJECTIVE BOX
HOSPITALIST PROGRESS NOTE:  SUBJECTIVE:  PCP: PCP: Dr. Fermin Cardona  Cardio: Dr. Ector Welsh    Chief Complaint: Patient is a 86y old  Female who presents with a chief complaint of LLQ abd pain (08 Oct 2019 16:43)    HPI:  87 y/o F with PMH of HTN, CAD s/p 2 stents hiatal hernia, h/o GI bleed, gastirtis, LLE stent, arthritis, diverticulosis, hypothyroidism, PPM, h/o CEA presents to the ER complaining of LLQ pain that started 2 days ago; No appetite; No Hematochezia, fever, diarrhea, N/V, NO hx of abx use or travel hx. IN the ER, patient received IVF, Cipro and flagyl    10/9: ABove reviewed; overnight patient complaints of joint pain in fingers and wrist  10/10:  Patient stated that she feels out of it and cant explain the feeling;  feeling weak; tolerated diet; unsure if is related to prednisone. No CP or dyspnea; Discussed plan with elva calderon -[phone]    Allergies:  No Known Drug Allergies  Originally Entered as [Unknown see Comment: pt unable to remember] reaction to [Mold] (Unknown)  stolazine eye med- pt doesn&#x27;t remember (Unknown)    REVIEW OF SYSTEMS:  See HPI. All other review of systems is negative unless indicated above.     OBJECTIVE  Physical Exam:  Vital Signs Last 24 Hrs  T(C): 36.9 (10 Oct 2019 11:39), Max: 36.9 (10 Oct 2019 11:39)  T(F): 98.4 (10 Oct 2019 11:39), Max: 98.4 (10 Oct 2019 11:39)  HR: 71 (10 Oct 2019 11:39) (70 - 73)  BP: 150/69 (10 Oct 2019 11:39) (130/51 - 169/70)  BP(mean): --  RR: 18 (10 Oct 2019 11:39) (16 - 18)  SpO2: 99% (10 Oct 2019 11:39) (95% - 99%)      Constitutional: NAD, awake and alert, well-developed  Neurological: AAO x 3, no focal deficits  HEENT: PERRLA, EOMI, MMM  Neck: Soft and supple, No LAD, No JVD  Respiratory: Breath sounds are clear bilaterally, No wheezing, rales or rhonchi  Cardiovascular: S1 and S2, regular rate and rhythm; no Murmurs, gallops or rubs  Gastrointestinal: Bowel Sounds present, soft, nontender, nondistended, no guarding, no rebound tenderness  Back: No CVA tenderness   Extremities: No peripheral edema  Vascular: 2+ peripheral pulses  Musculoskeletal: 5/5 strength b/l upper and lower extremities  Skin: No rashes  Breast: Deferred  Rectal: Deferred    MEDICATIONS  (STANDING):  aspirin  chewable 81 milliGRAM(s) Oral daily  atorvastatin 10 milliGRAM(s) Oral at bedtime  ciprofloxacin   IVPB 400 milliGRAM(s) IV Intermittent every 12 hours  clopidogrel Tablet 75 milliGRAM(s) Oral daily  ferrous    sulfate 325 milliGRAM(s) Oral two times a day  heparin  Injectable 5000 Unit(s) SubCutaneous two times a day  metoprolol succinate  milliGRAM(s) Oral daily  metroNIDAZOLE  IVPB 500 milliGRAM(s) IV Intermittent every 8 hours  sodium chloride 0.9%. 1000 milliLiter(s) (75 mL/Hr) IV Continuous <Continuous>      LABS: All Labs Reviewed:                        12.3   16.77 )-----------( 327      ( 07 Oct 2019 22:52 )             39.4     10-    139  |  109<H>  |  18  ----------------------------<  92  4.8   |  23  |  0.71    Ca    8.9      07 Oct 2019 22:52    TPro  6.8  /  Alb  3.3  /  TBili  0.4  /  DBili  x   /  AST  45<H>  /  ALT  21  /  AlkPhos  61  10-07      Urinalysis Basic - ( 08 Oct 2019 01:49 )    Color: Yellow / Appearance: Clear / S.005 / pH: x  Gluc: x / Ketone: Negative  / Bili: Negative / Urobili: Negative mg/dL   Blood: x / Protein: Negative mg/dL / Nitrite: Negative   Leuk Esterase: Negative / RBC: x / WBC x   Sq Epi: x / Non Sq Epi: x / Bacteria: x      RADIOLOGY/EKG:    < from: Xray Chest 1 View AP/PA. (10.07.19 @ 23:37) >      IMPRESSION:   No evidence of active chest disease.    Cardiomegaly. Cardiac device wire leads are within right atrium and right   ventricle.         < end of copied text >      	< from: CT Abdomen and Pelvis w/ IV Cont (10.08.19 @ 00:55) >      	IMPRESSION:     	Acute sigmoidcolonic diverticulitis without extraluminal gas or   drainable fluid collection

## 2019-10-10 NOTE — PROGRESS NOTE ADULT - SUBJECTIVE AND OBJECTIVE BOX
GI    seen ~1230PM  feels depressed  LLQ pain somewhat improved   no n/v  kiersten LR diet  no BM today    ROS  As above  Otherwise unremarkable, all systems reviewed    Vital Signs Last 24 Hrs  T(C): 36.9 (10 Oct 2019 11:39), Max: 36.9 (10 Oct 2019 11:39)  T(F): 98.4 (10 Oct 2019 11:39), Max: 98.4 (10 Oct 2019 11:39)  HR: 71 (10 Oct 2019 11:39) (70 - 73)  BP: 150/69 (10 Oct 2019 11:39) (130/51 - 169/70)  BP(mean): --  RR: 18 (10 Oct 2019 11:39) (16 - 18)  SpO2: 99% (10 Oct 2019 11:39) (95% - 99%)    Constitutional: NAD, well-developed, frail  Respiratory: CTAB  Cardiovascular: S1 and S2, RRR  Gastrointestinal: BS+, soft, ND, mild LLQ abd tenderness  Extremities: No peripheral edema  Psychiatric: Normal mood, normal affect  Skin: No rashes    LABS:                                10.4   16.73 )-----------( 237      ( 10 Oct 2019 08:46 )             32.0

## 2019-10-10 NOTE — PROGRESS NOTE ADULT - ASSESSMENT
*LLQ ABd pain 2ndry to Acute Sigmoid Diverticulitis   *Leukocytosis  -CT abd noted  -UA, CXR Neg  -IVF  -tolerate full Fluids, advance to Low fiber diet if tolerated   -S/P  Cipro and Flagyl IV, switch PO  -FU stool Cx   -GI consult appreciated will need outpatient colonoscopy    *Right Hand Pain and swelling  most likely 2ndry to Polymyalgia Rheumatica/ arthritis   -patient was on steroids in the past for arthritis and was being tapered; each time she is being tapered off she gets these flare ups  -decrease prednisone 20mg PO QD  -patient to FU with Rheum dr Chao as outpatient     *CAD  -continue ASA, plavix, Statin and BB    *HTN  -continue Metoprolol  -Hold norvasc/ACEI    *Hyperlipidemia  -continue statin    *DVT ppx  -Heparin SQ

## 2019-10-11 ENCOUNTER — TRANSCRIPTION ENCOUNTER (OUTPATIENT)
Age: 84
End: 2019-10-11

## 2019-10-11 VITALS
HEART RATE: 69 BPM | DIASTOLIC BLOOD PRESSURE: 48 MMHG | TEMPERATURE: 99 F | SYSTOLIC BLOOD PRESSURE: 128 MMHG | OXYGEN SATURATION: 97 % | RESPIRATION RATE: 18 BRPM

## 2019-10-11 RX ORDER — CIPROFLOXACIN LACTATE 400MG/40ML
1 VIAL (ML) INTRAVENOUS
Qty: 12 | Refills: 0
Start: 2019-10-11 | End: 2019-10-16

## 2019-10-11 RX ORDER — METRONIDAZOLE 500 MG
1 TABLET ORAL
Qty: 18 | Refills: 0
Start: 2019-10-11 | End: 2019-10-16

## 2019-10-11 RX ORDER — LACTOBACILLUS ACIDOPHILUS 100MM CELL
1 CAPSULE ORAL
Qty: 12 | Refills: 0
Start: 2019-10-11 | End: 2019-10-16

## 2019-10-11 RX ORDER — LACTOBACILLUS ACIDOPHILUS 100MM CELL
1 CAPSULE ORAL
Qty: 0 | Refills: 0 | DISCHARGE
Start: 2019-10-11 | End: 2019-10-16

## 2019-10-11 RX ADMIN — Medication 20 MILLIGRAM(S): at 07:12

## 2019-10-11 RX ADMIN — Medication 500 MILLIGRAM(S): at 05:16

## 2019-10-11 RX ADMIN — CLOPIDOGREL BISULFATE 75 MILLIGRAM(S): 75 TABLET, FILM COATED ORAL at 11:36

## 2019-10-11 RX ADMIN — Medication 500 MILLIGRAM(S): at 13:55

## 2019-10-11 RX ADMIN — Medication 81 MILLIGRAM(S): at 11:36

## 2019-10-11 RX ADMIN — Medication 600 MILLIGRAM(S): at 05:45

## 2019-10-11 RX ADMIN — Medication 200 MILLIGRAM(S): at 07:12

## 2019-10-11 RX ADMIN — PANTOPRAZOLE SODIUM 40 MILLIGRAM(S): 20 TABLET, DELAYED RELEASE ORAL at 05:16

## 2019-10-11 RX ADMIN — Medication 0.25 MILLIGRAM(S): at 00:03

## 2019-10-11 RX ADMIN — Medication 500 MILLIGRAM(S): at 05:22

## 2019-10-11 RX ADMIN — HEPARIN SODIUM 5000 UNIT(S): 5000 INJECTION INTRAVENOUS; SUBCUTANEOUS at 05:15

## 2019-10-11 RX ADMIN — Medication 325 MILLIGRAM(S): at 05:16

## 2019-10-11 RX ADMIN — Medication 1 TABLET(S): at 05:16

## 2019-10-11 NOTE — DISCHARGE NOTE PROVIDER - NSDCCPCAREPLAN_GEN_ALL_CORE_FT
PRINCIPAL DISCHARGE DIAGNOSIS  Diagnosis: Diverticulitis  Assessment and Plan of Treatment: *LLQ ABd pain 2ndry to Acute Sigmoid Diverticulitis   *Leukocytosis  -continue  Low fiber diet   -continue  Cipro and Flagyl X 6 franco days  -FU wi GI  will need outpatient colonoscopy        SECONDARY DISCHARGE DIAGNOSES  Diagnosis: Arthritis  Assessment and Plan of Treatment: Right Hand Pain and swelling  most likely 2ndry to Polymyalgia Rheumatica/ arthritis - resolved   -patient was on steroids in the past for arthritis and was being tapered; each time she is being tapered off she gets these flare ups  -continue prednisone 20mg PO QD  -patient to FU with Rheum dr Chao as outpatient

## 2019-10-11 NOTE — DISCHARGE NOTE NURSING/CASE MANAGEMENT/SOCIAL WORK - PATIENT PORTAL LINK FT
You can access the FollowMyHealth Patient Portal offered by [facility] by registering at the following website: [email or web address]. By joining [employer]’s FollowMyHealth portal, you will also be able to view your health information using other applications (apps) compatible with our system.

## 2019-10-11 NOTE — PROGRESS NOTE ADULT - SUBJECTIVE AND OBJECTIVE BOX
HOSPITALIST PROGRESS NOTE:  SUBJECTIVE:  PCP: PCP: Dr. Fermin Cardona  Cardio: Dr. Ector Welsh    Chief Complaint: Patient is a 86y old  Female who presents with a chief complaint of LLQ abd pain (08 Oct 2019 16:43)    HPI:  87 y/o F with PMH of HTN, CAD s/p 2 stents hiatal hernia, h/o GI bleed, gastirtis, LLE stent, arthritis, diverticulosis, hypothyroidism, PPM, h/o CEA presents to the ER complaining of LLQ pain that started 2 days ago; No appetite; No Hematochezia, fever, diarrhea, N/V, NO hx of abx use or travel hx. IN the ER, patient received IVF, Cipro and flagyl    10/9: ABove reviewed; overnight patient complaints of joint pain in fingers and wrist  10/10:  Patient stated that she feels out of it and cant explain the feeling;  feeling weak; tolerated diet; unsure if is related to prednisone. No CP or dyspnea; Discussed plan with son kvng [phone]  10/11:  No overnight evetns; patient refused blood work this morning; feels better and wants to go to home; feels better; Hand much better- no swelling    Allergies:  No Known Drug Allergies  Originally Entered as [Unknown see Comment: pt unable to remember] reaction to [Mold] (Unknown)  stolazine eye med- pt doesn&#x27;t remember (Unknown)    REVIEW OF SYSTEMS:  See HPI. All other review of systems is negative unless indicated above.     OBJECTIVE  Physical Exam:  Vital Signs Last 24 Hrs  T(C): 36.6 (11 Oct 2019 05:07), Max: 36.9 (10 Oct 2019 11:39)  T(F): 97.8 (11 Oct 2019 05:07), Max: 98.4 (10 Oct 2019 11:39)  HR: 71 (11 Oct 2019 05:07) (71 - 76)  BP: 133/49 (11 Oct 2019 05:07) (133/49 - 150/69)  BP(mean): --  RR: 16 (11 Oct 2019 05:07) (16 - 18)  SpO2: 99% (11 Oct 2019 05:07) (97% - 99%)      Constitutional: NAD, awake and alert, well-developed  Neurological: AAO x 3, no focal deficits  HEENT: PERRLA, EOMI, MMM  Neck: Soft and supple, No LAD, No JVD  Respiratory: Breath sounds are clear bilaterally, No wheezing, rales or rhonchi  Cardiovascular: S1 and S2, regular rate and rhythm; no Murmurs, gallops or rubs  Gastrointestinal: Bowel Sounds present, soft, nontender, nondistended, no guarding, no rebound tenderness  Back: No CVA tenderness   Extremities: No peripheral edema  Vascular: 2+ peripheral pulses  Musculoskeletal: 5/5 strength b/l upper and lower extremities  Skin: No rashes  Breast: Deferred  Rectal: Deferred    MEDICATIONS  (STANDING):  aspirin  chewable 81 milliGRAM(s) Oral daily  atorvastatin 10 milliGRAM(s) Oral at bedtime  ciprofloxacin   IVPB 400 milliGRAM(s) IV Intermittent every 12 hours  clopidogrel Tablet 75 milliGRAM(s) Oral daily  ferrous    sulfate 325 milliGRAM(s) Oral two times a day  heparin  Injectable 5000 Unit(s) SubCutaneous two times a day  metoprolol succinate  milliGRAM(s) Oral daily  metroNIDAZOLE  IVPB 500 milliGRAM(s) IV Intermittent every 8 hours  sodium chloride 0.9%. 1000 milliLiter(s) (75 mL/Hr) IV Continuous <Continuous>      LABS: All Labs Reviewed:                        12.3   16.77 )-----------( 327      ( 07 Oct 2019 22:52 )             39.4     10    139  |  109<H>  |  18  ----------------------------<  92  4.8   |  23  |  0.71    Ca    8.9      07 Oct 2019 22:52    TPro  6.8  /  Alb  3.3  /  TBili  0.4  /  DBili  x   /  AST  45<H>  /  ALT  21  /  AlkPhos  61  10-      Urinalysis Basic - ( 08 Oct 2019 01:49 )    Color: Yellow / Appearance: Clear / S.005 / pH: x  Gluc: x / Ketone: Negative  / Bili: Negative / Urobili: Negative mg/dL   Blood: x / Protein: Negative mg/dL / Nitrite: Negative   Leuk Esterase: Negative / RBC: x / WBC x   Sq Epi: x / Non Sq Epi: x / Bacteria: x      RADIOLOGY/EKG:    < from: Xray Chest 1 View AP/PA. (10.07.19 @ 23:37) >      IMPRESSION:   No evidence of active chest disease.    Cardiomegaly. Cardiac device wire leads are within right atrium and right   ventricle.         < end of copied text >      	< from: CT Abdomen and Pelvis w/ IV Cont (10.08.19 @ 00:55) >      	IMPRESSION:     	Acute sigmoidcolonic diverticulitis without extraluminal gas or   drainable fluid collection

## 2019-10-11 NOTE — DISCHARGE NOTE PROVIDER - HOSPITAL COURSE
HOSPITALIST PROGRESS NOTE:    SUBJECTIVE:    PCP: PCP: Dr. Fermin Cardona    Cardio: Dr. Ector Welsh        Chief Complaint: Patient is a 86y old  Female who presents with a chief complaint of LLQ abd pain (08 Oct 2019 16:43)        HPI:    85 y/o F with PMH of HTN, CAD s/p 2 stents hiatal hernia, h/o GI bleed, gastirtis, LLE stent, arthritis, diverticulosis, hypothyroidism, PPM, h/o CEA presents to the ER complaining of LLQ pain that started 2 days ago; No appetite; No Hematochezia, fever, diarrhea, N/V, NO hx of abx use or travel hx. IN the ER, patient received IVF, Cipro and flagyl        10/9: ABove reviewed; overnight patient complaints of joint pain in fingers and wrist    10/10:  Patient stated that she feels out of it and cant explain the feeling;  feeling weak; tolerated diet; unsure if is related to prednisone. No CP or dyspnea; Discussed plan with son kvng [phone]    10/11:  No overnight evetns; patient refused blood work this morning; feels better and wants to go to home; feels better; Hand much better- no swelling        *LLQ ABd pain 2ndry to Acute Sigmoid Diverticulitis     *Leukocytosis    -CT abd noted    -UA, CXR Neg    -IVF    -tolerate full Fluids, advance to Low fiber diet if tolerated     -S/P  Cipro and Flagyl IV, switch PO    -FU stool Cx - never sent     -GI consult appreciated will need outpatient colonoscopy        *Right Hand Pain and swelling  most likely 2ndry to Polymyalgia Rheumatica/ arthritis - resolved     -patient was on steroids in the past for arthritis and was being tapered; each time she is being tapered off she gets these flare ups    -decrease prednisone 20mg PO QD    -patient to FU with Rheum dr Chao as outpatient         *CAD    -continue ASA, plavix, Statin and BB        *HTN    -continue Metoprolol    -Hold norvasc/ACEI        *Hyperlipidemia    -continue statin        *DVT ppx    -Heparin SQ        total time 45 minutes

## 2019-10-11 NOTE — PHYSICAL THERAPY INITIAL EVALUATION ADULT - PERTINENT HX OF CURRENT PROBLEM, REHAB EVAL
Patient is a 86y old  Female who presents with a chief complaint of LLQ abd pain, Acute Sigmoid Diverticulitis, now better, wants to go home

## 2019-10-11 NOTE — DISCHARGE NOTE PROVIDER - PROVIDER RX CONTACT NUMBER
Recommend he increase the hydrochlorothiazide and take 2 of the 12.5 mg tablets daily.  Schedule an appointment back with Dr. Iniguez.   [phone]

## 2019-10-11 NOTE — PHYSICAL THERAPY INITIAL EVALUATION ADULT - DISCHARGE DISPOSITION, PT EVAL
Pt amb independently w/o any AD, steady on her feet, just c/o mild stiffness in her left knee due to arthritis, pt taught some exs and recommended using warm packs to improve flexibility, pt does not require PT,/home

## 2019-10-11 NOTE — DISCHARGE NOTE PROVIDER - CARE PROVIDER_API CALL
FU with PCP,   Phone: (   )    -  Fax: (   )    -  Follow Up Time:     Anup Castillo)  Gastroenterology; Internal Medicine  [address], [address]  [place] [address]  Phone: [phone]  Fax: [phone]  Follow Up Time:

## 2019-10-16 ENCOUNTER — APPOINTMENT (OUTPATIENT)
Dept: CARDIOLOGY | Facility: CLINIC | Age: 84
End: 2019-10-16

## 2019-10-16 DIAGNOSIS — Z96.651 PRESENCE OF RIGHT ARTIFICIAL KNEE JOINT: ICD-10-CM

## 2019-10-16 DIAGNOSIS — D72.829 ELEVATED WHITE BLOOD CELL COUNT, UNSPECIFIED: ICD-10-CM

## 2019-10-16 DIAGNOSIS — K57.32 DIVERTICULITIS OF LARGE INTESTINE WITHOUT PERFORATION OR ABSCESS WITHOUT BLEEDING: ICD-10-CM

## 2019-10-16 DIAGNOSIS — Z95.5 PRESENCE OF CORONARY ANGIOPLASTY IMPLANT AND GRAFT: ICD-10-CM

## 2019-10-16 DIAGNOSIS — E78.5 HYPERLIPIDEMIA, UNSPECIFIED: ICD-10-CM

## 2019-10-16 DIAGNOSIS — Z88.8 ALLERGY STATUS TO OTHER DRUGS, MEDICAMENTS AND BIOLOGICAL SUBSTANCES STATUS: ICD-10-CM

## 2019-10-16 DIAGNOSIS — Z79.02 LONG TERM (CURRENT) USE OF ANTITHROMBOTICS/ANTIPLATELETS: ICD-10-CM

## 2019-10-16 DIAGNOSIS — I10 ESSENTIAL (PRIMARY) HYPERTENSION: ICD-10-CM

## 2019-10-16 DIAGNOSIS — E03.9 HYPOTHYROIDISM, UNSPECIFIED: ICD-10-CM

## 2019-10-16 DIAGNOSIS — M35.3 POLYMYALGIA RHEUMATICA: ICD-10-CM

## 2019-10-16 DIAGNOSIS — Z79.52 LONG TERM (CURRENT) USE OF SYSTEMIC STEROIDS: ICD-10-CM

## 2019-10-16 DIAGNOSIS — I25.10 ATHEROSCLEROTIC HEART DISEASE OF NATIVE CORONARY ARTERY WITHOUT ANGINA PECTORIS: ICD-10-CM

## 2019-10-16 DIAGNOSIS — M19.90 UNSPECIFIED OSTEOARTHRITIS, UNSPECIFIED SITE: ICD-10-CM

## 2019-10-16 DIAGNOSIS — K44.9 DIAPHRAGMATIC HERNIA WITHOUT OBSTRUCTION OR GANGRENE: ICD-10-CM

## 2019-10-16 DIAGNOSIS — Z95.0 PRESENCE OF CARDIAC PACEMAKER: ICD-10-CM

## 2019-10-16 DIAGNOSIS — Z90.710 ACQUIRED ABSENCE OF BOTH CERVIX AND UTERUS: ICD-10-CM

## 2019-10-16 DIAGNOSIS — R10.9 UNSPECIFIED ABDOMINAL PAIN: ICD-10-CM

## 2019-10-16 DIAGNOSIS — Z79.82 LONG TERM (CURRENT) USE OF ASPIRIN: ICD-10-CM

## 2019-10-17 NOTE — ED ADULT TRIAGE NOTE - MEANS OF ARRIVAL
Daily Note     Today's date: 10/17/2019  Patient name: Theresa Casanova  : 1942  MRN: [id number]  Referring provider: Alexi Loya DO  Dx:   Encounter Diagnosis     ICD-10-CM    1  Primary osteoarthritis of one hip, right M16 11    2  Preoperative testing Z01 818                   Subjective: Pt reports feeling stiff today  Says she is ready for surgery in 5 days  Objective: See treatment diary below    Assessment: Tolerated session well  PROM limited in all plains  No increase in pain reported with exercises, but demonstrates weakness and has pain with walking  Reviewed with pt expectations for post op and instructed pt to begin HEP as tolerated after surgery  Would benefit from continued PT following her LANCE in 5 days  Plan: Continue per plan of care        Precautions: HTN, Elevated PE Risk      Manual  10/8  IE 10/15 10/17          R Hip PROM  10' 10'                                                                  Exercise Diary  10/8  IE 10/15 10/17          Bike 10' L1 10' L1 10' L1          H/S Stretch 4x30" 4x30" 4x30"          Heel Slides (Flex/Ext) 15x10" 15x10" 20x          Bridges  20x 20x          SLR x 3  20x 20x          Supine TB Clamshells  Red 20x Red 20x          Standing H' Abd/Ext  20x 20x          HR  20x 20x          Mini Squats  20x 20x          Hip flexor stretch   Leg off edge of table 30"x3                                                                                                                                                Modalities
ambulatory

## 2019-10-27 NOTE — ED PROVIDER NOTE - CLINICAL SUMMARY MEDICAL DECISION MAKING FREE TEXT BOX
Normal vision: sees adequately in most situations; can see medication labels, newsprint blood in stool, will obtain guaiac, labs hemorrhoids, will obtain guaiac, labs

## 2019-11-14 NOTE — PROGRESS NOTE ADULT - PROBLEM SELECTOR PROBLEM 1
----- Message from Concepción Burns sent at 11/14/2019  1:33 PM CST -----  Contact: self  Patient is about 5 weeks pregnant she is experiencing some cramping. She states no bleeding but if rated on a scale 1-10 she says sometimes a 3 times a 5. The patient can be reached at [phone].  
LM to call the office back   
Gastrointestinal hemorrhage, unspecified gastrointestinal hemorrhage type
Gastrointestinal hemorrhage, unspecified gastrointestinal hemorrhage type
Gastrointestinal hemorrhage with melena

## 2020-09-13 ENCOUNTER — RX RENEWAL (OUTPATIENT)
Age: 85
End: 2020-09-13

## 2020-10-07 ENCOUNTER — NON-APPOINTMENT (OUTPATIENT)
Age: 85
End: 2020-10-07

## 2020-10-07 ENCOUNTER — APPOINTMENT (OUTPATIENT)
Dept: CARDIOLOGY | Facility: CLINIC | Age: 85
End: 2020-10-07
Payer: MEDICARE

## 2020-10-07 VITALS
HEIGHT: 59 IN | BODY MASS INDEX: 28.83 KG/M2 | SYSTOLIC BLOOD PRESSURE: 137 MMHG | DIASTOLIC BLOOD PRESSURE: 69 MMHG | OXYGEN SATURATION: 99 % | HEART RATE: 66 BPM | WEIGHT: 143 LBS

## 2020-10-07 DIAGNOSIS — R06.00 DYSPNEA, UNSPECIFIED: ICD-10-CM

## 2020-10-07 DIAGNOSIS — R53.83 OTHER FATIGUE: ICD-10-CM

## 2020-10-07 DIAGNOSIS — R07.89 OTHER CHEST PAIN: ICD-10-CM

## 2020-10-07 PROCEDURE — 93000 ELECTROCARDIOGRAM COMPLETE: CPT

## 2020-10-07 PROCEDURE — 99214 OFFICE O/P EST MOD 30 MIN: CPT

## 2020-10-07 NOTE — PHYSICAL EXAM
[General Appearance - Well Developed] : well developed [Normal Appearance] : normal appearance [General Appearance - Well Nourished] : well nourished [Normal Conjunctiva] : the conjunctiva exhibited no abnormalities [Eyelids - No Xanthelasma] : the eyelids demonstrated no xanthelasmas [Normal Oral Mucosa] : normal oral mucosa [Normal Jugular Venous V Waves Present] : normal jugular venous V waves present [Abdomen Soft] : soft [Abnormal Walk] : normal gait [Nail Clubbing] : no clubbing of the fingernails [Cyanosis, Localized] : no localized cyanosis [Skin Color & Pigmentation] : normal skin color and pigmentation [] : no rash [Oriented To Time, Place, And Person] : oriented to person, place, and time [No Anxiety] : not feeling anxious [5th Left ICS - MCL] : palpated at the 5th LICS in the midclavicular line [Normal] : normal [Normal Rate] : normal [Rhythm Regular] : regular [Normal S1] : normal S1 [Normal S2] : normal S2 [S4] : an S4 was heard [II] : a grade 2 [Right Carotid Bruit] : right carotid bruit heard [Left Carotid Bruit] : left carotid bruit heard [No Pitting Edema] : no pitting edema present [Auscultation Breath Sounds / Voice Sounds] : lungs were clear to auscultation bilaterally [Bowel Sounds] : normal bowel sounds [2+] : left 2+ [Rt] : no varicose veins of the right leg [Lt] : no varicose veins of the left leg

## 2020-10-07 NOTE — HISTORY OF PRESENT ILLNESS
[FreeTextEntry1] : Says she is doing well. Still living on her own but her family is helping out.  Denies cp, angina, SOB, orthopnea, or PND. Has no had pacemaker checked in sometime.

## 2020-10-07 NOTE — DISCUSSION/SUMMARY
[FreeTextEntry1] : Coronary artery disease/atypical chest pain/ hx. of Angina pectoris: Stable currently on medical regime.\par Hypertension: Well controlled on current regime. Low-sodium diet recommend.\par Hyperlipidemia: Blood work to be done. Continue current diet.\par Pacemaker: Interrogation in near future and enroll in clinic to have ongoing monitoring.\par F/u in 4 months.

## 2020-10-12 ENCOUNTER — APPOINTMENT (OUTPATIENT)
Dept: CARDIOLOGY | Facility: CLINIC | Age: 85
End: 2020-10-12
Payer: MEDICARE

## 2020-10-12 PROCEDURE — 93296 REM INTERROG EVL PM/IDS: CPT

## 2020-10-12 PROCEDURE — 93294 REM INTERROG EVL PM/LDLS PM: CPT

## 2020-10-26 LAB
25(OH)D3 SERPL-MCNC: 92.1 NG/ML
ALBUMIN SERPL ELPH-MCNC: 4 G/DL
ALP BLD-CCNC: 88 U/L
ALT SERPL-CCNC: 12 U/L
ANION GAP SERPL CALC-SCNC: 12 MMOL/L
AST SERPL-CCNC: 18 U/L
BASOPHILS # BLD AUTO: 0.08 K/UL
BASOPHILS NFR BLD AUTO: 1.2 %
BILIRUB SERPL-MCNC: 0.3 MG/DL
BUN SERPL-MCNC: 23 MG/DL
CALCIUM SERPL-MCNC: 9.8 MG/DL
CHLORIDE SERPL-SCNC: 101 MMOL/L
CHOLEST SERPL-MCNC: 152 MG/DL
CK SERPL-CCNC: 31 U/L
CO2 SERPL-SCNC: 24 MMOL/L
CREAT SERPL-MCNC: 0.82 MG/DL
EOSINOPHIL # BLD AUTO: 0.33 K/UL
EOSINOPHIL NFR BLD AUTO: 4.8 %
ESTIMATED AVERAGE GLUCOSE: 108 MG/DL
GLUCOSE SERPL-MCNC: 96 MG/DL
HBA1C MFR BLD HPLC: 5.4 %
HCT VFR BLD CALC: 37.6 %
HDLC SERPL-MCNC: 37 MG/DL
HGB BLD-MCNC: 11.8 G/DL
IMM GRANULOCYTES NFR BLD AUTO: 0.3 %
LDLC SERPL CALC-MCNC: 79 MG/DL
LDLC SERPL DIRECT ASSAY-MCNC: 93 MG/DL
LYMPHOCYTES # BLD AUTO: 2.33 K/UL
LYMPHOCYTES NFR BLD AUTO: 33.6 %
MAN DIFF?: NORMAL
MCHC RBC-ENTMCNC: 28.7 PG
MCHC RBC-ENTMCNC: 31.4 GM/DL
MCV RBC AUTO: 91.5 FL
MONOCYTES # BLD AUTO: 0.93 K/UL
MONOCYTES NFR BLD AUTO: 13.4 %
NEUTROPHILS # BLD AUTO: 3.25 K/UL
NEUTROPHILS NFR BLD AUTO: 46.7 %
NONHDLC SERPL-MCNC: 115 MG/DL
PLATELET # BLD AUTO: 351 K/UL
POTASSIUM SERPL-SCNC: 4.9 MMOL/L
PROT SERPL-MCNC: 6.1 G/DL
RBC # BLD: 4.11 M/UL
RBC # FLD: 13.3 %
SODIUM SERPL-SCNC: 137 MMOL/L
TRIGL SERPL-MCNC: 177 MG/DL
TSH SERPL-ACNC: 4.11 UIU/ML
WBC # FLD AUTO: 6.94 K/UL

## 2020-11-30 ENCOUNTER — APPOINTMENT (OUTPATIENT)
Dept: ORTHOPEDIC SURGERY | Facility: CLINIC | Age: 85
End: 2020-11-30
Payer: MEDICARE

## 2020-11-30 VITALS — DIASTOLIC BLOOD PRESSURE: 67 MMHG | TEMPERATURE: 97 F | SYSTOLIC BLOOD PRESSURE: 124 MMHG | HEART RATE: 72 BPM

## 2020-11-30 PROCEDURE — 99213 OFFICE O/P EST LOW 20 MIN: CPT

## 2020-11-30 PROCEDURE — 73562 X-RAY EXAM OF KNEE 3: CPT | Mod: RT

## 2020-12-29 RX ORDER — LOVASTATIN 40 MG/1
40 TABLET ORAL
Qty: 90 | Refills: 3 | Status: ACTIVE | COMMUNITY
Start: 2019-04-02 | End: 1900-01-01

## 2020-12-31 RX ORDER — AMLODIPINE BESYLATE AND BENAZEPRIL HYDROCHLORIDE 5; 20 MG/1; MG/1
5-20 CAPSULE ORAL
Qty: 90 | Refills: 2 | Status: ACTIVE | COMMUNITY
Start: 2017-12-06 | End: 1900-01-01

## 2021-01-03 NOTE — PHYSICAL THERAPY INITIAL EVALUATION ADULT - IMPAIRED TRANSFERS: SIT/STAND, REHAB EVAL
Continue IV equivalent of home dose of keppra for documented history of seizures. decreased strength Continue IV equivalent of home dose of keppra for documented history of seizures. Continue IV equivalent of home dose of keppra for documented history of seizures.

## 2021-01-05 NOTE — ED ADULT TRIAGE NOTE - ACCOMPANIED BY
Ella Callejas is a 58 [address]     [facility]    Erika Henry MD  Authorized by: Gm Ruffin MD   Consent: Verbal consent obtained. Consent given by: power of   Patient identity confirmed: arm band  Time out: Immediately prior to procedure a \"time out\" was called to verify the correct patient, procedure, equipment, support staff and site/side marked as required. Preparation: Patient was prepped and draped in the usual sterile fashion.   Indications: multiple ABGs, respiratory failure and hemodynamic monitoring  Location: right radial  Needle gauge: 20  Number of attempts: 2  Post-procedure: line sutured and dressing applied  Patient tolerance: patient tolerated the procedure well with no immediate complications  Comments: Dr. Pily Tang was present for procedure.           Neftali Ramos MD  1/5/2021 Immediate family member

## 2021-01-11 ENCOUNTER — APPOINTMENT (OUTPATIENT)
Dept: CARDIOLOGY | Facility: CLINIC | Age: 86
End: 2021-01-11

## 2021-01-12 ENCOUNTER — APPOINTMENT (OUTPATIENT)
Dept: ELECTROPHYSIOLOGY | Facility: CLINIC | Age: 86
End: 2021-01-12

## 2021-01-12 ENCOUNTER — APPOINTMENT (OUTPATIENT)
Dept: ORTHOPEDIC SURGERY | Facility: CLINIC | Age: 86
End: 2021-01-12

## 2021-01-19 NOTE — PHYSICAL THERAPY INITIAL EVALUATION ADULT - LONG TERM MEMORY, REHAB EVAL
SUBJECTIVE:   Leroy Cummings TERRELL  - APPLICATION TOPICAL FLUORIDE VARNISH (70574)  - Screening Questionnaire for Immunizations  - HEPA VACCINE PED/ADOL-2 DOSE(aka HEP A) [95925]  - DTAP IMMUNIZATION (<7Y), IM [18802]    2. Peanut, soy allergy    - EPINEPHrine (EPIPEN JR) 0.15 MG/0.3ML injection 2-pack; Inject 0.3 mLs (0.15 mg) into the muscle as needed  Dispense: 2 each; Refill: 1  - ALLERGY/ASTHMA PEDS REFERRAL    3. Tree nut allergy   will refer to allergist for ongoing nut allergies      Anticipatory  Guidance  The following topics were discussed:  SOCIAL/ FAMILY:    Positive discipline  NUTRITION:    Healthy food choices    Age-related decrease in appetite  HEALTH/ SAFETY:    Dental hygiene    Preventive Care Plan  Immunizations     See orders in EpicCare.  I reviewed the signs and symptoms of adverse effects and when to seek medical care if they should arise.  Referrals/Ongoing Specialty care: Yes, see orders in EpicCare  See other orders in EpicCare    Resources:  [place] Child and Teen Checkups (C&TC) Schedule of Age-Related Screening Standards     FOLLOW-UP:    2 year old Preventive Care visit    Etta Bourgeois MD  [facility]   intact

## 2021-01-20 ENCOUNTER — NON-APPOINTMENT (OUTPATIENT)
Age: 86
End: 2021-01-20

## 2021-01-20 ENCOUNTER — APPOINTMENT (OUTPATIENT)
Dept: CARDIOLOGY | Facility: CLINIC | Age: 86
End: 2021-01-20
Payer: MEDICARE

## 2021-01-20 VITALS
OXYGEN SATURATION: 98 % | HEART RATE: 76 BPM | TEMPERATURE: 97.6 F | WEIGHT: 137 LBS | DIASTOLIC BLOOD PRESSURE: 84 MMHG | HEIGHT: 59 IN | SYSTOLIC BLOOD PRESSURE: 150 MMHG | BODY MASS INDEX: 27.62 KG/M2

## 2021-01-20 DIAGNOSIS — E78.00 PURE HYPERCHOLESTEROLEMIA, UNSPECIFIED: ICD-10-CM

## 2021-01-20 DIAGNOSIS — I10 ESSENTIAL (PRIMARY) HYPERTENSION: ICD-10-CM

## 2021-01-20 DIAGNOSIS — Z86.79 PERSONAL HISTORY OF OTHER DISEASES OF THE CIRCULATORY SYSTEM: ICD-10-CM

## 2021-01-20 DIAGNOSIS — I25.10 ATHEROSCLEROTIC HEART DISEASE OF NATIVE CORONARY ARTERY W/OUT ANGINA PECTORIS: ICD-10-CM

## 2021-01-20 PROCEDURE — 93000 ELECTROCARDIOGRAM COMPLETE: CPT

## 2021-01-20 PROCEDURE — 99214 OFFICE O/P EST MOD 30 MIN: CPT

## 2021-01-20 NOTE — DISCUSSION/SUMMARY
[FreeTextEntry1] : Coronary artery disease/atypical chest pain/ hx. of Angina pectoris: Stable currently on medical regime.\par Hypertension: Well controlled on current regime. Low-sodium diet recommend.\par Hyperlipidemia: Blood work with reasonable LDL. Discussed being better with her current diet.\par PAF: Asymptomatic episode on pacemaker interrogation and has hx. of anemia from GI bleeding.  D/w pt. and son about AC and opted to stay on Aspirin and Plavix as opposed to AC. \par Pacemaker: No issues.\par F/u in 4 months.

## 2021-01-20 NOTE — HISTORY OF PRESENT ILLNESS
[FreeTextEntry1] : Knees and back are bothering her but no cp, angina, SOB, orthopnea, or PND. Denies palpitations or dizziness or lightheadedness.  Had pacemaker checked October and doing ok; PAF noted.  Reviewed labs with her and son.

## 2021-02-01 ENCOUNTER — APPOINTMENT (OUTPATIENT)
Dept: ELECTROPHYSIOLOGY | Facility: CLINIC | Age: 86
End: 2021-02-01

## 2021-02-09 ENCOUNTER — APPOINTMENT (OUTPATIENT)
Dept: ORTHOPEDIC SURGERY | Facility: CLINIC | Age: 86
End: 2021-02-09
Payer: MEDICARE

## 2021-02-09 VITALS
HEART RATE: 76 BPM | HEIGHT: 59 IN | TEMPERATURE: 97.3 F | WEIGHT: 134 LBS | DIASTOLIC BLOOD PRESSURE: 81 MMHG | BODY MASS INDEX: 27.01 KG/M2 | SYSTOLIC BLOOD PRESSURE: 140 MMHG

## 2021-02-09 DIAGNOSIS — Z96.659 PRESENCE OF UNSPECIFIED ARTIFICIAL KNEE JOINT: ICD-10-CM

## 2021-02-09 DIAGNOSIS — M25.569 PAIN IN UNSPECIFIED KNEE: ICD-10-CM

## 2021-02-09 PROCEDURE — 99213 OFFICE O/P EST LOW 20 MIN: CPT

## 2021-02-09 RX ORDER — ZOLPIDEM TARTRATE 10 MG/1
10 TABLET ORAL
Qty: 30 | Refills: 0 | Status: ACTIVE | COMMUNITY
Start: 1900-01-01 | End: 1900-01-01

## 2021-02-09 NOTE — ASU PREOP CHECKLIST - PATIENT SENT TO
Summary of today's encounter-    1.  Starting twice daily inhaler, Flovent, patient will message me in 1 to 2 weeks to advise me of progress.    2. I will message patient's cardiologist to determine why he is not on an ACE inhibitor or angiotensin receptor blocker.  He may message his cardiologist as well.    3.  Follow-up for preoperative exam.    Se Vann MD, FAAFP  Family Medicine Physician  [facility]  [address] [place] [address]       holding area

## 2021-02-10 ENCOUNTER — APPOINTMENT (OUTPATIENT)
Dept: CARDIOLOGY | Facility: CLINIC | Age: 86
End: 2021-02-10

## 2021-03-03 ENCOUNTER — APPOINTMENT (OUTPATIENT)
Dept: CARDIOLOGY | Facility: CLINIC | Age: 86
End: 2021-03-03

## 2021-04-29 NOTE — HISTORY OF PRESENT ILLNESS
Previously Declined (within the last year)
[FreeTextEntry1] : Having issues with SOB and rare episodes of chest discomfort.  Has recently been hospitalized with GI bleeding and anemia and was placed on oral iron.  Also diagnosed with SH/PH and they are contemplating surgery.

## 2021-10-22 NOTE — PATIENT PROFILE ADULT - LAST BOWEL MOVEMENT DATE
[place] Counseling: I discussed with the patient the risks of tildrakizumab including but not limited to immunosuppression, malignancy, posterior leukoencephalopathy syndrome, and serious infections.  The patient understands that monitoring is required including a PPD at baseline and must alert us or the primary physician if symptoms of infection or other concerning signs are noted. 15-Nov-2018

## 2021-11-14 NOTE — ED PROVIDER NOTE - AGGRAVATING FACTORS
INPATIENT ENCOUNTER  DAILY PROGRESS NOTE        SUBJECTIVE:  Getting 1 unt prbc  No reported bleeding  Afebrile    MEDICATIONS:    Current Facility-Administered Medications   Medication Dose Route Frequency Provider Last Rate Last Admin   • sodium chloride 0.9% infusion   Intravenous Continuous PRN Vivian Aponte MD       • insulin glargine (LANTUS) injection 10 Units  10 Units Subcutaneous Nightly Elicia Sheets MD       • HYDROcodone-acetaminophen (NORCO) 7.5-325 MG per tablet 1 tablet  1 tablet Oral Q6H PRN Elicia Sheets MD   1 tablet at 11/14/21 0401   • atorvastatin (LIPITOR) tablet 40 mg  40 mg Oral Daily Yamini Emmanuel MD   40 mg at 11/14/21 0836   • bictegravir-emtricitab-tenofov (BIKTARVY) -25 MG per tablet 1 tablet  1 tablet Oral Daily Yamini Emmanuel MD   1 tablet at 11/14/21 0837   • carvedilol (COREG) tablet 25 mg  25 mg Oral BID Yamini Emmanuel MD   25 mg at 11/14/21 0836   • cloNIDine (CATAPRES) tablet 0.3 mg  0.3 mg Oral 3 times per day Yamnii Emmanuel MD   0.3 mg at 11/14/21 0500   • cyclobenzaprine (FLEXERIL) tablet 5 mg  5 mg Oral TID PRN Yamini Emmanuel MD   5 mg at 11/13/21 0113   • gabapentin (NEURONTIN) capsule 300 mg  300 mg Oral Nightly Yamini Emmanuel MD   300 mg at 11/13/21 2001   • hydrALAZINE (APRESOLINE) tablet 100 mg  100 mg Oral 3 times per day Yamini Emmanuel MD   100 mg at 11/14/21 0502   • isosorbide dinitrate (ISORDIL) tablet 20 mg  20 mg Oral TID Yamini Emmanuel MD   20 mg at 11/14/21 0837   • NIFEdipine XL (PROCARDIA XL) ER tablet 60 mg  60 mg Oral Q12H Yamini Emmanuel MD   60 mg at 11/14/21 0837   • sodium chloride 0.9 % flush bag 25 mL  25 mL Intravenous PRN Yamini Emmanuel MD       • labetalol (NORMODYNE) injection 20 mg  20 mg Intravenous Q4H PRN Yamini Emmanuel MD   20 mg at 11/13/21 0110   • ipratropium-albuterol (DUONEB) 0.5-2.5 (3) MG/3ML nebulizer solution 3 mL  3 mL Nebulization 4x Daily Resp Yamini Emmanuel MD   3 mL at 11/14/21  0714   • furosemide (LASIX INJECT) injection 40 mg  40 mg Intravenous Daily Yamini Emmanuel MD   40 mg at 11/14/21 0836   • sodium chloride 0.9 % flush bag 25 mL  25 mL Intravenous PRN Yamini Emmanuel MD       • sodium chloride (PF) 0.9 % injection 2 mL  2 mL Intracatheter 2 times per day Yamini Emmanuel MD   2 mL at 11/14/21 0842   • apixaBAN (ELIQUIS) tablet 5 mg  5 mg Oral 2 times per day Yamini Emmanuel MD   5 mg at 11/13/21 2100   • sodium chloride (NORMAL SALINE) 0.9 % bolus 100-200 mL  100-200 mL Intravenous PRN Laron Jesus MD       • lacosamide (VIMPAT) tablet 150 mg  150 mg Oral BID Yamini Emmanuel MD   150 mg at 11/14/21 0851   • sodium chloride (NORMAL SALINE) 0.9 % bolus 100-200 mL  100-200 mL Intravenous PRN Luz Herzog MD       • insulin lispro (ADMELOG,HumaLOG) - Correction Dose   Subcutaneous TID WC Elicia Sheets MD   2 Units at 11/14/21 0837   • insulin lispro (ADMELOG,HumaLOG) - Correction Dose   Subcutaneous Nightly Elicia Sheets MD   2 Units at 11/12/21 2049   • dextrose 50 % injection 25 g  25 g Intravenous PRN Elicia Sheets MD       • dextrose 50 % injection 12.5 g  12.5 g Intravenous PRN Elicia Sheets MD       • glucagon (GLUCAGEN) injection 1 mg  1 mg Intramuscular PRN Elicia Sheets MD       • dextrose (GLUTOSE) 40 % gel 15 g  15 g Oral PRN Elicia Sheets MD       • dextrose (GLUTOSE) 40 % gel 30 g  30 g Oral PRN Elicia Sheets MD         Facility-Administered Medications Ordered in Other Encounters   Medication Dose Route Frequency Provider Last Rate Last Admin   • carvedilol (COREG) tablet 25 mg  25 mg Oral BID Yamini Emmanuel MD       • NIFEdipine XL (PROCARDIA XL) ER tablet 60 mg  60 mg Oral Q12H Yamini Emmanuel MD       • dextrose 50 % injection 25 g  25 g Intravenous PRN Yamini Emmanuel MD       • dextrose 50 % injection 12.5 g  12.5 g Intravenous PRN Yamini Emmanuel MD       • glucagon (GLUCAGEN) injection 1 mg  1 mg Intramuscular PRN  Yamini Emmanuel MD       • dextrose (GLUTOSE) 40 % gel 15 g  15 g Oral PRN Yamini Emmanuel MD       • dextrose (GLUTOSE) 40 % gel 30 g  30 g Oral PRN Yamini Emmanuel MD       • sodium chloride 0.9 % flush bag 25 mL  25 mL Intravenous PRN Yamini Emmanuel MD       • sodium chloride (PF) 0.9 % injection 2 mL  2 mL Intracatheter 2 times per day Yamini Emmanuel MD       • Potassium Standard Replacement Protocol   Does not apply See Admin Instructions Yamini Emmanuel MD       • Magnesium Standard Replacement Protocol   Does not apply See Admin Instructions Yamini Emmanuel MD       • ondansetron (ZOFRAN) injection 4 mg  4 mg Intravenous BID PRN Yamini Emmanuel MD       • acetaminophen (TYLENOL) tablet 650 mg  650 mg Oral Q4H PRN Yamini Emmanuel MD       • traMADol (ULTRAM) tablet 50 mg  50 mg Oral Q6H PRN Yamini Emmanuel MD       • polyethylene glycol (MIRALAX) packet 17 g  17 g Oral Daily PRN Yamini Emmanuel MD       • docusate sodium-sennosides (SENOKOT S) 50-8.6 MG 1 tablet  1 tablet Oral BID PRN Yamini Emmanuel MD       • aluminum-magnesium hydroxide-simethicone (MAALOX) 200-200-20 MG/5ML suspension 20 mL  20 mL Oral Q4H PRN Yamini Emmanuel MD       • sodium chloride 0.9 % flush bag 25 mL  25 mL Intravenous PRN Yamini Emmanuel MD       • insulin lispro (ADMELOG,HumaLOG) - Correction Dose   Subcutaneous TID WC Yamini Emmanuel MD             REVIEW OF SYSTEMS:  Review of Systems   Constitutional: Negative for activity change, chills and fever.   HENT: Negative for trouble swallowing.    Eyes: Negative for visual disturbance.   Respiratory: Positive for shortness of breath. Negative for apnea, chest tightness and wheezing.    Cardiovascular: Negative for chest pain and palpitations.   Gastrointestinal: Negative for abdominal pain, nausea and vomiting.   Genitourinary: Negative for difficulty urinating and dysuria.   Musculoskeletal: Positive for arthralgias.   Skin: Negative for wound.    Neurological: Negative for dizziness and weakness.        OBJECTIVE:    VITAL SIGNS:  Visit Vitals  BP (!) 154/78   Pulse 77   Temp 97.2 °F (36.2 °C) (Axillary)   Resp 18   Ht 5' 9\" (1.753 m)   Wt 125 kg (275 lb 9.2 oz)   LMP  (LMP Unknown)   SpO2 100%   BMI 40.70 kg/m²        INTAKE/OUTPUT:      Intake/Output Summary (Last 24 hours) at 11/14/2021 1005  Last data filed at 11/14/2021 1000  Gross per 24 hour   Intake 120 ml   Output 4000 ml   Net -3880 ml         Physical Exam  Vitals and nursing note reviewed.   Constitutional:       Appearance: Normal appearance.   HENT:      Head: Normocephalic and atraumatic.      Nose: Nose normal. No congestion.      Mouth/Throat:      Mouth: Mucous membranes are moist.      Pharynx: Oropharynx is clear.   Eyes:      Extraocular Movements: Extraocular movements intact.      Conjunctiva/sclera: Conjunctivae normal.      Pupils: Pupils are equal, round, and reactive to light.   Cardiovascular:      Rate and Rhythm: Normal rate and regular rhythm.      Pulses: Normal pulses.      Heart sounds: Normal heart sounds.   Abdominal:      General: Abdomen is flat. Bowel sounds are normal. There is no distension.      Palpations: Abdomen is soft. There is no mass.      Tenderness: There is no abdominal tenderness. There is no guarding.   Musculoskeletal:         General: No swelling or tenderness.      Cervical back: Neck supple. No muscular tenderness.   Skin:     General: Skin is warm and dry.      Findings: Rash present.   Neurological:      General: No focal deficit present.      Mental Status: She is alert and oriented to person, place, and time. Mental status is at baseline.   Psychiatric:         Mood and Affect: Mood normal.         Behavior: Behavior normal.           LABORATORY DATA:      Recent Results (from the past 24 hour(s))   GLUCOSE, BEDSIDE - POINT OF CARE    Collection Time: 11/13/21  1:46 PM   Result Value Ref Range    GLUCOSE, BEDSIDE - POINT OF CARE 184 (H) 70 - 99  mg/dL   GLUCOSE, BEDSIDE - POINT OF CARE    Collection Time: 11/13/21  3:50 PM   Result Value Ref Range    GLUCOSE, BEDSIDE - POINT OF CARE 243 (H) 70 - 99 mg/dL   GLUCOSE, BEDSIDE - POINT OF CARE    Collection Time: 11/13/21  8:46 PM   Result Value Ref Range    GLUCOSE, BEDSIDE - POINT OF CARE 217 (H) 70 - 99 mg/dL   Basic Metabolic Panel    Collection Time: 11/14/21  5:53 AM   Result Value Ref Range    Fasting Status      Sodium 127 (L) 135 - 145 mmol/L    Potassium 4.8 3.4 - 5.1 mmol/L    Chloride 94 (L) 98 - 107 mmol/L    Carbon Dioxide 29 21 - 32 mmol/L    Anion Gap 9 (L) 10 - 20 mmol/L    Glucose 206 (H) 70 - 99 mg/dL    BUN 29 (H) 6 - 20 mg/dL    Creatinine 3.75 (H) 0.51 - 0.95 mg/dL    Glomerular Filtration Rate 17 (L) >=60    BUN/ Creatinine Ratio 8 7 - 25    Calcium 7.8 (L) 8.4 - 10.2 mg/dL   CBC with Automated Differential (performable only)    Collection Time: 11/14/21  5:53 AM   Result Value Ref Range    WBC 5.1 4.2 - 11.0 K/mcL    RBC 2.29 (L) 4.00 - 5.20 mil/mcL    HGB 6.7 (LL) 12.0 - 15.5 g/dL    HCT 20.5 (L) 36.0 - 46.5 %    MCV 89.5 78.0 - 100.0 fl    MCH 29.3 26.0 - 34.0 pg    MCHC 32.7 32.0 - 36.5 g/dL    RDW-CV 19.1 (H) 11.0 - 15.0 %    RDW-SD 62.2 (H) 39.0 - 50.0 fL     140 - 450 K/mcL    NRBC 0 <=0 /100 WBC    Neutrophil, Percent 71 %    Lymphocytes, Percent 13 %    Mono, Percent 14 %    Eosinophils, Percent 1 %    Basophils, Percent 1 %    Immature Granulocytes 0 %    Absolute Neutrophils 3.6 1.8 - 7.7 K/mcL    Absolute Lymphocytes 0.7 (L) 1.0 - 4.8 K/mcL    Absolute Monocytes 0.7 0.3 - 0.9 K/mcL    Absolute Eosinophils  0.1 0.0 - 0.5 K/mcL    Absolute Basophils 0.0 0.0 - 0.3 K/mcL    Absolute Immmature Granulocytes 0.0 0.0 - 0.2 K/mcL   TYPE/SCREEN    Collection Time: 11/14/21  5:53 AM   Result Value Ref Range    ABO/RH(D) A Rh Positive     ANTIBODY SCREEN Negative     TYPE AND SCREEN EXPIRATION DATE 11/17/2021 23:59    Prepare Red Blood Cells: 1 Units    Collection Time: 11/14/21   7:11 AM   Result Value Ref Range    UNIT BLOOD TYPE A Pos     ISBT BLOOD TYPE 6200     BLOOD EXPIRATION DATE [id number]     UNIT NUMBER [id number]     DISPENSE STATUS Issued     PRODUCT ID Red Blood Cells     PRODUCT CODE [id number]     PRODUCT DESCRIPTION RBC AS-1 LR     CROSSMATCH RESULT Compatible     ISSUE DATE/TIME [id number]    GLUCOSE, BEDSIDE - POINT OF CARE    Collection Time: 11/14/21  7:22 AM   Result Value Ref Range    GLUCOSE, BEDSIDE - POINT OF CARE 182 (H) 70 - 99 mg/dL          IMAGING STUDIES:    XR CHEST PA OR AP 1 VIEW   Final Result      Severe cardiomegaly.  Possible mild left pleural effusion.  Bibasilar    opacities.   Right central line unchanged.         Electronically signed by María Bowling MD on 11 12 21 at 04:49             ASSESSMENT AND PLAN:     37 yo mmps presented w chf exacerbation     CHF EXACERBATION pef  -cont hd, monitor closely  Cont oral meds    Anemia   Of chronic disease  Noted hemoglobin <7 will transfuse  No bleeding     ACUTE ON CHRONIC RESPIRATORY FAILURE  Wean to baseline 4l as tolerated  -cont hd  Cont bp management     HTN esrd  -cont meds, titrate as needed     ESRD ON HD  Cont hd as per renal  Noted hyperkalekia     HIV  Cont meds        DM2  Accuchecks, ssi dm2 diet  Cont lantus, adjust as needed  HIV disease  -cont meds  SEIZURE DISORDER  Cont meds  Anemia of chronic diseas  No bleeding monitor   dvt  Cont eliquis  Morbid obesity  Discussed calorie intake to promote wt loss  outpt fu        DVT Prophylaxis  Current Active Medications for DVT Prophylaxis (From admission, onward)                  Stop        apixaBAN (ELIQUIS) tablet 5 mg  5 mg,   Oral,   2 times per day        Note to Pharmacy: OP SIG:Take 1 tablet by mouth every 12 hours.      --                    Code Status    Code Status: Full Resuscitation                CODE STATUS:  Full Resuscitation      Elicia Sheets MD    exertion

## 2021-12-07 NOTE — ED PROVIDER NOTE - SKIN, MLM
"  Emergency Department Encounter     Evaluation Date & Time:   2021  1:15 PM    CHIEF COMPLAINT:  Palpitations      Triage Note:Pt is 23 weeks AOG started having palpitations since yesterday 112-114.   Pt has hx of migraine headache and having slurred speech, fast HR  and hard to focus.  Having headache now.  Pt denies any vaginal bleeding and abd cramping.  Has dizziness and nausea         ED COURSE & MEDICAL DECISION MAKING:     ED Course as of 21 1522   Tue Dec 07, 2021   1341 Labs overall unremarkable.     1431 HR now 70s.   1440 Pt feeling much better on re-evaluation, would like to go home. Agreed to outpatient obgyn, primary and neurologist follow up.     Pt is  at approx 23 weeks presenting with multiple complaints, primarily revolving around chronic migraines that sound complex and associated with \"syncope\" and palpitations.  Pt off certain meds for migraines due to pregnancy and reports symptoms worsen during pregnancy. During my evaluation she felt lightheaded while lying there and \"passed out\". She did not have any change to breathing or heart rate, quickly came to. She states this is normal for her with migraines.  Labs from triage show mild hypomagnesemia and repletion started.  Will give additional IVF and reglan for HA.  Pt with nothing to suggest PE here.    1:19 PM I met with the patient, obtained history, performed an initial exam, and discussed options and plan for diagnostics and treatment here in the ED. [place] PPE was worn during the entire patient encounter.  2:30 PM UA reviewed. Pt without symptoms, overall reassuring micro.  2:37 PM I rechecked and updated the patient. We discussed plan for discharge with return precautions if needed and patient was agreeable.    At the conclusion of the encounter I discussed the results of all the tests and the disposition. The questions were answered. The patient or family acknowledged understanding and was agreeable with the care " plan.      MEDICATIONS GIVEN IN THE EMERGENCY DEPARTMENT:  Medications   magnesium sulfate 2 g in water intermittent infusion (0 g Intravenous Stopped 12/7/21 1452)   lactated ringers BOLUS 1,000 mL (0 mLs Intravenous Stopped 12/7/21 1453)   metoclopramide (REGLAN) injection 5 mg (5 mg Intravenous Given 12/7/21 1348)       NEW PRESCRIPTIONS STARTED AT TODAY'S ED VISIT:  Discharge Medication List as of 12/7/2021  2:53 PM          HPI   HPI     Pao Baeza is a 43 year old female with a pertinent history of G4A2L1 hypertension, fibromyalgia, and history of migraines, who presents to this ED via private car for evaluation of palpitations.    Patient is currently 23 weeks pregnant and presents for evaluation of palpitations with associated fast HR (110s) since yesterday afternoon. Endorses associated fatigue, dizziness, nausea and headache. Patient has a history of migraines for several years PTA and notes that her above symptoms come on when she is having a migraine episode. She states that she has had 2 migraine episodes in the past 24 hours PTA, similar to previous. She recently stopped taking her migraine medications due to her pregnancy and has seen an increased frequency in these migraines. Denies fall, head injury, or LOC.    Denies abdominal pain, vaginal bleeding, fever, cough, vomiting, diarrhea, or any additional symptoms.    REVIEW OF SYSTEMS:  Review of Systems   Constitutional: Positive for fatigue. Negative for chills and fever.   HENT: Negative for sore throat.    Eyes: Negative for visual disturbance.   Respiratory: Negative for shortness of breath.    Cardiovascular: Positive for palpitations. Negative for chest pain.   Gastrointestinal: Positive for nausea. Negative for abdominal pain, diarrhea and vomiting.   Endocrine: Negative for polyuria.   Genitourinary: Negative for dysuria, hematuria and vaginal bleeding.        - urinary changes     Musculoskeletal: Negative for joint swelling.   Skin:  "Negative for rash.   Neurological: Positive for dizziness and headaches.   Psychiatric/Behavioral: Negative for confusion.   All other systems reviewed and are negative.        Medical History     Past Medical History:   Diagnosis Date     Hypertension      Migraines        No past surgical history on file.    Family History   Problem Relation Age of Onset     Fibromyalgia Mother      Arthritis Mother      Migraines Mother      Depression Father      Dementia Father        Social History     Tobacco Use     Smoking status: Never Smoker     Smokeless tobacco: Never Used   Substance Use Topics     Alcohol use: Not Currently     Drug use: Yes     Comment: CBD oil        amitriptyline (ELAVIL) 10 MG tablet  atenolol (TENORMIN) 50 MG tablet  butalbital-acetaminophen-caffeine (ESGIC) -40 MG tablet  desogestrel-ethinyl estradiol (APRI) 0.15-30 MG-MCG tablet  dicyclomine (BENTYL) 20 MG tablet  divalproex sodium extended-release (DEPAKOTE ER) 250 MG 24 hr tablet  fluticasone (FLONASE) 50 MCG/ACT nasal spray  gabapentin (NEURONTIN) 300 MG capsule  lamoTRIgine (LAMICTAL) 25 MG tablet  Multiple Vitamin (DAILY REGINA) TABS  rizatriptan (MAXALT) 10 MG tablet  tiZANidine (ZANAFLEX) 2 MG tablet        Physical Exam     Triage Vitals:  ED Triage Vitals [12/07/21 1206]   Enc Vitals Group      /80      Pulse 112      Resp 19      Temp 97.6  F (36.4  C)      Temp src Temporal      SpO2 100 %      Weight 66.7 kg (147 lb)      Height 1.702 m (5' 7\")      Head Circumference       Peak Flow       Pain Score       Pain Loc       Pain Edu?       Excl. in GC?         Vitals:  /66   Pulse 83   Temp 97.6  F (36.4  C) (Temporal)   Resp 18   Ht 1.702 m (5' 7\")   Wt 66.7 kg (147 lb)   SpO2 100%   BMI 23.02 kg/m      PHYSICAL EXAM:   Physical Exam  Vitals and nursing note reviewed.   Constitutional:       General: She is not in acute distress.     Appearance: Normal appearance.   HENT:      Head: Normocephalic and atraumatic. "      Nose: Nose normal.      Mouth/Throat:      Mouth: Mucous membranes are moist.   Eyes:      Pupils: Pupils are equal, round, and reactive to light.   Cardiovascular:      Rate and Rhythm: Regular rhythm. Tachycardia present.      Pulses: Normal pulses.           Radial pulses are 2+ on the right side and 2+ on the left side.        Dorsalis pedis pulses are 2+ on the right side and 2+ on the left side.   Pulmonary:      Effort: Pulmonary effort is normal. No respiratory distress.      Breath sounds: Normal breath sounds.   Abdominal:      Palpations: Abdomen is soft.      Tenderness: There is no abdominal tenderness.      Comments: Abdomen gravid. Fetal heart tones assessed. Rate in 150s-160s.   Musculoskeletal:      Cervical back: Full passive range of motion without pain and neck supple.      Comments: No calf tenderness or swelling b/l   Skin:     General: Skin is warm.      Findings: No rash.   Neurological:      General: No focal deficit present.      Mental Status: She is alert. Mental status is at baseline.      Comments: Fluent speech, no acute lateralizing deficits   Psychiatric:         Mood and Affect: Mood is anxious.         Behavior: Behavior normal.         Results     LAB:  All pertinent labs reviewed and interpreted  Labs Ordered and Resulted from Time of ED Arrival to Time of ED Departure   BASIC METABOLIC PANEL - Abnormal       Result Value    Sodium 138      Potassium 3.6      Chloride 105      Carbon Dioxide (CO2) 24      Anion Gap 9      Urea Nitrogen 7 (*)     Creatinine 0.57 (*)     Calcium 8.6      Glucose 91      GFR Estimate >90     MAGNESIUM - Abnormal    Magnesium 1.7 (*)    ROUTINE UA WITH MICROSCOPIC REFLEX TO CULTURE - Abnormal    Color Urine Light Yellow      Appearance Urine Clear      Glucose Urine Negative      Bilirubin Urine Negative      Ketones Urine Negative      Specific Gravity Urine 1.020      Blood Urine Negative      pH Urine 7.0      Protein Albumin Urine Negative       Urobilinogen Urine <2.0      Nitrite Urine Negative      Leukocyte Esterase Urine 75 Sachin/uL (*)     Bacteria Urine Few (*)     Mucus Urine Present (*)     RBC Urine <1      WBC Urine 12 (*)     Squamous Epithelials Urine <1     CBC WITH PLATELETS - Normal    WBC Count 7.8      RBC Count 4.27      Hemoglobin 13.1      Hematocrit 40.2      MCV 94      MCH 30.7      MCHC 32.6      RDW 13.7      Platelet Count 196     TSH WITH FREE T4 REFLEX - Normal    TSH 1.76     TROPONIN I - Normal    Troponin I <0.01     B-TYPE NATRIURETIC PEPTIDE ([facility] ONLY) - Normal    BNP 11     URINE CULTURE       RADIOLOGY:  No orders to display                ECG:  NSR, rate 86, similar morphology to 4/2018 EKG, no acute ischemic changes    I have independently reviewed and interpreted the EKG(s) documented above      PROCEDURES:  Procedures:  none      FINAL IMPRESSION:    ICD-10-CM    1. Palpitations  R00.2    2. Hypomagnesemia  E83.42    3. Migraine without status migrainosus, not intractable, unspecified migraine type  G43.909    4. 23 weeks gestation of pregnancy  Z3A.23        0 minutes of critical care time      I, James Enciso, am serving as a scribe to document services personally performed by Dr. Ger Dent, based on my observations and the provider's statements to me. I, Ger Dent, DO attest that James Enciso is acting in a scribe capacity, has observed my performance of the services and has documented them in accordance with my direction.      Ger Dent DO  Emergency Medicine  [facility] EMERGENCY DEPARTMENT  12/7/2021  1:19 PM        Ger Dent MD  12/07/21 1521     Skin normal color for race, warm, dry and intact. No evidence of rash.

## 2022-01-21 NOTE — ED ADULT NURSE NOTE - NURSING MUSC HAND SYMPTOMS LEFT
Patient understands condition, prognosis and need for follow up care. pain risk stenosis no fusion I have discussed at length with the patient the indications for the planned surgery of laminectomy/decompression for spinal stenosis and possible discectomy, the other options available, the nature of the surgery and the risks and possible complications including but not limited to death, paralysis, nerve damage, infection, failure of the surgery to relieve symptoms, possibility of worsening sx after surgery, possible need for further surgery in the future such as spinal fusion,  adjacent level deterioration, spinal fluid leakage, pulmonary/cardiac problems, blood clots, urinary tract infection, organ damage/failure, excessive bleeding requiring transfusions and related complications, etc, etc. All questions were answered, models and diagrams were used  and the patient understands and wishes to proceed- tm

## 2022-01-25 NOTE — ED ADULT NURSE NOTE - NSFALLRSKPASTHIST_ED_ALL_ED
- Goal blood pressure for most patients is less than 130/85. Both numbers are important. If you have questions about your specific goal blood pressure, please ask at your clinic visits.   - Check blood pressure outside of clinic, record the numbers, and bring the log to all your office visits.   - Take a daily, 81mg Aspirin, unless instructed to take a different dose for another medical purpose. Also do not take Aspirin if you have a history of adverse reaction to Aspirin.   - If you have any chest pain, SOB, or other concerning symptoms, report these immediately, or go to the nearest ER.    - Recommend cardiovascular exercise, at least 3 times per week, for at least 15 minutes.     no

## 2022-04-20 NOTE — ANESTHESIA FOLLOW-UP NOTE - NSEVALATION_GEN_ALL_CORE
No apparent complications or complaints regarding anesthesia care at this time
DISPLAY PLAN FREE TEXT

## 2022-09-15 NOTE — DISCHARGE NOTE ADULT - PRINCIPAL DIAGNOSIS
Patient: Addie Barnes    Procedure: Procedure(s):  EXAM UNDER ANESTHESIA, WITH DRESSING REPLACEMENT       Diagnosis: Malignant melanoma, unspecified site (H) [C43.9]  Diagnosis Additional Information: No value filed.    Anesthesia Type:   General     Note:    Oropharynx: oropharynx clear of all foreign objects  Level of Consciousness: iatrogenic sedation  Oxygen Supplementation: nasal cannula  Level of Supplemental Oxygen (L/min / FiO2): 2  Independent Airway: airway patency satisfactory and stable  Dentition: dentition unchanged  Vital Signs Stable: post-procedure vital signs reviewed and stable  Report to RN Given: handoff report given  Patient transferred to:  Recovery    Handoff Report: Identifed the Patient, Identified the Reponsible Provider, Reviewed the pertinent medical history, Discussed the surgical course, Reviewed Intra-OP anesthesia mangement and issues during anesthesia, Set expectations for post-procedure period and Allowed opportunity for questions and acknowledgement of understanding      Vitals:  Vitals Value Taken Time   BP 82/49    Temp 36.7    Pulse 98    Resp 26    SpO2 99        Electronically Signed By: GABRIELLA Galindo CRNA  September 15, 2022  11:16 AM   ACS (acute coronary syndrome)

## 2022-11-25 NOTE — PROGRESS NOTE ADULT - ASSESSMENT
*LLQ ABd pain 2ndry to Acute Sigmoid Diverticulitis   *Leukocytosis  -CT abd noted  -UA, CXR Neg  -IVF  -tolerate full Fluids, advance to Low fiber diet if tolerated   -S/P  Cipro and Flagyl IV, switch PO  -FU stool Cx - never sent   -GI consult appreciated will need outpatient colonoscopy    *Right Hand Pain and swelling  most likely 2ndry to Polymyalgia Rheumatica/ arthritis - resolved   -patient was on steroids in the past for arthritis and was being tapered; each time she is being tapered off she gets these flare ups  -decrease prednisone 20mg PO QD  -patient to FU with Rheum dr Chao as outpatient     *CAD  -continue ASA, plavix, Statin and BB    *HTN  -continue Metoprolol  -Hold norvasc/ACEI    *Hyperlipidemia  -continue statin    *DVT ppx  -Heparin SQ [phone]

## 2023-01-03 NOTE — ED ADULT NURSE NOTE - HARM RISK FACTORS
[FreeTextEntry3] : I have personally seen, examined, and participated in the care of this patient. I was physically present for key portions of the evaluation and management service provided and I have reviewed all pertinent clinical information.  I agree with the resident's history, physical exam, and plan which I reviewed and edited where appropriate.  no

## 2023-01-17 ENCOUNTER — RX RENEWAL (OUTPATIENT)
Age: 88
End: 2023-01-17

## 2023-01-17 ENCOUNTER — INPATIENT (INPATIENT)
Facility: HOSPITAL | Age: 88
LOS: 6 days | Discharge: HOME HEALTH | DRG: 291 | End: 2023-01-24
Attending: EMERGENCY MEDICINE | Admitting: FAMILY MEDICINE
Payer: MEDICARE

## 2023-01-17 VITALS — WEIGHT: 130.07 LBS | HEIGHT: 62 IN

## 2023-01-17 DIAGNOSIS — G56.01 CARPAL TUNNEL SYNDROME, RIGHT UPPER LIMB: Chronic | ICD-10-CM

## 2023-01-17 DIAGNOSIS — Z95.5 PRESENCE OF CORONARY ANGIOPLASTY IMPLANT AND GRAFT: Chronic | ICD-10-CM

## 2023-01-17 DIAGNOSIS — Z98.89 OTHER SPECIFIED POSTPROCEDURAL STATES: Chronic | ICD-10-CM

## 2023-01-17 DIAGNOSIS — Z98.82 BREAST IMPLANT STATUS: Chronic | ICD-10-CM

## 2023-01-17 DIAGNOSIS — H26.9 UNSPECIFIED CATARACT: Chronic | ICD-10-CM

## 2023-01-17 DIAGNOSIS — Z96.659 PRESENCE OF UNSPECIFIED ARTIFICIAL KNEE JOINT: Chronic | ICD-10-CM

## 2023-01-17 DIAGNOSIS — Z90.710 ACQUIRED ABSENCE OF BOTH CERVIX AND UTERUS: Chronic | ICD-10-CM

## 2023-01-17 DIAGNOSIS — Z95.0 PRESENCE OF CARDIAC PACEMAKER: Chronic | ICD-10-CM

## 2023-01-17 LAB
ACANTHOCYTES BLD QL SMEAR: SLIGHT — SIGNIFICANT CHANGE UP
ANISOCYTOSIS BLD QL: SIGNIFICANT CHANGE UP
BASOPHILS # BLD AUTO: 0.11 K/UL — SIGNIFICANT CHANGE UP (ref 0–0.2)
BASOPHILS NFR BLD AUTO: 1.4 % — SIGNIFICANT CHANGE UP (ref 0–2)
ELLIPTOCYTES BLD QL SMEAR: SLIGHT — SIGNIFICANT CHANGE UP
EOSINOPHIL # BLD AUTO: 0.27 K/UL — SIGNIFICANT CHANGE UP (ref 0–0.5)
EOSINOPHIL NFR BLD AUTO: 3.4 % — SIGNIFICANT CHANGE UP (ref 0–6)
HCT VFR BLD CALC: 42.3 % — SIGNIFICANT CHANGE UP (ref 34.5–45)
HGB BLD-MCNC: 13.2 G/DL — SIGNIFICANT CHANGE UP (ref 11.5–15.5)
IMM GRANULOCYTES NFR BLD AUTO: 0.4 % — SIGNIFICANT CHANGE UP (ref 0–0.9)
LYMPHOCYTES # BLD AUTO: 1.35 K/UL — SIGNIFICANT CHANGE UP (ref 1–3.3)
LYMPHOCYTES # BLD AUTO: 17 % — SIGNIFICANT CHANGE UP (ref 13–44)
MACROCYTES BLD QL: SLIGHT — SIGNIFICANT CHANGE UP
MANUAL SMEAR VERIFICATION: SIGNIFICANT CHANGE UP
MCHC RBC-ENTMCNC: 29.3 PG — SIGNIFICANT CHANGE UP (ref 27–34)
MCHC RBC-ENTMCNC: 31.2 GM/DL — LOW (ref 32–36)
MCV RBC AUTO: 93.8 FL — SIGNIFICANT CHANGE UP (ref 80–100)
MONOCYTES # BLD AUTO: 0.91 K/UL — HIGH (ref 0–0.9)
MONOCYTES NFR BLD AUTO: 11.4 % — SIGNIFICANT CHANGE UP (ref 2–14)
NEUTROPHILS # BLD AUTO: 5.28 K/UL — SIGNIFICANT CHANGE UP (ref 1.8–7.4)
NEUTROPHILS NFR BLD AUTO: 66.4 % — SIGNIFICANT CHANGE UP (ref 43–77)
OVALOCYTES BLD QL SMEAR: SLIGHT — SIGNIFICANT CHANGE UP
PLAT MORPH BLD: NORMAL — SIGNIFICANT CHANGE UP
PLATELET # BLD AUTO: 311 K/UL — SIGNIFICANT CHANGE UP (ref 150–400)
POIKILOCYTOSIS BLD QL AUTO: SLIGHT — SIGNIFICANT CHANGE UP
RAPID RVP RESULT: SIGNIFICANT CHANGE UP
RBC # BLD: 4.51 M/UL — SIGNIFICANT CHANGE UP (ref 3.8–5.2)
RBC # FLD: 20 % — HIGH (ref 10.3–14.5)
RBC BLD AUTO: ABNORMAL
SARS-COV-2 RNA SPEC QL NAA+PROBE: SIGNIFICANT CHANGE UP
WBC # BLD: 7.95 K/UL — SIGNIFICANT CHANGE UP (ref 3.8–10.5)
WBC # FLD AUTO: 7.95 K/UL — SIGNIFICANT CHANGE UP (ref 3.8–10.5)

## 2023-01-17 PROCEDURE — 71045 X-RAY EXAM CHEST 1 VIEW: CPT | Mod: 26

## 2023-01-17 PROCEDURE — 99285 EMERGENCY DEPT VISIT HI MDM: CPT | Mod: CS

## 2023-01-17 PROCEDURE — 93010 ELECTROCARDIOGRAM REPORT: CPT

## 2023-01-17 RX ORDER — TRAMADOL HYDROCHLORIDE 50 MG/1
1 TABLET ORAL
Qty: 0 | Refills: 0 | DISCHARGE

## 2023-01-17 RX ORDER — CHOLECALCIFEROL (VITAMIN D3) 125 MCG
1 CAPSULE ORAL
Qty: 0 | Refills: 0 | DISCHARGE

## 2023-01-17 RX ORDER — ACETAMINOPHEN 500 MG
2 TABLET ORAL
Qty: 0 | Refills: 0 | DISCHARGE

## 2023-01-17 RX ORDER — ASPIRIN/CALCIUM CARB/MAGNESIUM 324 MG
162 TABLET ORAL ONCE
Refills: 0 | Status: COMPLETED | OUTPATIENT
Start: 2023-01-17 | End: 2023-01-17

## 2023-01-17 RX ADMIN — Medication 162 MILLIGRAM(S): at 21:07

## 2023-01-17 NOTE — ED ADULT TRIAGE NOTE - CHIEF COMPLAINT QUOTE
chest pain x couple of days associated with right arm pain. + B/L Le swelling. chest pain x couple of days associated with right arm pain. + B/L Le swelling. hx pacemaker

## 2023-01-17 NOTE — ED STATDOCS - PROGRESS NOTE DETAILS
[place] for Dr. Forbes:  89 year old w/ PMHx of afib and pacemaker recently moved in w daughter from own house in [place] presents to the ED c/o chest pain and sent in by MD. Will send to main for further eval. Pt confused at baseline, both pt and family poor historians.

## 2023-01-17 NOTE — H&P ADULT - RESPIRATORY
Breath Sounds equal & clear to percussion & auscultation, no accessory muscle use Epidermal Closure Graft Donor Site (Optional): simple interrupted

## 2023-01-17 NOTE — ED ADULT NURSE NOTE - NS ED NURSE RECORD ANOTHER VITAL SIGN
Please call patient. His liver function tests are almost back to normal.  See me back as planned.   Thank you,  Dr. Jaja Hernandez Yes

## 2023-01-17 NOTE — ED ADULT NURSE NOTE - OBJECTIVE STATEMENT
Pt presents to the ED AOx4 w/ daughter at bedside c/o chest and B/L arm pain 6/10. Pt reports chest and left arm discomfort for the past 4 days. Pt reports falling about a week ago Pt presents to the ED AOx4 w/ daughter at bedside c/o chest and B/L arm pain 6/10. Pt reports chest and left arm discomfort for the past 4 days. Pt reports PMHx of 17 stents and having a similar discomfort. Pt reports falling about a week ago landing on her right arm and face w/ bruising noted on her right cheek. Pt reports being on Plavix. Pt denies LOC, dizziness, headache,. N/V/D, SOB, blurred vision, or weakness. Pt presents to the ED AOx4 w/ daughter at bedside c/o chest and B/L arm pain 6/10 and lower extremity swelling. Pt reports chest and left arm discomfort for the past 4 days. Pt reports PMHx of 17 stents and a pacemaker. Pt reports falling about a week ago landing on her right arm and face w/ bruising noted on her right cheek. Pt reports being on Plavix. Pt denies LOC, dizziness, headache,. N/V/D, SOB, blurred vision, or weakness.

## 2023-01-17 NOTE — PHARMACOTHERAPY INTERVENTION NOTE - COMMENTS
Medication reconciliation completed.  Patient was unable to provide medication information, spoke to pt's daughter Annetta at bedside and they provided current medication list; confirmed with Dr. First Campbell.  Annetta and son Dickson confirm that pt had previously been living on her own and was not compliant with medication, but now pt is living with Annetta and pt has been compliant.

## 2023-01-18 LAB
APPEARANCE UR: CLEAR — SIGNIFICANT CHANGE UP
BACTERIA # UR AUTO: ABNORMAL
BILIRUB UR-MCNC: NEGATIVE — SIGNIFICANT CHANGE UP
COLOR SPEC: YELLOW — SIGNIFICANT CHANGE UP
DIFF PNL FLD: ABNORMAL
EPI CELLS # UR: SIGNIFICANT CHANGE UP
GLUCOSE UR QL: NEGATIVE — SIGNIFICANT CHANGE UP
KETONES UR-MCNC: ABNORMAL
LEUKOCYTE ESTERASE UR-ACNC: ABNORMAL
NITRITE UR-MCNC: NEGATIVE — SIGNIFICANT CHANGE UP
PH UR: 5 — SIGNIFICANT CHANGE UP (ref 5–8)
POTASSIUM SERPL-MCNC: 4.9 MMOL/L — SIGNIFICANT CHANGE UP (ref 3.5–5.3)
POTASSIUM SERPL-SCNC: 4.9 MMOL/L — SIGNIFICANT CHANGE UP (ref 3.5–5.3)
PROT UR-MCNC: 100
RBC CASTS # UR COMP ASSIST: ABNORMAL /HPF (ref 0–4)
SP GR SPEC: 1.02 — SIGNIFICANT CHANGE UP (ref 1.01–1.02)
TROPONIN I, HIGH SENSITIVITY RESULT: 10.06 NG/L — SIGNIFICANT CHANGE UP
TROPONIN I, HIGH SENSITIVITY RESULT: 9.84 NG/L — SIGNIFICANT CHANGE UP
UROBILINOGEN FLD QL: NEGATIVE — SIGNIFICANT CHANGE UP
WBC UR QL: SIGNIFICANT CHANGE UP /HPF (ref 0–5)

## 2023-01-18 PROCEDURE — 80061 LIPID PANEL: CPT

## 2023-01-18 PROCEDURE — 84100 ASSAY OF PHOSPHORUS: CPT

## 2023-01-18 PROCEDURE — 93010 ELECTROCARDIOGRAM REPORT: CPT

## 2023-01-18 PROCEDURE — 80053 COMPREHEN METABOLIC PANEL: CPT

## 2023-01-18 PROCEDURE — 85027 COMPLETE CBC AUTOMATED: CPT

## 2023-01-18 PROCEDURE — 84484 ASSAY OF TROPONIN QUANT: CPT

## 2023-01-18 PROCEDURE — 83880 ASSAY OF NATRIURETIC PEPTIDE: CPT

## 2023-01-18 PROCEDURE — 97162 PT EVAL MOD COMPLEX 30 MIN: CPT | Mod: GP

## 2023-01-18 PROCEDURE — 36415 COLL VENOUS BLD VENIPUNCTURE: CPT

## 2023-01-18 PROCEDURE — 83735 ASSAY OF MAGNESIUM: CPT

## 2023-01-18 PROCEDURE — 97530 THERAPEUTIC ACTIVITIES: CPT | Mod: GP

## 2023-01-18 PROCEDURE — 80048 BASIC METABOLIC PNL TOTAL CA: CPT

## 2023-01-18 PROCEDURE — 93306 TTE W/DOPPLER COMPLETE: CPT

## 2023-01-18 PROCEDURE — 93970 EXTREMITY STUDY: CPT | Mod: 26

## 2023-01-18 PROCEDURE — 93005 ELECTROCARDIOGRAM TRACING: CPT

## 2023-01-18 PROCEDURE — 97116 GAIT TRAINING THERAPY: CPT | Mod: GP

## 2023-01-18 RX ORDER — POLYETHYLENE GLYCOL 3350 17 G/17G
17 POWDER, FOR SOLUTION ORAL DAILY
Refills: 0 | Status: DISCONTINUED | OUTPATIENT
Start: 2023-01-18 | End: 2023-01-24

## 2023-01-18 RX ORDER — ASPIRIN/CALCIUM CARB/MAGNESIUM 324 MG
81 TABLET ORAL DAILY
Refills: 0 | Status: DISCONTINUED | OUTPATIENT
Start: 2023-01-18 | End: 2023-01-24

## 2023-01-18 RX ORDER — FERROUS SULFATE 325(65) MG
325 TABLET ORAL DAILY
Refills: 0 | Status: DISCONTINUED | OUTPATIENT
Start: 2023-01-18 | End: 2023-01-24

## 2023-01-18 RX ORDER — CHOLECALCIFEROL (VITAMIN D3) 125 MCG
1000 CAPSULE ORAL
Refills: 0 | Status: DISCONTINUED | OUTPATIENT
Start: 2023-01-18 | End: 2023-01-24

## 2023-01-18 RX ORDER — ZOLPIDEM TARTRATE 10 MG/1
5 TABLET ORAL AT BEDTIME
Refills: 0 | Status: DISCONTINUED | OUTPATIENT
Start: 2023-01-18 | End: 2023-01-24

## 2023-01-18 RX ORDER — ONDANSETRON 8 MG/1
4 TABLET, FILM COATED ORAL EVERY 8 HOURS
Refills: 0 | Status: DISCONTINUED | OUTPATIENT
Start: 2023-01-18 | End: 2023-01-24

## 2023-01-18 RX ORDER — DULOXETINE HYDROCHLORIDE 30 MG/1
30 CAPSULE, DELAYED RELEASE ORAL DAILY
Refills: 0 | Status: DISCONTINUED | OUTPATIENT
Start: 2023-01-18 | End: 2023-01-24

## 2023-01-18 RX ORDER — ACETAMINOPHEN 500 MG
650 TABLET ORAL EVERY 6 HOURS
Refills: 0 | Status: DISCONTINUED | OUTPATIENT
Start: 2023-01-18 | End: 2023-01-24

## 2023-01-18 RX ORDER — ATORVASTATIN CALCIUM 80 MG/1
40 TABLET, FILM COATED ORAL AT BEDTIME
Refills: 0 | Status: DISCONTINUED | OUTPATIENT
Start: 2023-01-18 | End: 2023-01-24

## 2023-01-18 RX ORDER — LISINOPRIL 2.5 MG/1
5 TABLET ORAL DAILY
Refills: 0 | Status: DISCONTINUED | OUTPATIENT
Start: 2023-01-18 | End: 2023-01-24

## 2023-01-18 RX ORDER — METOPROLOL TARTRATE 50 MG
200 TABLET ORAL DAILY
Refills: 0 | Status: DISCONTINUED | OUTPATIENT
Start: 2023-01-18 | End: 2023-01-24

## 2023-01-18 RX ORDER — LANOLIN ALCOHOL/MO/W.PET/CERES
3 CREAM (GRAM) TOPICAL AT BEDTIME
Refills: 0 | Status: DISCONTINUED | OUTPATIENT
Start: 2023-01-18 | End: 2023-01-24

## 2023-01-18 RX ORDER — FUROSEMIDE 40 MG
20 TABLET ORAL ONCE
Refills: 0 | Status: COMPLETED | OUTPATIENT
Start: 2023-01-18 | End: 2023-01-18

## 2023-01-18 RX ORDER — CLOPIDOGREL BISULFATE 75 MG/1
75 TABLET, FILM COATED ORAL DAILY
Refills: 0 | Status: DISCONTINUED | OUTPATIENT
Start: 2023-01-18 | End: 2023-01-24

## 2023-01-18 RX ORDER — SODIUM ZIRCONIUM CYCLOSILICATE 10 G/10G
10 POWDER, FOR SUSPENSION ORAL ONCE
Refills: 0 | Status: COMPLETED | OUTPATIENT
Start: 2023-01-18 | End: 2023-01-18

## 2023-01-18 RX ORDER — ENOXAPARIN SODIUM 100 MG/ML
40 INJECTION SUBCUTANEOUS EVERY 24 HOURS
Refills: 0 | Status: DISCONTINUED | OUTPATIENT
Start: 2023-01-18 | End: 2023-01-24

## 2023-01-18 RX ADMIN — DULOXETINE HYDROCHLORIDE 30 MILLIGRAM(S): 30 CAPSULE, DELAYED RELEASE ORAL at 10:12

## 2023-01-18 RX ADMIN — Medication 325 MILLIGRAM(S): at 10:12

## 2023-01-18 RX ADMIN — ATORVASTATIN CALCIUM 40 MILLIGRAM(S): 80 TABLET, FILM COATED ORAL at 21:43

## 2023-01-18 RX ADMIN — SODIUM ZIRCONIUM CYCLOSILICATE 10 GRAM(S): 10 POWDER, FOR SUSPENSION ORAL at 00:56

## 2023-01-18 RX ADMIN — ENOXAPARIN SODIUM 40 MILLIGRAM(S): 100 INJECTION SUBCUTANEOUS at 18:53

## 2023-01-18 RX ADMIN — CLOPIDOGREL BISULFATE 75 MILLIGRAM(S): 75 TABLET, FILM COATED ORAL at 10:12

## 2023-01-18 RX ADMIN — Medication 20 MILLIGRAM(S): at 18:53

## 2023-01-18 RX ADMIN — Medication 1000 UNIT(S): at 10:12

## 2023-01-18 RX ADMIN — LISINOPRIL 5 MILLIGRAM(S): 2.5 TABLET ORAL at 10:13

## 2023-01-18 RX ADMIN — Medication 1 TABLET(S): at 10:12

## 2023-01-18 RX ADMIN — Medication 1000 UNIT(S): at 21:42

## 2023-01-18 RX ADMIN — Medication 200 MILLIGRAM(S): at 10:12

## 2023-01-18 RX ADMIN — Medication 81 MILLIGRAM(S): at 10:12

## 2023-01-18 NOTE — H&P ADULT - HISTORY OF PRESENT ILLNESS
88 y/o F with PMH of HTN, CAD s/p 2 stents hiatal hernia, h/o GI bleed, gastirtis, LLE stent, arthritis, diverticulosis, hypothyroidism, PPM, h/o CEA  89 y/o F with PMH of HTN, CAD s/p 2 stents hiatal hernia, h/o GI bleed, gastritis,  LLE stent, arthritis, diverticulosis, hypothyroidism,  PPM, h/o CEA presented to ED c/o  palpitations? and leg swelling. Pt  is poor historian and unable to provide detailed history. States that had similar episodes of  " loud and strong heartbeat" but got much worse recently. Pt denies that its  a pain but unable to describe.  Also  noted worsening  leg swelling, denies SOB, but states that lately does not exert herself.  States that recently moved in with her daughter.    In ED: VS stable. labs with hyperkalemia, improved after Lokelma,   Also elevated BNP, trop neg x 1. CXR: no infiltrates ( official report pending)      FH: Pt reports that doesnt remember if anybody head CV Dz or cancer

## 2023-01-18 NOTE — PHYSICAL THERAPY INITIAL EVALUATION ADULT - PATIENT/FAMILY/SIGNIFICANT OTHER GOALS STATEMENT, PT EVAL
[facility] at [address], [address]  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    • Multiple food allergies    • [facility]be at [facility] MAIN OR   • TRANSFORAMINAL LUMBAR EPIDURAL STEROID INJECTION MULTIPLE LEVEL Right 6/5/2017    Performed by Tariq Donahue MD at [facility] MAIN OR   • UPPER GI ENDOSCOPY,BIOPSY  5/27/2009      Family History   Problem Relation Age of Onset   • Cancer Fath mg by mouth. Take 1 when experiencing migraine induced diarrhea.    Take along with Benadryl, Disp: , Rfl:   •  Loperamide HCl 2 MG Oral Cap, Take 2 mg by mouth as needed for Diarrhea., Disp: , Rfl:   •  Ondansetron HCl (ZOFRAN) 4 mg tablet, TK 1 T PO Q 8 H of Hepatology  Medical Director of Liver Transplant  [facility] [address]  [place] [address], 7th floor, [place], [place],  [facility] (office)  [phone] (fax)  [phone] (mobile)  [email or web address] pt c/o aching discomfort R upper arm and difficulty elevating R arm , suspect OA/DJD R shoulder joint

## 2023-01-18 NOTE — PHYSICAL THERAPY INITIAL EVALUATION ADULT - LEVEL OF INDEPENDENCE: SIT/SUPINE, REHAB EVAL
returned to bed after encounter with bed alarm enabled ,CBIR ,TV on/minimum assist (75% patients effort)/moderate assist (50% patients effort)

## 2023-01-18 NOTE — ED PROVIDER NOTE - SECONDARY DIAGNOSIS.
PROGRESS NOTE



DATE OF SERVICE:

01/06/2019



This 71-year-old woman admitted with atrial fibrillation, bradycardia, also had severe

pancytopenia.  Patient with symptomatic anemia.  Hemoglobin was improving initially up

to 8, but currently 6.7, one unit transfusion was arranged. No chest pain or

palpitation.  No fever.  Magnesium 1.5.



PHYSICAL EXAMINATION:

On exam, alert and oriented x3. Pulse 94, blood pressure 127/65, respiration 17,

temperature 97.6, pulse ox 93% on room air.

HEENT: Conjunctivae normal.

NECK: No jugular venous distention.

CARDIOVASCULAR: S1, S2 muffled.

RESPIRATORY:  Breath sounds diminished at the bases. A few scattered rhonchi.  No

crackles.

Abdomen is soft, nontender.

LEGS:  No edema, no swelling.

NERVOUS SYSTEM:  No focal deficits.



LABS:

WBC 1.4, hemoglobin 6.7, platelets are 20.



ASSESSMENT:

1. Atrial fibrillation with bradycardia, present on admission.

2. Severe pancytopenia.

3. Severe symptomatic anemia, hemoglobin 6.5, status post chemotherapy.

4. Hypotension, possibly multifactorial, sepsis.

5. Metastatic ovarian cancer on chemotherapy.

6. Elevated creatinine 1.33 with acute renal failure, acute tubular necrosis,

    prerenal renal failure, present on admission.

7. Hypomagnesemia.

8. Atrial flutter, fibrillation.

9. History of hypertension.

10.Hyperlipidemia.

11.History of pulmonary embolism.

12.History of appendectomy.

13.History of cholecystectomy.

14.History of motion sickness.



RECOMMENDATIONS AND DISCUSSION:

Recommend to continue with current medication, continue with monitoring,and

symptomatic treatment.  Otherwise at this time I recommend to continue with current

medication.  Repeat labs. Prognosis guarded because of multiple complex medical issues.

Further recommendations to follow.





MMODL / IJN: [id number] / Job#: [id number] Elevated brain natriuretic peptide (BNP) level

## 2023-01-18 NOTE — PHYSICAL THERAPY INITIAL EVALUATION ADULT - ADDITIONAL COMMENTS
pt last hospitalized Oct 2019 here with acute sigmoid diverticulitis ,LLQ pain which resolved; she was independent amb at that time ,reported living alone in [place] ,+steps inside home which she had no concern negotiating ,amb >200ft with supervision without device on PT Eval Oct 2019 pt last hospitalized Oct 2019 here with acute sigmoid diverticulitis ,LLQ pain which resolved; she was independent amb at that time ,reported living alone in [place]  & amb >200ft with supervision without device on PT Eval Oct 2019; pt recently moved in with daughter in [place] and now uses RW ,daughter has 2 big dogs and a mischievous grandchild that gets into "everything"

## 2023-01-18 NOTE — H&P ADULT - NSCORESITESY/N_GEN_A_CORE_RD
Pt is A&O x4, VSS on RA, slow speech at times. Pt is a assistance with one with gait belt to bedside commode, Cont B/B x1 BM overnight. Tolerating mod cho diet bg 150 overnight. PIV SL. Denies pain. Plan for discharge home or to TCU depending on PT. Continue to monitor.    Yes

## 2023-01-18 NOTE — ED PROVIDER NOTE - CONSIDERATION OF ADMISSION OBSERVATION
Consideration of Admission/Observation swelling, cp, high risk for ACS, will admit, requires serial trop, and cardio follow up.

## 2023-01-18 NOTE — H&P ADULT - ASSESSMENT
89 y/o F with PMH of HTN, CAD s/p 2 stents hiatal hernia, h/o GI bleed, gastritis,  LLE stent, arthritis, diverticulosis, hypothyroidism,  PPM, h/o CEA  admitted for:       1. Atypical Chest pain. H/o CAD s/p stents   admit to tele   R/o ACS  trend troponin   ECHO  Interrogate PPM   C/w ASA, Plavix, change lovastatin to Lipitor  C/w Toprol    Cardio eval, d/w Dr Welsh       2. Leg edema, elevated BNP suspected CHF, unknown EF   Monitor weight   Strict is and Os   Trial of low dose IV lasix  Check LE doppler as edema asymmetric  ECHO to evaluate EF  CArdio eval         3. HTN  C/w metoprolol and ACEI  Hold amlodipine due to leg swelling and possible CHF   Monitor BP         4. PAD , S/p LLE stent s/p R   C/w ASA, Plavix, statins     5. Depression   C/w Duloxetine  Supportive care     6. DVT PPx: lovenox SQ

## 2023-01-18 NOTE — PHYSICAL THERAPY INITIAL EVALUATION ADULT - RANGE OF MOTION EXAMINATION, REHAB EVAL
has difficulty elevating RUE from shoulder and c/o shoulder /upper arm discomfort ,suspect OA vs RC arthropathy/Left UE ROM was WFL (within functional limits)/bilateral lower extremity ROM was WFL (within functional limits)/deficits as listed below

## 2023-01-18 NOTE — ED PROVIDER NOTE - EYES, MLM
Clear bilaterally, pupils equal, round and reactive to light. EOMI. Visual fields intact x 4 quadrants. lower leg swelling.

## 2023-01-18 NOTE — PHYSICAL THERAPY INITIAL EVALUATION ADULT - LIVES WITH, PROFILE
resides private home in [place] ,+steps inside/alone resides with daughter in [place], recently moved in with dtr ,left her home in [place]/[facility]

## 2023-01-18 NOTE — DISCHARGE NOTE ADULT - NS DC ANGIO PCI YN
no Abbe Flap (Upper To Lower Lip) Text: The defect of the lower lip was assessed and measured.  Given the location and size of the defect, an Abbe flap was deemed most appropriate.  Using a sterile surgical marker, an appropriate Abbe flap was measured and drawn on the upper lip. Local anesthesia was then infiltrated.  A scalpel was then used to incise the upper lip through and through the skin, vermilion, muscle and mucosa, leaving the flap pedicled on the opposite side.  The flap was then rotated and transferred to the lower lip defect.  The flap was then sutured into place with a three layer technique, closing the orbicularis oris muscle layer with subcutaneous buried sutures, followed by a mucosal layer and an epidermal layer.

## 2023-01-18 NOTE — PATIENT PROFILE ADULT - FALL HARM RISK - HARM RISK INTERVENTIONS

## 2023-01-18 NOTE — ED PROVIDER NOTE - CONSTITUTIONAL, MLM
Well appearing, awake, alert, oriented to person, place, time/situation and in no apparent distress. Nontoxic appearing. normal...

## 2023-01-18 NOTE — PHYSICAL THERAPY INITIAL EVALUATION ADULT - PERTINENT HX OF CURRENT PROBLEM, REHAB EVAL
89 y/o F with PMH of HTN, CAD s/p 2 stents hiatal hernia, h/o GI bleed, gastirtis, LLE stent, arthritis, diverticulosis, hypothyroidism, PPM, h/o CEA comes to ED c/o CHEST PAIN 89 y/o F with PMH of HTN, CAD s/p 2 stents hiatal hernia, h/o GI bleed, gastritis, LLE stent, arthritis, diverticulosis, hypothyroidism, PPM, h/o CEA comes to ED c/o CHEST PAIN; Troponins negative x3

## 2023-01-18 NOTE — ED PROVIDER NOTE - OBJECTIVE STATEMENT
89 year old female with PMH of CAD, stents, patient of Dr. Arrieta, HTN, presents with cc of left parasternal chest pain, palpitation and leg swelling and SOB. BIB daughter, symptoms for two days and worsening. No abdominal pain. No fever or chills. 89 year old female with PMH of CAD, stents, patient of Dr. Arrieta, HTN, presents with cc of left parasternal chest pain, palpitation and leg swelling and SOB. BIB daughter, symptoms for two days and worsening. No abdominal pain. No fever or chills. No saddle anesthesia. No visual or focal neurological complaints. No recent trauma. No incontinence of urine or stool. No pain to the interscapular space. No skin rash. No neck pain or stiffness.

## 2023-01-18 NOTE — PHYSICAL THERAPY INITIAL EVALUATION ADULT - MANUAL MUSCLE TESTING RESULTS, REHAB EVAL
grossly >/= 4-/5 thruout except R shoulder flexors/ABD ~2+ to3-/5/grossly assessed due to Statement Selected

## 2023-01-18 NOTE — H&P ADULT - NSHPPHYSICALEXAM_GEN_ALL_CORE
Vital Signs Last 24 Hrs  T(C): 36.2 (18 Jan 2023 08:27), Max: 37.1 (18 Jan 2023 05:58)  T(F): 97.2 (18 Jan 2023 08:27), Max: 98.8 (18 Jan 2023 05:58)  HR: 63 (18 Jan 2023 08:27) (61 - 78)  BP: 139/68 (18 Jan 2023 08:27) (139/68 - 157/83)  BP(mean): 92 (17 Jan 2023 20:39) (92 - 92)  RR: 18 (18 Jan 2023 08:27) (17 - 19)  SpO2: 97% (18 Jan 2023 08:27) (97% - 99%)    Parameters below as of 18 Jan 2023 08:27  Patient On (Oxygen Delivery Method): room air      PHYSICAL EXAM:    General: Well developed; well nourished; in no acute distress  Eyes: PERRLA, EOMI; conjunctiva and sclera clear  Head: Normocephalic; atraumatic  ENMT: No nasal discharge; airway clear  Neck: Supple; non tender; no masses  Respiratory: No wheezes, rales or rhonchi  Cardiovascular: Regular rate and rhythm. S1 and S2 Normal; No murmurs, gallops or rubs  Gastrointestinal: Soft non-tender non-distended; Normal bowel sounds  Genitourinary: No  suprapubic  tenderness  Extremities: Normal range of motion, No clubbing, cyanosis or edema  Vascular: Peripheral pulses palpable 2+ bilaterally  Neurological: Alert and oriented x4  Skin: Warm and dry. No acute rash  Lymph Nodes: No acute cervical adenopathy  Musculoskeletal: Normal muscle tone, without deformities  Psychiatric: Cooperative and appropriate Vital Signs Last 24 Hrs  T(C): 36.2 (18 Jan 2023 08:27), Max: 37.1 (18 Jan 2023 05:58)  T(F): 97.2 (18 Jan 2023 08:27), Max: 98.8 (18 Jan 2023 05:58)  HR: 63 (18 Jan 2023 08:27) (61 - 78)  BP: 139/68 (18 Jan 2023 08:27) (139/68 - 157/83)  BP(mean): 92 (17 Jan 2023 20:39) (92 - 92)  RR: 18 (18 Jan 2023 08:27) (17 - 19)  SpO2: 97% (18 Jan 2023 08:27) (97% - 99%)    Parameters below as of 18 Jan 2023 08:27  Patient On (Oxygen Delivery Method): room air      PHYSICAL EXAM:  General: Well developed; frail elderly female,  in no acute distress  Eyes:  EOMI; conjunctiva and sclera clear  Head: Normocephalic; atraumatic  ENMT: No nasal discharge; airway clear  Neck: Supple; non tender; no masses  Respiratory: Decreased BS, No wheezes, rales or rhonchi  Cardiovascular: Regular rate and rhythm. S1 and S2 Normal;   Gastrointestinal: Soft non-tender non-distended; Normal bowel sounds  Genitourinary: No  suprapubic  tenderness  Extremities: + R>L  leg  edema  Vascular: Peripheral pulses palpable 2+ bilaterally  Neurological: Alert and oriented x2-3, forgetful, non focal   Skin: Warm and dry. No acute rash  Musculoskeletal: Normal muscle tone, without deformities  Psychiatric: Cooperative and appropriate

## 2023-01-18 NOTE — ED PROVIDER NOTE - CARE PLAN
[facility] - PODIATRY    Today's Date: 12/09/22    Patient Name: Mick Sims  MRN: [id number]  CSN: [id number]  PCP: Ginny Collazo APRN   Referring Provider: No ref. provider found    SUBJECTIVE     Chief Complaint   Patient presents with   • Follow-up     Ginny Collazo APRN PCP01/15/2022 Return in about 3 months (around 10/19/2022) for Follow-up with Podiatry Provider.-pt states he is here for diabetic foot care-pt denies pain but states he has neuropathy   • Diabetes     235 mg/dl BG     HPI: Mick Sims, a 69 y.o.male, comes to clinic as a(n) established patient presenting for diabetic foot exam and complaining of thick fungal toenails. Patient has h/o arthritis, DM2, HTN, Renal Disorder. Patient is IDDM with last stated BG level of 235mg/dl. Last A1C was 8.7% which is down from 10.1%.  Notes mild numbness and tingling in feet.  Denies open wounds or sores. States that his toenails are long, thick and discolored. He has trouble caring for them himself. Denies pain today. Taking Effient daily. Relates previous treatment(s) including foot care by podiatry. Denies any constitutional symptoms. No other pedal complaints at this time.    Past Medical History:   Diagnosis Date   • Arthritis    • Diabetes mellitus (HCC)    • Hypertension    • Renal disorder      Past Surgical History:   Procedure Laterality Date   • CARDIAC CATHETERIZATION     • CARPAL TUNNEL RELEASE     • CORONARY STENT PLACEMENT  2018    X 2   • TOTAL KNEE ARTHROPLASTY Right      Family History   Problem Relation Age of Onset   • Cancer Mother    • Diabetes Sister    • Diabetes Brother    • No Known Problems Brother      Social History     Socioeconomic History   • Marital status:    Tobacco Use   • Smoking status: Never     Passive exposure: Never   • Smokeless tobacco: Never   Vaping Use   • Vaping Use: Never used   Substance and Sexual Activity   • Alcohol use: No   • Drug use: Never   • Sexual activity: Defer      Allergies   Allergen Reactions   • Atorvastatin Calcium Unknown - Low Severity   • Latex Hives     Current Outpatient Medications   Medication Sig Dispense Refill   • aspirin 81 MG chewable tablet Chew 81 mg Daily.     • benzonatate (Tessalon Perles) 100 MG capsule Take 1 capsule by mouth 3 (Three) Times a Day As Needed for Cough. 30 capsule 5   • Continuous Blood Gluc  (FreeStyle Fercho 2 [place]) device 1 each Continuous. Use as indicated for glucose monitoring 1 each 1   • Continuous Blood Gluc Sensor (FreeStyle Fercho 2 Sensor) misc 1 each Every 14 (Fourteen) Days. 2 each 11   • empagliflozin (Jardiance) 25 MG tablet tablet Take 1 tablet by mouth Daily. One tablet daily before breakfast 30 tablet 11   • ezetimibe (Zetia) 10 MG tablet Take 1 tablet by mouth Daily. 90 tablet 3   • Finerenone (Kerendia) 10 MG tablet Take 1 tablet by mouth Daily. 30 tablet 11   • fluticasone (FLONASE) 50 MCG/ACT nasal spray 1 spray into each nostril 2 (Two) Times a Day As Needed for rhinitis or allergies.     • furosemide (LASIX) 40 MG tablet Take 20 mg by mouth As Needed. prn     • gabapentin (NEURONTIN) 300 MG capsule Take 300 mg by mouth 3 (Three) Times a Day.     • Glucagon, rDNA, (Glucagon Emergency) 1 MG kit Inject 1 mg under the skin into the appropriate area as directed 1 (One) Time As Needed (hypoglycemia) for up to 1 dose. 1 each 11   • Insulin Glargine, 2 Unit Dial, (Toujeo Max SoloStar) 300 UNIT/ML solution pen-injector injection 80 units qhs 24 mL 3   • Insulin Lispro-aabc, 1 U Dial, (Lyumjev KwikPen) 100 UNIT/ML solution pen-injector Inject 80 Units under the skin into the appropriate area as directed 3 (Three) Times a Day With Meals. Up to 70u with meals 72 mL 3   • Insulin Pen Needle (PEN NEEDLES) 32G X 4 MM misc 1 each 4 (Four) Times a Day. Use 4 times per day to inject insulin DX E11.9 400 each 3   • irbesartan (AVAPRO) 150 MG tablet Take 300 mg by mouth Every Night.     • metFORMIN ER (Glucophage XR) 500  MG 24 hr tablet 2 tabs twice daily with meals , generic ok 360 tablet 3   • metoprolol tartrate (LOPRESSOR) 50 MG tablet Take 1 tablet by mouth 2 (Two) Times a Day. 30 tablet 0   • nitroglycerin (NITROSTAT) 0.4 MG SL tablet Place 0.4 mg under the tongue Every 5 (Five) Minutes As Needed for Chest Pain. Take no more than 3 doses in 15 minutes.     • oxyCODONE-acetaminophen (PERCOCET)  MG per tablet Take 1 tablet by mouth Every 6 (Six) Hours As Needed for Moderate Pain .     • PANTOPRAZOLE SODIUM PO Take 40 mg by mouth Daily. Twice daily     • prasugrel (EFFIENT) 10 MG tablet Daily.     • pravastatin (Pravachol) 40 MG tablet Take 1 tablet by mouth Daily. 90 tablet 3   • Semaglutide, 2 MG/DOSE, (Ozempic, 2 MG/DOSE,) 8 MG/3ML solution pen-injector Inject 2 mg under the skin into the appropriate area as directed 1 (One) Time Per Week. 9 mL 3   • sildenafil (Viagra) 100 MG tablet Take 1 tablet by mouth As Needed for Erectile Dysfunction. 10 tablet 11     No current facility-administered medications for this visit.     Review of Systems   Constitutional: Negative for chills and fever.   HENT: Negative for congestion.    Respiratory: Negative for shortness of breath.    Cardiovascular: Positive for leg swelling. Negative for chest pain.   Gastrointestinal: Negative for constipation, diarrhea, nausea and vomiting.   Musculoskeletal:        Foot pain   Skin: Negative for wound.   Neurological: Positive for numbness.       OBJECTIVE     Vitals:    12/09/22 1535   BP: 110/62   Pulse: 81   SpO2: 99%       PHYSICAL EXAM  GEN:   Accompanied by none.     Foot/Ankle Exam:       General:   Appearance: appears stated age and healthy    Orientation: AAOx3    Affect: appropriate    Gait: antalgic    Assistance: walker    Shoe Gear:  Casual shoes    VASCULAR      Right Foot Vascularity   Dorsalis pedis:  2+  Posterior tibial:  2+  Skin Temperature: warm    Edema Grading:  None  CFT:  3  Pedal Hair Growth:  Present  Varicosities:  mild varicosities       Left Foot Vascularity   Dorsalis pedis:  2+  Posterior tibial:  2+  Skin Temperature: warm    Edema Grading:  None  CFT:  3  Pedal Hair Growth:  Present  Varicosities: mild varicosities        NEUROLOGIC     Right Foot Neurologic   Light touch sensation:  Diminished  Vibratory sensation:  Diminished  Hot/Cold sensation: diminished    Protective Sensation using Croghan-Azar Monofilament:  4     Left Foot Neurologic   Light touch sensation:  Diminished  Vibratory sensation:  Diminished  Hot/cold sensation: diminished    Protective Sensation using Croghan-Azar Monofilament:  4     MUSCULOSKELETAL      Right Foot Musculoskeletal   Ecchymosis:  None  Tenderness: neuroma    Tenderness comment:  Sub 2nd interspace  Arch:  Normal     Left Foot Musculoskeletal   Ecchymosis:  None  Tenderness: none    Arch:  Normal     MUSCLE STRENGTH     Right Foot Muscle Strength   Foot dorsiflexion:  5  Foot plantar flexion:  5  Foot inversion:  5  Foot eversion:  5     Left Foot Muscle Strength   Foot dorsiflexion:  5  Foot plantar flexion:  5  Foot inversion:  5  Foot eversion:  5     RANGE OF MOTION      Right Foot Range of Motion   Foot and ankle ROM within normal limits       Left Foot Range of Motion   Foot and ankle ROM within normal limits       DERMATOLOGIC     Right Foot Dermatologic   Skin: skin intact    Nails: onychomycosis, abnormally thick, subungual debris and dystrophic nails    Nails comment:  1-5     Left Foot Dermatologic   Skin: skin intact    Nails: onychomycosis, abnormally thick, subungual debris and dystrophic nails    Nails comment:  1-5      RADIOLOGY/NUCLEAR:  No results found.    LABORATORY/CULTURE RESULTS:      PATHOLOGY RESULTS:       ASSESSMENT/PLAN     Diagnoses and all orders for this visit:    1. Onychomycosis (Primary)    2. Type 2 diabetes mellitus with diabetic neuropathy, with long-term current use of insulin (HCC)    3. Peripheral edema    4. Antiplatelet or  antithrombotic long-term use      Comprehensive lower extremity examination and evaluation was performed.  Discussed findings and treatment plan including risks, benefits, and treatment options with patient in detail. Patient agreed with treatment plan.  After verbal consent obtained, nail(s) x10 debrided of length and thickness with nail nipper without incidence  Patient may maintain nails and calluses at home utilizing emery board or pumice stone between visits as needed  Reviewed at home diabetic foot care including daily foot checks   Continue daily use of compression stockings and elevate extremities at rest.  Continue diabetic monitoring and control under direction of PCP.   An After Visit Summary was printed and given to the patient at discharge, including (if requested) any available informative/educational handouts regarding diagnosis, treatment, or medications. All questions were answered to patient/family satisfaction. Should symptoms fail to improve or worsen they agree to call or return to clinic or to go to the Emergency Department. Discussed the importance of following up with any needed screening tests/labs/specialist appointments and any requested follow-up recommended by me today. Importance of maintaining follow-up discussed and patient accepts that missed appointments can delay diagnosis and potentially lead to worsening of conditions.  Return in about 3 months (around 3/9/2023) for Follow-up with Podiatry Provider., or sooner if acute issues arise.        This document has been electronically signed by Mehdi Valencia DPM on December 9, 2022 16:10 CST        Principal Discharge DX:	Chest pain  Goal:	palpitation, chest pain, leg swelling, elevated BNP, r/o CHF, r/o ACS  Secondary Diagnosis:	Elevated brain natriuretic peptide (BNP) level   1

## 2023-01-18 NOTE — PATIENT PROFILE ADULT - FUNCTIONAL ASSESSMENT - BASIC MOBILITY 6.
2-calculated by average/Not able to assess (calculate score using [facility] averaging method)

## 2023-01-18 NOTE — ED PROVIDER NOTE - CLINICAL SUMMARY MEDICAL DECISION MAKING FREE TEXT BOX
cp, lower leg swelling diff includes CAD, ACS, CHF. Patient with cp, worsening leg swelling, r/o ACS, CHF, elevated troponin I, abnormal EKG.

## 2023-01-18 NOTE — PROCEDURE NOTE - ADDITIONAL PROCEDURE DETAILS
Dual chamber pacemaker with normal function, adequate sensing and pacing thresholds.  Contacted by telemetry that patient was inappropriately pacing.  Device undersensing P waves, adjusted sensitivity and no further inappropriate pacing.  Recommend routine follow up in three months.

## 2023-01-18 NOTE — PHYSICAL THERAPY INITIAL EVALUATION ADULT - IMPAIRMENTS CONTRIBUTING TO GAIT DEVIATIONS, PT EVAL
easily distracted , also c/o fear of slipping wearing socks only (despite treads) due to polished floors/cognition

## 2023-01-18 NOTE — ED PROVIDER NOTE - PROGRESS NOTE DETAILS
Julian LOVELACE: Spoke with Dr. Stephanie Echevarria, hospitalist admission appreciated. Patient with slightly elevated potassium of 5.7, repeat pending. Dr. Echevarria aware will follow up with repeat potassium level. Patient also with palpitation, pain, first trop negaive, but elevated BNP and leg swelling, CHF exacerbation is in the differential. Plan is to repeat potassium, Lokelma suggested by medicine, and ordered, admission to rule out CHF. Julian LOVELACE: Spoke with Dr. Stephanie Echevarria, hospitalist admission appreciated. Patient with slightly elevated potassium of 5.7, repeat pending. Dr. Echevarria aware will follow up with repeat potassium level. Patient also with palpitation, pain, first trop negative, but elevated BNP and leg swelling, CHF exacerbation is in the differential. Plan is to repeat potassium, Lokelma suggested by medicine, and ordered, admission to rule out CHF.

## 2023-01-18 NOTE — PHYSICAL THERAPY INITIAL EVALUATION ADULT - GENERAL OBSERVATIONS, REHAB EVAL
sitting up in bedside chair after dinner meal , IV site R antecubital area , pt is awake, alert, Ox>1, thinks this is a hospital in [place] where she recently relocated from

## 2023-01-18 NOTE — PHYSICAL THERAPY INITIAL EVALUATION ADULT - FOLLOWS COMMANDS/ANSWERS QUESTIONS, REHAB EVAL
at times slow follow thru ,needing directives repeated, at times distracted by surroundings, other staff,visitors etc/100% of the time/able to follow single-step instructions

## 2023-01-19 DIAGNOSIS — R07.9 CHEST PAIN, UNSPECIFIED: ICD-10-CM

## 2023-01-19 LAB
ANION GAP SERPL CALC-SCNC: 8 MMOL/L — SIGNIFICANT CHANGE UP (ref 5–17)
BUN SERPL-MCNC: 27 MG/DL — HIGH (ref 7–23)
CALCIUM SERPL-MCNC: 9.3 MG/DL — SIGNIFICANT CHANGE UP (ref 8.5–10.1)
CHLORIDE SERPL-SCNC: 105 MMOL/L — SIGNIFICANT CHANGE UP (ref 96–108)
CHOLEST SERPL-MCNC: 117 MG/DL — SIGNIFICANT CHANGE UP
CO2 SERPL-SCNC: 26 MMOL/L — SIGNIFICANT CHANGE UP (ref 22–31)
CREAT SERPL-MCNC: 0.8 MG/DL — SIGNIFICANT CHANGE UP (ref 0.5–1.3)
EGFR: 70 ML/MIN/1.73M2 — SIGNIFICANT CHANGE UP
GLUCOSE SERPL-MCNC: 99 MG/DL — SIGNIFICANT CHANGE UP (ref 70–99)
HCT VFR BLD CALC: 36.8 % — SIGNIFICANT CHANGE UP (ref 34.5–45)
HDLC SERPL-MCNC: 39 MG/DL — LOW
HGB BLD-MCNC: 11.9 G/DL — SIGNIFICANT CHANGE UP (ref 11.5–15.5)
LIPID PNL WITH DIRECT LDL SERPL: 66 MG/DL — SIGNIFICANT CHANGE UP
MCHC RBC-ENTMCNC: 29.8 PG — SIGNIFICANT CHANGE UP (ref 27–34)
MCHC RBC-ENTMCNC: 32.3 GM/DL — SIGNIFICANT CHANGE UP (ref 32–36)
MCV RBC AUTO: 92 FL — SIGNIFICANT CHANGE UP (ref 80–100)
NON HDL CHOLESTEROL: 78 MG/DL — SIGNIFICANT CHANGE UP
PLATELET # BLD AUTO: 230 K/UL — SIGNIFICANT CHANGE UP (ref 150–400)
POTASSIUM SERPL-MCNC: 3.7 MMOL/L — SIGNIFICANT CHANGE UP (ref 3.5–5.3)
POTASSIUM SERPL-SCNC: 3.7 MMOL/L — SIGNIFICANT CHANGE UP (ref 3.5–5.3)
RBC # BLD: 4 M/UL — SIGNIFICANT CHANGE UP (ref 3.8–5.2)
RBC # FLD: 19.7 % — HIGH (ref 10.3–14.5)
SODIUM SERPL-SCNC: 139 MMOL/L — SIGNIFICANT CHANGE UP (ref 135–145)
TRIGL SERPL-MCNC: 59 MG/DL — SIGNIFICANT CHANGE UP
WBC # BLD: 6.48 K/UL — SIGNIFICANT CHANGE UP (ref 3.8–10.5)
WBC # FLD AUTO: 6.48 K/UL — SIGNIFICANT CHANGE UP (ref 3.8–10.5)

## 2023-01-19 PROCEDURE — 99233 SBSQ HOSP IP/OBS HIGH 50: CPT

## 2023-01-19 PROCEDURE — 93306 TTE W/DOPPLER COMPLETE: CPT | Mod: 26

## 2023-01-19 RX ORDER — FUROSEMIDE 40 MG
20 TABLET ORAL ONCE
Refills: 0 | Status: COMPLETED | OUTPATIENT
Start: 2023-01-19 | End: 2023-01-19

## 2023-01-19 RX ADMIN — Medication 20 MILLIGRAM(S): at 10:44

## 2023-01-19 RX ADMIN — Medication 325 MILLIGRAM(S): at 09:59

## 2023-01-19 RX ADMIN — Medication 3 MILLIGRAM(S): at 21:25

## 2023-01-19 RX ADMIN — Medication 200 MILLIGRAM(S): at 09:58

## 2023-01-19 RX ADMIN — Medication 1 TABLET(S): at 09:58

## 2023-01-19 RX ADMIN — CLOPIDOGREL BISULFATE 75 MILLIGRAM(S): 75 TABLET, FILM COATED ORAL at 10:43

## 2023-01-19 RX ADMIN — Medication 1000 UNIT(S): at 09:58

## 2023-01-19 RX ADMIN — DULOXETINE HYDROCHLORIDE 30 MILLIGRAM(S): 30 CAPSULE, DELAYED RELEASE ORAL at 09:59

## 2023-01-19 RX ADMIN — ENOXAPARIN SODIUM 40 MILLIGRAM(S): 100 INJECTION SUBCUTANEOUS at 19:04

## 2023-01-19 RX ADMIN — ATORVASTATIN CALCIUM 40 MILLIGRAM(S): 80 TABLET, FILM COATED ORAL at 21:23

## 2023-01-19 RX ADMIN — Medication 81 MILLIGRAM(S): at 09:59

## 2023-01-19 RX ADMIN — LISINOPRIL 5 MILLIGRAM(S): 2.5 TABLET ORAL at 09:59

## 2023-01-19 RX ADMIN — Medication 1000 UNIT(S): at 21:24

## 2023-01-19 NOTE — CONSULT NOTE ADULT - ASSESSMENT
SOB, edema- Acute on chronic decompensated HF- unknown LVEF, per echo from past normal LVEF.  Recheck echo- pending.  Recommend another dose lasix iv today 40mg.  Diuresis with close monitoring of the renal function and electrolytes.  Goal potassium of 4 and magnesium of 2.   Strict I/O and daily wt checks. Low sodium diet. Nutrition education.     Chest pain- Chest pain is atypical in nature. So far cardiac enzymes and EKG did not reveal any ischemia.   Outpt ischemic heart disease evaluation recommended.  f/u with cardiologist as outpt.     HTN- poorly controlled at presentation.  Recommend continuation of current meds for now.  Based on echo results can consider entresto but daughter says that she cannot afford a med that was 1000 dollars a month and she was prescribed in past.    PPM- normal function.  s/p interrogation.    Atrial fibrillation- chronic in nature.  recommend elliquis 2.5mg PO BID and DC Aspirin if this was contemplated.  no high rate episodes per PPM interrogation.    CAD s/p PCI- continue current meds.    Spoke to daughter Annetta and she states pt had hx of afib in past. Will discuss with Dr Welsh.     Other medical issues- Management per primary team.   Thank you for allowing me to participate in the care of this patient. Please feel free to contact me with any questions.

## 2023-01-19 NOTE — PROGRESS NOTE ADULT - ASSESSMENT
88 y/o F with PMH of HTN, CAD s/p 2 stents hiatal hernia, h/o GI bleed, gastritis,  LLE stent, arthritis, diverticulosis, hypothyroidism,  PPM, h/o CEA  admitted for:       1. Atypical Chest pain. H/o CAD s/p stents   admit to tele   R/o ACS  trend troponin   ECHO  Interrogate PPM   C/w ASA, Plavix, change lovastatin to Lipitor  C/w Toprol    Cardio eval, d/w Dr Welsh       2. Leg edema, elevated BNP suspected CHF, unknown EF   Monitor weight   Strict is and Os   Trial of low dose IV lasix  Check LE doppler as edema asymmetric  ECHO to evaluate EF  CArdio eval         3. HTN  C/w metoprolol and ACEI  Hold amlodipine due to leg swelling and possible CHF   Monitor BP         4. PAD , S/p LLE stent s/p R   C/w ASA, Plavix, statins     5. Depression   C/w Duloxetine  Supportive care     6. DVT PPx: lovenox SQ    88 y/o F with PMH of HTN, CAD s/p 2 stents hiatal hernia, h/o GI bleed, gastritis,  LLE stent, arthritis, diverticulosis, hypothyroidism,  PPM, h/o CEA  admitted for:       1. Atypical Chest pain. H/o CAD s/p stents   C/w tele: AFIB , V-paced   ACS ruled out    troponin neg   ECHO pending   PPM interrogated, reprogrammed.   C/w ASA, Plavix, change lovastatin to Lipitor  C/w Toprol    Cardio eval appreciated       2. Leg edema, elevated BNP likely  CHF, unknown EF   Monitor weight   Strict is and Os   C/w IV lasix  LE doppler: neg for DVT   ECHO to evaluate EF pending   CArdio eval appreciated         3. HTN  C/w metoprolol and ACEI  Hold amlodipine due to leg swelling and possible CHF   Monitor BP       4. PAD , S/p LLE stent s/p R   C/w ASA, Plavix, statins     5. Depression   C/w Duloxetine  Supportive care     6. DVT PPx: lovenox SQ     Dispo; F/u Echo, PT, IV lasix, labs in am

## 2023-01-19 NOTE — PROGRESS NOTE ADULT - SUBJECTIVE AND OBJECTIVE BOX
CC: Chest pain     HPI:  88 y/o F with PMH of HTN, CAD s/p 2 stents hiatal hernia, h/o GI bleed, gastritis,  LLE stent, arthritis, diverticulosis, hypothyroidism,  PPM, h/o CEA presented to ED c/o  palpitations? and leg swelling. Pt  is poor historian and unable to provide detailed history. States that had similar episodes of  " loud and strong heartbeat" but got much worse recently. Pt denies that its  a pain but unable to describe.  Also  noted worsening  leg swelling, denies SOB, but states that lately does not exert herself.  States that recently moved in with her daughter.    In ED: VS stable. labs with hyperkalemia, improved after Lokelma,   Also elevated BNP, trop neg x 1. CXR: no infiltrates ( official report pending)      FH: Pt reports that doesnt remember if anybody head CV Dz or cancer     INTERVAL HPI/OVERNIGHT EVENTS:    Vital Signs Last 24 Hrs  T(C): 36.7 (2023 09:37), Max: 36.7 (2023 16:30)  T(F): 98.1 (2023 09:37), Max: 98.1 (2023 16:30)  HR: 77 (2023 09:37) (65 - 77)  BP: 142/84 (2023 09:37) (133/73 - 142/84)  BP(mean): --  RR: 18 (2023 09:37) (18 - 18)  SpO2: 94% (2023 09:37) (94% - 97%)    Parameters below as of 2023 09:37  Patient On (Oxygen Delivery Method): room air      I&O's Detail    2023 07:01  -  2023 07:00  --------------------------------------------------------  IN:  Total IN: 0 mL    OUT:    Voided (mL): 600 mL  Total OUT: 600 mL    Total NET: -600 mL        REVIEW OF SYSTEMS:    CONSTITUTIONAL: No weakness, fevers or chills  EYES/ENT: No visual changes;  No vertigo or throat pain   NECK: No pain or stiffness  RESPIRATORY: No cough, wheezing, hemoptysis; No shortness of breath  CARDIOVASCULAR: No chest pain or palpitations  GASTROINTESTINAL: No abdominal or epigastric pain. No nausea, vomiting, or hematemesis; No diarrhea or constipation. No melena or hematochezia.  GENITOURINARY: No dysuria, frequency or hematuria  NEUROLOGICAL: No numbness or weakness  SKIN: No itching, burning, rashes, or lesions   All other review of systems is negative unless indicated above.  PHYSICAL EXAM:    General: Well developed; well nourished; in no acute distress  Eyes: PERRLA, EOMI; conjunctiva and sclera clear  Head: Normocephalic; atraumatic  ENMT: No nasal discharge; airway clear  Neck: Supple; non tender; no masses  Respiratory: No wheezes, rales or rhonchi  Cardiovascular: Regular rate and rhythm. S1 and S2 Normal; No murmurs, gallops or rubs  Gastrointestinal: Soft non-tender non-distended; Normal bowel sounds  Genitourinary: No  suprapubic  tenderness  Extremities: Normal range of motion, No clubbing, cyanosis or edema  Vascular: Peripheral pulses palpable 2+ bilaterally  Neurological: Alert and oriented x4  Skin: Warm and dry. No acute rash  Lymph Nodes: No acute cervical adenopathy  Musculoskeletal: Normal muscle tone, without deformities  Psychiatric: Cooperative and appropriate                            11.9   6.48  )-----------( 230      ( 2023 07:30 )             36.8     2023 07:30    139    |  105    |  27     ----------------------------<  99     3.7     |  26     |  0.80     Ca    9.3        2023 07:30  Mg     2.2       2023 22:28    TPro  6.7    /  Alb  3.1    /  TBili  0.5    /  DBili  x      /  AST  26     /  ALT  27     /  AlkPhos  97     2023 22:28    PT/INR - ( 2023 22:28 )   PT: 14.4 sec;   INR: 1.24 ratio         PTT - ( 2023 22:28 )  PTT:33.8 sec  CAPILLARY BLOOD GLUCOSE        LIVER FUNCTIONS - ( 2023 22:28 )  Alb: 3.1 g/dL / Pro: 6.7 gm/dL / ALK PHOS: 97 U/L / ALT: 27 U/L / AST: 26 U/L / GGT: x           Urinalysis Basic - ( 2023 01:16 )    Color: Yellow / Appearance: Clear / S.020 / pH: x  Gluc: x / Ketone: Trace  / Bili: Negative / Urobili: Negative   Blood: x / Protein: 100 / Nitrite: Negative   Leuk Esterase: Trace / RBC: 3-5 /HPF / WBC 3-5 /HPF   Sq Epi: x / Non Sq Epi: Few / Bacteria: Moderate        MEDICATIONS  (STANDING):  aspirin  chewable 81 milliGRAM(s) Oral daily  atorvastatin 40 milliGRAM(s) Oral at bedtime  cholecalciferol 1000 Unit(s) Oral two times a day  clopidogrel Tablet 75 milliGRAM(s) Oral daily  DULoxetine 30 milliGRAM(s) Oral daily  enoxaparin Injectable 40 milliGRAM(s) SubCutaneous every 24 hours  ferrous    sulfate 325 milliGRAM(s) Oral daily  furosemide   Injectable 20 milliGRAM(s) IV Push once  lisinopril 5 milliGRAM(s) Oral daily  metoprolol succinate  milliGRAM(s) Oral daily  multivitamin 1 Tablet(s) Oral daily    MEDICATIONS  (PRN):  acetaminophen     Tablet .. 650 milliGRAM(s) Oral every 6 hours PRN Temp greater or equal to 38C (100.4F), Mild Pain (1 - 3)  aluminum hydroxide/magnesium hydroxide/simethicone Suspension 30 milliLiter(s) Oral every 4 hours PRN Dyspepsia  melatonin 3 milliGRAM(s) Oral at bedtime PRN Insomnia  ondansetron Injectable 4 milliGRAM(s) IV Push every 8 hours PRN Nausea and/or Vomiting  polyethylene glycol 3350 17 Gram(s) Oral daily PRN Constipation  zolpidem 5 milliGRAM(s) Oral at bedtime PRN Insomnia      RADIOLOGY & ADDITIONAL TESTS: CC: Chest pain     HPI:  88 y/o F with PMH of HTN, CAD s/p 2 stents hiatal hernia, h/o GI bleed, gastritis,  LLE stent, arthritis, diverticulosis, hypothyroidism,  PPM, h/o CEA presented to ED c/o  palpitations? and leg swelling. Pt  is poor historian and unable to provide detailed history. States that had similar episodes of  " loud and strong heartbeat" but got much worse recently. Pt denies that its  a pain but unable to describe.  Also  noted worsening  leg swelling, denies SOB, but states that lately does not exert herself.  States that recently moved in with her daughter.    In ED: VS stable. labs with hyperkalemia, improved after Lokelma,   Also elevated BNP, trop neg x 1. CXR: no infiltrates ( official report pending)      FH: Pt reports that doesnt remember if anybody head CV Dz or cancer   reports no new complains  INTERVAL HPI/ OVERNIGHT EVENTS: Pt was seen and examined, sleeping comfortably,  reports no new complains, keg swelling is better     Vital Signs Last 24 Hrs  T(C): 36.7 (2023 09:37), Max: 36.7 (2023 16:30)  T(F): 98.1 (2023 09:37), Max: 98.1 (2023 16:30)  HR: 77 (2023 09:37) (65 - 77)  BP: 142/84 (2023 09:37) (133/73 - 142/84)  RR: 18 (2023 09:37) (18 - 18)  SpO2: 94% (2023 09:37) (94% - 97%)    Parameters below as of 2023 09:37  Patient On (Oxygen Delivery Method): room air      REVIEW OF SYSTEMS:  All other review of systems is negative unless indicated above.      PHYSICAL EXAM:  General: Well developed; frail elderly female,  in no acute distress  Eyes:  EOMI; conjunctiva and sclera clear  Head: Normocephalic; atraumatic  ENMT: No nasal discharge; airway clear  Neck: Supple; non tender; no masses  Respiratory: Decreased BS, No wheezes, rales or rhonchi  Cardiovascular: Regular rate and rhythm. S1 and S2 Normal;   Gastrointestinal: Soft non-tender non-distended; Normal bowel sounds  Genitourinary: No  suprapubic  tenderness  Extremities: + R>L  leg  edema  Vascular: Peripheral pulses palpable 2+ bilaterally  Neurological: Alert and oriented x2-3, forgetful, non focal   Skin: Warm and dry. No acute rash  Musculoskeletal: Normal muscle tone, without deformities  Psychiatric: Cooperative and appropriate      LABS:                                   11.9   6.48  )-----------( 230      ( 2023 07:30 )             36.8     2023 07:30    139    |  105    |  27     ----------------------------<  99     3.7     |  26     |  0.80     Ca    9.3        2023 07:30  Mg     2.2       2023 22:28    TPro  6.7    /  Alb  3.1    /  TBili  0.5    /  DBili  x      /  AST  26     /  ALT  27     /  AlkPhos  97     2023 22:28  PT/INR - ( 2023 22:28 )   PT: 14.4 sec;   INR: 1.24 ratio    PTT - ( 2023 22:28 )  PTT:33.8 sec      LIVER FUNCTIONS - ( 2023 22:28 )  Alb: 3.1 g/dL / Pro: 6.7 gm/dL / ALK PHOS: 97 U/L / ALT: 27 U/L / AST: 26 U/L / GGT: x           Urinalysis Basic - ( 2023 01:16 )    Color: Yellow / Appearance: Clear / S.020 / pH: x  Gluc: x / Ketone: Trace  / Bili: Negative / Urobili: Negative   Blood: x / Protein: 100 / Nitrite: Negative   Leuk Esterase: Trace / RBC: 3-5 /HPF / WBC 3-5 /HPF   Sq Epi: x / Non Sq Epi: Few / Bacteria: Moderate        MEDICATIONS  (STANDING):  aspirin  chewable 81 milliGRAM(s) Oral daily  atorvastatin 40 milliGRAM(s) Oral at bedtime  cholecalciferol 1000 Unit(s) Oral two times a day  clopidogrel Tablet 75 milliGRAM(s) Oral daily  DULoxetine 30 milliGRAM(s) Oral daily  enoxaparin Injectable 40 milliGRAM(s) SubCutaneous every 24 hours  ferrous    sulfate 325 milliGRAM(s) Oral daily  furosemide   Injectable 20 milliGRAM(s) IV Push once  lisinopril 5 milliGRAM(s) Oral daily  metoprolol succinate  milliGRAM(s) Oral daily  multivitamin 1 Tablet(s) Oral daily    MEDICATIONS  (PRN):  acetaminophen     Tablet .. 650 milliGRAM(s) Oral every 6 hours PRN Temp greater or equal to 38C (100.4F), Mild Pain (1 - 3)  aluminum hydroxide/magnesium hydroxide/simethicone Suspension 30 milliLiter(s) Oral every 4 hours PRN Dyspepsia  melatonin 3 milliGRAM(s) Oral at bedtime PRN Insomnia  ondansetron Injectable 4 milliGRAM(s) IV Push every 8 hours PRN Nausea and/or Vomiting  polyethylene glycol 3350 17 Gram(s) Oral daily PRN Constipation  zolpidem 5 milliGRAM(s) Oral at bedtime PRN Insomnia      RADIOLOGY & ADDITIONAL TESTS:  < from: US Duplex Venous Lower Ext Complete, Bilateral (23 @ 12:57) >    ACC: [id number] EXAM:  US DPLX LWR EXT VEINS COMPL BI                          PROCEDURE DATE:  2023          INTERPRETATION:  CLINICAL INFORMATION: Bilateral lower extremity swelling    COMPARISON: Prior study from 2013.    TECHNIQUE: Duplex sonography of the BILATERAL LOWER extremity veins with   color and spectral Doppler, with and without compression.    FINDINGS:    RIGHT:  Normal compressibility of the RIGHT common femoral, femoral and popliteal   veins.  Doppler examination shows normal spontaneous and phasic flow.  No RIGHT calf vein thrombosis is detected. Calf edema is present.    LEFT:  Normal compressibility of the LEFT common femoral, femoral and popliteal   veins.  Doppler examination shows normal spontaneous and phasic flow.  No LEFT calf vein thrombosis is detected. Calf edema is present.    IMPRESSION:  No evidence of deep venous thrombosis in either lower extremity.    Bilateral calf edema.        ACC: [id number] EXAM:  XR CHEST PORTABLE URGENT 1V                        PROCEDURE DATE:  2023   FINDINGS:  Heart/Vascular: The mediastinum, hilum and aorta are within normal limits   for projection. Moderate cardiomegaly which is stable. Mitral annular   calcification. Multiple coronary artery stents. Left chest wall dual lead   pacemaker.  Pulmonary: Midline trachea. There is no focal infiltrate, congestion or   effusion.    Bones: There is no fracture. Degenerative changes spine and right   shoulder.  Lines and catheter: None    Impression:  No acute pulmonary disease.  Moderate cardiomegaly which is stable

## 2023-01-20 LAB
ANION GAP SERPL CALC-SCNC: 8 MMOL/L — SIGNIFICANT CHANGE UP (ref 5–17)
BUN SERPL-MCNC: 30 MG/DL — HIGH (ref 7–23)
CALCIUM SERPL-MCNC: 9.7 MG/DL — SIGNIFICANT CHANGE UP (ref 8.5–10.1)
CHLORIDE SERPL-SCNC: 104 MMOL/L — SIGNIFICANT CHANGE UP (ref 96–108)
CO2 SERPL-SCNC: 27 MMOL/L — SIGNIFICANT CHANGE UP (ref 22–31)
CREAT SERPL-MCNC: 0.87 MG/DL — SIGNIFICANT CHANGE UP (ref 0.5–1.3)
EGFR: 64 ML/MIN/1.73M2 — SIGNIFICANT CHANGE UP
GLUCOSE SERPL-MCNC: 101 MG/DL — HIGH (ref 70–99)
MAGNESIUM SERPL-MCNC: 1.9 MG/DL — SIGNIFICANT CHANGE UP (ref 1.6–2.6)
NT-PROBNP SERPL-SCNC: 8724 PG/ML — HIGH (ref 0–450)
POTASSIUM SERPL-MCNC: 3.7 MMOL/L — SIGNIFICANT CHANGE UP (ref 3.5–5.3)
POTASSIUM SERPL-SCNC: 3.7 MMOL/L — SIGNIFICANT CHANGE UP (ref 3.5–5.3)
SODIUM SERPL-SCNC: 139 MMOL/L — SIGNIFICANT CHANGE UP (ref 135–145)

## 2023-01-20 PROCEDURE — 99233 SBSQ HOSP IP/OBS HIGH 50: CPT

## 2023-01-20 RX ORDER — MAGNESIUM OXIDE 400 MG ORAL TABLET 241.3 MG
400 TABLET ORAL
Refills: 0 | Status: COMPLETED | OUTPATIENT
Start: 2023-01-20 | End: 2023-01-22

## 2023-01-20 RX ORDER — FUROSEMIDE 40 MG
40 TABLET ORAL ONCE
Refills: 0 | Status: COMPLETED | OUTPATIENT
Start: 2023-01-20 | End: 2023-01-20

## 2023-01-20 RX ADMIN — Medication 200 MILLIGRAM(S): at 11:12

## 2023-01-20 RX ADMIN — Medication 81 MILLIGRAM(S): at 11:11

## 2023-01-20 RX ADMIN — ATORVASTATIN CALCIUM 40 MILLIGRAM(S): 80 TABLET, FILM COATED ORAL at 21:01

## 2023-01-20 RX ADMIN — MAGNESIUM OXIDE 400 MG ORAL TABLET 400 MILLIGRAM(S): 241.3 TABLET ORAL at 19:26

## 2023-01-20 RX ADMIN — MAGNESIUM OXIDE 400 MG ORAL TABLET 400 MILLIGRAM(S): 241.3 TABLET ORAL at 11:11

## 2023-01-20 RX ADMIN — Medication 650 MILLIGRAM(S): at 21:01

## 2023-01-20 RX ADMIN — LISINOPRIL 5 MILLIGRAM(S): 2.5 TABLET ORAL at 11:12

## 2023-01-20 RX ADMIN — ENOXAPARIN SODIUM 40 MILLIGRAM(S): 100 INJECTION SUBCUTANEOUS at 18:33

## 2023-01-20 RX ADMIN — Medication 325 MILLIGRAM(S): at 11:12

## 2023-01-20 RX ADMIN — Medication 1000 UNIT(S): at 11:13

## 2023-01-20 RX ADMIN — CLOPIDOGREL BISULFATE 75 MILLIGRAM(S): 75 TABLET, FILM COATED ORAL at 11:12

## 2023-01-20 RX ADMIN — Medication 1 TABLET(S): at 11:11

## 2023-01-20 RX ADMIN — Medication 3 MILLIGRAM(S): at 21:02

## 2023-01-20 RX ADMIN — DULOXETINE HYDROCHLORIDE 30 MILLIGRAM(S): 30 CAPSULE, DELAYED RELEASE ORAL at 11:12

## 2023-01-20 RX ADMIN — Medication 1000 UNIT(S): at 21:02

## 2023-01-20 RX ADMIN — Medication 40 MILLIGRAM(S): at 11:15

## 2023-01-20 NOTE — PROGRESS NOTE ADULT - SUBJECTIVE AND OBJECTIVE BOX
CARDIOLOGY AND HEART FAILURE CONSULT     Patient is a 89y old  Female who presents with a chief complaint of Chest pain.      HPI:  89 y/o F with PMH of HTN, CAD s/p 2 stents hiatal hernia, h/o GI bleed, gastritis,  LLE stent, arthritis, diverticulosis, hypothyroidism,  PPM, h/o CEA presented to ED c/o  palpitations? and leg swelling and chest pain.     23- Patient seen and examined by me this  a.m. She seems to be comfortable lying flat in the bed.    She was able to ask questions and answer my questions.    No overnight events according to the nurse       PAST MEDICAL & SURGICAL HISTORY:  CAD (coronary artery disease)      Dyslipidemia      Hypothyroidism      Hypertension      Hiatal hernia      Diverticulosis      Rectocele      Stress incontinence in female      High cholesterol      Acute arthritis      Status post total knee replacement  right      Bilateral cataracts  surgery      History of breast reconstruction      History of hysterectomy      H/O carotid endarterectomy  right      Stented coronary artery  12 heart stents,   1 left leg stents      Cardiac pacemaker      Carpal tunnel syndrome of right wrist          MEDICATIONS  (STANDING):  aspirin  chewable 81 milliGRAM(s) Oral daily  atorvastatin 40 milliGRAM(s) Oral at bedtime  cholecalciferol 1000 Unit(s) Oral two times a day  clopidogrel Tablet 75 milliGRAM(s) Oral daily  DULoxetine 30 milliGRAM(s) Oral daily  enoxaparin Injectable 40 milliGRAM(s) SubCutaneous every 24 hours  ferrous    sulfate 325 milliGRAM(s) Oral daily  lisinopril 5 milliGRAM(s) Oral daily  metoprolol succinate  milliGRAM(s) Oral daily  multivitamin 1 Tablet(s) Oral daily    MEDICATIONS  (PRN):  acetaminophen     Tablet .. 650 milliGRAM(s) Oral every 6 hours PRN Temp greater or equal to 38C (100.4F), Mild Pain (1 - 3)  aluminum hydroxide/magnesium hydroxide/simethicone Suspension 30 milliLiter(s) Oral every 4 hours PRN Dyspepsia  melatonin 3 milliGRAM(s) Oral at bedtime PRN Insomnia  ondansetron Injectable 4 milliGRAM(s) IV Push every 8 hours PRN Nausea and/or Vomiting  polyethylene glycol 3350 17 Gram(s) Oral daily PRN Constipation  zolpidem 5 milliGRAM(s) Oral at bedtime PRN Insomnia      FAMILY HISTORY:  Family history of cancer (Sibling)        SOCIAL HISTORY: no recent smoking     REVIEW OF SYSTEMS:  CONSTITUTIONAL:    No fatigue, malaise, lethargy.  No fever or chills.  RESPIRATORY:  No cough.  No wheeze.  No hemoptysis.    CARDIOVASCULAR:  No chest pains.  No palpitations. c/o shortness of breath, No orthopnea or PND.  GASTROINTESTINAL:  No abdominal pain.  No nausea or vomiting.    GENITOURINARY:    No hematuria.    MUSCULOSKELETAL:  c/o musculoskeletal pain.  No joint swelling.  No arthritis.  NEUROLOGICAL:  No tingling or numbness or weakness.  PSYCHIATRIC:  No confusion  SKIN:  No rashes.            Vital Signs Last 24 Hrs  T(C): 36.6 (2023 21:36), Max: 36.7   T(F): 97.8 (2023 21:36), Max: 98.1  HR: 75 (2023 21:36) (63 - 75)  BP: 133/73 (2023 21:36) (133/73 - 142/64)  BP(mean): --  RR: 18 (2023 21:36) (18 - 18)  SpO2: 96% (2023 21:36) (96% - 97%)    Parameters below as of 2023 21:36  Patient On (Oxygen Delivery Method): room air        PHYSICAL EXAM-    Constitutional:  no acute distress , elderly frail female    Head: Head is normocephalic and atraumatic.      Neck:  + hepato jugular reflux    Cardiovascular: regular rate and rhythm without S3, S4. No murmurs or rubs are appreciated.      Respiratory: B/l basal rales. No wheezing.    Abdomen: Soft, nontender, nondistended with positive bowel sounds.      Extremity: No tenderness. No  pitting edema     Neurologic: The patient is alert and oriented.      Skin: No rash, no obvious lesions noted.      Psychiatric: The patient appears to be emotionally stable.      INTERPRETATION OF TELEMETRY: afib, v pacing     ECG: Atrial fibrillation, V pacing. T wave inversion in inferior leads.     I&O's Detail    2023 07:01  -  2023 06:59  --------------------------------------------------------  IN:  Total IN: 0 mL    OUT:    Voided (mL): 600 mL  Total OUT: 600 mL    Total NET: -600 mL          LABS:                        13.2   7.95  )-----------( 311      ( 2023 21:16 )             42.3         x   |  x   |  x   ----------------------------<  x   4.9   |  x   |  x     Ca    9.9      2023 22:28  Mg     2.2         TPro  6.7  /  Alb  3.1<L>  /  TBili  0.5  /  DBili  x   /  AST  26  /  ALT  27  /  AlkPhos  97          PT/INR - ( 2023 22:28 )   PT: 14.4 sec;   INR: 1.24 ratio         PTT - ( 2023 22:28 )  PTT:33.8 sec  Urinalysis Basic - ( 2023 01:16 )    Color: Yellow / Appearance: Clear / S.020 / pH: x  Gluc: x / Ketone: Trace  / Bili: Negative / Urobili: Negative   Blood: x / Protein: 100 / Nitrite: Negative   Leuk Esterase: Trace / RBC: 3-5 /HPF / WBC 3-5 /HPF   Sq Epi: x / Non Sq Epi: Few / Bacteria: Moderate    Labs from yesterday  reviewed.  lab forwarding system not working to pull labs into note.   I&O's Summary    2023 07:  -  2023 06:59  --------------------------------------------------------  IN: 0 mL / OUT: 600 mL / NET: -600 mL      BNP  RADIOLOGY & ADDITIONAL STUDIES:  < from: US Duplex Venous Lower Ext Complete, Bilateral (23 @ 12:57) >    ACC: [id number] EXAM:  US DPLX LWR EXT VEINS COMPL BI                          PROCEDURE DATE:  2023          INTERPRETATION:  CLINICAL INFORMATION: Bilateral lower extremity swelling    COMPARISON: Prior study from 2013.    TECHNIQUE: Duplex sonography of the BILATERAL LOWER extremity veins with   color and spectral Doppler, with and without compression.    FINDINGS:    RIGHT:  Normal compressibility of the RIGHT common femoral, femoral and popliteal   veins.  Doppler examination shows normal spontaneous and phasic flow.  No RIGHT calf vein thrombosis is detected. Calf edema is present.    LEFT:  Normal compressibility of the LEFT common femoral, femoral and popliteal   veins.  Doppler examination shows normal spontaneous and phasic flow.  No LEFT calf vein thrombosis is detected. Calf edema is present.    IMPRESSION:  No evidence of deep venous thrombosis in either lower extremity.    Bilateral calf edema.    --- End of Report ---            CHAYITO WILSON MD; Attending Radiologist  This document has been electronically signed. 2023  2:00PM    < end of copied text >  < from: US Echo 2D M-Mode, w/o Cont (08 @ 09:53) >    IMPRESSION:      Normal right and left ventricular systolic function.  Moderate mitral regurgitation.  Moderate tricuspid regurgitation with mild hypertension.             [facility] PALLA MEDICINE/CARDIOLOGY  This examination was interpreted on:  2008  8:10A.  This document   has been electronically signed. 2008  8:14A.    < end of copied text >  < from: Xray Chest 1 View- PORTABLE-Urgent (23 @ 21:19) >    Impression:    No acute pulmonary disease.    Moderate cardiomegaly which is stable    --- End of Report ---            LUIS BREWER DO; Attending Radiologist  This document has been electronically signed. 2023 11:22AM    < end of copied text >    Echo reviewed from yesterday .  Unable to pull the results into the note as it is not allowing me to.

## 2023-01-20 NOTE — PROGRESS NOTE ADULT - SUBJECTIVE AND OBJECTIVE BOX
CC: Chest pain     HPI:  88 y/o F with PMH of HTN, CAD s/p 2 stents hiatal hernia, h/o GI bleed, gastritis,  LLE stent, arthritis, diverticulosis, hypothyroidism,  PPM, h/o CEA presented to ED c/o  palpitations? and leg swelling. Pt  is poor historian and unable to provide detailed history. States that had similar episodes of  " loud and strong heartbeat" but got much worse recently. Pt denies that its  a pain but unable to describe.  Also  noted worsening  leg swelling, denies SOB, but states that lately does not exert herself.  States that recently moved in with her daughter.    In ED: VS stable. labs with hyperkalemia, improved after Lokelma,   Also elevated BNP, trop neg x 1. CXR: no infiltrates ( official report pending)      FH: Pt reports that doesnt remember if anybody head CV Dz or cancer   reports no new complains  INTERVAL HPI/ OVERNIGHT EVENTS: Pt was seen and examined, confused, reports no complains, explained her condition in simple words  but unlikely Pt understands due to dementia.   Later ambulated with PT     Vital Signs Last 24 Hrs  T(C): 36.2 (2023 15:00), Max: 36.8 (2023 08:10)  T(F): 97.2 (2023 15:00), Max: 98.3 (2023 08:10)  HR: 70 (2023 15:00) (68 - 70)  BP: 128/66 (2023 15:00) (128/66 - 143/65)  BP(mean): 89 (2023 05:40) (89 - 89)  RR: 16 (2023 15:00) (16 - 18)  SpO2: 95% (2023 15:00) (95% - 96%)    Parameters below as of 2023 15:00  Patient On (Oxygen Delivery Method): room air        REVIEW OF SYSTEMS:  All other review of systems is negative unless indicated above.      PHYSICAL EXAM:  General: Well developed; frail elderly female,  in no acute distress  Eyes:  EOMI; conjunctiva and sclera clear  Head: Normocephalic; atraumatic  ENMT: No nasal discharge; airway clear  Neck: Supple; non tender; no masses  Respiratory: Decreased BS, mild crackles at bases   Cardiovascular: Regular rate and rhythm. S1 and S2 Normal;   Gastrointestinal: Soft non-tender non-distended; Normal bowel sounds  Genitourinary: No  suprapubic  tenderness  Extremities: + R>L  leg  edema, better   Vascular: Peripheral pulses palpable 2+ bilaterally  Neurological: Alert and oriented x2, forgetful, non focal   Skin: Warm and dry. No acute rash  Musculoskeletal: Normal muscle tone, without deformities  Psychiatric: Cooperative and appropriate      LABS:                         11.9   6.48  )-----------( 230      ( 2023 07:30 )             36.8         139  |  104  |  30<H>  ----------------------------<  101<H>  3.7   |  27  |  0.87    Ca    9.7      2023 07:51  Mg     1.9                                         11.9   6.48  )-----------( 230      ( 2023 07:30 )             36.8     2023 07:30    139    |  105    |  27     ----------------------------<  99     3.7     |  26     |  0.80     Ca    9.3        2023 07:30  Mg     2.2       2023 22:28    TPro  6.7    /  Alb  3.1    /  TBili  0.5    /  DBili  x      /  AST  26     /  ALT  27     /  AlkPhos  97     2023 22:28  PT/INR - ( 2023 22:28 )   PT: 14.4 sec;   INR: 1.24 ratio    PTT - ( 2023 22:28 )  PTT:33.8 sec      LIVER FUNCTIONS - ( 2023 22:28 )  Alb: 3.1 g/dL / Pro: 6.7 gm/dL / ALK PHOS: 97 U/L / ALT: 27 U/L / AST: 26 U/L / GGT: x           Urinalysis Basic - ( 2023 01:16 )    Color: Yellow / Appearance: Clear / S.020 / pH: x  Gluc: x / Ketone: Trace  / Bili: Negative / Urobili: Negative   Blood: x / Protein: 100 / Nitrite: Negative   Leuk Esterase: Trace / RBC: 3-5 /HPF / WBC 3-5 /HPF   Sq Epi: x / Non Sq Epi: Few / Bacteria: Moderate      MEDICATIONS  (STANDING):  aspirin  chewable 81 milliGRAM(s) Oral daily  atorvastatin 40 milliGRAM(s) Oral at bedtime  cholecalciferol 1000 Unit(s) Oral two times a day  clopidogrel Tablet 75 milliGRAM(s) Oral daily  DULoxetine 30 milliGRAM(s) Oral daily  enoxaparin Injectable 40 milliGRAM(s) SubCutaneous every 24 hours  ferrous    sulfate 325 milliGRAM(s) Oral daily  lisinopril 5 milliGRAM(s) Oral daily  magnesium oxide 400 milliGRAM(s) Oral two times a day with meals  metoprolol succinate  milliGRAM(s) Oral daily  multivitamin 1 Tablet(s) Oral daily    MEDICATIONS  (PRN):  acetaminophen     Tablet .. 650 milliGRAM(s) Oral every 6 hours PRN Temp greater or equal to 38C (100.4F), Mild Pain (1 - 3)  aluminum hydroxide/magnesium hydroxide/simethicone Suspension 30 milliLiter(s) Oral every 4 hours PRN Dyspepsia  melatonin 3 milliGRAM(s) Oral at bedtime PRN Insomnia  ondansetron Injectable 4 milliGRAM(s) IV Push every 8 hours PRN Nausea and/or Vomiting  polyethylene glycol 3350 17 Gram(s) Oral daily PRN Constipation  zolpidem 5 milliGRAM(s) Oral at bedtime PRN Insomnia      RADIOLOGY & ADDITIONAL TESTS:  < from: US Duplex Venous Lower Ext Complete, Bilateral (23 @ 12:57) >    ACC: [id number] EXAM:  US DPLX LWR EXT VEINS COMPL BI                          PROCEDURE DATE:  2023          INTERPRETATION:  CLINICAL INFORMATION: Bilateral lower extremity swelling    COMPARISON: Prior study from 2013.    TECHNIQUE: Duplex sonography of the BILATERAL LOWER extremity veins with   color and spectral Doppler, with and without compression.    FINDINGS:    RIGHT:  Normal compressibility of the RIGHT common femoral, femoral and popliteal   veins.  Doppler examination shows normal spontaneous and phasic flow.  No RIGHT calf vein thrombosis is detected. Calf edema is present.    LEFT:  Normal compressibility of the LEFT common femoral, femoral and popliteal   veins.  Doppler examination shows normal spontaneous and phasic flow.  No LEFT calf vein thrombosis is detected. Calf edema is present.    IMPRESSION:  No evidence of deep venous thrombosis in either lower extremity.    Bilateral calf edema.        ACC: [id number] EXAM:  XR CHEST PORTABLE URGENT 1V                        PROCEDURE DATE:  2023   FINDINGS:  Heart/Vascular: The mediastinum, hilum and aorta are within normal limits   for projection. Moderate cardiomegaly which is stable. Mitral annular   calcification. Multiple coronary artery stents. Left chest wall dual lead   pacemaker.  Pulmonary: Midline trachea. There is no focal infiltrate, congestion or   effusion.    Bones: There is no fracture. Degenerative changes spine and right   shoulder.  Lines and catheter: None    Impression:  No acute pulmonary disease.  Moderate cardiomegaly which is stable

## 2023-01-20 NOTE — CONSULT NOTE ADULT - SUBJECTIVE AND OBJECTIVE BOX
HPI:  89 y/o F with PMH of HTN, CAD s/p 2 stents hiatal hernia, h/o GI bleed, gastritis,  LLE stent, arthritis, diverticulosis, hypothyroidism,  PPM, h/o CEA presented to ED c/o  palpitations? and leg swelling. Pt  is poor historian and unable to provide detailed history. States that had similar episodes of  " loud and strong heartbeat" but got much worse recently. Pt denies that its  a pain but unable to describe.  Also  noted worsening  leg swelling, denies SOB, but states that lately does not exert herself.  States that recently moved in with her daughter.    In ED: VS stable. labs with hyperkalemia, improved after Lokelma,   Also elevated BNP, trop neg x 1. CXR: no infiltrates ( official report pending)      FH: Pt reports that doesnt remember if anybody head CV Dz or cancer (18 Jan 2023 09:00)      1/20/23:  EP asked to evaluate this pt who has a dual lead PPM (see interrogation note from 1/18) and found to have 4+ TR on echo  TELE: atrial fib, rate controlled with demand V-pacing  EKG: atrial fib 82 bpm, QRS 86ms, QTc 472ms      PAST MEDICAL & SURGICAL HISTORY:  CAD (coronary artery disease)  Dyslipidemia  Hypothyroidism  Hypertension  Hiatal hernia  Diverticulosis  Rectocele  Stress incontinence in female  High cholesterol  Acute arthritis  Status post total knee replacement  right  Bilateral cataracts  surgery  History of breast reconstruction  History of hysterectomy  H/O carotid endarterectomy  right  Stented coronary artery  12 heart stents,   1 left leg stents  Cardiac pacemaker  Carpal tunnel syndrome of right wrist          MEDICATIONS  (STANDING):  aspirin  chewable 81 milliGRAM(s) Oral daily  atorvastatin 40 milliGRAM(s) Oral at bedtime  cholecalciferol 1000 Unit(s) Oral two times a day  clopidogrel Tablet 75 milliGRAM(s) Oral daily  DULoxetine 30 milliGRAM(s) Oral daily  enoxaparin Injectable 40 milliGRAM(s) SubCutaneous every 24 hours  ferrous    sulfate 325 milliGRAM(s) Oral daily  lisinopril 5 milliGRAM(s) Oral daily  magnesium oxide 400 milliGRAM(s) Oral two times a day with meals  metoprolol succinate  milliGRAM(s) Oral daily  multivitamin 1 Tablet(s) Oral daily    MEDICATIONS  (PRN):  acetaminophen     Tablet .. 650 milliGRAM(s) Oral every 6 hours PRN Temp greater or equal to 38C (100.4F), Mild Pain (1 - 3)  aluminum hydroxide/magnesium hydroxide/simethicone Suspension 30 milliLiter(s) Oral every 4 hours PRN Dyspepsia  melatonin 3 milliGRAM(s) Oral at bedtime PRN Insomnia  ondansetron Injectable 4 milliGRAM(s) IV Push every 8 hours PRN Nausea and/or Vomiting  polyethylene glycol 3350 17 Gram(s) Oral daily PRN Constipation  zolpidem 5 milliGRAM(s) Oral at bedtime PRN Insomnia      Allergies    No Known Drug Allergies  Originally Entered as [Unknown see Comment: pt unable to remember] reaction to [Mold] (Unknown)  stolazine eye med- pt doesn&#x27;t remember (Unknown)    Intolerances        SOCIAL HISTORY: Denies tobacco, etoh abuse or illicit drug use    FAMILY HISTORY:  Family history of cancer (Sibling)        Vital Signs Last 24 Hrs  T(C): 36.8 (20 Jan 2023 08:10), Max: 36.8 (20 Jan 2023 08:10)  T(F): 98.3 (20 Jan 2023 08:10), Max: 98.3 (20 Jan 2023 08:10)  HR: 68 (20 Jan 2023 08:10) (68 - 73)  BP: 143/65 (20 Jan 2023 08:10) (132/68 - 149/72)  BP(mean): 89 (20 Jan 2023 05:40) (89 - 89)  RR: 17 (20 Jan 2023 08:10) (17 - 18)  SpO2: 96% (20 Jan 2023 08:10) (95% - 96%)    Parameters below as of 20 Jan 2023 08:10  Patient On (Oxygen Delivery Method): room air        REVIEW OF SYSTEMS:    CONSTITUTIONAL:  As per HPI.  HEENT:  Eyes:  No diplopia or blurred vision. ENT:  No earache, sore throat or runny nose.  CARDIOVASCULAR:  No pressure, squeezing, strangling, tightness, heaviness or aching about the chest, neck, axilla or epigastrium.  RESPIRATORY:  No cough, shortness of breath, PND or orthopnea.  GASTROINTESTINAL:  No nausea, vomiting or diarrhea.  GENITOURINARY:  No dysuria, frequency or urgency.  MUSCULOSKELETAL:  As per HPI.  SKIN:  No change in skin, hair or nails.  NEUROLOGIC:  No paresthesias, fasciculations, seizures or weakness.  PSYCHIATRIC:  No disorder of thought or mood.  ENDOCRINE:  No heat or cold intolerance, polyuria or polydipsia.  HEMATOLOGICAL:  No easy bruising or bleedings:  .     PHYSICAL EXAMINATION:    GENERAL APPEARANCE:  Pt. is not currently dyspneic, in no distress. Pt. is alert, oriented, and pleasant.  HEENT:  Pupils are normal and react normally. No icterus. Mucous membranes well colored.  NECK:  Supple. No lymphadenopathy. Jugular venous pressure not elevated. Carotids equal.   HEART:   Irregular S1S2. There are no murmurs, rubs or gallops noted  CHEST:  Chest is clear to auscultation. Normal respiratory effort.  ABDOMEN:  Soft and nontender.   EXTREMITIES:  There is B/L LE edema.   SKIN:  No rash or significant lesions are noted.    I&O's Summary    19 Jan 2023 07:01  -  20 Jan 2023 07:00  --------------------------------------------------------  IN: 0 mL / OUT: 1000 mL / NET: -1000 mL        LABS:                        11.9   6.48  )-----------( 230      ( 19 Jan 2023 07:30 )             36.8     01-20    139  |  104  |  30<H>  ----------------------------<  101<H>  3.7   |  27  |  0.87    Ca    9.7      20 Jan 2023 07:51  Mg     1.9     01-20        CARDIAC TESTS:    < from: TTE Echo Complete w/o Contrast w/ Doppler (01.19.23 @ 11:55) >   Impression     Summary     The mitral valve leaflets appear thickened.   Mild mitral annular calcification is present.   Moderate (2+) mitral regurgitation is present.   Non-coaptation of tricuspid valve is present.   Severe (4+) tricuspid valve regurgitation is present.   Wide open tricuspid insufficiency.   Normal appearing pulmonic valve structure.   Mild pulmonic valvular regurgitation (1+) is present.   The left atrium is mildly dilated.   Left ventricle systolic function appears preserved in the presence of a   cardiac arrhythmia. Left ventricle size and structure are within normal   limitations.   Estimated left ventricular ejection fraction is 55-60 %.   The right atrium appears dilated.   A device wire is seen in the RV and RA.   The right ventricle appears mildly dilated.   A device wire is seen in the RV and RA.   IVC is dilated with decreased respiratory variation.          RADIOLOGY & ADDITIONAL STUDIES:    < from: Xray Chest 1 View- PORTABLE-Urgent (01.17.23 @ 21:19) >  Impression:    No acute pulmonary disease.    Moderate cardiomegaly which is stable    < end of copied text >    
CARDIOLOGY AND HEART FAILURE CONSULT     Patient is a 89y old  Female who presents with a chief complaint of Chest pain.      HPI:  89 y/o F with PMH of HTN, CAD s/p 2 stents hiatal hernia, h/o GI bleed, gastritis,  LLE stent, arthritis, diverticulosis, hypothyroidism,  PPM, h/o CEA presented to ED c/o  palpitations? and leg swelling.  Pt states that she had chest pain in L chest and pointing to a spot.  She denies any SOB now and was able to lie down flat in bed.  She states she might have had afib for long time but not sure.   BP elevated at presentation  No overnight issues per staff.        PAST MEDICAL & SURGICAL HISTORY:  CAD (coronary artery disease)      Dyslipidemia      Hypothyroidism      Hypertension      Hiatal hernia      Diverticulosis      Rectocele      Stress incontinence in female      High cholesterol      Acute arthritis      Status post total knee replacement  right      Bilateral cataracts  surgery      History of breast reconstruction      History of hysterectomy      H/O carotid endarterectomy  right      Stented coronary artery  12 heart stents,   1 left leg stents      Cardiac pacemaker      Carpal tunnel syndrome of right wrist          MEDICATIONS  (STANDING):  aspirin  chewable 81 milliGRAM(s) Oral daily  atorvastatin 40 milliGRAM(s) Oral at bedtime  cholecalciferol 1000 Unit(s) Oral two times a day  clopidogrel Tablet 75 milliGRAM(s) Oral daily  DULoxetine 30 milliGRAM(s) Oral daily  enoxaparin Injectable 40 milliGRAM(s) SubCutaneous every 24 hours  ferrous    sulfate 325 milliGRAM(s) Oral daily  lisinopril 5 milliGRAM(s) Oral daily  metoprolol succinate  milliGRAM(s) Oral daily  multivitamin 1 Tablet(s) Oral daily    MEDICATIONS  (PRN):  acetaminophen     Tablet .. 650 milliGRAM(s) Oral every 6 hours PRN Temp greater or equal to 38C (100.4F), Mild Pain (1 - 3)  aluminum hydroxide/magnesium hydroxide/simethicone Suspension 30 milliLiter(s) Oral every 4 hours PRN Dyspepsia  melatonin 3 milliGRAM(s) Oral at bedtime PRN Insomnia  ondansetron Injectable 4 milliGRAM(s) IV Push every 8 hours PRN Nausea and/or Vomiting  polyethylene glycol 3350 17 Gram(s) Oral daily PRN Constipation  zolpidem 5 milliGRAM(s) Oral at bedtime PRN Insomnia      FAMILY HISTORY:  Family history of cancer (Sibling)        SOCIAL HISTORY: no recent smoking     REVIEW OF SYSTEMS:  CONSTITUTIONAL:    No fatigue, malaise, lethargy.  No fever or chills.  RESPIRATORY:  No cough.  No wheeze.  No hemoptysis.    CARDIOVASCULAR:  No chest pains.  No palpitations. c/o shortness of breath, No orthopnea or PND.  GASTROINTESTINAL:  No abdominal pain.  No nausea or vomiting.    GENITOURINARY:    No hematuria.    MUSCULOSKELETAL:  c/o musculoskeletal pain.  No joint swelling.  No arthritis.  NEUROLOGICAL:  No tingling or numbness or weakness.  PSYCHIATRIC:  No confusion  SKIN:  No rashes.            Vital Signs Last 24 Hrs  T(C): 36.6 (2023 21:36), Max: 36.7 (2023 16:30)  T(F): 97.8 (2023 21:36), Max: 98.1 (2023 16:30)  HR: 75 (2023 21:36) (63 - 75)  BP: 133/73 (2023 21:36) (133/73 - 142/64)  BP(mean): --  RR: 18 (2023 21:36) (18 - 18)  SpO2: 96% (2023 21:36) (96% - 97%)    Parameters below as of 2023 21:36  Patient On (Oxygen Delivery Method): room air        PHYSICAL EXAM-    Constitutional:  no acute distress , elderly frail female    Head: Head is normocephalic and atraumatic.      Neck:  + hepato jugular reflux    Cardiovascular: regular rate and rhythm without S3, S4. No murmurs or rubs are appreciated.      Respiratory: B/l basal rales. No wheezing.    Abdomen: Soft, nontender, nondistended with positive bowel sounds.      Extremity: No tenderness. No  pitting edema     Neurologic: The patient is alert and oriented.      Skin: No rash, no obvious lesions noted.      Psychiatric: The patient appears to be emotionally stable.      INTERPRETATION OF TELEMETRY: afib, v pacing     ECG: Atrial fibrillation, V pacing. T wave inversion in inferior leads.     I&O's Detail    2023 07:01  -  2023 06:59  --------------------------------------------------------  IN:  Total IN: 0 mL    OUT:    Voided (mL): 600 mL  Total OUT: 600 mL    Total NET: -600 mL          LABS:                        13.2   7.95  )-----------( 311      ( 2023 21:16 )             42.3         x   |  x   |  x   ----------------------------<  x   4.9   |  x   |  x     Ca    9.9      2023 22:28  Mg     2.2         TPro  6.7  /  Alb  3.1<L>  /  TBili  0.5  /  DBili  x   /  AST  26  /  ALT  27  /  AlkPhos  97          PT/INR - ( 2023 22:28 )   PT: 14.4 sec;   INR: 1.24 ratio         PTT - ( 2023 22:28 )  PTT:33.8 sec  Urinalysis Basic - ( 2023 01:16 )    Color: Yellow / Appearance: Clear / S.020 / pH: x  Gluc: x / Ketone: Trace  / Bili: Negative / Urobili: Negative   Blood: x / Protein: 100 / Nitrite: Negative   Leuk Esterase: Trace / RBC: 3-5 /HPF / WBC 3-5 /HPF   Sq Epi: x / Non Sq Epi: Few / Bacteria: Moderate      I&O's Summary    2023 07:01  -  2023 06:59  --------------------------------------------------------  IN: 0 mL / OUT: 600 mL / NET: -600 mL      BNP  RADIOLOGY & ADDITIONAL STUDIES:  < from: US Duplex Venous Lower Ext Complete, Bilateral (23 @ 12:57) >    ACC: [id number] EXAM:  US DPLX LWR EXT VEINS COMPL BI                          PROCEDURE DATE:  2023          INTERPRETATION:  CLINICAL INFORMATION: Bilateral lower extremity swelling    COMPARISON: Prior study from 2013.    TECHNIQUE: Duplex sonography of the BILATERAL LOWER extremity veins with   color and spectral Doppler, with and without compression.    FINDINGS:    RIGHT:  Normal compressibility of the RIGHT common femoral, femoral and popliteal   veins.  Doppler examination shows normal spontaneous and phasic flow.  No RIGHT calf vein thrombosis is detected. Calf edema is present.    LEFT:  Normal compressibility of the LEFT common femoral, femoral and popliteal   veins.  Doppler examination shows normal spontaneous and phasic flow.  No LEFT calf vein thrombosis is detected. Calf edema is present.    IMPRESSION:  No evidence of deep venous thrombosis in either lower extremity.    Bilateral calf edema.    --- End of Report ---            CHAYITO WILSON MD; Attending Radiologist  This document has been electronically signed. 2023  2:00PM    < end of copied text >  < from: US Echo 2D M-Mode, w/o Cont (08 @ 09:53) >    IMPRESSION:      Normal right and left ventricular systolic function.  Moderate mitral regurgitation.  Moderate tricuspid regurgitation with mild hypertension.             [facility]IOLOG[facility]  This examination was interpreted on:  2008  8:10A.  This document   has been electronically signed. 2008  8:14A.    < end of copied text >  < from: Xray Chest 1 View- PORTABLE-Urgent (23 @ 21:19) >    Impression:    No acute pulmonary disease.    Moderate cardiomegaly which is stable    --- End of Report ---            LUIS BREWER DO; Attending Radiologist  This document has been electronically signed. 2023 11:22AM    < end of copied text >

## 2023-01-20 NOTE — PROGRESS NOTE ADULT - ASSESSMENT
SOB, edema- Acute on chronic decompensated HF- unknown LVEF, per echo from past normal LVEF.   reviewed her 2D echocardiogram from yesterday.    She has presence of wide open TR probably iatrogenic secondary to the pacema.ker wire.    The utilization of tricuspid regurgitation jet to calculate the right ventricular systolic pressure will not be useful in this case.    She will benefit from diuresis and I  recommended continuation of diuresis with Lasix 40 mg IV b.i.d..    Over the weekend if there is slight elevation of the BUN that is noted then we can slow down the diuresis with IV once a day.    Diuresis with close monitoring of the renal function and electrolytes.  Goal potassium of 4 and magnesium of 2.   Strict I/O and daily wt checks. Low sodium diet. Nutrition education.      recommend EP team consultation for evaluation of iatrogenic TR.    She has frailty and multiple comorbidities and she may be a poor surgical candidate.        Chest pain- Chest pain is atypical in nature. So far cardiac enzymes and EKG did not reveal any ischemia.   Outpt ischemic heart disease evaluation recommended.  f/u with cardiologist as outpt.     HTN- poorly controlled at presentation.  Recommend continuation of current meds for now.  Based on echo results can consider entresto but daughter says that she cannot afford a med that was 1000 dollars a month and she was prescribed in past.    PPM- normal function.  s/p interrogation.    Atrial fibrillation- chronic in nature.  recommend elliquis 2.5mg PO BID and DC Aspirin if this was contemplated.  no high rate episodes per PPM interrogation.    CAD s/p PCI- continue current meds.      I called her son Moshe over the phone and he did not .    I left a message for him.    He is her healthcare proxy.    I also discussed the case with Dr. Welsh who was her cardiologist for many years.        Other medical issues- Management per primary team.   Thank you for allowing me to participate in the care of this patient. Please feel free to contact me with any questions.

## 2023-01-20 NOTE — PROGRESS NOTE ADULT - ASSESSMENT
89 y/o F with PMH of HTN, CAD s/p 2 stents hiatal hernia, h/o GI bleed, gastritis,  LLE stent, arthritis, diverticulosis, hypothyroidism,  PPM, h/o CEA  admitted for:       1. Atypical Chest pain. H/o CAD s/p stents   C/w tele: AFIB , V-paced   ACS ruled out    troponin neg   ECHO: preserved EF, no RWMA  PPM interrogated, reprogrammed.   C/w ASA, Plavix, change lovastatin to Lipitor  C/w Toprol    D/w Dr Aguero, will need further ischemic work up outPt       2. Leg edema, elevated BNP due to acute  HFpEF. Severe Valvular Dz  Monitor weight   Strict is and Os   C/w IV lasix, increased to 40mg IV QD   LE doppler: neg for DVT   ECHO: preserved EF, wide open TR, moderate MR/AR    ED/w EP and cardio, severe TR likely iatrogenic related to PPM  lead, considering Pts age, frailty and comorbidities she id not a good candidate for Sx procedure. Will c/w diuresis   DR Aguero  discussed findings and POC with Pts son on the phone       3. HTN  C/w metoprolol and ACEI  Hold amlodipine due to leg swelling and  CHF   Monitor BP       4. PAD , S/p LLE stent s/p R   C/w ASA, Plavix, statins     5. Depression   C/w Duloxetine  Supportive care     6. DVT PPx: lovenox SQ     Dispo; C/w IV lasix, labs in am. PT

## 2023-01-20 NOTE — CONSULT NOTE ADULT - ASSESSMENT
89 y/o F with PMHx of HTN, CAD s/p 2 stents, hiatal hernia, h/o GI bleed, gastritis,  LLE stent, arthritis, diverticulosis, hypothyroidism,  PPM, h/o CEA presented to ED c/o  palpitations and leg swelling and chest pain.     Pt has PPM and leads from 2015  PPM function is normal, she has chronic AF, rate controlled  Found to have 4+ TR on 2D echo.  Due to age of leads and pt's age, recommend medical management at this time.  Continue diuresis  GDMT per cardiology  Plan discussed with Dr. Zhang, pt, hospitalist. 88 y/o F with PMHx of HTN, CAD s/p 2 stents, hiatal hernia, h/o GI bleed, gastritis,  LLE stent, arthritis, diverticulosis, hypothyroidism,  PPM, h/o CEA presented to ED c/o  palpitations and leg swelling and chest pain.     Pt has PPM and leads from 2015  PPM function is normal, she has chronic AF, rate controlled  Found to have 4+ TR on 2D echo.  lead extraction would be high risk procedure, Due to age of leads and pt's age, recommend medical management at this time.  Continue diuresis  GDMT per cardiology  Plan discussed with Dr. Zhang, pt, hospitalist.

## 2023-01-20 NOTE — CONSULT NOTE ADULT - NS ATTEND AMEND GEN_ALL_CORE FT
agree with a/p, severe TF propably also exacerbated by RV pacing lead, pt would be high risk for lead extraction and unclear if TR would improve,   agree with medical management

## 2023-01-21 LAB
ANION GAP SERPL CALC-SCNC: 6 MMOL/L — SIGNIFICANT CHANGE UP (ref 5–17)
BUN SERPL-MCNC: 31 MG/DL — HIGH (ref 7–23)
CALCIUM SERPL-MCNC: 9.1 MG/DL — SIGNIFICANT CHANGE UP (ref 8.5–10.1)
CHLORIDE SERPL-SCNC: 103 MMOL/L — SIGNIFICANT CHANGE UP (ref 96–108)
CO2 SERPL-SCNC: 28 MMOL/L — SIGNIFICANT CHANGE UP (ref 22–31)
CREAT SERPL-MCNC: 0.93 MG/DL — SIGNIFICANT CHANGE UP (ref 0.5–1.3)
EGFR: 59 ML/MIN/1.73M2 — LOW
GLUCOSE SERPL-MCNC: 105 MG/DL — HIGH (ref 70–99)
MAGNESIUM SERPL-MCNC: 1.9 MG/DL — SIGNIFICANT CHANGE UP (ref 1.6–2.6)
PHOSPHATE SERPL-MCNC: 3.5 MG/DL — SIGNIFICANT CHANGE UP (ref 2.5–4.5)
POTASSIUM SERPL-MCNC: 3.6 MMOL/L — SIGNIFICANT CHANGE UP (ref 3.5–5.3)
POTASSIUM SERPL-SCNC: 3.6 MMOL/L — SIGNIFICANT CHANGE UP (ref 3.5–5.3)
SODIUM SERPL-SCNC: 137 MMOL/L — SIGNIFICANT CHANGE UP (ref 135–145)

## 2023-01-21 PROCEDURE — 99233 SBSQ HOSP IP/OBS HIGH 50: CPT

## 2023-01-21 PROCEDURE — 99223 1ST HOSP IP/OBS HIGH 75: CPT

## 2023-01-21 RX ORDER — MAGNESIUM SULFATE 500 MG/ML
1 VIAL (ML) INJECTION ONCE
Refills: 0 | Status: COMPLETED | OUTPATIENT
Start: 2023-01-21 | End: 2023-01-21

## 2023-01-21 RX ORDER — FUROSEMIDE 40 MG
40 TABLET ORAL ONCE
Refills: 0 | Status: COMPLETED | OUTPATIENT
Start: 2023-01-21 | End: 2023-01-21

## 2023-01-21 RX ADMIN — ENOXAPARIN SODIUM 40 MILLIGRAM(S): 100 INJECTION SUBCUTANEOUS at 18:59

## 2023-01-21 RX ADMIN — Medication 3 MILLIGRAM(S): at 21:01

## 2023-01-21 RX ADMIN — CLOPIDOGREL BISULFATE 75 MILLIGRAM(S): 75 TABLET, FILM COATED ORAL at 10:51

## 2023-01-21 RX ADMIN — Medication 40 MILLIGRAM(S): at 13:30

## 2023-01-21 RX ADMIN — Medication 1000 UNIT(S): at 10:50

## 2023-01-21 RX ADMIN — Medication 1 TABLET(S): at 10:51

## 2023-01-21 RX ADMIN — DULOXETINE HYDROCHLORIDE 30 MILLIGRAM(S): 30 CAPSULE, DELAYED RELEASE ORAL at 10:52

## 2023-01-21 RX ADMIN — LISINOPRIL 5 MILLIGRAM(S): 2.5 TABLET ORAL at 10:49

## 2023-01-21 RX ADMIN — Medication 81 MILLIGRAM(S): at 10:51

## 2023-01-21 RX ADMIN — Medication 100 GRAM(S): at 13:30

## 2023-01-21 RX ADMIN — MAGNESIUM OXIDE 400 MG ORAL TABLET 400 MILLIGRAM(S): 241.3 TABLET ORAL at 08:45

## 2023-01-21 RX ADMIN — Medication 1000 UNIT(S): at 21:02

## 2023-01-21 RX ADMIN — MAGNESIUM OXIDE 400 MG ORAL TABLET 400 MILLIGRAM(S): 241.3 TABLET ORAL at 19:00

## 2023-01-21 RX ADMIN — Medication 650 MILLIGRAM(S): at 21:01

## 2023-01-21 RX ADMIN — Medication 325 MILLIGRAM(S): at 10:51

## 2023-01-21 RX ADMIN — Medication 200 MILLIGRAM(S): at 10:51

## 2023-01-21 RX ADMIN — ATORVASTATIN CALCIUM 40 MILLIGRAM(S): 80 TABLET, FILM COATED ORAL at 21:01

## 2023-01-21 NOTE — PROGRESS NOTE ADULT - ASSESSMENT
88 y/o F with PMH of HTN, CAD s/p 2 stents hiatal hernia, h/o GI bleed, gastritis,  LLE stent, arthritis, diverticulosis, hypothyroidism,  PPM, h/o CEA  admitted for:       1. Atypical Chest pain. H/o CAD s/p stents   C/w tele: AFIB , V-paced   ACS ruled out    troponin neg   ECHO: preserved EF, no RWMA  PPM interrogated, reprogrammed.   C/w ASA, Plavix, change lovastatin to Lipitor  C/w Toprol    D/w Dr Aguero, will need further ischemic work up outPt       2. Leg edema, elevated BNP due to acute  HFpEF. Severe Valvular Dz  Monitor weight   Strict is and Os   C/w IV lasix, increased to 40mg IV QD   LE doppler: neg for DVT   ECHO: preserved EF, wide open TR, moderate MR/AR    ED/w EP and cardio, severe TR likely iatrogenic related to PPM  lead, considering Pts age, frailty and comorbidities she id not a good candidate for Sx procedure. Will c/w diuresis   DR Aguero  discussed findings and POC with Pts son on the phone       3. HTN  C/w metoprolol and ACEI  Hold amlodipine due to leg swelling and  CHF   Monitor BP       4. PAD , S/p LLE stent s/p R   C/w ASA, Plavix, statins     5. Depression   C/w Duloxetine  Supportive care     6. DVT PPx: lovenox SQ     Dispo; C/w IV lasix, labs in am. PT    88 y/o F with PMH of HTN, CAD s/p 2 stents hiatal hernia, h/o GI bleed, gastritis,  LLE stent, arthritis, diverticulosis, hypothyroidism,  PPM, h/o CEA  admitted for:       1. Atypical Chest pain. H/o CAD s/p stents   C/w tele: AFIB , V-paced   ACS ruled out   troponin neg   ECHO: preserved EF, no RWMA  PPM interrogated, reprogrammed.   C/w ASA, Plavix, change lovastatin to Lipitor  C/w Toprol    D/w Dr Aguero, will need further ischemic work up outPt       2. Leg edema, elevated BNP due to acute  HFpEF. Severe Valvular Dz  Monitor weight   Strict is and Os   C/w IV lasix  40mg IV QD   LE doppler: neg for DVT   ECHO: preserved EF, wide open TR, moderate MR/AR    ED/w EP and cardio, severe TR likely iatrogenic related to PPM  lead, considering Pts age, frailty and comorbidities she id not a good candidate for Sx procedure. Will c/w diuresis   Replace Potassium       3. HTN  C/w metoprolol and ACEI  Hold amlodipine due to leg swelling and  CHF   Monitor BP       4. PAD , S/p LLE stent s/p R   C/w ASA, Plavix, statins     5. Depression   C/w Duloxetine  Supportive care     6. DVT PPx: lovenox SQ     Dispo; C/w IV lasix, labs in am. PT

## 2023-01-21 NOTE — PROGRESS NOTE ADULT - SUBJECTIVE AND OBJECTIVE BOX
CC: Chest pain     HPI:  88 y/o F with PMH of HTN, CAD s/p 2 stents hiatal hernia, h/o GI bleed, gastritis,  LLE stent, arthritis, diverticulosis, hypothyroidism,  PPM, h/o CEA presented to ED c/o  palpitations? and leg swelling. Pt  is poor historian and unable to provide detailed history. States that had similar episodes of  " loud and strong heartbeat" but got much worse recently. Pt denies that its  a pain but unable to describe.  Also  noted worsening  leg swelling, denies SOB, but states that lately does not exert herself.  States that recently moved in with her daughter.    In ED: VS stable. labs with hyperkalemia, improved after Lokelma,   Also elevated BNP, trop neg x 1. CXR: no infiltrates ( official report pending)      FH: Pt reports that doesnt remember if anybody head CV Dz or cancer   reports no new complains    INTERVAL HPI/ OVERNIGHT EVENTS: Pt was seen and examined,     Vital Signs Last 24 Hrs  T(C): 36.3 (2023 08:59), Max: 36.5 (2023 20:45)  T(F): 97.3 (2023 08:59), Max: 97.7 (2023 20:45)  HR: 64 (2023 08:59) (64 - 70)  BP: 127/67 (2023 08:59) (108/61 - 128/66)  RR: 17 (2023 08:59) (16 - 17)  SpO2: 91% (2023 08:59) (91% - 96%)    Parameters below as of 2023 08:59  Patient On (Oxygen Delivery Method): room air          REVIEW OF SYSTEMS:  All other review of systems is negative unless indicated above.      PHYSICAL EXAM:  General: Well developed; frail elderly female,  in no acute distress  Eyes:  EOMI; conjunctiva and sclera clear  Head: Normocephalic; atraumatic  ENMT: No nasal discharge; airway clear  Neck: Supple; non tender; no masses  Respiratory: Decreased BS, mild crackles at bases   Cardiovascular: Regular rate and rhythm. S1 and S2 Normal;   Gastrointestinal: Soft non-tender non-distended; Normal bowel sounds  Genitourinary: No  suprapubic  tenderness  Extremities: + R>L  leg  edema, better   Vascular: Peripheral pulses palpable 2+ bilaterally  Neurological: Alert and oriented x2, forgetful, non focal   Skin: Warm and dry. No acute rash  Musculoskeletal: Normal muscle tone, without deformities  Psychiatric: Cooperative and appropriate      LABS:         137  |  103  |  31<H>  ----------------------------<  105<H>  3.6   |  28  |  0.93    Ca    9.1      2023 07:36  Phos  3.5     21  Mg     1.9                                     11.9   6.48  )-----------( 230      ( 2023 07:30 )             36.8     0120    139  |  104  |  30<H>  ----------------------------<  101<H>  3.7   |  27  |  0.87    Ca    9.7      2023 07:51  Mg     1.9                                         11.9   6.48  )-----------( 230      ( 2023 07:30 )             36.8     2023 07:30    139    |  105    |  27     ----------------------------<  99     3.7     |  26     |  0.80     Ca    9.3        2023 07:30  Mg     2.2       2023 22:28    TPro  6.7    /  Alb  3.1    /  TBili  0.5    /  DBili  x      /  AST  26     /  ALT  27     /  AlkPhos  97     2023 22:28  PT/INR - ( 2023 22:28 )   PT: 14.4 sec;   INR: 1.24 ratio    PTT - ( 2023 22:28 )  PTT:33.8 sec      LIVER FUNCTIONS - ( 2023 22:28 )  Alb: 3.1 g/dL / Pro: 6.7 gm/dL / ALK PHOS: 97 U/L / ALT: 27 U/L / AST: 26 U/L / GGT: x           Urinalysis Basic - ( 2023 01:16 )    Color: Yellow / Appearance: Clear / S.020 / pH: x  Gluc: x / Ketone: Trace  / Bili: Negative / Urobili: Negative   Blood: x / Protein: 100 / Nitrite: Negative   Leuk Esterase: Trace / RBC: 3-5 /HPF / WBC 3-5 /HPF   Sq Epi: x / Non Sq Epi: Few / Bacteria: Moderate      MEDICATIONS  (STANDING):  aspirin  chewable 81 milliGRAM(s) Oral daily  atorvastatin 40 milliGRAM(s) Oral at bedtime  cholecalciferol 1000 Unit(s) Oral two times a day  clopidogrel Tablet 75 milliGRAM(s) Oral daily  DULoxetine 30 milliGRAM(s) Oral daily  enoxaparin Injectable 40 milliGRAM(s) SubCutaneous every 24 hours  ferrous    sulfate 325 milliGRAM(s) Oral daily  lisinopril 5 milliGRAM(s) Oral daily  magnesium oxide 400 milliGRAM(s) Oral two times a day with meals  metoprolol succinate  milliGRAM(s) Oral daily  multivitamin 1 Tablet(s) Oral daily    MEDICATIONS  (PRN):  acetaminophen     Tablet .. 650 milliGRAM(s) Oral every 6 hours PRN Temp greater or equal to 38C (100.4F), Mild Pain (1 - 3)  aluminum hydroxide/magnesium hydroxide/simethicone Suspension 30 milliLiter(s) Oral every 4 hours PRN Dyspepsia  melatonin 3 milliGRAM(s) Oral at bedtime PRN Insomnia  ondansetron Injectable 4 milliGRAM(s) IV Push every 8 hours PRN Nausea and/or Vomiting  polyethylene glycol 3350 17 Gram(s) Oral daily PRN Constipation  zolpidem 5 milliGRAM(s) Oral at bedtime PRN Insomnia      RADIOLOGY & ADDITIONAL TESTS:  < from: US Duplex Venous Lower Ext Complete, Bilateral (23 @ 12:57) >    ACC: [id number] EXAM:  US DPLX LWR EXT VEINS COMPL BI                          PROCEDURE DATE:  2023          INTERPRETATION:  CLINICAL INFORMATION: Bilateral lower extremity swelling    COMPARISON: Prior study from 2013.    TECHNIQUE: Duplex sonography of the BILATERAL LOWER extremity veins with   color and spectral Doppler, with and without compression.    FINDINGS:    RIGHT:  Normal compressibility of the RIGHT common femoral, femoral and popliteal   veins.  Doppler examination shows normal spontaneous and phasic flow.  No RIGHT calf vein thrombosis is detected. Calf edema is present.    LEFT:  Normal compressibility of the LEFT common femoral, femoral and popliteal   veins.  Doppler examination shows normal spontaneous and phasic flow.  No LEFT calf vein thrombosis is detected. Calf edema is present.    IMPRESSION:  No evidence of deep venous thrombosis in either lower extremity.    Bilateral calf edema.        ACC: [id number] EXAM:  XR CHEST PORTABLE URGENT 1V                        PROCEDURE DATE:  2023   FINDINGS:  Heart/Vascular: The mediastinum, hilum and aorta are within normal limits   for projection. Moderate cardiomegaly which is stable. Mitral annular   calcification. Multiple coronary artery stents. Left chest wall dual lead   pacemaker.  Pulmonary: Midline trachea. There is no focal infiltrate, congestion or   effusion.    Bones: There is no fracture. Degenerative changes spine and right   shoulder.  Lines and catheter: None    Impression:  No acute pulmonary disease.  Moderate cardiomegaly which is stable   CC: Chest pain     HPI:  89 y/o F with PMH of HTN, CAD s/p 2 stents hiatal hernia, h/o GI bleed, gastritis,  LLE stent, arthritis, diverticulosis, hypothyroidism,  PPM, h/o CEA presented to ED c/o  palpitations? and leg swelling. Pt  is poor historian and unable to provide detailed history. States that had similar episodes of  " loud and strong heartbeat" but got much worse recently. Pt denies that its  a pain but unable to describe.  Also  noted worsening  leg swelling, denies SOB, but states that lately does not exert herself.  States that recently moved in with her daughter.    In ED: VS stable. labs with hyperkalemia, improved after Lokelma,   Also elevated BNP, trop neg x 1. CXR: no infiltrates ( official report pending)      FH: Pt reports that doesnt remember if anybody head CV Dz or cancer   reports no new complains    INTERVAL HPI/ OVERNIGHT EVENTS: Pt was seen and examined, pleasantly confused, no complains.      Vital Signs Last 24 Hrs  T(C): 36.3 (2023 08:59), Max: 36.5 (2023 20:45)  T(F): 97.3 (2023 08:59), Max: 97.7 (2023 20:45)  HR: 64 (2023 08:59) (64 - 70)  BP: 127/67 (2023 08:59) (108/61 - 128/66)  RR: 17 (2023 08:59) (16 - 17)  SpO2: 91% (2023 08:59) (91% - 96%)    Parameters below as of 2023 08:59  Patient On (Oxygen Delivery Method): room air          REVIEW OF SYSTEMS:  All other review of systems is negative unless indicated above.      PHYSICAL EXAM:  General: Well developed; frail elderly female,  in no acute distress  Eyes:  EOMI; conjunctiva and sclera clear  Head: Normocephalic; atraumatic  ENMT: No nasal discharge; airway clear  Neck: Supple; non tender; no masses  Respiratory: Decreased BS, mild crackles at bases   Cardiovascular: Regular rate and rhythm. S1 and S2 Normal;   Gastrointestinal: Soft non-tender non-distended; Normal bowel sounds  Genitourinary: No  suprapubic  tenderness  Extremities: + R>L  leg  edema, better   Vascular: Peripheral pulses palpable 2+ bilaterally  Neurological: Alert and oriented x2, forgetful, non focal   Skin: Warm and dry. No acute rash  Musculoskeletal: Normal muscle tone, without deformities  Psychiatric: Cooperative and appropriate      LABS:         137  |  103  |  31<H>  ----------------------------<  105<H>  3.6   |  28  |  0.93    Ca    9.1      2023 07:36  Phos  3.5       Mg     1.9                               11.9   6.48  )-----------( 230      ( 2023 07:30 )             36.8     20    139  |  104  |  30<H>  ----------------------------<  101<H>  3.7   |  27  |  0.87    Ca    9.7      2023 07:51  Mg     1.9                                         11.9   6.48  )-----------( 230      ( 2023 07:30 )             36.8     2023 07:30    139    |  105    |  27     ----------------------------<  99     3.7     |  26     |  0.80     Ca    9.3        2023 07:30  Mg     2.2       2023 22:28    TPro  6.7    /  Alb  3.1    /  TBili  0.5    /  DBili  x      /  AST  26     /  ALT  27     /  AlkPhos  97     2023 22:28  PT/INR - ( 2023 22:28 )   PT: 14.4 sec;   INR: 1.24 ratio    PTT - ( 2023 22:28 )  PTT:33.8 sec      LIVER FUNCTIONS - ( 2023 22:28 )  Alb: 3.1 g/dL / Pro: 6.7 gm/dL / ALK PHOS: 97 U/L / ALT: 27 U/L / AST: 26 U/L / GGT: x           Urinalysis Basic - ( 2023 01:16 )    Color: Yellow / Appearance: Clear / S.020 / pH: x  Gluc: x / Ketone: Trace  / Bili: Negative / Urobili: Negative   Blood: x / Protein: 100 / Nitrite: Negative   Leuk Esterase: Trace / RBC: 3-5 /HPF / WBC 3-5 /HPF   Sq Epi: x / Non Sq Epi: Few / Bacteria: Moderate      MEDICATIONS  (STANDING):  aspirin  chewable 81 milliGRAM(s) Oral daily  atorvastatin 40 milliGRAM(s) Oral at bedtime  cholecalciferol 1000 Unit(s) Oral two times a day  clopidogrel Tablet 75 milliGRAM(s) Oral daily  DULoxetine 30 milliGRAM(s) Oral daily  enoxaparin Injectable 40 milliGRAM(s) SubCutaneous every 24 hours  ferrous    sulfate 325 milliGRAM(s) Oral daily  lisinopril 5 milliGRAM(s) Oral daily  magnesium oxide 400 milliGRAM(s) Oral two times a day with meals  metoprolol succinate  milliGRAM(s) Oral daily  multivitamin 1 Tablet(s) Oral daily    MEDICATIONS  (PRN):  acetaminophen     Tablet .. 650 milliGRAM(s) Oral every 6 hours PRN Temp greater or equal to 38C (100.4F), Mild Pain (1 - 3)  aluminum hydroxide/magnesium hydroxide/simethicone Suspension 30 milliLiter(s) Oral every 4 hours PRN Dyspepsia  melatonin 3 milliGRAM(s) Oral at bedtime PRN Insomnia  ondansetron Injectable 4 milliGRAM(s) IV Push every 8 hours PRN Nausea and/or Vomiting  polyethylene glycol 3350 17 Gram(s) Oral daily PRN Constipation  zolpidem 5 milliGRAM(s) Oral at bedtime PRN Insomnia      RADIOLOGY & ADDITIONAL TESTS:  < from: US Duplex Venous Lower Ext Complete, Bilateral (23 @ 12:57) >    ACC: [id number] EXAM:  US DPLX LWR EXT VEINS COMPL BI                          PROCEDURE DATE:  2023          INTERPRETATION:  CLINICAL INFORMATION: Bilateral lower extremity swelling    COMPARISON: Prior study from 2013.    TECHNIQUE: Duplex sonography of the BILATERAL LOWER extremity veins with   color and spectral Doppler, with and without compression.    FINDINGS:    RIGHT:  Normal compressibility of the RIGHT common femoral, femoral and popliteal   veins.  Doppler examination shows normal spontaneous and phasic flow.  No RIGHT calf vein thrombosis is detected. Calf edema is present.    LEFT:  Normal compressibility of the LEFT common femoral, femoral and popliteal   veins.  Doppler examination shows normal spontaneous and phasic flow.  No LEFT calf vein thrombosis is detected. Calf edema is present.    IMPRESSION:  No evidence of deep venous thrombosis in either lower extremity.    Bilateral calf edema.        ACC: [id number] EXAM:  XR CHEST PORTABLE URGENT 1V                        PROCEDURE DATE:  2023   FINDINGS:  Heart/Vascular: The mediastinum, hilum and aorta are within normal limits   for projection. Moderate cardiomegaly which is stable. Mitral annular   calcification. Multiple coronary artery stents. Left chest wall dual lead   pacemaker.  Pulmonary: Midline trachea. There is no focal infiltrate, congestion or   effusion.    Bones: There is no fracture. Degenerative changes spine and right   shoulder.  Lines and catheter: None    Impression:  No acute pulmonary disease.  Moderate cardiomegaly which is stable

## 2023-01-22 LAB
ANION GAP SERPL CALC-SCNC: 7 MMOL/L — SIGNIFICANT CHANGE UP (ref 5–17)
BUN SERPL-MCNC: 30 MG/DL — HIGH (ref 7–23)
CALCIUM SERPL-MCNC: 9.5 MG/DL — SIGNIFICANT CHANGE UP (ref 8.5–10.1)
CHLORIDE SERPL-SCNC: 101 MMOL/L — SIGNIFICANT CHANGE UP (ref 96–108)
CO2 SERPL-SCNC: 28 MMOL/L — SIGNIFICANT CHANGE UP (ref 22–31)
CREAT SERPL-MCNC: 0.94 MG/DL — SIGNIFICANT CHANGE UP (ref 0.5–1.3)
EGFR: 58 ML/MIN/1.73M2 — LOW
GLUCOSE SERPL-MCNC: 107 MG/DL — HIGH (ref 70–99)
MAGNESIUM SERPL-MCNC: 2.3 MG/DL — SIGNIFICANT CHANGE UP (ref 1.6–2.6)
PHOSPHATE SERPL-MCNC: 3.1 MG/DL — SIGNIFICANT CHANGE UP (ref 2.5–4.5)
POTASSIUM SERPL-MCNC: 4 MMOL/L — SIGNIFICANT CHANGE UP (ref 3.5–5.3)
POTASSIUM SERPL-SCNC: 4 MMOL/L — SIGNIFICANT CHANGE UP (ref 3.5–5.3)
SODIUM SERPL-SCNC: 136 MMOL/L — SIGNIFICANT CHANGE UP (ref 135–145)

## 2023-01-22 PROCEDURE — 99232 SBSQ HOSP IP/OBS MODERATE 35: CPT

## 2023-01-22 RX ORDER — LIDOCAINE 4 G/100G
1 CREAM TOPICAL DAILY
Refills: 0 | Status: DISCONTINUED | OUTPATIENT
Start: 2023-01-22 | End: 2023-01-24

## 2023-01-22 RX ORDER — FUROSEMIDE 40 MG
40 TABLET ORAL DAILY
Refills: 0 | Status: DISCONTINUED | OUTPATIENT
Start: 2023-01-22 | End: 2023-01-24

## 2023-01-22 RX ADMIN — MAGNESIUM OXIDE 400 MG ORAL TABLET 400 MILLIGRAM(S): 241.3 TABLET ORAL at 09:46

## 2023-01-22 RX ADMIN — DULOXETINE HYDROCHLORIDE 30 MILLIGRAM(S): 30 CAPSULE, DELAYED RELEASE ORAL at 10:27

## 2023-01-22 RX ADMIN — Medication 1000 UNIT(S): at 09:46

## 2023-01-22 RX ADMIN — Medication 650 MILLIGRAM(S): at 15:17

## 2023-01-22 RX ADMIN — Medication 81 MILLIGRAM(S): at 09:46

## 2023-01-22 RX ADMIN — LISINOPRIL 5 MILLIGRAM(S): 2.5 TABLET ORAL at 10:26

## 2023-01-22 RX ADMIN — Medication 1000 UNIT(S): at 21:10

## 2023-01-22 RX ADMIN — Medication 200 MILLIGRAM(S): at 09:47

## 2023-01-22 RX ADMIN — Medication 650 MILLIGRAM(S): at 16:00

## 2023-01-22 RX ADMIN — Medication 40 MILLIGRAM(S): at 18:48

## 2023-01-22 RX ADMIN — LIDOCAINE 1 PATCH: 4 CREAM TOPICAL at 17:13

## 2023-01-22 RX ADMIN — ATORVASTATIN CALCIUM 40 MILLIGRAM(S): 80 TABLET, FILM COATED ORAL at 21:11

## 2023-01-22 RX ADMIN — Medication 3 MILLIGRAM(S): at 21:10

## 2023-01-22 RX ADMIN — ENOXAPARIN SODIUM 40 MILLIGRAM(S): 100 INJECTION SUBCUTANEOUS at 18:37

## 2023-01-22 RX ADMIN — Medication 325 MILLIGRAM(S): at 09:47

## 2023-01-22 RX ADMIN — CLOPIDOGREL BISULFATE 75 MILLIGRAM(S): 75 TABLET, FILM COATED ORAL at 09:46

## 2023-01-22 RX ADMIN — LIDOCAINE 1 PATCH: 4 CREAM TOPICAL at 19:45

## 2023-01-22 RX ADMIN — Medication 1 TABLET(S): at 09:46

## 2023-01-22 RX ADMIN — Medication 650 MILLIGRAM(S): at 21:18

## 2023-01-22 NOTE — PROGRESS NOTE ADULT - ASSESSMENT
89 y/o F with PMH of HTN, CAD s/p 2 stents hiatal hernia, h/o GI bleed, gastritis,  LLE stent, arthritis, diverticulosis, hypothyroidism,  PPM, h/o CEA  admitted for:       1. Atypical Chest pain. H/o CAD s/p stents   C/w tele: AFIB , V-paced   ACS ruled out   troponin neg   ECHO: preserved EF, no RWMA  PPM interrogated, reprogrammed.   C/w ASA, Plavix, change lovastatin to Lipitor  C/w Toprol    Ischemic work up outPt as per cardio         2. Leg edema, elevated BNP due to acute  HFpEF. Severe Valvular Dz  Monitor weight in neg balance   Strict is and Os   C/w IV lasix  40mg IV QD, reeval in am   LE doppler: neg for DVT   ECHO: preserved EF, wide open TR, moderate MR/AR    ED/w EP and cardio, severe TR likely iatrogenic related to PPM  lead, considering Pts age, frailty and comorbidities she id not a good candidate for Sx procedure. c/w IV diuresis   Replace lytes PRN,  monitor   Needs cardio f/u       3. HTN  C/w metoprolol and ACEI  Hold amlodipine due to leg swelling and  CHF   Monitor BP       4. PAD , S/p LLE stent s/p R   C/w ASA, Plavix, statins     5. Depression   C/w Duloxetine  Supportive care     6. DVT PPx: lovenox SQ     Dispo; C/w IV lasix, labs in am. PT , cardio f/u

## 2023-01-22 NOTE — PROGRESS NOTE ADULT - SUBJECTIVE AND OBJECTIVE BOX
CC: Chest pain     HPI:  88 y/o F with PMH of HTN, CAD s/p 2 stents hiatal hernia, h/o GI bleed, gastritis,  LLE stent, arthritis, diverticulosis, hypothyroidism,  PPM, h/o CEA presented to ED c/o  palpitations? and leg swelling. Pt  is poor historian and unable to provide detailed history. States that had similar episodes of  " loud and strong heartbeat" but got much worse recently. Pt denies that its  a pain but unable to describe.  Also  noted worsening  leg swelling, denies SOB, but states that lately does not exert herself.  States that recently moved in with her daughter.    In ED: VS stable. labs with hyperkalemia, improved after Lokelma,   Also elevated BNP, trop neg x 1. CXR: no infiltrates ( official report pending)      FH: Pt reports that doesnt remember if anybody head CV Dz or cancer   reports no new complains    INTERVAL HPI/ OVERNIGHT EVENTS: Pt was seen and examined, pleasantly confused, no complains.      Vital Signs Last 24 Hrs  T(C): 36.5 (2023 15:45), Max: 36.7 (2023 07:43)  T(F): 97.7 (2023 15:45), Max: 98.1 (2023 07:43)  HR: 72 (2023 15:45) (66 - 85)  BP: 144/70 (2023 15:45) (125/77 - 144/70)  RR: 18 (2023 15:45) (18 - 18)  SpO2: 98% (2023 15:45) (96% - 98%)    Parameters below as of 2023 15:45  Patient On (Oxygen Delivery Method): room air      REVIEW OF SYSTEMS:  All other review of systems is negative unless indicated above.      PHYSICAL EXAM:  General: Well developed; frail elderly female,  in no acute distress  Eyes:  EOMI; conjunctiva and sclera clear  Head: Normocephalic; atraumatic  ENMT: No nasal discharge; airway clear  Neck: Supple; non tender; no masses  Respiratory: better air entry,  decreased at bases   Cardiovascular: Regular rate and rhythm. S1 and S2 Normal;   Gastrointestinal: Soft non-tender non-distended; Normal bowel sounds  Genitourinary: No  suprapubic  tenderness  Extremities: + R>L  leg  edema, better   Vascular: Peripheral pulses palpable 2+ bilaterally  Neurological: Alert and oriented x2, forgetful, non focal   Skin: Warm and dry. No acute rash  Musculoskeletal: Normal muscle tone, without deformities  Psychiatric: Cooperative and appropriate      LABS:         136  |  101  |  30<H>  ----------------------------<  107<H>  4.0   |  28  |  0.94    Ca    9.5      2023 09:12  Phos  3.1     -  Mg     2.3         137  |  103  |  31<H>  ----------------------------<  105<H>  3.6   |  28  |  0.93    Ca    9.1      2023 07:36  Phos  3.5     -  Mg     1.9                               11.9   6.48  )-----------( 230      ( 2023 07:30 )             36.8     -20    139  |  104  |  30<H>  ----------------------------<  101<H>  3.7   |  27  |  0.87    Ca    9.7      2023 07:51  Mg     1.9                                         11.9   6.48  )-----------( 230      ( 2023 07:30 )             36.8     2023 07:30    139    |  105    |  27     ----------------------------<  99     3.7     |  26     |  0.80     Ca    9.3        2023 07:30  Mg     2.2       2023 22:28    TPro  6.7    /  Alb  3.1    /  TBili  0.5    /  DBili  x      /  AST  26     /  ALT  27     /  AlkPhos  97     2023 22:28  PT/INR - ( 2023 22:28 )   PT: 14.4 sec;   INR: 1.24 ratio    PTT - ( 2023 22:28 )  PTT:33.8 sec      LIVER FUNCTIONS - ( 2023 22:28 )  Alb: 3.1 g/dL / Pro: 6.7 gm/dL / ALK PHOS: 97 U/L / ALT: 27 U/L / AST: 26 U/L / GGT: x           Urinalysis Basic - ( 2023 01:16 )    Color: Yellow / Appearance: Clear / S.020 / pH: x  Gluc: x / Ketone: Trace  / Bili: Negative / Urobili: Negative   Blood: x / Protein: 100 / Nitrite: Negative   Leuk Esterase: Trace / RBC: 3-5 /HPF / WBC 3-5 /HPF   Sq Epi: x / Non Sq Epi: Few / Bacteria: Moderate      MEDICATIONS  (STANDING):  aspirin  chewable 81 milliGRAM(s) Oral daily  atorvastatin 40 milliGRAM(s) Oral at bedtime  cholecalciferol 1000 Unit(s) Oral two times a day  clopidogrel Tablet 75 milliGRAM(s) Oral daily  DULoxetine 30 milliGRAM(s) Oral daily  enoxaparin Injectable 40 milliGRAM(s) SubCutaneous every 24 hours  ferrous    sulfate 325 milliGRAM(s) Oral daily  lisinopril 5 milliGRAM(s) Oral daily  magnesium oxide 400 milliGRAM(s) Oral two times a day with meals  metoprolol succinate  milliGRAM(s) Oral daily  multivitamin 1 Tablet(s) Oral daily    MEDICATIONS  (PRN):  acetaminophen     Tablet .. 650 milliGRAM(s) Oral every 6 hours PRN Temp greater or equal to 38C (100.4F), Mild Pain (1 - 3)  aluminum hydroxide/magnesium hydroxide/simethicone Suspension 30 milliLiter(s) Oral every 4 hours PRN Dyspepsia  melatonin 3 milliGRAM(s) Oral at bedtime PRN Insomnia  ondansetron Injectable 4 milliGRAM(s) IV Push every 8 hours PRN Nausea and/or Vomiting  polyethylene glycol 3350 17 Gram(s) Oral daily PRN Constipation  zolpidem 5 milliGRAM(s) Oral at bedtime PRN Insomnia      RADIOLOGY & ADDITIONAL TESTS:  < from: US Duplex Venous Lower Ext Complete, Bilateral (23 @ 12:57) >    ACC: [id number] EXAM:  US DPLX LWR EXT VEINS COMPL BI                          PROCEDURE DATE:  2023          INTERPRETATION:  CLINICAL INFORMATION: Bilateral lower extremity swelling    COMPARISON: Prior study from 2013.    TECHNIQUE: Duplex sonography of the BILATERAL LOWER extremity veins with   color and spectral Doppler, with and without compression.    FINDINGS:    RIGHT:  Normal compressibility of the RIGHT common femoral, femoral and popliteal   veins.  Doppler examination shows normal spontaneous and phasic flow.  No RIGHT calf vein thrombosis is detected. Calf edema is present.    LEFT:  Normal compressibility of the LEFT common femoral, femoral and popliteal   veins.  Doppler examination shows normal spontaneous and phasic flow.  No LEFT calf vein thrombosis is detected. Calf edema is present.    IMPRESSION:  No evidence of deep venous thrombosis in either lower extremity.    Bilateral calf edema.        ACC: [id number] EXAM:  XR CHEST PORTABLE URGENT 1V                        PROCEDURE DATE:  2023   FINDINGS:  Heart/Vascular: The mediastinum, hilum and aorta are within normal limits   for projection. Moderate cardiomegaly which is stable. Mitral annular   calcification. Multiple coronary artery stents. Left chest wall dual lead   pacemaker.  Pulmonary: Midline trachea. There is no focal infiltrate, congestion or   effusion.    Bones: There is no fracture. Degenerative changes spine and right   shoulder.  Lines and catheter: None    Impression:  No acute pulmonary disease.  Moderate cardiomegaly which is stable

## 2023-01-23 ENCOUNTER — TRANSCRIPTION ENCOUNTER (OUTPATIENT)
Age: 88
End: 2023-01-23

## 2023-01-23 LAB
ANION GAP SERPL CALC-SCNC: 7 MMOL/L — SIGNIFICANT CHANGE UP (ref 5–17)
BUN SERPL-MCNC: 31 MG/DL — HIGH (ref 7–23)
CALCIUM SERPL-MCNC: 9 MG/DL — SIGNIFICANT CHANGE UP (ref 8.5–10.1)
CHLORIDE SERPL-SCNC: 104 MMOL/L — SIGNIFICANT CHANGE UP (ref 96–108)
CO2 SERPL-SCNC: 28 MMOL/L — SIGNIFICANT CHANGE UP (ref 22–31)
CREAT SERPL-MCNC: 0.88 MG/DL — SIGNIFICANT CHANGE UP (ref 0.5–1.3)
EGFR: 63 ML/MIN/1.73M2 — SIGNIFICANT CHANGE UP
GLUCOSE SERPL-MCNC: 106 MG/DL — HIGH (ref 70–99)
MAGNESIUM SERPL-MCNC: 2.1 MG/DL — SIGNIFICANT CHANGE UP (ref 1.6–2.6)
NT-PROBNP SERPL-SCNC: 5936 PG/ML — HIGH (ref 0–450)
PHOSPHATE SERPL-MCNC: 3.1 MG/DL — SIGNIFICANT CHANGE UP (ref 2.5–4.5)
POTASSIUM SERPL-MCNC: 3.6 MMOL/L — SIGNIFICANT CHANGE UP (ref 3.5–5.3)
POTASSIUM SERPL-SCNC: 3.6 MMOL/L — SIGNIFICANT CHANGE UP (ref 3.5–5.3)
SODIUM SERPL-SCNC: 139 MMOL/L — SIGNIFICANT CHANGE UP (ref 135–145)

## 2023-01-23 PROCEDURE — 99233 SBSQ HOSP IP/OBS HIGH 50: CPT

## 2023-01-23 RX ADMIN — Medication 81 MILLIGRAM(S): at 09:31

## 2023-01-23 RX ADMIN — ATORVASTATIN CALCIUM 40 MILLIGRAM(S): 80 TABLET, FILM COATED ORAL at 21:07

## 2023-01-23 RX ADMIN — Medication 1000 UNIT(S): at 21:06

## 2023-01-23 RX ADMIN — Medication 200 MILLIGRAM(S): at 09:30

## 2023-01-23 RX ADMIN — Medication 1 TABLET(S): at 09:31

## 2023-01-23 RX ADMIN — LISINOPRIL 5 MILLIGRAM(S): 2.5 TABLET ORAL at 09:31

## 2023-01-23 RX ADMIN — Medication 1000 UNIT(S): at 09:31

## 2023-01-23 RX ADMIN — LIDOCAINE 1 PATCH: 4 CREAM TOPICAL at 04:43

## 2023-01-23 RX ADMIN — CLOPIDOGREL BISULFATE 75 MILLIGRAM(S): 75 TABLET, FILM COATED ORAL at 09:31

## 2023-01-23 RX ADMIN — Medication 3 MILLIGRAM(S): at 21:06

## 2023-01-23 RX ADMIN — ENOXAPARIN SODIUM 40 MILLIGRAM(S): 100 INJECTION SUBCUTANEOUS at 17:23

## 2023-01-23 RX ADMIN — DULOXETINE HYDROCHLORIDE 30 MILLIGRAM(S): 30 CAPSULE, DELAYED RELEASE ORAL at 09:30

## 2023-01-23 RX ADMIN — Medication 40 MILLIGRAM(S): at 09:31

## 2023-01-23 RX ADMIN — Medication 325 MILLIGRAM(S): at 09:31

## 2023-01-23 RX ADMIN — Medication 650 MILLIGRAM(S): at 21:07

## 2023-01-23 NOTE — DISCHARGE NOTE NURSING/CASE MANAGEMENT/SOCIAL WORK - PATIENT PORTAL LINK FT
You can access the FollowMyHealth Patient Portal offered by [facility] by registering at the following website: [email or web address]. By joining [facility]’s FollowMyHealth portal, you will also be able to view your health information using other applications (apps) compatible with our system.

## 2023-01-23 NOTE — PROGRESS NOTE ADULT - ASSESSMENT
88 y/o F with PMH of HTN, CAD s/p 2 stents hiatal hernia, h/o GI bleed, gastritis,  LLE stent, arthritis, diverticulosis, hypothyroidism,  PPM, h/o CEA  admitted for:       1. Atypical Chest pain. H/o CAD s/p stents   C/w tele: AFIB , V-paced   ACS ruled out   troponin neg   ECHO: preserved EF, no RWMA  PPM interrogated, reprogrammed.   C/w ASA, Plavix, change lovastatin to Lipitor  C/w Toprol    Ischemic work up outPt as per cardio         2. Leg edema, elevated BNP due to acute  HFpEF. Severe Valvular Dz  Monitor weight in neg balance   Strict is and Os   C/w IV lasix  40mg IV QD, reeval in am   LE doppler: neg for DVT   ECHO: preserved EF, wide open TR, moderate MR/AR    ED/w EP and cardio, severe TR likely iatrogenic related to PPM  lead, considering Pts age, frailty and comorbidities she id not a good candidate for Sx procedure. c/w IV diuresis   Replace lytes PRN,  monitor   Needs cardio f/u       3. HTN  C/w metoprolol and ACEI  Hold amlodipine due to leg swelling and  CHF   Monitor BP       4. PAD , S/p LLE stent s/p R   C/w ASA, Plavix, statins     5. Depression   C/w Duloxetine  Supportive care     6. DVT PPx: lovenox SQ     Dispo; C/w IV lasix, labs in am. PT , cardio f/u

## 2023-01-23 NOTE — DISCHARGE NOTE NURSING/CASE MANAGEMENT/SOCIAL WORK - NSDCPEFALRISK_GEN_ALL_CORE
For information on Fall & Injury Prevention, visit: [email or web address] OR  [email or web address] OR  https://www.cdc.gov/steadi/patient.html

## 2023-01-23 NOTE — PROGRESS NOTE ADULT - SUBJECTIVE AND OBJECTIVE BOX
CARDIOLOGY AND HEART FAILURE CONSULT     Patient is a 89y old  Female who presents with a chief complaint of Chest pain.      HPI:  89 y/o F with PMH of HTN, CAD s/p 2 stents hiatal hernia, h/o GI bleed, gastritis,  LLE stent, arthritis, diverticulosis, hypothyroidism,  PPM, h/o CEA presented to ED c/o  palpitations? and leg swelling and chest pain.     23- Patient seen and examined by me this  a.m. She seems to be comfortable lying flat in the bed.    She was able to ask questions and answer my questions.    No overnight events according to the nurse     - pt seen this am . She denies any SOB lying in bed.  No overnight issues per staff RN.    PAST MEDICAL & SURGICAL HISTORY:  CAD (coronary artery disease)      Dyslipidemia      Hypothyroidism      Hypertension      Hiatal hernia      Diverticulosis      Rectocele      Stress incontinence in female      High cholesterol      Acute arthritis      Status post total knee replacement  right      Bilateral cataracts  surgery      History of breast reconstruction      History of hysterectomy      H/O carotid endarterectomy  right      Stented coronary artery  12 heart stents,   1 left leg stents      Cardiac pacemaker      Carpal tunnel syndrome of right wrist          MEDICATIONS  (STANDING):  aspirin  chewable 81 milliGRAM(s) Oral daily  atorvastatin 40 milliGRAM(s) Oral at bedtime  cholecalciferol 1000 Unit(s) Oral two times a day  clopidogrel Tablet 75 milliGRAM(s) Oral daily  DULoxetine 30 milliGRAM(s) Oral daily  enoxaparin Injectable 40 milliGRAM(s) SubCutaneous every 24 hours  ferrous    sulfate 325 milliGRAM(s) Oral daily  lisinopril 5 milliGRAM(s) Oral daily  metoprolol succinate  milliGRAM(s) Oral daily  multivitamin 1 Tablet(s) Oral daily    MEDICATIONS  (PRN):  acetaminophen     Tablet .. 650 milliGRAM(s) Oral every 6 hours PRN Temp greater or equal to 38C (100.4F), Mild Pain (1 - 3)  aluminum hydroxide/magnesium hydroxide/simethicone Suspension 30 milliLiter(s) Oral every 4 hours PRN Dyspepsia  melatonin 3 milliGRAM(s) Oral at bedtime PRN Insomnia  ondansetron Injectable 4 milliGRAM(s) IV Push every 8 hours PRN Nausea and/or Vomiting  polyethylene glycol 3350 17 Gram(s) Oral daily PRN Constipation  zolpidem 5 milliGRAM(s) Oral at bedtime PRN Insomnia      FAMILY HISTORY:  Family history of cancer (Sibling)        SOCIAL HISTORY: no recent smoking     REVIEW OF SYSTEMS:  CONSTITUTIONAL:    No fatigue, malaise, lethargy.  No fever or chills.  RESPIRATORY:  No cough.  No wheeze.  No hemoptysis.    CARDIOVASCULAR:  No chest pains.  No palpitations. no shortness of breath, No orthopnea or PND.  GASTROINTESTINAL:  No abdominal pain.  No nausea or vomiting.    GENITOURINARY:    No hematuria.    MUSCULOSKELETAL:  c/o musculoskeletal pain.  No joint swelling.  No arthritis.  NEUROLOGICAL:  No tingling or numbness or weakness.  PSYCHIATRIC:  No confusion  SKIN:  No rashes.          ICU Vital Signs Last 24 Hrs  T(C): 36.6 (2023 21:34), Max: 36.7 (2023 07:43)  T(F): 97.9 (2023 21:34), Max: 98.1 (2023 07:43)  HR: 82 (2023 21:34) (66 - 82)  BP: 126/68 (2023 21:34) (126/68 - 144/70)  BP(mean): --  ABP: --  ABP(mean): --  RR: 18 (2023 21:34) (18 - 18)  SpO2: 97% (2023 21:34) (96% - 98%)    O2 Parameters below as of 2023 21:34  Patient On (Oxygen Delivery Method): room air              PHYSICAL EXAM-    Constitutional:  no acute distress , elderly frail female    Head: Head is normocephalic and atraumatic.      Neck:  no JVD    Cardiovascular: regular rate and rhythm without S3, S4. No murmurs or rubs are appreciated.      Respiratory: CTA b/l  No wheezing.    Abdomen: Soft, nontender, nondistended with positive bowel sounds.      Extremity: No tenderness. No  pitting edema     Neurologic: The patient is alert and oriented.      Skin: No rash, no obvious lesions noted.      Psychiatric: The patient appears to be emotionally stable.      INTERPRETATION OF TELEMETRY: afib, v pacing     ECG: Atrial fibrillation, V pacing. T wave inversion in inferior leads.     I&O's Detail    2023 07:01  -  2023 06:59  --------------------------------------------------------  IN:  Total IN: 0 mL    OUT:    Voided (mL): 600 mL  Total OUT: 600 mL    Total NET: -600 mL          LABS:        136  |  101  |  30<H>  ----------------------------<  107<H>  4.0   |  28  |  0.94    Ca    9.5      2023 09:12  Phos  3.1       Mg     2.3      Chol 117 LDL -- HDL 39<L> Trig 59       PTT - ( 2023 22:28 )  PTT:33.8 sec  Urinalysis Basic - ( 2023 01:16 )    Color: Yellow / Appearance: Clear / S.020 / pH: x  Gluc: x / Ketone: Trace  / Bili: Negative / Urobili: Negative   Blood: x / Protein: 100 / Nitrite: Negative   Leuk Esterase: Trace / RBC: 3-5 /HPF / WBC 3-5 /HPF   Sq Epi: x / Non Sq Epi: Few / Bacteria: Moderate    Labs from yesterday  reviewed.  lab forwarding system not working to pull labs into note.   I&O's Summary    2023 07:01  -  2023 06:59  --------------------------------------------------------  IN: 0 mL / OUT: 600 mL / NET: -600 mL      BNP  RADIOLOGY & ADDITIONAL STUDIES:  < from: US Duplex Venous Lower Ext Complete, Bilateral (23 @ 12:57) >    ACC: [id number] EXAM:  US DPLX LWR EXT VEINS COMPL BI                          PROCEDURE DATE:  2023          INTERPRETATION:  CLINICAL INFORMATION: Bilateral lower extremity swelling    COMPARISON: Prior study from 2013.    TECHNIQUE: Duplex sonography of the BILATERAL LOWER extremity veins with   color and spectral Doppler, with and without compression.    FINDINGS:    RIGHT:  Normal compressibility of the RIGHT common femoral, femoral and popliteal   veins.  Doppler examination shows normal spontaneous and phasic flow.  No RIGHT calf vein thrombosis is detected. Calf edema is present.    LEFT:  Normal compressibility of the LEFT common femoral, femoral and popliteal   veins.  Doppler examination shows normal spontaneous and phasic flow.  No LEFT calf vein thrombosis is detected. Calf edema is present.    IMPRESSION:  No evidence of deep venous thrombosis in either lower extremity.    Bilateral calf edema.    --- End of Report ---            CHAYITO WILSON MD; Attending Radiologist  This document has been electronically signed. 2023  2:00PM    < end of copied text >  < from: US Echo 2D M-Mode, w/o Cont (08 @ 09:53) >    IMPRESSION:      Normal right and left ventricular systolic function.  Moderate mitral regurgitation.  Moderate tricuspid regurgitation with mild hypertension.             [facility]CARDIOLOGY  This examination was interpreted on:  2008  8:10A.  This document   has been electronically signed. 2008  8:14A.    < end of copied text >  < from: Xray Chest 1 View- PORTABLE-Urgent (23 @ 21:19) >    Impression:    No acute pulmonary disease.    Moderate cardiomegaly which is stable    --- End of Report ---            LUIS BREWER DO; Attending Radiologist  This document has been electronically signed. 2023 11:22AM    < end of copied text >    Echo reviewed from yesterday .  Unable to pull the results into the note as it is not allowing me to.  < from: TTE Echo Complete w/o Contrast w/ Doppler (23 @ 11:55) >   Impression     Summary     The mitral valve leaflets appear thickened.   Mild mitral annular calcification is present.   Moderate (2+) mitral regurgitation is present.   Non-coaptation of tricuspid valve is present.   Severe (4+) tricuspid valve regurgitation is present.   Wide open tricuspid insufficiency.   Normal appearing pulmonic valve structure.   Mild pulmonic valvular regurgitation (1+) is present.   The left atrium is mildly dilated.   Left ventricle systolic function appears preserved in the presence of a   cardiac arrhythmia. Left ventricle size and structure are within normal   limitations.   Estimated left ventricular ejection fraction is 55-60 %.   The right atrium appears dilated.   A device wire is seen in the RV and RA.   The right ventricle appears mildly dilated.   A device wire is seen in the RV and RA.   IVC is dilated with decreased respiratory variation.     Signature     ----------------------------------------------------------------   Electronically signed by DEYANIRA FelicianoInterpreting   physician) on 2023 06:21 PM   ----------------------------------------------------------------    < end of copied text >

## 2023-01-23 NOTE — PROGRESS NOTE ADULT - SUBJECTIVE AND OBJECTIVE BOX
CC: Chest pain     HPI:  89 y/o F with PMH of HTN, CAD s/p 2 stents hiatal hernia, h/o GI bleed, gastritis,  LLE stent, arthritis, diverticulosis, hypothyroidism,  PPM, h/o CEA presented to ED c/o  palpitations? and leg swelling. Pt  is poor historian and unable to provide detailed history. States that had similar episodes of  " loud and strong heartbeat" but got much worse recently. Pt denies that its  a pain but unable to describe.  Also  noted worsening  leg swelling, denies SOB, but states that lately does not exert herself.  States that recently moved in with her daughter.    In ED: VS stable. labs with hyperkalemia, improved after Lokelma,   Also elevated BNP, trop neg x 1. CXR: no infiltrates ( official report pending)      FH: Pt reports that doesnt remember if anybody head CV Dz or cancer   reports no new complains    INTERVAL HPI/ OVERNIGHT EVENTS: Pt was seen and examined, pleasantly confused, no complains.      Vital Signs Last 24 Hrs  T(C): 36.5 (2023 15:45), Max: 36.7 (2023 07:43)  T(F): 97.7 (2023 15:45), Max: 98.1 (2023 07:43)  HR: 72 (2023 15:45) (66 - 85)  BP: 144/70 (2023 15:45) (125/77 - 144/70)  RR: 18 (2023 15:45) (18 - 18)  SpO2: 98% (2023 15:45) (96% - 98%)    Parameters below as of 2023 15:45  Patient On (Oxygen Delivery Method): room air      REVIEW OF SYSTEMS:  All other review of systems is negative unless indicated above.      PHYSICAL EXAM:  General: Well developed; frail elderly female,  in no acute distress  Eyes:  EOMI; conjunctiva and sclera clear  Head: Normocephalic; atraumatic  ENMT: No nasal discharge; airway clear  Neck: Supple; non tender; no masses  Respiratory: better air entry,  decreased at bases   Cardiovascular: Regular rate and rhythm. S1 and S2 Normal;   Gastrointestinal: Soft non-tender non-distended; Normal bowel sounds  Genitourinary: No  suprapubic  tenderness  Extremities: + R>L  leg  edema, better   Vascular: Peripheral pulses palpable 2+ bilaterally  Neurological: Alert and oriented x2, forgetful, non focal   Skin: Warm and dry. No acute rash  Musculoskeletal: Normal muscle tone, without deformities  Psychiatric: Cooperative and appropriate      LABS:         136  |  101  |  30<H>  ----------------------------<  107<H>  4.0   |  28  |  0.94    Ca    9.5      2023 09:12  Phos  3.1     -  Mg     2.3         137  |  103  |  31<H>  ----------------------------<  105<H>  3.6   |  28  |  0.93    Ca    9.1      2023 07:36  Phos  3.5     -  Mg     1.9                               11.9   6.48  )-----------( 230      ( 2023 07:30 )             36.8     -20    139  |  104  |  30<H>  ----------------------------<  101<H>  3.7   |  27  |  0.87    Ca    9.7      2023 07:51  Mg     1.9                                         11.9   6.48  )-----------( 230      ( 2023 07:30 )             36.8     2023 07:30    139    |  105    |  27     ----------------------------<  99     3.7     |  26     |  0.80     Ca    9.3        2023 07:30  Mg     2.2       2023 22:28    TPro  6.7    /  Alb  3.1    /  TBili  0.5    /  DBili  x      /  AST  26     /  ALT  27     /  AlkPhos  97     2023 22:28  PT/INR - ( 2023 22:28 )   PT: 14.4 sec;   INR: 1.24 ratio    PTT - ( 2023 22:28 )  PTT:33.8 sec      LIVER FUNCTIONS - ( 2023 22:28 )  Alb: 3.1 g/dL / Pro: 6.7 gm/dL / ALK PHOS: 97 U/L / ALT: 27 U/L / AST: 26 U/L / GGT: x           Urinalysis Basic - ( 2023 01:16 )    Color: Yellow / Appearance: Clear / S.020 / pH: x  Gluc: x / Ketone: Trace  / Bili: Negative / Urobili: Negative   Blood: x / Protein: 100 / Nitrite: Negative   Leuk Esterase: Trace / RBC: 3-5 /HPF / WBC 3-5 /HPF   Sq Epi: x / Non Sq Epi: Few / Bacteria: Moderate      MEDICATIONS  (STANDING):  aspirin  chewable 81 milliGRAM(s) Oral daily  atorvastatin 40 milliGRAM(s) Oral at bedtime  cholecalciferol 1000 Unit(s) Oral two times a day  clopidogrel Tablet 75 milliGRAM(s) Oral daily  DULoxetine 30 milliGRAM(s) Oral daily  enoxaparin Injectable 40 milliGRAM(s) SubCutaneous every 24 hours  ferrous    sulfate 325 milliGRAM(s) Oral daily  lisinopril 5 milliGRAM(s) Oral daily  magnesium oxide 400 milliGRAM(s) Oral two times a day with meals  metoprolol succinate  milliGRAM(s) Oral daily  multivitamin 1 Tablet(s) Oral daily    MEDICATIONS  (PRN):  acetaminophen     Tablet .. 650 milliGRAM(s) Oral every 6 hours PRN Temp greater or equal to 38C (100.4F), Mild Pain (1 - 3)  aluminum hydroxide/magnesium hydroxide/simethicone Suspension 30 milliLiter(s) Oral every 4 hours PRN Dyspepsia  melatonin 3 milliGRAM(s) Oral at bedtime PRN Insomnia  ondansetron Injectable 4 milliGRAM(s) IV Push every 8 hours PRN Nausea and/or Vomiting  polyethylene glycol 3350 17 Gram(s) Oral daily PRN Constipation  zolpidem 5 milliGRAM(s) Oral at bedtime PRN Insomnia      RADIOLOGY & ADDITIONAL TESTS:  < from: US Duplex Venous Lower Ext Complete, Bilateral (23 @ 12:57) >    ACC: [id number] EXAM:  US DPLX LWR EXT VEINS COMPL BI                          PROCEDURE DATE:  2023          INTERPRETATION:  CLINICAL INFORMATION: Bilateral lower extremity swelling    COMPARISON: Prior study from 2013.    TECHNIQUE: Duplex sonography of the BILATERAL LOWER extremity veins with   color and spectral Doppler, with and without compression.    FINDINGS:    RIGHT:  Normal compressibility of the RIGHT common femoral, femoral and popliteal   veins.  Doppler examination shows normal spontaneous and phasic flow.  No RIGHT calf vein thrombosis is detected. Calf edema is present.    LEFT:  Normal compressibility of the LEFT common femoral, femoral and popliteal   veins.  Doppler examination shows normal spontaneous and phasic flow.  No LEFT calf vein thrombosis is detected. Calf edema is present.    IMPRESSION:  No evidence of deep venous thrombosis in either lower extremity.    Bilateral calf edema.        ACC: [id number] EXAM:  XR CHEST PORTABLE URGENT 1V                        PROCEDURE DATE:  2023   FINDINGS:  Heart/Vascular: The mediastinum, hilum and aorta are within normal limits   for projection. Moderate cardiomegaly which is stable. Mitral annular   calcification. Multiple coronary artery stents. Left chest wall dual lead   pacemaker.  Pulmonary: Midline trachea. There is no focal infiltrate, congestion or   effusion.    Bones: There is no fracture. Degenerative changes spine and right   shoulder.  Lines and catheter: None    Impression:  No acute pulmonary disease.  Moderate cardiomegaly which is stable     Pt is a 89 y/o F with PMH of HTN, CAD s/p 2 stents hiatal hernia, h/o GI bleed, gastritis,  LLE stent, arthritis, diverticulosis, hypothyroidism,  PPM, h/o CEA presented to ED c/o  palpitations? and leg swelling. Pt  is poor historian and unable to provide detailed history. States that had similar episodes of  " loud and strong heartbeat" but got much worse recently. Pt denies that its  a pain but unable to describe.  Also  noted worsening  leg swelling, denies SOB, but states that lately does not exert herself.  States that recently moved in with her daughter.    In ED: VS stable. labs with hyperkalemia, improved after Lokelma,   Also elevated BNP, trop neg x 1. CXR: no infiltrates ( official report pending)      REVIEW OF SYSTEMS:  All other review of systems is negative unless indicated above.    PHYSICAL EXAM:  General: Well developed; frail elderly female,  in no acute distress  Eyes:  EOMI; conjunctiva and sclera clear  Head: Normocephalic; atraumatic  ENMT: No nasal discharge; airway clear  Neck: Supple; non tender; no masses  Respiratory: better air entry,  decreased at bases   Cardiovascular: Regular rate and rhythm. S1 and S2 Normal;   Gastrointestinal: Soft non-tender non-distended; Normal bowel sounds  Genitourinary: No  suprapubic  tenderness  Extremities: + R>L  leg  edema, better   Vascular: Peripheral pulses palpable 2+ bilaterally  Neurological: Alert and oriented x2, forgetful, non focal   Skin: Warm and dry. No acute rash  Musculoskeletal: Normal muscle tone, without deformities  Psychiatric: Cooperative and appropriate      LABS:     139  |  104  |  31<H>  ----------------------------<  106<H>  3.6   |  28  |  0.88    Ca    9.0      2023 07:30  Phos  3.1       Mg     2.1             Patient is a 89 y/o F with PMH of HTN, CAD s/p 2 stents hiatal hernia, h/o GI bleed, gastritis,  LLE stent, arthritis, diverticulosis, hypothyroidism,  PPM, h/o CEA  admitted for:     # Atypical Chest pain  # H/o CAD s/p stents   - C/w tele: AFIB , V-paced   - troponin neg   - ECHO: preserved EF, no RWMA  - PPM interrogated, reprogrammed.   - C/w ASA, Plavix, change lovastatin to Lipitor  - C/w Toprol    - Ischemic work up outPt as per cardio     # Leg edema, elevated BNP due to acute  HFpEF. Severe Valvular Dz  - Monitor weight in neg balance   - Strict is and Os   - IV lasix 40 mg today, then change to po in the am  - LE doppler: neg for DVT   - ECHO: preserved EF, wide open TR, moderate MR/AR    - ED/w EP and cardio, severe TR likely iatrogenic related to PPM  lead, considering Pts age, frailty and comorbidities she id not a good candidate for Sx procedure. c/w IV diuresis   - Discussed with Dr. Aguero    # HTN  - C/w metoprolol / ACEI  - Hold amlodipine due to leg swelling and  CHF     # PAD   # S/p LLE stent s/p R   - C/w ASA, Plavix, statins     # Depression   - C/w Duloxetine  - Supportive care     # DVT PPx:  - lovenox SQ

## 2023-01-23 NOTE — PROGRESS NOTE ADULT - ASSESSMENT
SOB, edema- Acute on chronic decompensated HF- HFPEF  She has presence of wide open TR probably iatrogenic secondary to the pacemaker wire.    continue lasix iv 40m g daily at the minimum  If she can tolerate can do BID if no BP drop.  D/w Dr Washington at length.  Over the weekend BNP more elevated.    Diuresis with close monitoring of the renal function and electrolytes.  Goal potassium of 4 and magnesium of 2.   Strict I/O and daily wt checks. Low sodium diet. Nutrition education.   EP team input appeciated.  She has frailty and multiple comorbidities and she may be a poor surgical candidate.    Chest pain- Chest pain is atypical in nature. So far cardiac enzymes and EKG did not reveal any ischemia.   Outpt ischemic heart disease evaluation recommended.  f/u with cardiologist as outpt.     HTN- poorly controlled at presentation.  BP optimal now.      PPM- normal function.  s/p interrogation.    Atrial fibrillation- chronic in nature.  recommend elliquis 2.5mg PO BID and DC Aspirin if this was contemplated.  no high rate episodes per PPM interrogation.    CAD s/p PCI- continue current meds.    Spoke with Moshe her son over the phone and he expressed full understanding of the management.   I also discussed the case with Dr. Welsh who was her cardiologist for many years.        Other medical issues- Management per primary team.   Thank you for allowing me to participate in the care of this patient. Please feel free to contact me with any questions.

## 2023-01-24 ENCOUNTER — TRANSCRIPTION ENCOUNTER (OUTPATIENT)
Age: 88
End: 2023-01-24

## 2023-01-24 VITALS
SYSTOLIC BLOOD PRESSURE: 104 MMHG | RESPIRATION RATE: 18 BRPM | TEMPERATURE: 98 F | OXYGEN SATURATION: 96 % | DIASTOLIC BLOOD PRESSURE: 53 MMHG | HEART RATE: 68 BPM

## 2023-01-24 LAB
ALBUMIN SERPL ELPH-MCNC: 2.6 G/DL — LOW (ref 3.3–5)
ALP SERPL-CCNC: 75 U/L — SIGNIFICANT CHANGE UP (ref 40–120)
ALT FLD-CCNC: 22 U/L — SIGNIFICANT CHANGE UP (ref 12–78)
ANION GAP SERPL CALC-SCNC: 6 MMOL/L — SIGNIFICANT CHANGE UP (ref 5–17)
AST SERPL-CCNC: 22 U/L — SIGNIFICANT CHANGE UP (ref 15–37)
BILIRUB SERPL-MCNC: 0.5 MG/DL — SIGNIFICANT CHANGE UP (ref 0.2–1.2)
BUN SERPL-MCNC: 31 MG/DL — HIGH (ref 7–23)
CALCIUM SERPL-MCNC: 8.9 MG/DL — SIGNIFICANT CHANGE UP (ref 8.5–10.1)
CHLORIDE SERPL-SCNC: 104 MMOL/L — SIGNIFICANT CHANGE UP (ref 96–108)
CO2 SERPL-SCNC: 29 MMOL/L — SIGNIFICANT CHANGE UP (ref 22–31)
CREAT SERPL-MCNC: 0.78 MG/DL — SIGNIFICANT CHANGE UP (ref 0.5–1.3)
EGFR: 73 ML/MIN/1.73M2 — SIGNIFICANT CHANGE UP
GLUCOSE SERPL-MCNC: 109 MG/DL — HIGH (ref 70–99)
HCT VFR BLD CALC: 38.6 % — SIGNIFICANT CHANGE UP (ref 34.5–45)
HGB BLD-MCNC: 12.6 G/DL — SIGNIFICANT CHANGE UP (ref 11.5–15.5)
MCHC RBC-ENTMCNC: 30.2 PG — SIGNIFICANT CHANGE UP (ref 27–34)
MCHC RBC-ENTMCNC: 32.6 GM/DL — SIGNIFICANT CHANGE UP (ref 32–36)
MCV RBC AUTO: 92.6 FL — SIGNIFICANT CHANGE UP (ref 80–100)
NT-PROBNP SERPL-SCNC: 5624 PG/ML — HIGH (ref 0–450)
PLATELET # BLD AUTO: 254 K/UL — SIGNIFICANT CHANGE UP (ref 150–400)
POTASSIUM SERPL-MCNC: 3.4 MMOL/L — LOW (ref 3.5–5.3)
POTASSIUM SERPL-SCNC: 3.4 MMOL/L — LOW (ref 3.5–5.3)
PROT SERPL-MCNC: 5.6 GM/DL — LOW (ref 6–8.3)
RBC # BLD: 4.17 M/UL — SIGNIFICANT CHANGE UP (ref 3.8–5.2)
RBC # FLD: 19.9 % — HIGH (ref 10.3–14.5)
SODIUM SERPL-SCNC: 139 MMOL/L — SIGNIFICANT CHANGE UP (ref 135–145)
WBC # BLD: 6.48 K/UL — SIGNIFICANT CHANGE UP (ref 3.8–10.5)
WBC # FLD AUTO: 6.48 K/UL — SIGNIFICANT CHANGE UP (ref 3.8–10.5)

## 2023-01-24 PROCEDURE — 99239 HOSP IP/OBS DSCHRG MGMT >30: CPT

## 2023-01-24 RX ORDER — FUROSEMIDE 40 MG
0 TABLET ORAL
Qty: 14 | Refills: 0 | DISCHARGE
Start: 2023-01-24 | End: 2023-02-06

## 2023-01-24 RX ORDER — POTASSIUM CHLORIDE 20 MEQ
40 PACKET (EA) ORAL ONCE
Refills: 0 | Status: COMPLETED | OUTPATIENT
Start: 2023-01-24 | End: 2023-01-24

## 2023-01-24 RX ORDER — FUROSEMIDE 40 MG
2 TABLET ORAL
Qty: 14 | Refills: 0
Start: 2023-01-24 | End: 2023-02-06

## 2023-01-24 RX ORDER — POTASSIUM CHLORIDE 20 MEQ
1 PACKET (EA) ORAL
Qty: 14 | Refills: 0
Start: 2023-01-24 | End: 2023-02-06

## 2023-01-24 RX ORDER — FUROSEMIDE 40 MG
1 TABLET ORAL
Qty: 7 | Refills: 0
Start: 2023-01-24 | End: 2023-02-06

## 2023-01-24 RX ADMIN — Medication 1 TABLET(S): at 09:25

## 2023-01-24 RX ADMIN — Medication 40 MILLIGRAM(S): at 09:25

## 2023-01-24 RX ADMIN — Medication 200 MILLIGRAM(S): at 09:25

## 2023-01-24 RX ADMIN — ENOXAPARIN SODIUM 40 MILLIGRAM(S): 100 INJECTION SUBCUTANEOUS at 17:54

## 2023-01-24 RX ADMIN — DULOXETINE HYDROCHLORIDE 30 MILLIGRAM(S): 30 CAPSULE, DELAYED RELEASE ORAL at 09:25

## 2023-01-24 RX ADMIN — CLOPIDOGREL BISULFATE 75 MILLIGRAM(S): 75 TABLET, FILM COATED ORAL at 09:26

## 2023-01-24 RX ADMIN — Medication 1000 UNIT(S): at 09:26

## 2023-01-24 RX ADMIN — Medication 81 MILLIGRAM(S): at 09:26

## 2023-01-24 RX ADMIN — Medication 325 MILLIGRAM(S): at 09:26

## 2023-01-24 RX ADMIN — LISINOPRIL 5 MILLIGRAM(S): 2.5 TABLET ORAL at 09:26

## 2023-01-24 RX ADMIN — Medication 40 MILLIEQUIVALENT(S): at 12:19

## 2023-01-24 NOTE — DISCHARGE NOTE PROVIDER - NSDCMRMEDTOKEN_GEN_ALL_CORE_FT
amlodipine-benazepril 5 mg-20 mg oral capsule: 1 cap(s) orally once a day  aspirin 81 mg oral tablet, chewable: 1 tab(s) orally once a day  DULoxetine 30 mg oral delayed release capsule: 1 cap(s) orally once a day  FeroSul 325 mg (65 mg elemental iron) oral tablet: 1 tab(s) orally once a day  lovastatin 40 mg oral tablet: 1 tab(s) orally once a day  metoprolol succinate 200 mg oral tablet, extended release: 1 tab(s) orally once a day  Multiple Vitamins oral tablet: 1 tab(s) orally once a day  Plavix 75 mg oral tablet: 1 tab(s) orally once a day  Vitamin D3 1000 intl units oral tablet: 1 tab(s) orally 2 times a day  zolpidem 10 mg oral tablet: 1 tab(s) orally once a day (at bedtime), As Needed - for insomnia   amlodipine-benazepril 5 mg-20 mg oral capsule: 1 cap(s) orally once a day  aspirin 81 mg oral tablet, chewable: 1 tab(s) orally once a day  DULoxetine 30 mg oral delayed release capsule: 1 cap(s) orally once a day  FeroSul 325 mg (65 mg elemental iron) oral tablet: 1 tab(s) orally once a day  Lasix 20 mg oral tablet: 1 tab(s) orally every other day  Lasix 20 mg oral tablet: 2 tab(s) orally every other day    Change between 20mg and 40mg every other day  lovastatin 40 mg oral tablet: 1 tab(s) orally once a day  metoprolol succinate 200 mg oral tablet, extended release: 1 tab(s) orally once a day  Multiple Vitamins oral tablet: 1 tab(s) orally once a day  Plavix 75 mg oral tablet: 1 tab(s) orally once a day  Vitamin D3 1000 intl units oral tablet: 1 tab(s) orally 2 times a day  zolpidem 10 mg oral tablet: 1 tab(s) orally once a day (at bedtime), As Needed - for insomnia   amlodipine-benazepril 5 mg-20 mg oral capsule: 1 cap(s) orally once a day  aspirin 81 mg oral tablet, chewable: 1 tab(s) orally once a day  DULoxetine 30 mg oral delayed release capsule: 1 cap(s) orally once a day  FeroSul 325 mg (65 mg elemental iron) oral tablet: 1 tab(s) orally once a day  Lasix 20 mg oral tablet: 1 tab(s) orally every other day  Lasix 20 mg oral tablet: 2 tab(s) orally every other day    Change between 20mg and 40mg every other day  lovastatin 40 mg oral tablet: 1 tab(s) orally once a day  metoprolol succinate 200 mg oral tablet, extended release: 1 tab(s) orally once a day  Multiple Vitamins oral tablet: 1 tab(s) orally once a day  Plavix 75 mg oral tablet: 1 tab(s) orally once a day  Potassium Chloride (Eqv-Klor-Con 10) 10 mEq oral tablet, extended release: 1 tab(s) orally once a day   Vitamin D3 1000 intl units oral tablet: 1 tab(s) orally 2 times a day  zolpidem 10 mg oral tablet: 1 tab(s) orally once a day (at bedtime), As Needed - for insomnia

## 2023-01-24 NOTE — DISCHARGE NOTE PROVIDER - HOSPITAL COURSE
89 y/o F with PMH of HTN, CAD s/p 2 stents hiatal hernia, h/o GI bleed, gastritis,  LLE stent, arthritis, diverticulosis, hypothyroidism,  PPM, h/o CEA presented to ED c/o  palpitations? and leg swelling. Pt  is poor historian and unable to provide detailed history. States that had similar episodes of  " loud and strong heartbeat" but got much worse recently. Pt denies that its  a pain but unable to describe.  Also  noted worsening  leg swelling, denies SOB, but states that lately does not exert herself.  States that recently moved in with her daughter.    In ED: VS stable. labs with hyperkalemia, improved after Lokelma,   Also elevated BNP, trop neg x 1. CXR: no infiltrates ( official report pending)    89 y/o F with PMH of HTN, CAD s/p 2 stents hiatal hernia, h/o GI bleed, gastritis,  LLE stent, arthritis, diverticulosis, hypothyroidism,  PPM, h/o CEA  admitted for:     # Atypical Chest pain  # H/o CAD s/p stents   - C/w tele: AFIB , V-paced   - troponin neg   - ECHO: preserved EF, no RWMA  - PPM interrogated, reprogrammed.   - C/w ASA, Plavix, change lovastatin to Lipitor  - C/w Toprol    - Ischemic work up outPt as per cardio     # Leg edema, elevated BNP due to acute  HFpEF. Severe Valvular Dz  - Monitor weight in neg balance   - Strict is and Os   - IV lasix 40 mg today, then change to po in the am  - LE doppler: neg for DVT   - ECHO: preserved EF, wide open TR, moderate MR/AR    - ED/w EP and cardio, severe TR likely iatrogenic related to PPM  lead, considering Pts age, frailty and comorbidities she id not a good candidate for Sx procedure. c/w IV diuresis   - Discussed with Dr. Aguero    # HTN  - C/w metoprolol / ACEI  - Hold amlodipine due to leg swelling and  CHF     # PAD   # S/p LLE stent s/p R   - C/w ASA, Plavix, statins     # Depression   - C/w Duloxetine  - Supportive care     # DVT PPx:  - lovenox SQ     88 y/o F with PMH of HTN, CAD s/p 2 stents hiatal hernia, h/o GI bleed, gastritis,  LLE stent, arthritis, diverticulosis, hypothyroidism,  PPM, h/o CEA presented to ED c/o  palpitations? and leg swelling. Pt  is poor historian and unable to provide detailed history. States that had similar episodes of  " loud and strong heartbeat" but got much worse recently. Pt denies that its  a pain but unable to describe.  Also  noted worsening  leg swelling, denies SOB, but states that lately does not exert herself.  States that recently moved in with her daughter.    In ED: VS stable. labs with hyperkalemia, improved after Lokelma,   Also elevated BNP, trop neg x 1. CXR: no infiltrates ( official report pending)    88 y/o F with PMH of HTN, CAD s/p 2 stents hiatal hernia, h/o GI bleed, gastritis,  LLE stent, arthritis, diverticulosis, hypothyroidism,  PPM, h/o CEA  admitted for:     # Atypical Chest pain  # H/o CAD s/p stents   - C/w tele: AFIB , V-paced   - troponin neg   - ECHO: preserved EF, no RWMA  - PPM interrogated, reprogrammed.   - C/w ASA, Plavix, change lovastatin to Lipitor  - C/w Toprol    - Ischemic work up outPt as per cardio   Discussed with Cardiology, patient to continue with asa and plavix, no reduced dose eliquis as family is unable to afford. Continue with PO lasix alternate between 40 and 20 daily. Follow up with PCP and Cardiology upon discharge    # Leg edema, elevated BNP due to acute  HFpEF. Severe Valvular Dz  - Monitor weight in neg balance   - Strict is and Os   - s/p iv lasix, now PO  - LE doppler: neg for DVT   - ECHO: preserved EF, wide open TR, moderate MR/AR    - ED/w EP and cardio, severe TR likely iatrogenic related to PPM  lead, considering Pts age, frailty and comorbidities she id not a good candidate for Sx procedure. c/w IV diuresis   - Discussed with Dr. Aguero    # HTN  - C/w metoprolol / ACEI  - can restart home amlodpine    # PAD   # S/p LLE stent s/p R   - C/w ASA, Plavix, statins     # Depression   - C/w Duloxetine  - Supportive care

## 2023-01-24 NOTE — DISCHARGE NOTE PROVIDER - CARE PROVIDER_API CALL
Yecenia Sepulveda (MD)  [facility]; Cardiovascular Disease  [address] [place] [address]  Phone: [phone]  Fax: [phone]  Follow Up Time:

## 2023-01-24 NOTE — DISCHARGE NOTE PROVIDER - NSDCCPCAREPLAN_GEN_ALL_CORE_FT
PRINCIPAL DISCHARGE DIAGNOSIS  Diagnosis: Chest pain  Assessment and Plan of Treatment:       SECONDARY DISCHARGE DIAGNOSES  Diagnosis: Elevated brain natriuretic peptide (BNP) level  Assessment and Plan of Treatment:

## 2023-01-24 NOTE — PROGRESS NOTE ADULT - SUBJECTIVE AND OBJECTIVE BOX
CARDIOLOGY AND HEART FAILURE CONSULT     Patient is a 89y old  Female who presents with a chief complaint of Chest pain.      HPI:  89 y/o F with PMH of HTN, CAD s/p 2 stents hiatal hernia, h/o GI bleed, gastritis,  LLE stent, arthritis, diverticulosis, hypothyroidism,  PPM, h/o CEA presented to ED c/o  palpitations? and leg swelling and chest pain.     23- Patient seen and examined by me this  a.m. She seems to be comfortable lying flat in the bed.    She was able to ask questions and answer my questions.    No overnight events according to the nurse     - pt seen this am . She denies any SOB lying in bed.  No overnight issues per staff RN.    - Pt seen this am.  She is alert and asking questions.   I discussed the diagnosis of severe TR and CHF.    PAST MEDICAL & SURGICAL HISTORY:  CAD (coronary artery disease)      Dyslipidemia      Hypothyroidism      Hypertension      Hiatal hernia      Diverticulosis      Rectocele      Stress incontinence in female      High cholesterol      Acute arthritis      Status post total knee replacement  right      Bilateral cataracts  surgery      History of breast reconstruction      History of hysterectomy      H/O carotid endarterectomy  right      Stented coronary artery  12 heart stents,   1 left leg stents      Cardiac pacemaker      Carpal tunnel syndrome of right wrist          MEDICATIONS  (STANDING):  aspirin  chewable 81 milliGRAM(s) Oral daily  atorvastatin 40 milliGRAM(s) Oral at bedtime  cholecalciferol 1000 Unit(s) Oral two times a day  clopidogrel Tablet 75 milliGRAM(s) Oral daily  DULoxetine 30 milliGRAM(s) Oral daily  enoxaparin Injectable 40 milliGRAM(s) SubCutaneous every 24 hours  ferrous    sulfate 325 milliGRAM(s) Oral daily  lisinopril 5 milliGRAM(s) Oral daily  metoprolol succinate  milliGRAM(s) Oral daily  multivitamin 1 Tablet(s) Oral daily    MEDICATIONS  (PRN):  acetaminophen     Tablet .. 650 milliGRAM(s) Oral every 6 hours PRN Temp greater or equal to 38C (100.4F), Mild Pain (1 - 3)  aluminum hydroxide/magnesium hydroxide/simethicone Suspension 30 milliLiter(s) Oral every 4 hours PRN Dyspepsia  melatonin 3 milliGRAM(s) Oral at bedtime PRN Insomnia  ondansetron Injectable 4 milliGRAM(s) IV Push every 8 hours PRN Nausea and/or Vomiting  polyethylene glycol 3350 17 Gram(s) Oral daily PRN Constipation  zolpidem 5 milliGRAM(s) Oral at bedtime PRN Insomnia      FAMILY HISTORY:  Family history of cancer (Sibling)        SOCIAL HISTORY: no recent smoking     REVIEW OF SYSTEMS:  CONSTITUTIONAL:    No fatigue, malaise, lethargy.  No fever or chills.  RESPIRATORY:  No cough.  No wheeze.  No hemoptysis.    CARDIOVASCULAR:  No chest pains.  No palpitations. no shortness of breath, No orthopnea or PND.  GASTROINTESTINAL:  No abdominal pain.  No nausea or vomiting.    GENITOURINARY:    No hematuria.    MUSCULOSKELETAL:  c/o musculoskeletal pain.  No joint swelling.  No arthritis.  NEUROLOGICAL:  No tingling or numbness or weakness.  PSYCHIATRIC:  No confusion  SKIN:  No rashes.          ICU Vital Signs Last 24 Hrs  T: 97.7  HR:71  BP: 143/69  O2 Sat:  98%    PHYSICAL EXAM-    Constitutional:  no acute distress , elderly frail female    Head: Head is normocephalic and atraumatic.      Neck:  no JVD    Cardiovascular: regular rate and rhythm without S3, S4. No murmurs or rubs are appreciated.      Respiratory: CTA b/l  No wheezing.    Abdomen: Soft, nontender, nondistended with positive bowel sounds.      Extremity: No tenderness. No  pitting edema     Neurologic: The patient is alert and oriented.      Skin: No rash, no obvious lesions noted.      Psychiatric: The patient appears to be emotionally stable.      INTERPRETATION OF TELEMETRY: not  on tele now    ECG: Atrial fibrillation, V pacing. T wave inversion in inferior leads.     I&O's Detail    2023 07:01  -  2023 06:59  --------------------------------------------------------  IN:  Total IN: 0 mL    OUT:    Voided (mL): 600 mL  Total OUT: 600 mL    Total NET: -600 mL          LABS:        136  |  101  |  30<H>  ----------------------------<  107<H>  4.0   |  28  |  0.94    Ca    9.5      2023 09:12  Phos  3.1       Mg     2.3             labs from  Unable to copy the lapse into this note   Sodium level 139, potassium 3.4, creatinine 0.78, cardiac pro BNP 5 624       Chol 117 LDL -- HDL 39<L> Trig 59       PTT - ( 2023 22:28 )  PTT:33.8 sec  Urinalysis Basic - ( 2023 01:16 )    Color: Yellow / Appearance: Clear / S.020 / pH: x  Gluc: x / Ketone: Trace  / Bili: Negative / Urobili: Negative   Blood: x / Protein: 100 / Nitrite: Negative   Leuk Esterase: Trace / RBC: 3-5 /HPF / WBC 3-5 /HPF   Sq Epi: x / Non Sq Epi: Few / Bacteria: Moderate    Labs from yesterday  reviewed.  lab forwarding system not working to pull labs into note.   I&O's Summary    2023 07:  -  2023 06:59  --------------------------------------------------------  IN: 0 mL / OUT: 600 mL / NET: -600 mL      BNP  RADIOLOGY & ADDITIONAL STUDIES:  < from: US Duplex Venous Lower Ext Complete, Bilateral (23 @ 12:57) >    ACC: [id number] EXAM:  US DPLX LWR EXT VEINS COMPL BI                          PROCEDURE DATE:  2023          INTERPRETATION:  CLINICAL INFORMATION: Bilateral lower extremity swelling    COMPARISON: Prior study from 2013.    TECHNIQUE: Duplex sonography of the BILATERAL LOWER extremity veins with   color and spectral Doppler, with and without compression.    FINDINGS:    RIGHT:  Normal compressibility of the RIGHT common femoral, femoral and popliteal   veins.  Doppler examination shows normal spontaneous and phasic flow.  No RIGHT calf vein thrombosis is detected. Calf edema is present.    LEFT:  Normal compressibility of the LEFT common femoral, femoral and popliteal   veins.  Doppler examination shows normal spontaneous and phasic flow.  No LEFT calf vein thrombosis is detected. Calf edema is present.    IMPRESSION:  No evidence of deep venous thrombosis in either lower extremity.    Bilateral calf edema.    --- End of Report ---            CHAYITO WILSON MD; Attending Radiologist  This document has been electronically signed. 2023  2:00PM    < end of copied text >  < from: US Echo 2D M-Mode, w/o Cont (08 @ 09:53) >    IMPRESSION:      Normal right and left ventricular systolic function.  Moderate mitral regurgitation.  Moderate tricuspid regurgitation with mild hypertension.             [facility]IOLOG[facility]  This examination was interpreted on:  2008  8:10A.  This document   has been electronically signed. 2008  8:14A.    < end of copied text >  < from: Xray Chest 1 View- PORTABLE-Urgent (23 @ 21:19) >    Impression:    No acute pulmonary disease.    Moderate cardiomegaly which is stable    --- End of Report ---            LUIS BREWER DO; Attending Radiologist  This document has been electronically signed. 2023 11:22AM    < end of copied text >    Echo reviewed from yesterday .  Unable to pull the results into the note as it is not allowing me to.  < from: TTE Echo Complete w/o Contrast w/ Doppler (23 @ 11:55) >   Impression     Summary     The mitral valve leaflets appear thickened.   Mild mitral annular calcification is present.   Moderate (2+) mitral regurgitation is present.   Non-coaptation of tricuspid valve is present.   Severe (4+) tricuspid valve regurgitation is present.   Wide open tricuspid insufficiency.   Normal appearing pulmonic valve structure.   Mild pulmonic valvular regurgitation (1+) is present.   The left atrium is mildly dilated.   Left ventricle systolic function appears preserved in the presence of a   cardiac arrhythmia. Left ventricle size and structure are within normal   limitations.   Estimated left ventricular ejection fraction is 55-60 %.   The right atrium appears dilated.   A device wire is seen in the RV and RA.   The right ventricle appears mildly dilated.   A device wire is seen in the RV and RA.   IVC is dilated with decreased respiratory variation.     Signature     ----------------------------------------------------------------   Electronically signed by Yecenia Sepulveda MD(Interpreting   physician) on 2023 06:21 PM   ----------------------------------------------------------------    < end of copied text >

## 2023-01-24 NOTE — PROGRESS NOTE ADULT - ASSESSMENT
SOB, edema- Acute on chronic decompensated HF- HFPEF  She has presence of wide open TR probably iatrogenic secondary to the pacemaker wire.    D/w Dr Amaya at length.   recommend starting discharge planning.    She will need Lasix 20 mg alternating with 40 mg on the other days p.o. after discharge.    She needs close follow-up with her cardiologist in a week.        Diuresis with close monitoring of the renal function and electrolytes.  Goal potassium of 4 and magnesium of 2.   Strict I/O and daily wt checks. Low sodium diet. Nutrition education.   EP team input appeciated.  She has frailty and multiple comorbidities and she may be a poor surgical candidate.    Chest pain- Chest pain is atypical in nature. So far cardiac enzymes and EKG did not reveal any ischemia.   Outpt ischemic heart disease evaluation recommended.  f/u with cardiologist as outpt.     HTN-  Blood pressure mildly elevated this morning.    Please recheck after the a.m. medications were given.          PPM- normal function.  s/p interrogation.    Atrial fibrillation- chronic in nature.  recommend elliquis 2.5mg PO BID and DC Aspirin if this was contemplated.  no high rate episodes per PPM interrogation.    CAD s/p PCI- continue current meds.    Spoke with Moshe her son over the phone and he expressed full understanding of the management.   I also discussed the case with Dr. Welsh who was her cardiologist for many years.      The he I discussed her clinical course at length with the patient and she answered many questions and she expressed understanding.    I also reassured her that I am in touch with her son Moshe and she was happy about that.          Other medical issues- Management per primary team.   Thank you for allowing me to participate in the care of this patient. Please feel free to contact me with any questions.

## 2023-01-24 NOTE — DISCHARGE NOTE PROVIDER - ATTENDING DISCHARGE PHYSICAL EXAMINATION:
General: Well developed; frail elderly female,  in no acute distress  Eyes:  EOMI; conjunctiva and sclera clear  Head: Normocephalic; atraumatic  ENMT: No nasal discharge; airway clear  Neck: Supple; non tender; no masses  Respiratory: better air entry,  decreased at bases   Cardiovascular: Regular rate and rhythm. S1 and S2 Normal;   Gastrointestinal: Soft non-tender non-distended; Normal bowel sounds  Genitourinary: No  suprapubic  tenderness  Extremities: Trace edema b/l  Vascular: Peripheral pulses palpable 2+ bilaterally  Neurological: Alert and oriented x2, forgetful, non focal   Skin: Warm and dry. No acute rash  Musculoskeletal: Normal muscle tone, without deformities  Psychiatric: Cooperative and appropriate

## 2023-01-30 DIAGNOSIS — I73.9 PERIPHERAL VASCULAR DISEASE, UNSPECIFIED: ICD-10-CM

## 2023-01-30 DIAGNOSIS — Z95.0 PRESENCE OF CARDIAC PACEMAKER: ICD-10-CM

## 2023-01-30 DIAGNOSIS — I08.1 RHEUMATIC DISORDERS OF BOTH MITRAL AND TRICUSPID VALVES: ICD-10-CM

## 2023-01-30 DIAGNOSIS — Z91.190 PATIENT'S NONCOMPLIANCE WITH OTHER MEDICAL TREATMENT AND REGIMEN DUE TO FINANCIAL HARDSHIP: ICD-10-CM

## 2023-01-30 DIAGNOSIS — E87.5 HYPERKALEMIA: ICD-10-CM

## 2023-01-30 DIAGNOSIS — Z96.651 PRESENCE OF RIGHT ARTIFICIAL KNEE JOINT: ICD-10-CM

## 2023-01-30 DIAGNOSIS — I50.33 ACUTE ON CHRONIC DIASTOLIC (CONGESTIVE) HEART FAILURE: ICD-10-CM

## 2023-01-30 DIAGNOSIS — Z79.02 LONG TERM (CURRENT) USE OF ANTITHROMBOTICS/ANTIPLATELETS: ICD-10-CM

## 2023-01-30 DIAGNOSIS — K57.90 DIVERTICULOSIS OF INTESTINE, PART UNSPECIFIED, WITHOUT PERFORATION OR ABSCESS WITHOUT BLEEDING: ICD-10-CM

## 2023-01-30 DIAGNOSIS — I25.10 ATHEROSCLEROTIC HEART DISEASE OF NATIVE CORONARY ARTERY WITHOUT ANGINA PECTORIS: ICD-10-CM

## 2023-01-30 DIAGNOSIS — M19.90 UNSPECIFIED OSTEOARTHRITIS, UNSPECIFIED SITE: ICD-10-CM

## 2023-01-30 DIAGNOSIS — E78.00 PURE HYPERCHOLESTEROLEMIA, UNSPECIFIED: ICD-10-CM

## 2023-01-30 DIAGNOSIS — E03.9 HYPOTHYROIDISM, UNSPECIFIED: ICD-10-CM

## 2023-01-30 DIAGNOSIS — R07.89 OTHER CHEST PAIN: ICD-10-CM

## 2023-01-30 DIAGNOSIS — Z95.820 PERIPHERAL VASCULAR ANGIOPLASTY STATUS WITH IMPLANTS AND GRAFTS: ICD-10-CM

## 2023-01-30 DIAGNOSIS — N39.3 STRESS INCONTINENCE (FEMALE) (MALE): ICD-10-CM

## 2023-01-30 DIAGNOSIS — Z79.82 LONG TERM (CURRENT) USE OF ASPIRIN: ICD-10-CM

## 2023-01-30 DIAGNOSIS — I48.20 CHRONIC ATRIAL FIBRILLATION, UNSPECIFIED: ICD-10-CM

## 2023-01-30 DIAGNOSIS — Z95.5 PRESENCE OF CORONARY ANGIOPLASTY IMPLANT AND GRAFT: ICD-10-CM

## 2023-01-30 DIAGNOSIS — N81.6 RECTOCELE: ICD-10-CM

## 2023-01-30 DIAGNOSIS — R79.89 OTHER SPECIFIED ABNORMAL FINDINGS OF BLOOD CHEMISTRY: ICD-10-CM

## 2023-01-30 DIAGNOSIS — I11.0 HYPERTENSIVE HEART DISEASE WITH HEART FAILURE: ICD-10-CM

## 2023-01-30 DIAGNOSIS — F32.A DEPRESSION, UNSPECIFIED: ICD-10-CM

## 2023-02-15 ENCOUNTER — TRANSCRIPTION ENCOUNTER (OUTPATIENT)
Age: 88
End: 2023-02-15

## 2023-02-22 ENCOUNTER — TRANSCRIPTION ENCOUNTER (OUTPATIENT)
Age: 88
End: 2023-02-22

## 2023-06-01 NOTE — OCCUPATIONAL THERAPY INITIAL EVALUATION ADULT - IMPAIRED TRANSFERS: BED/CHAIR, REHAB EVAL
decreased strength/unable to hold RW with hands, Pt stood with PT present & unable to take steps without RW however + difficulty grasping RW & turning due to dec hand/UE weakness/pain/impaired balance Aklief Pregnancy And Lactation Text: It is unknown if this medication is safe to use during pregnancy.  It is unknown if this medication is excreted in breast milk.  Breastfeeding women should use the topical cream on the smallest area of the skin for the shortest time needed while breastfeeding.  Do not apply to nipple and areola.

## 2023-11-01 NOTE — CHART NOTE - NSCHARTNOTESELECT_GEN_ALL_CORE
Cardiology NP
pt presents to ED c/o right sided weakness/numbess. symptoms began 1-2 hours PTA. pt hx. hashimoto's. pt denies pain. pt had a similar episode to this once before but states it affected the right side last time. pt denies cp, sob, ha, dizziness, n/v/d. pt is alert, with no other complaints. no code stroke called.

## 2024-01-20 ENCOUNTER — INPATIENT (INPATIENT)
Facility: HOSPITAL | Age: 89
LOS: 3 days | Discharge: HOME HEALTH | DRG: 291 | End: 2024-01-24
Attending: STUDENT IN AN ORGANIZED HEALTH CARE EDUCATION/TRAINING PROGRAM | Admitting: STUDENT IN AN ORGANIZED HEALTH CARE EDUCATION/TRAINING PROGRAM
Payer: MEDICARE

## 2024-01-20 VITALS — HEIGHT: 64 IN | WEIGHT: 110.01 LBS

## 2024-01-20 DIAGNOSIS — G56.01 CARPAL TUNNEL SYNDROME, RIGHT UPPER LIMB: Chronic | ICD-10-CM

## 2024-01-20 DIAGNOSIS — Z95.5 PRESENCE OF CORONARY ANGIOPLASTY IMPLANT AND GRAFT: Chronic | ICD-10-CM

## 2024-01-20 DIAGNOSIS — Z95.0 PRESENCE OF CARDIAC PACEMAKER: Chronic | ICD-10-CM

## 2024-01-20 DIAGNOSIS — Z98.82 BREAST IMPLANT STATUS: Chronic | ICD-10-CM

## 2024-01-20 DIAGNOSIS — Z96.659 PRESENCE OF UNSPECIFIED ARTIFICIAL KNEE JOINT: Chronic | ICD-10-CM

## 2024-01-20 DIAGNOSIS — Z98.89 OTHER SPECIFIED POSTPROCEDURAL STATES: Chronic | ICD-10-CM

## 2024-01-20 DIAGNOSIS — Z90.710 ACQUIRED ABSENCE OF BOTH CERVIX AND UTERUS: Chronic | ICD-10-CM

## 2024-01-20 DIAGNOSIS — H26.9 UNSPECIFIED CATARACT: Chronic | ICD-10-CM

## 2024-01-20 LAB
ACANTHOCYTES BLD QL SMEAR: SIGNIFICANT CHANGE UP
ALBUMIN SERPL ELPH-MCNC: 3.2 G/DL — LOW (ref 3.3–5)
ALT FLD-CCNC: 25 U/L — SIGNIFICANT CHANGE UP (ref 12–78)
ANION GAP SERPL CALC-SCNC: 2 MMOL/L — LOW (ref 5–17)
APPEARANCE UR: ABNORMAL
APTT BLD: 34.9 SEC — SIGNIFICANT CHANGE UP (ref 24.5–35.6)
AST SERPL-CCNC: 27 U/L — SIGNIFICANT CHANGE UP (ref 15–37)
BACTERIA # UR AUTO: ABNORMAL /HPF
BASOPHILS # BLD AUTO: 0.11 K/UL — SIGNIFICANT CHANGE UP (ref 0–0.2)
BASOPHILS NFR BLD AUTO: 1.6 % — SIGNIFICANT CHANGE UP (ref 0–2)
BILIRUB UR-MCNC: NEGATIVE — SIGNIFICANT CHANGE UP
BUN SERPL-MCNC: 29 MG/DL — HIGH (ref 7–23)
CALCIUM SERPL-MCNC: 9.4 MG/DL — SIGNIFICANT CHANGE UP (ref 8.5–10.1)
CAST: 0 /LPF — SIGNIFICANT CHANGE UP (ref 0–4)
CHLORIDE SERPL-SCNC: 109 MMOL/L — HIGH (ref 96–108)
CO2 SERPL-SCNC: 30 MMOL/L — SIGNIFICANT CHANGE UP (ref 22–31)
COLOR SPEC: SIGNIFICANT CHANGE UP
CREAT SERPL-MCNC: 1.11 MG/DL — SIGNIFICANT CHANGE UP (ref 0.5–1.3)
D DIMER BLD IA.RAPID-MCNC: 325 NG/ML DDU — HIGH
DIFF PNL FLD: ABNORMAL
EGFR: 47 ML/MIN/1.73M2 — LOW
ELLIPTOCYTES BLD QL SMEAR: SIGNIFICANT CHANGE UP
EOSINOPHIL # BLD AUTO: 0.4 K/UL — SIGNIFICANT CHANGE UP (ref 0–0.5)
EOSINOPHIL NFR BLD AUTO: 5.9 % — SIGNIFICANT CHANGE UP (ref 0–6)
GLUCOSE SERPL-MCNC: 114 MG/DL — HIGH (ref 70–99)
GLUCOSE UR QL: NEGATIVE MG/DL — SIGNIFICANT CHANGE UP
HCT VFR BLD CALC: 29.3 % — LOW (ref 34.5–45)
HGB BLD-MCNC: 8.7 G/DL — LOW (ref 11.5–15.5)
HYPOCHROMIA BLD QL: SIGNIFICANT CHANGE UP
IMM GRANULOCYTES NFR BLD AUTO: 0.7 % — SIGNIFICANT CHANGE UP (ref 0–0.9)
INR BLD: 1.32 RATIO — HIGH (ref 0.85–1.18)
KETONES UR-MCNC: NEGATIVE MG/DL — SIGNIFICANT CHANGE UP
LEUKOCYTE ESTERASE UR-ACNC: ABNORMAL
LYMPHOCYTES # BLD AUTO: 1.14 K/UL — SIGNIFICANT CHANGE UP (ref 1–3.3)
LYMPHOCYTES # BLD AUTO: 16.7 % — SIGNIFICANT CHANGE UP (ref 13–44)
MANUAL SMEAR VERIFICATION: YES — SIGNIFICANT CHANGE UP
MCHC RBC-ENTMCNC: 22.8 PG — LOW (ref 27–34)
MCHC RBC-ENTMCNC: 29.7 GM/DL — LOW (ref 32–36)
MCV RBC AUTO: 76.7 FL — LOW (ref 80–100)
MONOCYTES # BLD AUTO: 1.36 K/UL — HIGH (ref 0–0.9)
MONOCYTES NFR BLD AUTO: 19.9 % — HIGH (ref 2–14)
NEUTROPHILS # BLD AUTO: 3.76 K/UL — SIGNIFICANT CHANGE UP (ref 1.8–7.4)
NEUTROPHILS NFR BLD AUTO: 55.2 % — SIGNIFICANT CHANGE UP (ref 43–77)
NITRITE UR-MCNC: POSITIVE
PH UR: 5.5 — SIGNIFICANT CHANGE UP (ref 5–8)
PLAT MORPH BLD: NORMAL — SIGNIFICANT CHANGE UP
PLATELET # BLD AUTO: 312 K/UL — SIGNIFICANT CHANGE UP (ref 150–400)
POIKILOCYTOSIS BLD QL AUTO: SIGNIFICANT CHANGE UP
POTASSIUM SERPL-MCNC: 4.5 MMOL/L — SIGNIFICANT CHANGE UP (ref 3.5–5.3)
POTASSIUM SERPL-SCNC: 4.5 MMOL/L — SIGNIFICANT CHANGE UP (ref 3.5–5.3)
PROT UR-MCNC: 30 MG/DL
PROTHROM AB SERPL-ACNC: 14.8 SEC — HIGH (ref 9.5–13)
RBC # BLD: 3.82 M/UL — SIGNIFICANT CHANGE UP (ref 3.8–5.2)
RBC # FLD: 21 % — HIGH (ref 10.3–14.5)
RBC BLD AUTO: ABNORMAL
RBC CASTS # UR COMP ASSIST: 31 /HPF — HIGH (ref 0–4)
SCHISTOCYTES BLD QL AUTO: SIGNIFICANT CHANGE UP
SODIUM SERPL-SCNC: 141 MMOL/L — SIGNIFICANT CHANGE UP (ref 135–145)
SP GR SPEC: 1.01 — SIGNIFICANT CHANGE UP (ref 1–1.03)
SQUAMOUS # UR AUTO: 6 /HPF — HIGH (ref 0–5)
TARGETS BLD QL SMEAR: SLIGHT — SIGNIFICANT CHANGE UP
TOXIC GRANULES BLD QL SMEAR: PRESENT — SIGNIFICANT CHANGE UP
TROPONIN I, HIGH SENSITIVITY RESULT: 16.65 NG/L — SIGNIFICANT CHANGE UP
UROBILINOGEN FLD QL: 1 MG/DL — SIGNIFICANT CHANGE UP (ref 0.2–1)
WBC # BLD: 6.82 K/UL — SIGNIFICANT CHANGE UP (ref 3.8–10.5)
WBC # FLD AUTO: 6.82 K/UL — SIGNIFICANT CHANGE UP (ref 3.8–10.5)
WBC UR QL: 330 /HPF — HIGH (ref 0–5)

## 2024-01-20 PROCEDURE — 93010 ELECTROCARDIOGRAM REPORT: CPT

## 2024-01-20 PROCEDURE — 99291 CRITICAL CARE FIRST HOUR: CPT

## 2024-01-20 PROCEDURE — 71045 X-RAY EXAM CHEST 1 VIEW: CPT | Mod: 26

## 2024-01-20 RX ORDER — SODIUM CHLORIDE 9 MG/ML
3 INJECTION INTRAMUSCULAR; INTRAVENOUS; SUBCUTANEOUS ONCE
Refills: 0 | Status: COMPLETED | OUTPATIENT
Start: 2024-01-20 | End: 2024-01-20

## 2024-01-20 RX ADMIN — SODIUM CHLORIDE 3 MILLILITER(S): 9 INJECTION INTRAMUSCULAR; INTRAVENOUS; SUBCUTANEOUS at 23:02

## 2024-01-20 NOTE — ED PROVIDER NOTE - CARE PLAN
1 Principal Discharge DX:	CHF, acute on chronic  Secondary Diagnosis:	Bilateral pleural effusion  Secondary Diagnosis:	Anemia

## 2024-01-20 NOTE — ED PROVIDER NOTE - OBJECTIVE STATEMENT
89 y/o female with PMHx of CAD, HLD, HTN, CHF, dementia hypothyroidism, GIB presents to the ED from home. Pt interviewed stating she forgot why she came to the ED, had SOB at some point but not now. Pt does not remember if she has had a cough recently, however pt denies N/V/D.  Pt son in law reports pt has had 3 days of SOB on exertion

## 2024-01-20 NOTE — ED PROVIDER NOTE - NSICDXPASTMEDICALHX_GEN_ALL_CORE_FT
PAST MEDICAL HISTORY:  Acute arthritis     Arthritis     CAD (coronary artery disease)     Diverticulosis     Dyslipidemia     Gastritis     GIB (gastrointestinal bleeding)     Hiatal hernia     High cholesterol     Hypertension     Hypothyroidism     Rectocele     Stress incontinence in female

## 2024-01-20 NOTE — ED ADULT TRIAGE NOTE - CHIEF COMPLAINT QUOTE
Patient BIB son in law to ER c/o SOB, chest pain, cough. Patient's son in law states that these symptoms have been going on for the past 3 days. Patient has a hx of dementia but states that she "doesn't feel well;" patient is unable to give me more information about how she is feeling. PMH of CHF, multiple cardiac stents. SPO2 98% on RA in triage.

## 2024-01-20 NOTE — ED PROVIDER NOTE - CRITICAL CARE ATTENDING CONTRIBUTION TO CARE
Critical care time spent evaluating and reevaluating patient, ordering and interpreting diagnostics, ordering therapeutics, speaking to admitting MD, reviewing old records and documenting. Jason LOVELACE

## 2024-01-20 NOTE — ED ADULT NURSE NOTE - OBJECTIVE STATEMENT
The pt is a 89 y/o female presenting to the ED c/o chest pain and shortness of breath. Pt is a poor historian. Pt son in law at bedside called daughter. Daughter states that x3 days she has been SOB worse on exertion and has been complaining of chest pain. Daughter states "she has 17 stents". Son in law states that she also started coughing 3 days ago. Pt denies fever, N/V/D.

## 2024-01-20 NOTE — ED ADULT NURSE NOTE - NSFALLHARMRISKINTERV_ED_ALL_ED

## 2024-01-21 DIAGNOSIS — I50.9 HEART FAILURE, UNSPECIFIED: ICD-10-CM

## 2024-01-21 PROBLEM — K92.2 GASTROINTESTINAL HEMORRHAGE, UNSPECIFIED: Chronic | Status: ACTIVE | Noted: 2023-01-22

## 2024-01-21 PROBLEM — M19.90 UNSPECIFIED OSTEOARTHRITIS, UNSPECIFIED SITE: Chronic | Status: ACTIVE | Noted: 2023-01-22

## 2024-01-21 PROBLEM — K29.70 GASTRITIS, UNSPECIFIED, WITHOUT BLEEDING: Chronic | Status: ACTIVE | Noted: 2023-01-22

## 2024-01-21 LAB
ADD ON TEST-SPECIMEN IN LAB: SIGNIFICANT CHANGE UP
ALP SERPL-CCNC: 153 U/L — HIGH (ref 40–120)
BILIRUB SERPL-MCNC: 1 MG/DL — SIGNIFICANT CHANGE UP (ref 0.2–1.2)
BLD GP AB SCN SERPL QL: SIGNIFICANT CHANGE UP
FLUAV AG NPH QL: SIGNIFICANT CHANGE UP
FLUBV AG NPH QL: SIGNIFICANT CHANGE UP
LACTATE SERPL-SCNC: 1.9 MMOL/L — SIGNIFICANT CHANGE UP (ref 0.7–2)
NT-PROBNP SERPL-SCNC: HIGH PG/ML (ref 0–450)
PROT SERPL-MCNC: 6.4 GM/DL — SIGNIFICANT CHANGE UP (ref 6–8.3)
RSV RNA NPH QL NAA+NON-PROBE: SIGNIFICANT CHANGE UP
SARS-COV-2 RNA SPEC QL NAA+PROBE: SIGNIFICANT CHANGE UP
TROPONIN I, HIGH SENSITIVITY RESULT: 13.47 NG/L — SIGNIFICANT CHANGE UP

## 2024-01-21 PROCEDURE — 82728 ASSAY OF FERRITIN: CPT

## 2024-01-21 PROCEDURE — 82746 ASSAY OF FOLIC ACID SERUM: CPT

## 2024-01-21 PROCEDURE — 97162 PT EVAL MOD COMPLEX 30 MIN: CPT | Mod: GP

## 2024-01-21 PROCEDURE — 84484 ASSAY OF TROPONIN QUANT: CPT

## 2024-01-21 PROCEDURE — 36415 COLL VENOUS BLD VENIPUNCTURE: CPT

## 2024-01-21 PROCEDURE — 97116 GAIT TRAINING THERAPY: CPT | Mod: GP

## 2024-01-21 PROCEDURE — 99223 1ST HOSP IP/OBS HIGH 75: CPT

## 2024-01-21 PROCEDURE — 82607 VITAMIN B-12: CPT

## 2024-01-21 PROCEDURE — 80048 BASIC METABOLIC PNL TOTAL CA: CPT

## 2024-01-21 PROCEDURE — 84100 ASSAY OF PHOSPHORUS: CPT

## 2024-01-21 PROCEDURE — 83540 ASSAY OF IRON: CPT

## 2024-01-21 PROCEDURE — 83550 IRON BINDING TEST: CPT

## 2024-01-21 PROCEDURE — 85045 AUTOMATED RETICULOCYTE COUNT: CPT

## 2024-01-21 PROCEDURE — 83605 ASSAY OF LACTIC ACID: CPT

## 2024-01-21 PROCEDURE — 87040 BLOOD CULTURE FOR BACTERIA: CPT

## 2024-01-21 PROCEDURE — 83880 ASSAY OF NATRIURETIC PEPTIDE: CPT

## 2024-01-21 PROCEDURE — 85027 COMPLETE CBC AUTOMATED: CPT

## 2024-01-21 PROCEDURE — 83735 ASSAY OF MAGNESIUM: CPT

## 2024-01-21 RX ORDER — FUROSEMIDE 40 MG
40 TABLET ORAL DAILY
Refills: 0 | Status: COMPLETED | OUTPATIENT
Start: 2024-01-21 | End: 2024-01-23

## 2024-01-21 RX ORDER — FUROSEMIDE 40 MG
40 TABLET ORAL ONCE
Refills: 0 | Status: COMPLETED | OUTPATIENT
Start: 2024-01-21 | End: 2024-01-21

## 2024-01-21 RX ORDER — CHOLECALCIFEROL (VITAMIN D3) 125 MCG
1 CAPSULE ORAL
Qty: 0 | Refills: 0 | DISCHARGE

## 2024-01-21 RX ORDER — METOPROLOL TARTRATE 50 MG
200 TABLET ORAL DAILY
Refills: 0 | Status: DISCONTINUED | OUTPATIENT
Start: 2024-01-21 | End: 2024-01-24

## 2024-01-21 RX ORDER — FERROUS SULFATE 325(65) MG
325 TABLET ORAL DAILY
Refills: 0 | Status: DISCONTINUED | OUTPATIENT
Start: 2024-01-21 | End: 2024-01-22

## 2024-01-21 RX ORDER — ASPIRIN/CALCIUM CARB/MAGNESIUM 324 MG
1 TABLET ORAL
Qty: 0 | Refills: 0 | DISCHARGE

## 2024-01-21 RX ORDER — FUROSEMIDE 40 MG
1 TABLET ORAL
Refills: 0 | DISCHARGE

## 2024-01-21 RX ORDER — ENOXAPARIN SODIUM 100 MG/ML
40 INJECTION SUBCUTANEOUS EVERY 24 HOURS
Refills: 0 | Status: DISCONTINUED | OUTPATIENT
Start: 2024-01-21 | End: 2024-01-23

## 2024-01-21 RX ORDER — CEFTRIAXONE 500 MG/1
1000 INJECTION, POWDER, FOR SOLUTION INTRAMUSCULAR; INTRAVENOUS EVERY 24 HOURS
Refills: 0 | Status: COMPLETED | OUTPATIENT
Start: 2024-01-21 | End: 2024-01-23

## 2024-01-21 RX ORDER — LISINOPRIL 2.5 MG/1
20 TABLET ORAL DAILY
Refills: 0 | Status: DISCONTINUED | OUTPATIENT
Start: 2024-01-21 | End: 2024-01-24

## 2024-01-21 RX ORDER — POTASSIUM CHLORIDE 20 MEQ
10 PACKET (EA) ORAL DAILY
Refills: 0 | Status: DISCONTINUED | OUTPATIENT
Start: 2024-01-21 | End: 2024-01-24

## 2024-01-21 RX ORDER — DULOXETINE HYDROCHLORIDE 30 MG/1
1 CAPSULE, DELAYED RELEASE ORAL
Qty: 0 | Refills: 0 | DISCHARGE

## 2024-01-21 RX ORDER — PANTOPRAZOLE SODIUM 20 MG/1
1 TABLET, DELAYED RELEASE ORAL
Refills: 0 | DISCHARGE

## 2024-01-21 RX ORDER — ATORVASTATIN CALCIUM 80 MG/1
1 TABLET, FILM COATED ORAL
Refills: 0 | DISCHARGE

## 2024-01-21 RX ORDER — DULOXETINE HYDROCHLORIDE 30 MG/1
30 CAPSULE, DELAYED RELEASE ORAL DAILY
Refills: 0 | Status: DISCONTINUED | OUTPATIENT
Start: 2024-01-21 | End: 2024-01-24

## 2024-01-21 RX ORDER — CLOPIDOGREL BISULFATE 75 MG/1
75 TABLET, FILM COATED ORAL DAILY
Refills: 0 | Status: DISCONTINUED | OUTPATIENT
Start: 2024-01-21 | End: 2024-01-24

## 2024-01-21 RX ORDER — ZOLPIDEM TARTRATE 10 MG/1
1 TABLET ORAL
Qty: 0 | Refills: 0 | DISCHARGE

## 2024-01-21 RX ORDER — ASPIRIN/CALCIUM CARB/MAGNESIUM 324 MG
81 TABLET ORAL DAILY
Refills: 0 | Status: DISCONTINUED | OUTPATIENT
Start: 2024-01-21 | End: 2024-01-23

## 2024-01-21 RX ADMIN — Medication 81 MILLIGRAM(S): at 16:08

## 2024-01-21 RX ADMIN — Medication 40 MILLIGRAM(S): at 16:10

## 2024-01-21 RX ADMIN — CEFTRIAXONE 1000 MILLIGRAM(S): 500 INJECTION, POWDER, FOR SOLUTION INTRAMUSCULAR; INTRAVENOUS at 23:12

## 2024-01-21 RX ADMIN — Medication 325 MILLIGRAM(S): at 16:08

## 2024-01-21 RX ADMIN — CEFTRIAXONE 1000 MILLIGRAM(S): 500 INJECTION, POWDER, FOR SOLUTION INTRAMUSCULAR; INTRAVENOUS at 02:17

## 2024-01-21 RX ADMIN — CLOPIDOGREL BISULFATE 75 MILLIGRAM(S): 75 TABLET, FILM COATED ORAL at 16:07

## 2024-01-21 RX ADMIN — ENOXAPARIN SODIUM 40 MILLIGRAM(S): 100 INJECTION SUBCUTANEOUS at 16:09

## 2024-01-21 RX ADMIN — DULOXETINE HYDROCHLORIDE 30 MILLIGRAM(S): 30 CAPSULE, DELAYED RELEASE ORAL at 16:09

## 2024-01-21 RX ADMIN — Medication 200 MILLIGRAM(S): at 16:08

## 2024-01-21 RX ADMIN — Medication 40 MILLIGRAM(S): at 00:47

## 2024-01-21 RX ADMIN — LISINOPRIL 20 MILLIGRAM(S): 2.5 TABLET ORAL at 16:08

## 2024-01-21 RX ADMIN — Medication 10 MILLIEQUIVALENT(S): at 16:08

## 2024-01-21 NOTE — H&P ADULT - HISTORY OF PRESENT ILLNESS
· 89 y/o female with PMHx of CAD s/p 12stents, last done 2019 after NSTEMI,  HLD, HTN, CHF, PPM, ? Afib,  Dementia, Hypothyroidism, GIB presents to the ED from home. Pt interviewed stating she forgot why she came to the ED, had SOB at some point but not now.  Pt son in law reports pt has had 3 days of SOB on exertion and worsening LE swelling    In the ED was given Lasix IV; Cxray showed bilateral effusions new from last year    PAST MEDICAL HISTORY:  Acute arthritis   Arthritis   CAD (coronary artery disease)   Diverticulosis   Dyslipidemia   Gastritis   GIB (gastrointestinal bleeding)   Hiatal hernia   High cholesterol   Hypertension   Hypothyroidism   Rectocele   Stress incontinence in female.     PAST SURGICAL HISTORY:  Bilateral cataracts surgery  Cardiac pacemaker   Carpal tunnel syndrome of right wrist   H/O carotid endarterectomy right  History of breast reconstruction   History of hysterectomy   Status post total knee replacement right  Stented coronary artery 12 heart stents,   PAD s/p  left leg stents.     FAMILY HISTORY:  Sibling  Still living? Unknown  Family history of cancer, Age at diagnosis: Age Unknown.    Social Hx: no smoking, no Etoh            REVIEW OF SYSTEMS:    CONSTITUTIONAL: No weakness, No fevers or chills  ENT: No ear ache, No sorethroat  NECK: No pain, No stiffness  RESPIRATORY: No cough, No wheezing, No hemoptysis; No dyspnea  CARDIOVASCULAR: No chest pain, No palpitations  GASTROINTESTINAL: No abd pain, No nausea, No vomiting, No hematemesis, No diarrhea or constipation. No melena, No hematochezia.  GENITOURINARY: No dysuria, No  hematuria  NEUROLOGICAL: No diplopia, No paresthesia, No motor dysfunction  MUSCULOSKELETAL: No arthralgia, No myalgia  SKIN: No rashes, or lesions   PSYCH: no anxiety, no suicidal ideation    All other review of systems is negative unless indicated above    Vital Signs Last 24 Hrs  T(C): 36.2 (21 Jan 2024 08:12), Max: 36.8 (21 Jan 2024 02:15)  T(F): 97.2 (21 Jan 2024 08:12), Max: 98.2 (21 Jan 2024 02:15)  HR: 75 (21 Jan 2024 08:12) (73 - 75)  BP: 155/77 (21 Jan 2024 08:12) (151/74 - 158/67)  BP(mean): 96 (21 Jan 2024 04:58) (96 - 107)  RR: 18 (21 Jan 2024 08:12) (16 - 18)  SpO2: 97% (21 Jan 2024 08:12) (96% - 100%)    Parameters below as of 21 Jan 2024 08:12  Patient On (Oxygen Delivery Method): room air        PHYSICAL EXAM:    GENERAL: NAD  HEENT:  NC/AT, EOMI, PERRLA, No scleral icterus, Moist mucous membranes  NECK: Supple, No JVD  CNS:  Alert & Oriented X3, Motor Strength 5/5 B/L upper and lower extremities; DTRs 2+ intact   LUNG: Normal Breath sounds, , +bilateral basilar rales, No rhonchi, No wheezing  HEART: RRR; No murmurs, No rubs  ABDOMEN: +BS, ST/ND/NT  GENITOURINARY: Voiding, Bladder not distended  EXTREMITIES:  2+ Peripheral Pulses, No clubbing, No cyanosis, +3bilateral  tibial edema  MUSCULOSKELTAL: Joints normal ROM, No TTP, No effusion  VAGINAL: deferred  SKIN: no rashes  RECTAL: deferred, not indicated  BREAST: deferred                          8.7    6.82  )-----------( 312      ( 20 Jan 2024 22:58 )             29.3     01-20    141  |  109<H>  |  29<H>  ----------------------------<  114<H>  4.5   |  30  |  1.11    Ca    9.4      20 Jan 2024 22:58    TPro  6.4  /  Alb  3.2<L>  /  TBili  1.0  /  DBili  x   /  AST  27  /  ALT  25  /  AlkPhos  153<H>  01-20    Vancomycin levels:   Cultures:     MEDICATIONS  (STANDING):  aspirin  chewable 81 milliGRAM(s) Oral daily  cefTRIAXone Injectable. 1000 milliGRAM(s) IV Push every 24 hours  clopidogrel Tablet 75 milliGRAM(s) Oral daily  DULoxetine 30 milliGRAM(s) Oral daily  ferrous    sulfate 325 milliGRAM(s) Oral daily  furosemide   Injectable 40 milliGRAM(s) IV Push daily  lisinopril 20 milliGRAM(s) Oral daily  metoprolol succinate  milliGRAM(s) Oral daily  potassium chloride    Tablet ER 10 milliEquivalent(s) Oral daily    MEDICATIONS  (PRN):      all labs reviewed  all imaging reviewed      a/p:    1. Acute on chronic Chf exacerbation:  HFpEF  admit to telemetry :   Troponin x 3  BNP elevated  Lasix 40mg IV daily ; replete K as needed  Cardiology evaluation    TTE    2. Afib, chronic: rate controlled  not on anticoagulation as outpatient  c/w BB  ASA/Plavix  s/p PPM  Cardiology evaluation   PPM interrogation     3. Pyuria: r/o Uti  f/u UCx  Ceftriaxone IV pending cultures     4. Bilateral effusions: likely due to Chf  c/w diuretic    5. Dementia  · 89 y/o female with PMHx of CAD s/p 12stents, last done 2019 after NSTEMI,  HLD, HTN, CHF, PPM, ? Afib,  Dementia, Hypothyroidism, GIB presents to the ED from home. Pt interviewed stating she forgot why she came to the ED, had SOB at some point but not now.  Pt son in law reports pt has had 3 days of SOB on exertion and worsening LE swelling    In the ED was given Lasix IV; Cxray showed bilateral effusions new from last year    PAST MEDICAL HISTORY:  Acute arthritis   Arthritis   CAD (coronary artery disease)   Diverticulosis   Dyslipidemia   Gastritis   GIB (gastrointestinal bleeding)   Hiatal hernia   High cholesterol   Hypertension   Hypothyroidism   Rectocele   Stress incontinence in female.     PAST SURGICAL HISTORY:  Bilateral cataracts surgery  Cardiac pacemaker   Carpal tunnel syndrome of right wrist   H/O carotid endarterectomy right  History of breast reconstruction   History of hysterectomy   Status post total knee replacement right  Stented coronary artery 12 heart stents,   PAD s/p  left leg stents.     FAMILY HISTORY:  Sibling  Still living? Unknown  Family history of cancer, Age at diagnosis: Age Unknown.    Social Hx: no smoking, no Etoh            REVIEW OF SYSTEMS:    CONSTITUTIONAL: No weakness, No fevers or chills  ENT: No ear ache, No sorethroat  NECK: No pain, No stiffness  RESPIRATORY: No cough, No wheezing, No hemoptysis; No dyspnea  CARDIOVASCULAR: No chest pain, No palpitations  GASTROINTESTINAL: No abd pain, No nausea, No vomiting, No hematemesis, No diarrhea or constipation. No melena, No hematochezia.  GENITOURINARY: No dysuria, No  hematuria  NEUROLOGICAL: No diplopia, No paresthesia, No motor dysfunction  MUSCULOSKELETAL: No arthralgia, No myalgia  SKIN: No rashes, or lesions   PSYCH: no anxiety, no suicidal ideation    All other review of systems is negative unless indicated above    Vital Signs Last 24 Hrs  T(C): 36.2 (21 Jan 2024 08:12), Max: 36.8 (21 Jan 2024 02:15)  T(F): 97.2 (21 Jan 2024 08:12), Max: 98.2 (21 Jan 2024 02:15)  HR: 75 (21 Jan 2024 08:12) (73 - 75)  BP: 155/77 (21 Jan 2024 08:12) (151/74 - 158/67)  BP(mean): 96 (21 Jan 2024 04:58) (96 - 107)  RR: 18 (21 Jan 2024 08:12) (16 - 18)  SpO2: 97% (21 Jan 2024 08:12) (96% - 100%)    Parameters below as of 21 Jan 2024 08:12  Patient On (Oxygen Delivery Method): room air        PHYSICAL EXAM:    GENERAL: NAD  HEENT:  NC/AT, EOMI, PERRLA, No scleral icterus, Moist mucous membranes  NECK: Supple, No JVD  CNS:  Alert & Oriented X3, Motor Strength 5/5 B/L upper and lower extremities; DTRs 2+ intact   LUNG: Normal Breath sounds, , +bilateral basilar rales, No rhonchi, No wheezing  HEART: RRR; No murmurs, No rubs  ABDOMEN: +BS, ST/ND/NT  GENITOURINARY: Voiding, Bladder not distended  EXTREMITIES:  2+ Peripheral Pulses, No clubbing, No cyanosis, +3bilateral  tibial edema  MUSCULOSKELTAL: Joints normal ROM, No TTP, No effusion  VAGINAL: deferred  SKIN: no rashes  RECTAL: deferred, not indicated  BREAST: deferred                          8.7    6.82  )-----------( 312      ( 20 Jan 2024 22:58 )             29.3     01-20    141  |  109<H>  |  29<H>  ----------------------------<  114<H>  4.5   |  30  |  1.11    Ca    9.4      20 Jan 2024 22:58    TPro  6.4  /  Alb  3.2<L>  /  TBili  1.0  /  DBili  x   /  AST  27  /  ALT  25  /  AlkPhos  153<H>  01-20    Vancomycin levels:   Cultures:     MEDICATIONS  (STANDING):  aspirin  chewable 81 milliGRAM(s) Oral daily  cefTRIAXone Injectable. 1000 milliGRAM(s) IV Push every 24 hours  clopidogrel Tablet 75 milliGRAM(s) Oral daily  DULoxetine 30 milliGRAM(s) Oral daily  ferrous    sulfate 325 milliGRAM(s) Oral daily  furosemide   Injectable 40 milliGRAM(s) IV Push daily  lisinopril 20 milliGRAM(s) Oral daily  metoprolol succinate  milliGRAM(s) Oral daily  potassium chloride    Tablet ER 10 milliEquivalent(s) Oral daily    MEDICATIONS  (PRN):      all labs reviewed  all imaging reviewed      a/p:    1. Acute on chronic Chf exacerbation:  HFpEF  admit to telemetry :   Troponin x 3  BNP elevated  Lasix 40mg IV daily ; replete K as needed  Cardiology evaluation    TTE    2. Afib, chronic: rate controlled  not on anticoagulation as outpatient  c/w BB  ASA/Plavix  s/p PPM  Cardiology evaluation   PPM interrogation     3. Pyuria: r/o Uti  f/u UCx  Ceftriaxone IV pending cultures     4. Bilateral effusions: likely due to Chf  c/w diuretic    5. Dementia     6. Microcytic Anemia:  outpatient f/u  cw Feosol inpatient , transfuse prn Hb<8

## 2024-01-21 NOTE — PATIENT PROFILE ADULT - FALL HARM RISK - HARM RISK INTERVENTIONS
Assistance with ambulation/Assistance OOB with selected safe patient handling equipment/Communicate Risk of Fall with Harm to all staff/Discuss with provider need for PT consult/Monitor gait and stability/Provide patient with walking aids - walker, cane, crutches/Reinforce activity limits and safety measures with patient and family/Reorient to person, place and time as needed/Tailored Fall Risk Interventions/Use of alarms - bed, chair and/or voice tab/Visual Cue: Yellow wristband and red socks/Bed in lowest position, wheels locked, appropriate side rails in place/Call bell, personal items and telephone in reach/Instruct patient to call for assistance before getting out of bed or chair/Non-slip footwear when patient is out of bed/[place] to call system/Physically safe environment - no spills, clutter or unnecessary equipment/Purposeful Proactive Rounding/Room/bathroom lighting operational, light cord in reach

## 2024-01-21 NOTE — PATIENT PROFILE ADULT - NSPROPOAPRESSUREINJURY_GEN_A_NUR
"[facility]        LOS:  LOS: 1 day   Patient Name: Matheus Jolly  Age/Sex: 77 y.o. male  : 1944  MRN: [id number]    Day of Service: 21   Length of Stay: 1  Encounter Provider: JIMBO Jasmine  Place of Service: [facility] C[facility]  No care team member to display    Subjective:     Chief Complaint: f/u anterior STEMI    Subjective:   Seen cont with CP, EKG changed from yesterday from this am, concerning for ongoing ischemia, d/w pt and family extensively,     Back to cath lab to ensure stent ptentcy and latonya 3 flow in all vessels given EKG abn with ST changes compared to prior.     Pt per family not \"a complainer\" and just this am report some CP coniued but better than yesterday.     Confirmed pt received brillenta loading and ASA in addition to dose this am    Current Medications:   Scheduled Meds:aspirin, 243 mg, Oral, Once  aspirin, 81 mg, Oral, Daily  atorvastatin, 20 mg, Oral, Nightly  fentanyl, 50 mcg, Intravenous, Once  heparin, 4,000 Units, Intravenous, Once  [START ON 7/10/2021] metoprolol tartrate, 12.5 mg, Oral, Q12H  ondansetron, 4 mg, Intravenous, Once  spironolactone, 12.5 mg, Oral, Daily  ticagrelor, 180 mg, Oral, Once  ticagrelor, 90 mg, Oral, BID      Continuous Infusions:nitroglycerin, 10-50 mcg/min, Last Rate: 10 mcg/min (21)        Allergies:  No Known Allergies    ROS    Objective:     Temp:  [97.7 °F (36.5 °C)-100 °F (37.8 °C)] 97.9 °F (36.6 °C)  Heart Rate:  [60-90] 73  Resp:  [16] 16  BP: ()/(53-92) 97/53     Intake/Output Summary (Last 24 hours) at 2021 1306  Last data filed at 2021 1103  Gross per 24 hour   Intake 951 ml   Output 1450 ml   Net -499 ml     Body mass index is 28.43 kg/m².      21  1658 21  0607   Weight: 83.9 kg (185 lb) 84.9 kg (187 lb 2.7 oz) 84.8 kg (186 lb 15.2 oz)         Physical Exam:  General Appearance:    Alert, cooperative, in no acute distress, up " Procedures     Rima Barrientos is a 23 y.o. female  presents for IUD removal because of desire for pregnancy.      PRE-IUD REMOVAL COUNSELING:  The patient was advised of minimal risks of bleeding and pain and she agrees to proceed.    PROCEDURE:  TIME OUT PERFORMED.  IUD strings were  visualized at the os and grasped. IUD removed with gentle traction.  The patient tolerated the procedure well.    ASSESSMENT:  Contraceptive Management / Removal IUD. V25.0.    POST IUD REMOVAL COUNSELING:  Expect period-like flow to occur after  IUD removal and periods to return to pre-IUD pattern.  Manage post IUD removal cramping with NSAIDs, Tylenol. Discussed signs/symptoms necessitating reporting to provider    POST IUD REMOVAL CONTRACEPTION:  Discussed need for daily prenatal vitamin and folic acid supplementation due to plans for pregnancy.    Counseling lasted approximately 15 minutes and all her questions were answered.    FOLLOW-UP: For annual gyn exam or prn.     to chair               Neck:   supple, trachea midline, no thyromegaly, no carotid bruit, no JVD   Lungs:     Clear to auscultation,respirations regular, even and                  Unlabored, no crackles    Heart:    Regular rhythm and normal rate, 70s. normal S1 and S2, 1/6 HARPREET, no gallop   Abdomen:     Normal bowel sounds, no masses, no organomegaly, soft        non-tender, non-distended, no guarding, no rebound                tenderness   Extremities:   Moves all extremities well, no edema, no cyanosis, no             redness   Pulses:   Pulses palpable and equal bilaterally   Skin:   No bleeding, bruising or rash   Neurologic:   Awake, alert, oriented x3         Lab Review:   Results from last 7 days   Lab Units 07/09/21  0621 07/08/21  1734   SODIUM mmol/L 136 139   POTASSIUM mmol/L 4.0 3.9   CHLORIDE mmol/L 97* 96*   CO2 mmol/L 28.0 27.0   BUN mg/dL 10 8   CREATININE mg/dL 0.85 0.77   GLUCOSE mg/dL 128* 131*   CALCIUM mg/dL 9.0 9.3   AST (SGOT) U/L 138* 96*   ALT (SGPT) U/L 39 35     Results from last 7 days   Lab Units 07/09/21  0621 07/08/21  1734   TROPONIN T ng/mL 2.440* 1.800*     Results from last 7 days   Lab Units 07/09/21  0621 07/08/21  1734   WBC 10*3/mm3 12.00* 12.00*   HEMOGLOBIN g/dL 14.5 15.8   HEMATOCRIT % 42.7 46.3   PLATELETS 10*3/mm3 146 149     Results from last 7 days   Lab Units 07/09/21  0621 07/08/21  1734   INR   --  1.06   APTT seconds 30.5* 27.6*     Results from last 7 days   Lab Units 07/09/21  0621 07/08/21  1734   MAGNESIUM mg/dL 1.9 1.9     Results from last 7 days   Lab Units 07/09/21 0621   CHOLESTEROL mg/dL 129   TRIGLYCERIDES mg/dL 60   HDL CHOL mg/dL 37*               Recent Radiology:  Imaging Results (Most Recent)     None          ECHOCARDIOGRAM:          I reviewed the patient's new clinical results.    EKG:              Assessment:       ST elevation myocardial infarction (STEMI) (CMS/[facility])    1. Anterior STEMI  - 7/8/2021: 99% stenosis of the mid LAD status post  yes intervention with Xience 3.5 x 28 drug-eluting stent postdilated to 4.2 and 4.6 mm distal proximal respectively  - troponin 1.8, 2.4, trend troponin  - ASA / Brilinta / statin  - 2D ECHO pending  - on metoprolol, aldactone added    2.  HTN-- borderline bp currently    3. Dyslipidemia  - FLP total 129, HDL 37, LDL 79, triglycerides    Plan:   ECHO pending  EKG abn with inferior ST elevation different than yesterday with LAD culprit yesterdayw ith successful intervention and post dilation to > 4mm with IVUS verified apposition and LM > 6mm^2  Troponin 1.8, 2.4, continue to trend  Continue DAPT with ASA / Brilinta  Continue on Statin therapy  On aldactone    Back to cath now for angiography    Further recs to follow.       Shay Mirza MD PhD        Tari Mitchell, JIMBO  07/09/21  13:06 EDT       Addendum  2D echo with apical akinesis, overall EF 45% consistent with LAD injury  Left heart cath with apical LAD no reflow, possible distal thromboembolism from thrombus trapped behind the mid LAD stent versus secondary reperfusion injury and microvasculature congestion, very small caliber wraparound LAD unable to open this given size, not amenable to PCI  Treat medically, Integrilin drip 18 hours on background of aspirin and ticagrelor  Stop tomorrow  Continue beta-blocker, Aldactone on board, add low-dose ACE inhibitor if able per hemodynamics  Likely be in the hospital 3-4 more days  Continue telemetry, closely monitor for any electrical instability    Plan discussed with weekend cardiology team    Shay Mirza MD, PhD

## 2024-01-22 ENCOUNTER — TRANSCRIPTION ENCOUNTER (OUTPATIENT)
Age: 89
End: 2024-01-22

## 2024-01-22 LAB
ADD ON TEST-SPECIMEN IN LAB: SIGNIFICANT CHANGE UP
ANION GAP SERPL CALC-SCNC: 4 MMOL/L — LOW (ref 5–17)
BUN SERPL-MCNC: 31 MG/DL — HIGH (ref 7–23)
CALCIUM SERPL-MCNC: 9.7 MG/DL — SIGNIFICANT CHANGE UP (ref 8.5–10.1)
CHLORIDE SERPL-SCNC: 106 MMOL/L — SIGNIFICANT CHANGE UP (ref 96–108)
CO2 SERPL-SCNC: 28 MMOL/L — SIGNIFICANT CHANGE UP (ref 22–31)
CREAT SERPL-MCNC: 0.99 MG/DL — SIGNIFICANT CHANGE UP (ref 0.5–1.3)
EGFR: 54 ML/MIN/1.73M2 — LOW
FERRITIN SERPL-MCNC: 19 NG/ML — SIGNIFICANT CHANGE UP (ref 13–330)
FOLATE SERPL-MCNC: >20 NG/ML — SIGNIFICANT CHANGE UP
GLUCOSE SERPL-MCNC: 133 MG/DL — HIGH (ref 70–99)
HCT VFR BLD CALC: 28.3 % — LOW (ref 34.5–45)
HGB BLD-MCNC: 8.6 G/DL — LOW (ref 11.5–15.5)
IRON SATN MFR SERPL: 11 % — LOW (ref 14–50)
IRON SATN MFR SERPL: 49 UG/DL — SIGNIFICANT CHANGE UP (ref 30–160)
MANUAL SMEAR VERIFICATION: SIGNIFICANT CHANGE UP
MCHC RBC-ENTMCNC: 23 PG — LOW (ref 27–34)
MCHC RBC-ENTMCNC: 30.4 GM/DL — LOW (ref 32–36)
MCV RBC AUTO: 75.7 FL — LOW (ref 80–100)
PLAT MORPH BLD: NORMAL — SIGNIFICANT CHANGE UP
PLATELET # BLD AUTO: 253 K/UL — SIGNIFICANT CHANGE UP (ref 150–400)
POTASSIUM SERPL-MCNC: 3.9 MMOL/L — SIGNIFICANT CHANGE UP (ref 3.5–5.3)
POTASSIUM SERPL-SCNC: 3.9 MMOL/L — SIGNIFICANT CHANGE UP (ref 3.5–5.3)
RBC # BLD: 3.74 M/UL — LOW (ref 3.8–5.2)
RBC # BLD: 3.74 M/UL — LOW (ref 3.8–5.2)
RBC # FLD: 21.2 % — HIGH (ref 10.3–14.5)
RBC BLD AUTO: SIGNIFICANT CHANGE UP
RETICS #: 51.7 K/UL — SIGNIFICANT CHANGE UP (ref 25–125)
RETICS/RBC NFR: 1.4 % — SIGNIFICANT CHANGE UP (ref 0.5–2.5)
SODIUM SERPL-SCNC: 138 MMOL/L — SIGNIFICANT CHANGE UP (ref 135–145)
TIBC SERPL-MCNC: 428 UG/DL — SIGNIFICANT CHANGE UP (ref 220–430)
UIBC SERPL-MCNC: 379 UG/DL — HIGH (ref 110–370)
VIT B12 SERPL-MCNC: 1534 PG/ML — HIGH (ref 232–1245)
WBC # BLD: 5.67 K/UL — SIGNIFICANT CHANGE UP (ref 3.8–10.5)
WBC # FLD AUTO: 5.67 K/UL — SIGNIFICANT CHANGE UP (ref 3.8–10.5)

## 2024-01-22 PROCEDURE — 99232 SBSQ HOSP IP/OBS MODERATE 35: CPT

## 2024-01-22 PROCEDURE — 93280 PM DEVICE PROGR EVAL DUAL: CPT | Mod: 26

## 2024-01-22 RX ORDER — ONDANSETRON 8 MG/1
4 TABLET, FILM COATED ORAL ONCE
Refills: 0 | Status: COMPLETED | OUTPATIENT
Start: 2024-01-22 | End: 2024-01-22

## 2024-01-22 RX ORDER — IRON SUCROSE 20 MG/ML
200 INJECTION, SOLUTION INTRAVENOUS EVERY 24 HOURS
Refills: 0 | Status: COMPLETED | OUTPATIENT
Start: 2024-01-22 | End: 2024-01-23

## 2024-01-22 RX ORDER — POLYETHYLENE GLYCOL 3350 17 G/17G
17 POWDER, FOR SOLUTION ORAL DAILY
Refills: 0 | Status: DISCONTINUED | OUTPATIENT
Start: 2024-01-22 | End: 2024-01-24

## 2024-01-22 RX ADMIN — CLOPIDOGREL BISULFATE 75 MILLIGRAM(S): 75 TABLET, FILM COATED ORAL at 09:35

## 2024-01-22 RX ADMIN — Medication 200 MILLIGRAM(S): at 09:35

## 2024-01-22 RX ADMIN — Medication 81 MILLIGRAM(S): at 09:35

## 2024-01-22 RX ADMIN — POLYETHYLENE GLYCOL 3350 17 GRAM(S): 17 POWDER, FOR SOLUTION ORAL at 09:32

## 2024-01-22 RX ADMIN — LISINOPRIL 20 MILLIGRAM(S): 2.5 TABLET ORAL at 09:36

## 2024-01-22 RX ADMIN — Medication 10 MILLIEQUIVALENT(S): at 09:35

## 2024-01-22 RX ADMIN — ONDANSETRON 4 MILLIGRAM(S): 8 TABLET, FILM COATED ORAL at 08:37

## 2024-01-22 RX ADMIN — ENOXAPARIN SODIUM 40 MILLIGRAM(S): 100 INJECTION SUBCUTANEOUS at 17:34

## 2024-01-22 RX ADMIN — IRON SUCROSE 110 MILLIGRAM(S): 20 INJECTION, SOLUTION INTRAVENOUS at 21:41

## 2024-01-22 RX ADMIN — Medication 40 MILLIGRAM(S): at 09:36

## 2024-01-22 RX ADMIN — Medication 325 MILLIGRAM(S): at 09:35

## 2024-01-22 RX ADMIN — DULOXETINE HYDROCHLORIDE 30 MILLIGRAM(S): 30 CAPSULE, DELAYED RELEASE ORAL at 09:36

## 2024-01-22 NOTE — CONSULT NOTE ADULT - SUBJECTIVE AND OBJECTIVE BOX
HPI:  89 y/o female with PMHx of CAD s/p 12stents, last done  after NSTEMI,  HLD, HTN, CHF, PPM, Afib,  Dementia, Hypothyroidism, GIB presents with SOB at some point but states now back to baseline.  Pt. is poorly compliant with diet as she eats as she wishes despite having CHF in past and being told to watch her diet. Has known diastolic and systolic HF. As per chart, family reveals 3 days of SOB on exertion and worsening LE swelling.  Was given Lasix IV; Cxray showed bilateral effusions new from last year.  She now is comfortable and wants to go home.  Last seen office 10/2023 with cellulitis of lower extremities as she refused to bath or shower.  Treated with oral abx with resolution.  Has been on chronic carelto therapy for Afib but is not always compliant. Had been on GDMT with benzapril, Lasix and metoprolol for chronic diastolic and systolic HF.  Also not compliant with meds sometimes.      PAST MEDICAL & SURGICAL HISTORY:  Acute arthritis   Arthritis   CAD (coronary artery disease)   s/p multiple stenting procedures.  Diverticulosis   Dyslipidemia   Gastritis   GIB (gastrointestinal bleeding)   Hiatal hernia   High cholesterol   Hypertension   Hypothyroidism   Rectocele   Stress incontinence in female.   Bilateral cataracts surgery  Cardiac pacemaker   Carpal tunnel syndrome of right wrist   H/O carotid endarterectomy right  History of breast reconstruction   History of hysterectomy   Status post total knee replacement right  Stented coronary artery 12 heart stents,   PAD s/p  left leg stents.   CAD (coronary artery disease)  Dyslipidemia  Hypothyroidism  Hypertension  Hiatal hernia  Diverticulosis  Rectocele  stress incontinence in female  High cholesterol  Acute arthritis  GIB (gastrointestinal bleeding)  Gastritis  Arthritis  Status post total knee replacement right  Bilateral cataracts surgery  History of breast reconstruction  History of hysterectomy  H/O carotid endarterectomy right  Stented coronary artery 12 heart stents,   1 left leg stents  Cardiac pacemaker  Carpal tunnel syndrome of right wrist    SOCIAL HISTORY: Non-Smoker (cig). / Smoke marijuanna daily for many years. /Social ETOH in past , but none now./ No Ilicit Drug use.    FAMILY HISTORY:  Family history of cancer (Sibling)    ROS: Unreliable due to dementia.    Allergies  No Known Drug Allergies  stolazine eye med- pt doesn&#x27;t remember (Unknown)  Originally Entered as [Unknown see Comment: pt unable to remember] reaction to [Mold] (Unknown)    Intolerances    Home Medications:  amlodipine-benazepril 5 mg-20 mg oral capsule: 1 cap(s) orally once a day (2024 12:11)  atorvastatin 40 mg oral tablet: 1 tab(s) orally once a day (2024 12:12)  furosemide 20 mg oral tablet: 1 tab(s) orally once a day (2024 12:14)  Lasix 20 mg oral tablet: once a day (2024 12:13)  metoprolol succinate 200 mg oral tablet, extended release: 1 tab(s) orally once a day (2024 12:11)  Multiple Vitamins oral tablet: 1 tab(s) orally once a day (2024 12:11)  pantoprazole 40 mg oral delayed release tablet: 1 tab(s) orally once a day (2024 12:12)  Vitamin D3 1000 intl units oral tablet: 1 tab(s) orally 2 times a day (2024 12:11)  Xarelto 15 mg QD    HOSPITAL MEDICATIONS:   MEDICATIONS  (STANDING):  aspirin  chewable 81 milliGRAM(s) Oral daily  cefTRIAXone Injectable. 1000 milliGRAM(s) IV Push every 24 hours  clopidogrel Tablet 75 milliGRAM(s) Oral daily  DULoxetine 30 milliGRAM(s) Oral daily  enoxaparin Injectable 40 milliGRAM(s) SubCutaneous every 24 hours  furosemide   Injectable 40 milliGRAM(s) IV Push daily  iron sucrose IVPB 200 milliGRAM(s) IV Intermittent every 24 hours  lisinopril 20 milliGRAM(s) Oral daily  metoprolol succinate  milliGRAM(s) Oral daily  polyethylene glycol 3350 17 Gram(s) Oral daily  potassium chloride    Tablet ER 10 milliEquivalent(s) Oral daily    MEDICATIONS  (PRN):      REVIEW OF SYSTEMS: 13 systems were reviewed and all negative except for comments above.    Vital Signs Last 24 Hrs  T(C): 36.4 (2024 08:00), Max: 36.4 (2024 20:24)  T(F): 97.6 (2024 08:00), Max: 97.6 (2024 08:00)  HR: 74 (2024 08:00) (74 - 78)  BP: 175/70 (2024 08:00) (158/78 - 175/70)  BP(mean): --  RR: 17 (2024 08:00) (17 - 18)  SpO2: 96% (2024 08:00) (96% - 97%)    Parameters below as of 2024 08:00  Patient On (Oxygen Delivery Method): room air    Daily     Daily Weight in k (2024 04:18)I&O's Summary      PHYSICAL EXAM:    Constitutional: NAD, awake, well-developed  HEENT: PERRLA, EOMI,  No oral cyanosis. Oropharynx Clean and Dry.  Neck:  supple,  No JVD, No Thyroid enlargement. + Carotid Bruits bilaterally.  Respiratory: Breath sounds are diminshed at bases with scant rhonchi.    Cardiovascular: NL S1 and S2, RRR, 1-2/KIRBY, No gallops or rubs  Gastrointestinal: Bowel Sounds present.   Extremities: 1+ peripheral edema. No clubbing or cyanosis.   Vascular: 2+ peripheral pulses in LE   Musculoskeletal: no calf tenderness.  Skin: No rashes       LABS: All Labs Reviewed:                        8.6    5.67  )-----------( 253      ( 2024 07:28 )             28.3                         8.7    6.82  )-----------( 312      ( 2024 22:58 )             29.3     2024 07:28    138    |  106    |  31     ----------------------------<  133    3.9     |  28     |  0.99   2024 22:58    141    |  109    |  29     ----------------------------<  114    4.5     |  30     |  1.11     Ca    9.7        2024 07:28  Ca    9.4        2024 22:58    TPro  6.4    /  Alb  3.2    /  TBili  1.0    /  DBili  x      /  AST  27     /  ALT  25     /  AlkPhos  153    2024 22:58    PT/INR - ( 2024 22:58 )   PT: 14.8 sec;   INR: 1.32 ratio         PTT - ( 2024 22:58 )  PTT:34.9 sec  Pro-Brain Natriuretic Peptide (24 @ 22:58)   Pro-Brain Natriuretic Peptide: 40894 pg/mL  - Troponin: <-13.47, <-16.65  RADIOLOGY:    EKG:    < from: 12 Lead ECG (24 @ 21:30) >  Atrial fibrillation.  Low voltage QRS  Abnormal ECG    < end of copied text >  ECHO:  < from: TTE Echo Complete w/o Contrast w/ Doppler (23 @ 11:55) >   Summary     The mitral valve leaflets appear thickened.   Mild mitral annular calcification is present.   Moderate (2+) mitral regurgitation is present.   Non-coaptation of tricuspid valve is present.   Severe (4+) tricuspid valve regurgitation is present.   Wide open tricuspid insufficiency.   Normal appearing pulmonic valve structure.   Mild pulmonic valvular regurgitation (1+) is present.   The left atrium is mildly dilated.   Left ventricle systolic function appears preserved in the presence of a   cardiac arrhythmia. Left ventricle size and structure are within normal   limitations.   Estimated left ventricular ejection fraction is 55-60 %.   The right atrium appears dilated.   A device wire is seen in the RV and RA.   The right ventricle appears mildly dilated.   A device wire is seen in the RV and RA.   IVC is dilated with decreased respiratory variation.    < end of copied text >    CARDIAC CATHTERIZATION/STRESS TEST:

## 2024-01-22 NOTE — CONSULT NOTE ADULT - ASSESSMENT
Acute on chronic systolic/diastolic HF with intermittent compliance with therapy  Chronic Afib which she had been on AC and stopped after GIB and recently reinitiated.   -Continue metoprolol.  IV Lasix .  Better BP control and will uptitrate medication.  Severe TR is likely from a combo of diastlic/syst. HF, and PPM  -Increase lisinopril to 20 MG BID.  restart Xarelto.      D/w patient and staff

## 2024-01-22 NOTE — PROGRESS NOTE ADULT - ASSESSMENT
88 y/o F with PMH of HTN, CAD s/p 2 stents hiatal hernia, h/o GI bleed, gastritis,  LLE stent, arthritis, diverticulosis, hypothyroidism,  PPM, h/o CEA  admitted for:       Acute on Chronic Diastolic HF  Severe TR/Moderate AR/Moderate MR  Atypical Chest pain. H/o CAD s/p stents   -admit to tele   -Troponin negative. ACS unlikely  -BNP 10K  -ECHO to reassess EF and multiple bri of clinically significant regurg  -Interrogate PPM-> AFib   -C/w ASA, Plavix, change lovastatin to Lipitor  -C/w Toprol    -Cardio eval, d/w Dr Welsh   -LE doppler negatie for DVT  -Lasix  -I/Os  -Daily weights     HTN  -C/w metoprolol and ACEI  -Hold amlodipine due to leg swelling and possible CHF   -Monitor BP     PAD , S/p LLE stent s/p R   C/w ASA, Plavix, statins     Depression   -C/w Duloxetine  -Supportive care     DVT PPx: lovenox SQ          89 y/o F with PMH of HTN, CAD s/p 2 stents hiatal hernia, h/o GI bleed, gastritis,  LLE stent, arthritis, diverticulosis, hypothyroidism,  PPM, h/o CEA  admitted for:       Acute on Chronic Diastolic HF  Severe TR/Moderate AR/Moderate MR  Atypical Chest pain. H/o CAD s/p stents   -admit to tele   -Troponin negative. ACS unlikely  -BNP 10K  -ECHO to reassess EF and multiple bri of clinically significant regurg  -Interrogate PPM-> AFib   -C/w ASA, Plavix, change lovastatin to Lipitor  -C/w Toprol    -Cardio eval, d/w Dr Welsh   -LE doppler negatie for DVT  -Lasix  -I/Os  -Daily weights    E. Coli UTI  -Continue rocephin  -Follow urine culture sensitivities      HTN  -C/w metoprolol and ACEI  -Hold amlodipine due to leg swelling and possible CHF   -Monitor BP     PAD , S/p LLE stent s/p R   C/w ASA, Plavix, statins     Depression   -C/w Duloxetine  -Supportive care     DVT PPx: lovenox SQ

## 2024-01-22 NOTE — DISCHARGE NOTE NURSING/CASE MANAGEMENT/SOCIAL WORK - NSDCPEFALRISK_GEN_ALL_CORE
For information on Fall & Injury Prevention, visit: [email or web address] OR  [email or web address] OR  https://www.cdc.gov/steadi/patient.html

## 2024-01-22 NOTE — DISCHARGE NOTE NURSING/CASE MANAGEMENT/SOCIAL WORK - PATIENT PORTAL LINK FT
You can access the FollowMyHealth Patient Portal offered by [facility] by registering at the following website: [email or web address]. By joining [employer]’s FollowMyHealth portal, you will also be able to view your health information using other applications (apps) compatible with our system.

## 2024-01-22 NOTE — PHYSICAL THERAPY INITIAL EVALUATION ADULT - GENERAL OBSERVATIONS, REHAB EVAL
Pt rec'd supine in bed, pleasant and cooperative with PT, no c/o pain, endorses generalized fatigue.

## 2024-01-22 NOTE — PHYSICAL THERAPY INITIAL EVALUATION ADULT - PERTINENT HX OF CURRENT PROBLEM, REHAB EVAL
89 y/o female with PMHx of CAD s/p 12stents, last done 2019 after NSTEMI,  HLD, HTN, CHF, PPM, ? Afib,  Dementia, Hypothyroidism, GIB presents to the ED from home. Pt interviewed stating she forgot why she came to the ED, had SOB at some point but not now.  Pt son in law reports pt has had 3 days of SOB on exertion and worsening LE swelling

## 2024-01-23 LAB
-  AMOXICILLIN/CLAVULANIC ACID: SIGNIFICANT CHANGE UP
-  AMPICILLIN/SULBACTAM: SIGNIFICANT CHANGE UP
-  AMPICILLIN: SIGNIFICANT CHANGE UP
-  AMPICILLIN: SIGNIFICANT CHANGE UP
-  AZTREONAM: SIGNIFICANT CHANGE UP
-  CEFAZOLIN: SIGNIFICANT CHANGE UP
-  CEFEPIME: SIGNIFICANT CHANGE UP
-  CEFOXITIN: SIGNIFICANT CHANGE UP
-  CEFTRIAXONE: SIGNIFICANT CHANGE UP
-  CEFUROXIME: SIGNIFICANT CHANGE UP
-  CIPROFLOXACIN: SIGNIFICANT CHANGE UP
-  CIPROFLOXACIN: SIGNIFICANT CHANGE UP
-  ERTAPENEM: SIGNIFICANT CHANGE UP
-  GENTAMICIN: SIGNIFICANT CHANGE UP
-  IMIPENEM: SIGNIFICANT CHANGE UP
-  LEVOFLOXACIN: SIGNIFICANT CHANGE UP
-  LEVOFLOXACIN: SIGNIFICANT CHANGE UP
-  MEROPENEM: SIGNIFICANT CHANGE UP
-  NITROFURANTOIN: SIGNIFICANT CHANGE UP
-  NITROFURANTOIN: SIGNIFICANT CHANGE UP
-  PIPERACILLIN/TAZOBACTAM: SIGNIFICANT CHANGE UP
-  TETRACYCLINE: SIGNIFICANT CHANGE UP
-  TOBRAMYCIN: SIGNIFICANT CHANGE UP
-  TRIMETHOPRIM/SULFAMETHOXAZOLE: SIGNIFICANT CHANGE UP
-  VANCOMYCIN: SIGNIFICANT CHANGE UP
ANION GAP SERPL CALC-SCNC: 4 MMOL/L — LOW (ref 5–17)
BUN SERPL-MCNC: 30 MG/DL — HIGH (ref 7–23)
CALCIUM SERPL-MCNC: 9.5 MG/DL — SIGNIFICANT CHANGE UP (ref 8.5–10.1)
CHLORIDE SERPL-SCNC: 106 MMOL/L — SIGNIFICANT CHANGE UP (ref 96–108)
CO2 SERPL-SCNC: 29 MMOL/L — SIGNIFICANT CHANGE UP (ref 22–31)
CREAT SERPL-MCNC: 0.95 MG/DL — SIGNIFICANT CHANGE UP (ref 0.5–1.3)
CULTURE RESULTS: ABNORMAL
EGFR: 57 ML/MIN/1.73M2 — LOW
GLUCOSE SERPL-MCNC: 119 MG/DL — HIGH (ref 70–99)
HCT VFR BLD CALC: 29.4 % — LOW (ref 34.5–45)
HGB BLD-MCNC: 8.8 G/DL — LOW (ref 11.5–15.5)
MAGNESIUM SERPL-MCNC: 2.1 MG/DL — SIGNIFICANT CHANGE UP (ref 1.6–2.6)
MCHC RBC-ENTMCNC: 22.7 PG — LOW (ref 27–34)
MCHC RBC-ENTMCNC: 29.9 GM/DL — LOW (ref 32–36)
MCV RBC AUTO: 75.8 FL — LOW (ref 80–100)
METHOD TYPE: SIGNIFICANT CHANGE UP
METHOD TYPE: SIGNIFICANT CHANGE UP
NT-PROBNP SERPL-SCNC: HIGH PG/ML (ref 0–450)
ORGANISM # SPEC MICROSCOPIC CNT: ABNORMAL
ORGANISM # SPEC MICROSCOPIC CNT: ABNORMAL
ORGANISM # SPEC MICROSCOPIC CNT: SIGNIFICANT CHANGE UP
PHOSPHATE SERPL-MCNC: 3.3 MG/DL — SIGNIFICANT CHANGE UP (ref 2.5–4.5)
PLATELET # BLD AUTO: 308 K/UL — SIGNIFICANT CHANGE UP (ref 150–400)
POTASSIUM SERPL-MCNC: 4 MMOL/L — SIGNIFICANT CHANGE UP (ref 3.5–5.3)
POTASSIUM SERPL-SCNC: 4 MMOL/L — SIGNIFICANT CHANGE UP (ref 3.5–5.3)
RBC # BLD: 3.88 M/UL — SIGNIFICANT CHANGE UP (ref 3.8–5.2)
RBC # FLD: 21.3 % — HIGH (ref 10.3–14.5)
SODIUM SERPL-SCNC: 139 MMOL/L — SIGNIFICANT CHANGE UP (ref 135–145)
SPECIMEN SOURCE: SIGNIFICANT CHANGE UP
WBC # BLD: 6.8 K/UL — SIGNIFICANT CHANGE UP (ref 3.8–10.5)
WBC # FLD AUTO: 6.8 K/UL — SIGNIFICANT CHANGE UP (ref 3.8–10.5)

## 2024-01-23 PROCEDURE — 99232 SBSQ HOSP IP/OBS MODERATE 35: CPT

## 2024-01-23 RX ORDER — PANTOPRAZOLE SODIUM 20 MG/1
40 TABLET, DELAYED RELEASE ORAL
Refills: 0 | Status: DISCONTINUED | OUTPATIENT
Start: 2024-01-23 | End: 2024-01-24

## 2024-01-23 RX ORDER — RIVAROXABAN 15 MG-20MG
15 KIT ORAL
Refills: 0 | Status: DISCONTINUED | OUTPATIENT
Start: 2024-01-23 | End: 2024-01-24

## 2024-01-23 RX ORDER — PIPERACILLIN AND TAZOBACTAM 4; .5 G/20ML; G/20ML
3.38 INJECTION, POWDER, LYOPHILIZED, FOR SOLUTION INTRAVENOUS EVERY 8 HOURS
Refills: 0 | Status: DISCONTINUED | OUTPATIENT
Start: 2024-01-23 | End: 2024-01-24

## 2024-01-23 RX ADMIN — PANTOPRAZOLE SODIUM 40 MILLIGRAM(S): 20 TABLET, DELAYED RELEASE ORAL at 18:24

## 2024-01-23 RX ADMIN — CEFTRIAXONE 1000 MILLIGRAM(S): 500 INJECTION, POWDER, FOR SOLUTION INTRAMUSCULAR; INTRAVENOUS at 01:14

## 2024-01-23 RX ADMIN — CLOPIDOGREL BISULFATE 75 MILLIGRAM(S): 75 TABLET, FILM COATED ORAL at 10:19

## 2024-01-23 RX ADMIN — Medication 40 MILLIGRAM(S): at 10:19

## 2024-01-23 RX ADMIN — RIVAROXABAN 15 MILLIGRAM(S): KIT at 18:13

## 2024-01-23 RX ADMIN — Medication 200 MILLIGRAM(S): at 10:19

## 2024-01-23 RX ADMIN — IRON SUCROSE 110 MILLIGRAM(S): 20 INJECTION, SOLUTION INTRAVENOUS at 22:17

## 2024-01-23 RX ADMIN — POLYETHYLENE GLYCOL 3350 17 GRAM(S): 17 POWDER, FOR SOLUTION ORAL at 10:19

## 2024-01-23 RX ADMIN — Medication 81 MILLIGRAM(S): at 10:18

## 2024-01-23 RX ADMIN — LISINOPRIL 20 MILLIGRAM(S): 2.5 TABLET ORAL at 10:19

## 2024-01-23 RX ADMIN — Medication 10 MILLIEQUIVALENT(S): at 10:18

## 2024-01-23 RX ADMIN — DULOXETINE HYDROCHLORIDE 30 MILLIGRAM(S): 30 CAPSULE, DELAYED RELEASE ORAL at 10:18

## 2024-01-23 RX ADMIN — PIPERACILLIN AND TAZOBACTAM 25 GRAM(S): 4; .5 INJECTION, POWDER, LYOPHILIZED, FOR SOLUTION INTRAVENOUS at 22:17

## 2024-01-23 NOTE — PROGRESS NOTE ADULT - SUBJECTIVE AND OBJECTIVE BOX
CHIEF COMPLAINT: SOB/Edema    SUBJECTIVE/SIGNIFICANT INTERVAL EVENTS/OVERNIGHT EVENTS:    1/22: SOB and edema improved however patient appears to have short term memory issues in the setting of dementia so unable to gauge interval change from her reported. Reported nausea and vomiting once last night and again this morning. Symptomatic Rx given. Reported constipation->bowel regimen started. Patient AAOx2. Limited ROS/CC    1/23: No new complaints. Breathing is improved. No other events.     Review of Systems: 14 Point review of systems reviewed and reported as negative unless otherwise stated above    FROM H&P:  "  · 89 y/o female with PMHx of CAD s/p 12stents, last done 2019 after NSTEMI,  HLD, HTN, CHF, PPM, ? Afib,  Dementia, Hypothyroidism, GIB presents to the ED from home. Pt interviewed stating she forgot why she came to the ED, had SOB at some point but not now.  Pt son in law reports pt has had 3 days of SOB on exertion and worsening LE swelling    In the ED was given Lasix IV; Cxray showed bilateral effusions new from last year"    PHYSICAL EXAM:  Vital Signs Last 24 Hrs  T(C): 36.2 (23 Jan 2024 07:30), Max: 36.2 (22 Jan 2024 20:05)  T(F): 97.2 (23 Jan 2024 07:30), Max: 97.2 (22 Jan 2024 20:05)  HR: 66 (23 Jan 2024 07:30) (66 - 67)  BP: 154/68 (23 Jan 2024 07:30) (154/68 - 154/69)  RR: 18 (23 Jan 2024 07:30) (18 - 18)  SpO2: 95% (23 Jan 2024 07:30) (95% - 95%)    Parameters below as of 23 Jan 2024 07:30  Patient On (Oxygen Delivery Method): room air        General: AAOx2; NAD  Head: AT/NC  ENT: Moist Mucous Membranes; No Injury  Eyes: EOMI; PERRL  Neck: Non-tender; No JVD  CVS: RRR, S1&S2, No murmur,LE Edema  Respiratory: Decreased breath sounds bilaterally; Normal Respiratory Effort  Abdomen/GI: Soft, non-tender, non-distended, no guarding, no rebound, normal bowel sounds  : No bladder distention, No Prakash  Extremities: No cyanosis, No clubbing, LE edema  MSK: No CVA tenderness, Normal ROM, No injury  Neuro: AAOx2, CNII-XII grossly intact, non-focal  Psych: Appropriate, Cooperative,   Skin: Clean, Dry and Intact      LABS:                          8.8    6.80  )-----------( 308      ( 23 Jan 2024 06:44 )             29.4   01-23    139  |  106  |  30<H>  ----------------------------<  119<H>  4.0   |  29  |  0.95    Ca    9.5      23 Jan 2024 06:44  Phos  3.3     01-23  Mg     2.1     01-23        CAPILLARY BLOOD GLUCOSE          Culture - Blood (collected 21 Jan 2024 01:51)  Source: .Blood None  Preliminary Report (22 Jan 2024 11:02):    No growth at 24 hours    Culture - Blood (collected 21 Jan 2024 01:51)  Source: .Blood None  Preliminary Report (22 Jan 2024 11:02):    No growth at 24 hours    Culture - Urine (collected 20 Jan 2024 23:39)  Source: Clean Catch Clean Catch (Midstream)  Preliminary Report (22 Jan 2024 16:02):    >100,000 CFU/ml Escherichia coli    Vitamin B12, Serum: 1534 pg/mL (01.22.24 @ 07:28) Folate, Serum: >20.0 ng/mL (01.22.24 @ 07:28) Ferritin: 19 ng/mL (01.22.24 @ 07:28) Iron with Total Binding Capacity in AM (01.22.24 @ 07:28)   Iron - Total Binding Capacity.: 428 ug/dL  % Saturation, Iron: 11 %  Iron Total: 49 ug/dL  Unsaturated Iron Binding Capacity: 379 ug/dLroponin I, High Sensitivity Result: 13.47: Pro-Brain Natriuretic Peptide: 57352 pg/mL (01.20.24 @ 22:58)       RADIOLOGY:  < from: Xray Chest 1 View- PORTABLE-Urgent (Xray Chest 1 View- PORTABLE-Urgent .) (01.20.24 @ 23:42) >  IMPRESSION: Bilateral effusions presently seen new since prior.    < end of copied text >      EKG:  < from: 12 Lead ECG (01.20.24 @ 21:30) >    Diagnosis Line Atrial fibrillation.  Low voltage QRS  Abnormal ECG    < end of copied text >          I personally reviewed labs, imaging, ekg, orders and vitals.    Discussed case with:  [X]RN  [X]CM/SALVATORE  [X]Patient  []Family  [X]Specialist: Cardiology        MEDICATIONS  (STANDING):  clopidogrel Tablet 75 milliGRAM(s) Oral daily  DULoxetine 30 milliGRAM(s) Oral daily  iron sucrose IVPB 200 milliGRAM(s) IV Intermittent every 24 hours  lisinopril 20 milliGRAM(s) Oral daily  metoprolol succinate  milliGRAM(s) Oral daily  pantoprazole    Tablet 40 milliGRAM(s) Oral before breakfast  polyethylene glycol 3350 17 Gram(s) Oral daily  potassium chloride    Tablet ER 10 milliEquivalent(s) Oral daily  rivaroxaban 15 milliGRAM(s) Oral with dinner    MEDICATIONS  (PRN):    
CHIEF COMPLAINT: SOB/Edema    SUBJECTIVE/SIGNIFICANT INTERVAL EVENTS/OVERNIGHT EVENTS:    1/22: SOB and edema improved however patient appears to have short term memory issues in the setting of dementia so unable to gauge interval change from her reported. Reported nausea and vomiting once last night and again this morning. Symptomatic Rx given. Reported constipation->bowel regimen started. Patient AAOx2. Limited ROS/CC    Review of Systems: 14 Point review of systems reviewed and reported as negative unless otherwise stated above    FROM H&P:  "  · 89 y/o female with PMHx of CAD s/p 12stents, last done 2019 after NSTEMI,  HLD, HTN, CHF, PPM, ? Afib,  Dementia, Hypothyroidism, GIB presents to the ED from home. Pt interviewed stating she forgot why she came to the ED, had SOB at some point but not now.  Pt son in law reports pt has had 3 days of SOB on exertion and worsening LE swelling    In the ED was given Lasix IV; Cxray showed bilateral effusions new from last year"    PHYSICAL EXAM:    T(C): 36.4 (01-22-24 @ 08:00), Max: 36.4 (01-21-24 @ 20:24)  HR: 74 (01-22-24 @ 08:00) (74 - 78)  BP: 175/70 (01-22-24 @ 08:00) (158/78 - 175/70)  RR: 17 (01-22-24 @ 08:00) (17 - 18)  SpO2: 96% (01-22-24 @ 08:00) (96% - 97%)    General: AAOx2; NAD  Head: AT/NC  ENT: Moist Mucous Membranes; No Injury  Eyes: EOMI; PERRL  Neck: Non-tender; No JVD  CVS: RRR, S1&S2, No murmur,LE Edema  Respiratory: Decreased breath sounds bilaterally; Normal Respiratory Effort  Abdomen/GI: Soft, non-tender, non-distended, no guarding, no rebound, normal bowel sounds  : No bladder distention, No Prakash  Extremities: No cyanosis, No clubbing, LE edema  MSK: No CVA tenderness, Normal ROM, No injury  Neuro: AAOx2, CNII-XII grossly intact, non-focal  Psych: Appropriate, Cooperative,   Skin: Clean, Dry and Intact      LABS:                          8.6    5.67  )-----------( 253      ( 22 Jan 2024 07:28 )             28.3     01-22    138  |  106  |  31<H>  ----------------------------<  133<H>  3.9   |  28  |  0.99    Ca    9.7      22 Jan 2024 07:28    TPro  6.4  /  Alb  3.2<L>  /  TBili  1.0  /  DBili  x   /  AST  27  /  ALT  25  /  AlkPhos  153<H>  01-20      CAPILLARY BLOOD GLUCOSE          Culture - Blood (collected 21 Jan 2024 01:51)  Source: .Blood None  Preliminary Report (22 Jan 2024 11:02):    No growth at 24 hours    Culture - Blood (collected 21 Jan 2024 01:51)  Source: .Blood None  Preliminary Report (22 Jan 2024 11:02):    No growth at 24 hours    Culture - Urine (collected 20 Jan 2024 23:39)  Source: Clean Catch Clean Catch (Midstream)  Preliminary Report (22 Jan 2024 16:02):    >100,000 CFU/ml Escherichia coli    Vitamin B12, Serum: 1534 pg/mL (01.22.24 @ 07:28) Folate, Serum: >20.0 ng/mL (01.22.24 @ 07:28) Ferritin: 19 ng/mL (01.22.24 @ 07:28) Iron with Total Binding Capacity in AM (01.22.24 @ 07:28)   Iron - Total Binding Capacity.: 428 ug/dL  % Saturation, Iron: 11 %  Iron Total: 49 ug/dL  Unsaturated Iron Binding Capacity: 379 ug/dLroponin I, High Sensitivity Result: 13.47: Pro-Brain Natriuretic Peptide: 33945 pg/mL (01.20.24 @ 22:58)       RADIOLOGY:  < from: Xray Chest 1 View- PORTABLE-Urgent (Xray Chest 1 View- PORTABLE-Urgent .) (01.20.24 @ 23:42) >  IMPRESSION: Bilateral effusions presently seen new since prior.    < end of copied text >      EKG:  < from: 12 Lead ECG (01.20.24 @ 21:30) >    Diagnosis Line Atrial fibrillation.  Low voltage QRS  Abnormal ECG    < end of copied text >          I personally reviewed labs, imaging, ekg, orders and vitals.    Discussed case with:  [X]RN  [X]CM/SW  [X]Patient  []Family  [X]Specialist: Cardiology        MEDICATIONS  (STANDING):  aspirin  chewable 81 milliGRAM(s) Oral daily  cefTRIAXone Injectable. 1000 milliGRAM(s) IV Push every 24 hours  clopidogrel Tablet 75 milliGRAM(s) Oral daily  DULoxetine 30 milliGRAM(s) Oral daily  enoxaparin Injectable 40 milliGRAM(s) SubCutaneous every 24 hours  ferrous    sulfate 325 milliGRAM(s) Oral daily  furosemide   Injectable 40 milliGRAM(s) IV Push daily  lisinopril 20 milliGRAM(s) Oral daily  metoprolol succinate  milliGRAM(s) Oral daily  polyethylene glycol 3350 17 Gram(s) Oral daily  potassium chloride    Tablet ER 10 milliEquivalent(s) Oral daily    MEDICATIONS  (PRN):

## 2024-01-23 NOTE — PROGRESS NOTE ADULT - ASSESSMENT
89 y/o F with PMH of HTN, CAD s/p 2 stents hiatal hernia, h/o GI bleed, gastritis,  LLE stent, arthritis, diverticulosis, hypothyroidism,  PPM, h/o CEA  admitted for:           Acute on Chronic Diastolic HF  Severe TR/Moderate AR/Moderate MR  Atypical Chest pain. H/o CAD s/p stents   -Troponin negative. ACS unlikely  -BNP 10K  -ECHO to reassess EF and multiple bri of clinically significant regurg  -Interrogate PPM-> AFib   -C/w ASA, Plavix, change lovastatin to Lipitor  -C/w Toprol    -Cardio eval, d/w Dr Welsh   -LE doppler negatie for DVT  -Lasix  -I/Os  -Daily weights    #A-fib  -Xarelto    #CAD s/p stents  -d/c ASA   -c/w Plavix   -BB  -ACEi   -Statin     #UTI  -s/p Rocephin x3 days for E.coli   -UCx as noted be positive for EFNA, start Zosyn, pending sensitivities      HTN  -C/w metoprolol and ACEI  -Hold amlodipine due to leg swelling and possible CHF   -Monitor BP     PAD , S/p LLE stent s/p R   C/w ASA, Plavix, statins     Depression   -C/w Duloxetine  -Supportive care     DVT Prop  -Xarelto

## 2024-01-24 ENCOUNTER — TRANSCRIPTION ENCOUNTER (OUTPATIENT)
Age: 89
End: 2024-01-24

## 2024-01-24 VITALS
OXYGEN SATURATION: 94 % | DIASTOLIC BLOOD PRESSURE: 49 MMHG | SYSTOLIC BLOOD PRESSURE: 135 MMHG | RESPIRATION RATE: 18 BRPM | TEMPERATURE: 97 F | HEART RATE: 62 BPM

## 2024-01-24 PROCEDURE — 99239 HOSP IP/OBS DSCHRG MGMT >30: CPT

## 2024-01-24 RX ORDER — POTASSIUM CHLORIDE 20 MEQ
1 PACKET (EA) ORAL
Qty: 30 | Refills: 0
Start: 2024-01-24

## 2024-01-24 RX ORDER — AMLODIPINE BESYLATE AND BENAZEPRIL HYDROCHLORIDE 10; 20 MG/1; MG/1
1 CAPSULE ORAL
Qty: 0 | Refills: 0 | DISCHARGE

## 2024-01-24 RX ORDER — DULOXETINE HYDROCHLORIDE 30 MG/1
1 CAPSULE, DELAYED RELEASE ORAL
Qty: 30 | Refills: 0
Start: 2024-01-24

## 2024-01-24 RX ORDER — RIVAROXABAN 15 MG-20MG
1 KIT ORAL
Qty: 30 | Refills: 0
Start: 2024-01-24

## 2024-01-24 RX ORDER — LISINOPRIL 2.5 MG/1
1 TABLET ORAL
Qty: 30 | Refills: 0
Start: 2024-01-24

## 2024-01-24 RX ADMIN — LISINOPRIL 20 MILLIGRAM(S): 2.5 TABLET ORAL at 11:00

## 2024-01-24 RX ADMIN — CLOPIDOGREL BISULFATE 75 MILLIGRAM(S): 75 TABLET, FILM COATED ORAL at 11:00

## 2024-01-24 RX ADMIN — Medication 10 MILLIEQUIVALENT(S): at 11:00

## 2024-01-24 RX ADMIN — POLYETHYLENE GLYCOL 3350 17 GRAM(S): 17 POWDER, FOR SOLUTION ORAL at 11:01

## 2024-01-24 RX ADMIN — PANTOPRAZOLE SODIUM 40 MILLIGRAM(S): 20 TABLET, DELAYED RELEASE ORAL at 06:24

## 2024-01-24 RX ADMIN — RIVAROXABAN 15 MILLIGRAM(S): KIT at 16:49

## 2024-01-24 RX ADMIN — DULOXETINE HYDROCHLORIDE 30 MILLIGRAM(S): 30 CAPSULE, DELAYED RELEASE ORAL at 11:00

## 2024-01-24 RX ADMIN — Medication 200 MILLIGRAM(S): at 11:00

## 2024-01-24 RX ADMIN — PIPERACILLIN AND TAZOBACTAM 25 GRAM(S): 4; .5 INJECTION, POWDER, LYOPHILIZED, FOR SOLUTION INTRAVENOUS at 06:24

## 2024-01-24 NOTE — DISCHARGE NOTE PROVIDER - NSDCCPCAREPLAN_GEN_ALL_CORE_FT
PRINCIPAL DISCHARGE DIAGNOSIS  Diagnosis: CHF, acute on chronic  Assessment and Plan of Treatment:       SECONDARY DISCHARGE DIAGNOSES  Diagnosis: Bilateral pleural effusion  Assessment and Plan of Treatment:     Diagnosis: Anemia  Assessment and Plan of Treatment:

## 2024-01-24 NOTE — DISCHARGE NOTE PROVIDER - CARE PROVIDER_API CALL
Ector Welsh.  Interventional Cardiology  [address] [place] [address], [place] [address]  Phone: [phone]  Fax: [phone]  Follow Up Time: 1 week

## 2024-01-24 NOTE — PHARMACOTHERAPY INTERVENTION NOTE - COMMENTS
cultures pansuscetible to ecoli and entercocccus - changed zosyn to augmentin 
discontinued aspirin to avoid triple therapy and restarted pantoprazole due to anticoagulation/antiplatelet use and hx of gi bleed 
Medication history complete, reviewed medications with patient and confirmed with doctor first med hx.
restarted xarelto as per cardio for afib, dose adjusted to 15mg due to renal function (crcl 34)

## 2024-01-24 NOTE — DISCHARGE NOTE PROVIDER - HOSPITAL COURSE
89 y/o female with PMHx of CAD s/p 12stents, last done 2019 after NSTEMI,  HLD, HTN, CHF, PPM, Afib,  Dementia, Hypothyroidism, GIB presents with SOB at some point but states now back to baseline.  Pt. is poorly compliant with diet as she eats as she wishes despite having CHF in past and being told to watch her diet. Has known diastolic and systolic HF. As per chart, family reveals 3 days of SOB on exertion and worsening LE swelling.  Was given Lasix IV; Cxray showed bilateral effusions new from last year.  She now is comfortable and wants to go home.  Last seen office 10/2023 with cellulitis of lower extremities as she refused to bath or shower.  Treated with oral abx with resolution.  Has been on chronic carelto therapy for Afib but is not always compliant. Had been on GDMT with benzapril, Lasix and metoprolol for chronic diastolic and systolic HF.  Also not compliant with meds sometimes. Patient seen by cardiology. Lisinopril increased to 20 mg twice daily.  Xarelto was restarted.  Breathing continues to improve.  Patient will be discharged home.  Patient to follow-up with PCP and cardiology in 1 to 2 weeks.  Patient vies return to ED with any worsening symptoms chest pain shortness breath fevers chills nausea vomiting diarrhea abdominal pain.

## 2024-01-24 NOTE — DISCHARGE NOTE PROVIDER - ATTENDING DISCHARGE PHYSICAL EXAMINATION:
General: AAOx2; NAD  Head: AT/NC  ENT: Moist Mucous Membranes; No Injury  Eyes: EOMI; PERRL  Neck: Non-tender; No JVD  CVS: RRR, S1&S2, No murmur,LE Edema  Respiratory: Decreased breath sounds bilaterally; Normal Respiratory Effort  Abdomen/GI: Soft, non-tender, non-distended, no guarding, no rebound, normal bowel sounds  : No bladder distention, No Prakash  Extremities: No cyanosis, No clubbing, LE edema  MSK: No CVA tenderness, Normal ROM, No injury  Neuro: AAOx2, CNII-XII grossly intact, non-focal  Psych: Appropriate, Cooperative,   Skin: Clean, Dry and Intact

## 2024-01-24 NOTE — DISCHARGE NOTE PROVIDER - NSDCMRMEDTOKEN_GEN_ALL_CORE_FT
amoxicillin-clavulanate 500 mg-125 mg oral tablet: 1 tab(s) orally 2 times a day  atorvastatin 40 mg oral tablet: 1 tab(s) orally once a day  DULoxetine 30 mg oral delayed release capsule: 1 cap(s) orally once a day  furosemide 20 mg oral tablet: 1 tab(s) orally once a day  Lasix 20 mg oral tablet: once a day  lisinopril 20 mg oral tablet: 1 tab(s) orally once a day  metoprolol succinate 200 mg oral tablet, extended release: 1 tab(s) orally once a day  Multiple Vitamins oral tablet: 1 tab(s) orally once a day  pantoprazole 40 mg oral delayed release tablet: 1 tab(s) orally once a day  Plavix 75 mg oral tablet: 1 tab(s) orally once a day  potassium chloride 10 mEq oral tablet, extended release: 1 tab(s) orally once a day  rivaroxaban 15 mg oral tablet: 1 tab(s) orally once a day (before a meal)  Vitamin D3 1000 intl units oral tablet: 1 tab(s) orally 2 times a day

## 2024-01-25 ENCOUNTER — TRANSCRIPTION ENCOUNTER (OUTPATIENT)
Age: 89
End: 2024-01-25

## 2024-01-29 DIAGNOSIS — I50.43 ACUTE ON CHRONIC COMBINED SYSTOLIC (CONGESTIVE) AND DIASTOLIC (CONGESTIVE) HEART FAILURE: ICD-10-CM

## 2024-01-29 DIAGNOSIS — E03.9 HYPOTHYROIDISM, UNSPECIFIED: ICD-10-CM

## 2024-01-29 DIAGNOSIS — I11.0 HYPERTENSIVE HEART DISEASE WITH HEART FAILURE: ICD-10-CM

## 2024-01-29 DIAGNOSIS — Z95.5 PRESENCE OF CORONARY ANGIOPLASTY IMPLANT AND GRAFT: ICD-10-CM

## 2024-01-29 DIAGNOSIS — B96.20 UNSPECIFIED ESCHERICHIA COLI [E. COLI] AS THE CAUSE OF DISEASES CLASSIFIED ELSEWHERE: ICD-10-CM

## 2024-01-29 DIAGNOSIS — N39.0 URINARY TRACT INFECTION, SITE NOT SPECIFIED: ICD-10-CM

## 2024-01-29 DIAGNOSIS — F03.90 UNSPECIFIED DEMENTIA WITHOUT BEHAVIORAL DISTURBANCE: ICD-10-CM

## 2024-01-29 DIAGNOSIS — I25.10 ATHEROSCLEROTIC HEART DISEASE OF NATIVE CORONARY ARTERY WITHOUT ANGINA PECTORIS: ICD-10-CM

## 2024-02-05 ENCOUNTER — TRANSCRIPTION ENCOUNTER (OUTPATIENT)
Age: 89
End: 2024-02-05

## 2024-02-12 ENCOUNTER — TRANSCRIPTION ENCOUNTER (OUTPATIENT)
Age: 89
End: 2024-02-12

## 2024-02-20 NOTE — ED PROVIDER NOTE - CPE EDP CARDIAC NORM
[FreeTextEntry1] : Healthy appearing 11 year-old child. Awake, alert, in no acute distress. Pleasant and cooperative.  Eyes are clear with no sclera abnormalities. External ears, nose and mouth are clear.  Good respiratory effort with no audible wheezing without use of a stethoscope. Ambulates independently with no evidence of antalgia. Good coordination and balance. Able to get on and off exam table without difficulty.  Spine: Inspection of the skin reveals no cafe au lait spots or large birth marks. Left shoulder is slightly elevated. Mild flank asymmetry, left flank slightly deeper crease.  Ritchie forward bend test demonstrates a right thoracic paraspinal prominence and mild left lumbar prominence.  No pain with ROM of back  NTTP over spinous processes and paraspinal musculature. Full range of motion at cervical, thoracic and lumbar spine with no pain or difficulty. No pelvic obliquity. No LLD  LE: Skin clean and intact. No deformity or lymphedema. Full ROM bilateral hips, knees and ankles.  Neg SLR Neg ZHOU 5/5 motor strength in LE. SILT distally. Brisk symmetric reflexes at Patellar and Achilles' tendons No clonus. DP 2+, BCR < 2 seconds  Brace is fitting well. 
normal...

## 2024-03-15 NOTE — DISCHARGE NOTE PROVIDER - TIME SPENT: (MINUTES SPENT ON THE DISCHARGE SERVICE)
Do You Have A Family History Of Psoriasis?: yes Do You Have Joint Pains?: no Where Is Your Psoriasis Located?: Trunk, arms, arm pit, legs, feet 35

## 2024-05-14 NOTE — ED ADULT NURSE NOTE - GASTROINTESTINAL WDL
PATIENT EVALUATED, MEDICALLY OPTIMIZED FOR SURGERY   gradual onset Abdomen soft, nontender, nondistended, bowel sounds present in all 4 quadrants.

## 2024-09-16 NOTE — PATIENT PROFILE ADULT - NSPROMUTANXFEARFT_GEN_A_NUR
Problem: PHYSICAL THERAPY ADULT  Goal: Performs mobility at highest level of function for planned discharge setting.  See evaluation for individualized goals.  Description: Treatment/Interventions: Functional transfer training, LE strengthening/ROM, Elevations, Therapeutic exercise, Patient/family training, Equipment eval/education, Bed mobility, Spoke to nursing, Spoke to case management, OT, Cognitive reorientation, Endurance training          See flowsheet documentation for full assessment, interventions and recommendations.  Note: Prognosis: Fair  Problem List: Decreased strength, Decreased endurance, Impaired balance, Decreased mobility, Decreased cognition, Impaired judgement, Decreased safety awareness, Decreased skin integrity, Pain  Assessment: Pt is a 89 y.o. female seen for PT evaluation s/p admit to [facility] on [facility]/12/[facility]24. Pt was admitted with a primary dx of: shock, leg pain., pleural effusion, HOSSEIN, acute on chronic congestive heart failure, multiple subsegmental pulmonary emboli without acute cor pulmonale.  PT now consulted for assessment of mobility and d/c needs. Pt with Up and OOB as tolerated orders.  Pts current comorbidities and personal factors effecting treatment include: sacral wound, chronic heart failure, anemia due to CKD, failure to thrive. Pts current clinical presentation is Unstable/Unpredictable (high complexity) due to Ongoing medical management for primary dx, Increased reliance on more restrictive AD compared to baseline, Decreased activity tolerance compared to baseline, Fall risk, Increased assistance needed from caregiver at current time, Ongoing telemetry monitoring, Increased O2 via NC from pts baseline. Prior to admission, pt was independent with use of RW. Upon evaluation, pt currently is requiring MaxA x2 for bed mobility; MaxA x2 for transfers. Pt presents at PT eval functioning below baseline and currently w/ overall mobility deficits 2* to: BLE weakness,  impaired balance, pain, decreased activity tolerance compared to baseline, decreased functional mobility tolerance compared to baseline, decreased safety awareness, impaired judgement, fall risk, decreased skin integrity, decreased cognition. Pt currently at a fall risk 2* to impairments listed above.  Pt will continue to benefit from skilled acute PT interventions to address stated impairments; to maximize functional mobility; for ongoing pt/ family training; and DME needs. At conclusion of PT session chair alarm engaged, all needs in reach, RN notified of session findings/recommendations, and pt returned back in recliner chair with phone and call bell within reach. Pt denies any further questions at this time. Recommend Level II (Moderate Resource Intensity)  upon hospital D/C.        Rehab Resource Intensity Level, PT: II (Moderate Resource Intensity)    See flowsheet documentation for full assessment.         "I get anxious"

## 2024-09-19 NOTE — PROGRESS NOTE ADULT - SUBJECTIVE AND OBJECTIVE BOX
INTERVAL HPI/OVERNIGHT EVENTS:  No new overnight event.  No N/V/D.  Tolerating diet.    Allergies    No Known Drug Allergies  Originally Entered as [Unknown see Comment: pt unable to remember] reaction to [Mold] (Unknown)  stolazine eye med- pt doesn&#x27;t remember (Unknown)    Intolerances  General:  No wt loss, fevers, chills, night sweats, fatigue,   Eyes:  Good vision, no reported pain  ENT:  No sore throat, pain, runny nose, dysphagia  CV:  No pain, palpitations, hypo/hypertension  Resp:  No dyspnea, cough, tachypnea, wheezing  GI:  No pain, No nausea, No vomiting, No diarrhea, No constipation, No weight loss, No fever, No pruritis, No rectal bleeding, No tarry stools, No dysphagia,  :  No pain, bleeding, incontinence, nocturia  Muscle:  No pain, weakness  Neuro:  No weakness, tingling, memory problems  Psych:  No fatigue, insomnia, mood problems, depression  Endocrine:  No polyuria, polydipsia, cold/heat intolerance  Heme:  No petechiae, ecchymosis, easy bruisability  Skin:  No rash, tattoos, scars, edema      PHYSICAL EXAM:   Vital Signs:  Vital Signs Last 24 Hrs  T(C): 36.8 (22 Feb 2019 15:00), Max: 37.5 (22 Feb 2019 07:50)  T(F): 98.3 (22 Feb 2019 15:00), Max: 99.5 (22 Feb 2019 07:50)  HR: 88 (22 Feb 2019 15:00) (88 - 99)  BP: 127/66 (22 Feb 2019 15:00) (120/74 - 144/70)  BP(mean): --  RR: 14 (22 Feb 2019 15:00) (14 - 26)  SpO2: 94% (22 Feb 2019 15:00) (94% - 96%)  Daily     Daily I&O's Summary      GENERAL:  Appears stated age, well-groomed, well-nourished, no distress  HEENT:  NC/AT,  conjunctivae clear and pink, no thyromegaly, nodules, adenopathy, no JVD, sclera -anicteric  CHEST:  Full & symmetric excursion, no increased effort, breath sounds clear  HEART:  Regular rhythm, S1, S2, no murmur/rub/S3/S4, no abdominal bruit, no edema  ABDOMEN:  Soft, non-tender, non-distended, normoactive bowel sounds,  no masses ,no hepato-splenomegaly, no signs of chronic liver disease  EXTEREMITIES:  no cyanosis,clubbing or edema  SKIN:  No rash/erythema/ecchymoses/petechiae/wounds/abscess/warm/dry  NEURO:  Alert, oriented, no asterixis, no tremor, no encephalopathy      LABS:                        10.2   13.17 )-----------( 346      ( 21 Feb 2019 17:54 )             32.4     02-22    139  |  105  |  11  ----------------------------<  116<H>  3.5   |  25  |  0.75    Ca    8.6      22 Feb 2019 07:42  Phos  2.4     02-22  Mg     1.8     02-22          amylase   lipase  RADIOLOGY & ADDITIONAL TESTS: Consent: Written consent obtained and the risks of skin tag removal was reviewed with the patient including but not limited to bleeding, pigmentary change, infection, pain, and remote possibility of scarring. Include Z78.9 (Other Specified Conditions Influencing Health Status) As An Associated Diagnosis?: No Medical Necessity Clause: This procedure was medically necessary because the lesions that were treated were: Add Associated Diagnoses If Applicable When Selecting Medical Necessity: Yes Detail Level: Detailed Medical Necessity Information: It is in your best interest to select a reason for this procedure from the list below. All of these items fulfill various CMS LCD requirements except the new and changing color options.

## 2024-12-02 NOTE — ED PROVIDER NOTE - WR INTERPRETED BY 1
Physical Therapy    Physical Therapy Treatment    Patient Name: Fahad Meraz  MRN: [id number]  Today's Date: 12/2/2024  Room: [address] [place], [place] [address]  Time Calculation  Start Time: 1140  Stop Time: 1210  Time Calculation (min): 30 min       Assessment/Plan   PT Plan  Treatment/Interventions: Transfer training, Bed mobility, Gait training, Balance training, Neuromuscular re-education, Strengthening, Endurance training, Therapeutic exercise, Therapeutic activity  PT Plan: Ongoing PT  PT Frequency: 5 times per week  PT Discharge Recommendations: High intensity level of continued care  Equipment Recommended upon Discharge: Wheeled walker  PT Recommended Transfer Status: Assist x1  PT - OK to Discharge: Yes    General Visit Information:   Reason for Referral: s/p HM3 implantation  Past Medical History Relevant to Rehab: CAD s/p 4V CABG (2012) and PCI (LCx/OM; 5/2019), stage D systolic HF/ICM/HFrEF with LVEF 10-15%( 10/22/24 TTE), AF s/p RFA (PVI and CTI; 4/2024) on OAC therapy, HTN, dyslipidemia, depression, anxiety and VIV using CPAP  Prior to Session Communication: Bedside nurse  Patient Position Received: Bed, 3 rail up, Alarm off, caregiver present   Subjective: Patient is alert, agreeable to PT.  In good spirits, spouse in room and supportive throughout session.  Looking forward to discharge possibly tomorrow.  Precautions:  Precautions  Medical Precautions: Fall precautions, Cardiac precautions  Post-Surgical Precautions: Move in the Tube  Precautions Comment: LVAD  Vital Signs:      Objective   Pain:  Pain Assessment  0-10 (Numeric) Pain Score: 0 - No pain (post 0)  Cognition:  Cognition  Overall Cognitive Status: Within Functional Limits  Orientation Level: Oriented X4 (forgetful)  Lines/Tubes/Drains:  Ventricular Assist Device HeartMate 3 (Active)   Number of days: 20   Driveline checked prior to session  Heart Mate III:     Most Recent Range Past 24hrs   Flow 4.5 Pump Flow  Min: 4.3  Max: 4.6   Speed 5200 Speed RPM  Min:  5200  Max: 5200   Power 3.8      PI 3.5 Pulse Index  Min: 2.9  Max: 3.5       Medical Gas Therapy: None (Room air)  Continuous Medications/Drips:       PT Treatments:     Therapeutic Activity  Therapeutic Activity 1: Min A for battery transition from wall device     Bed Mobility 1  Bed Mobility 1: Supine to sitting  Level of Assistance 1: Close supervision  Bed Mobility Comments 1: HOB 30,  Ambulation/Gait Training 1  Surface 1: Level tile  Device 1: Rolling walker  Assistance 1: Close supervision  Quality of Gait 1:  (decreased step length)  Comments/Distance (ft) 1: 100  Ambulation/Gait Training 2  Surface 2: Level tile  Device 2: No device  Assistance 2: Close supervision  Quality of Gait 2:  (flexed posture)  Comments/Distance (ft) 2: 120, 30' x 2  Transfer 1  Transfer From 1: Sit to  Transfer to 1: Stand  Transfer Level of Assistance 1: Close supervision  Trials/Comments 1: x3  Transfers 2  Transfer From 2: Stand to  Transfer to 2: Sit  Transfer Level of Assistance 2: Close supervision  Trials/Comments 2: x3  Transfers 3  Transfer From 3: Bed to  Transfer to 3: Chair with arms  Transfer Device 3:  (no device)  Transfer Level of Assistance 3: Close supervision  Trials/Comments 3: x1  Transfers 4  Transfer From 4: Chair with arms to  Transfer to 4: Chair with arms  Transfer Device 4:  (no device)  Transfer Level of Assistance 4: Close supervision  Trials/Comments 4: x2  Stairs  Rails 1: Left  Assistance 1: Contact guard  Comment/Number of Steps 1: 12 steps ascend reciprocal, descend 6 step 2 and 6 reciprocal          Outcome Measures:  [facility] Basic Mobility  Turning from your back to your side while in a flat bed without using bedrails: A little  Moving from lying on your back to sitting on the side of a flat bed without using bedrails: A little  Moving to and from bed to chair (including a wheelchair): A little  Standing up from a chair using your arms (e.g. wheelchair or bedside chair): A little  To walk in  hospital room: A little  Climbing 3-5 steps with railing: A little  Basic Mobility - Total Score: 18                            Education Documentation  Precautions, taught by Bryan Workman, PT at 12/2/2024  2:11 PM.  Learner: Patient  Readiness: Acceptance  Method: Explanation  Response: Needs Reinforcement  Comment: mobility precautions    Body Mechanics, taught by Bryan Workman, PT at 12/2/2024  2:11 PM.  Learner: Patient  Readiness: Acceptance  Method: Explanation  Response: Needs Reinforcement  Comment: mobility precautions    Handouts, taught by Bryan Workman, PT at 12/2/2024  2:11 PM.  Learner: Patient  Readiness: Acceptance  Method: Explanation  Response: Needs Reinforcement  Comment: mobility precautions    Mobility Training, taught by Bryan Workman, PT at 12/2/2024  2:11 PM.  Learner: Patient  Readiness: Acceptance  Method: Explanation  Response: Needs Reinforcement  Comment: mobility precautions    Education Comments  No comments found.          OP EDUCATION:       Encounter Problems       Encounter Problems (Active)       Balance       Pt will score >45 on VEGA for safe community ambulation (Progressing)       Start:  10/29/24    Expected End:  12/13/24               Mobility       Patient will ambulate >1000 ft with LRAD and supervision With stable vitals and RPD </= 3/10 and RPE </= 13/20 for improved functional mobility.   (Met)       Start:  10/29/24    Expected End:  11/12/24    Resolved:  11/05/24    Updated to: Patient will ambulate >2500 ft with LRAD and supervision With stable vitals and RPD </= 3/10 and RPE </= 13/20 for improved functional mobility.    Update reason: Progression         Pt will complete 6MWT with no assistive device and supervision and achieve >700 ft With stable vitals and RPD </= 3/10 and RPE </= 13/20 for improved functional mobility.   (Met)       Start:  10/29/24    Expected End:  11/12/24    Resolved:  11/05/24    Updated to: Pt will complete 6MWT with no  assistive device and supervision and achieve >1300 ft With stable vitals and RPD </= 3/10 and RPE </= 13/20 for improved functional mobility.    Update reason: Progression         Patient will ambulate >2500 ft with LRAD and supervision With stable vitals and RPD </= 3/10 and RPE </= 13/20 for improved functional mobility. (Progressing)       Start:  11/05/24    Expected End:  12/13/24                Pt will complete 6MWT with no assistive device and supervision and achieve >1300 ft With stable vitals and RPD </= 3/10 and RPE </= 13/20 for improved functional mobility. (Progressing)       Start:  11/05/24    Expected End:  12/13/24                   PT Transfers       Patient will perform bed mobility indep (maintenance 2/2 prolonged hospital stay anticipated) (Progressing)       Start:  10/29/24    Expected End:  12/13/24            Patient will transfer sit to and from stand indep (Progressing)       Start:  10/29/24    Expected End:  12/13/24            Pt will complete 5XSTS from recliner without UE assist <12 seconds With stable vitals and RPD </= 3/10 and RPE </= 13/20 for improved functional mobility.   (Progressing)       Start:  10/29/24    Expected End:  12/13/24               Pain - Adult              Assessment: Patient is progressing Well with therapy this date.  Patient able to complete multiple standing and gait trials this date.  Improved gait pattern without use of RW and completed stair trial in stairwell this date.  Remains forgetful with device management and battery transition requiring continued assistance throughout session.  Will continue to monitor and progress as appropriate.      12/02/24 at 2:13 PM   Bryan Workman, PT   Rehab Office: [phone]             Jose Shaikh on behalf of Dr. Hinkle

## 2025-02-17 NOTE — PATIENT PROFILE ADULT. - COMFORT LEVEL, ACCEPTABLE
FAMILY HISTORY:  Father  Still living? Yes, Estimated age: Age Unknown  Family history of hypertension, Age at diagnosis: Age Unknown    Mother  Still living? No  Family history of breast cancer in mother, Age at diagnosis: Age Unknown     2

## 2025-03-27 NOTE — PATIENT PROFILE ADULT - NSPRESCRUSEDDRG_GEN_A_NUR
[facility] Hospitalist Discharge Summary      Admission date     3/26/2025  8:55 AM      Discharge date  3/27/25      Discharge Diagnosis  Hyperparathyroidism  (CMD)       Consultants     IP Consult Orders (From admission, onward)       Start     Ordered    03/26/25 1802  Inpatient consult to Hospitalist  ONE TIME        Provider:  Yudy Burdick MD    03/26/25 1801                      Reason For Hospitalization    80 years old very pleasant male with history of hypertension, hyperparathyroidism, coronary artery disease status post heart surgery heart failure with preserved ejection fraction type 2 diabetes came in for total parathyroidectomy       Hospital Course:    1 hyperparathyroidism status post total parathyroidectomy postop day 0 not much of pain awake alert currently in PACU pain management as needed clear liquid diet and advance as tolerated     2.  Essential hypertension resume blood pressure medications     #3 coronary artery disease resume medications     #3 thoracic aortic aneurysm stable             Physical exam  Pt seen and examined on the day of discharge.       Vital Last Value   Temperature 97.2 °F (36.2 °C) (03/27/25 0600)   Pulse 70 (03/27/25 0600)   Respiratory 18 (03/27/25 0600)   Non-Invasive  Blood Pressure 116/66 (03/27/25 0600)   Pulse Oximetry 98 % (03/27/25 0600)   Arterial   Blood Pressure       General: patient not in acute distress.  HEENT: Normocephalic. Normal conjunctiva.   Respiratory: chest expansion wnl. Lung clear to auscultation bilaterally.  Cardiovascular: Regular rate and rhythm.  No peripheral edema.  Gastrointestinal: Abdomen soft, non tender, non distended. Bowel sound present in all 4 quadrants.  Genitourinary: No suprapubic tenderness.   Musculoskeletal: Moves all 4 extremities  Neurology: alert and oriented to place, time, person.  No focal defect  Skin: Warm.   Psychiatry: Cooperative       Labs Results  Recent Results (from the past 48 hour(s))    Parathyroid Hormone, Intraoperative    Collection Time: 03/26/25  9:01 AM    Specimen: Blood, Venous   Result Value Ref Range    PTH, Intraoperative 429 pg/mL   Parathyroid Hormone, Intraoperative    Collection Time: 03/26/25 12:06 PM    Specimen: Blood, Venous   Result Value Ref Range    PTH, Intraoperative 164 pg/mL   GLUCOSE, BEDSIDE - POINT OF CARE    Collection Time: 03/26/25  1:12 PM    Specimen: Blood   Result Value Ref Range    GLUCOSE, BEDSIDE - POINT OF CARE 110 (H) 70 - 99 mg/dL   Parathyroid Hormone, Intraoperative    Collection Time: 03/26/25  1:47 PM    Specimen: Blood, Venous   Result Value Ref Range    PTH, Intraoperative 75 pg/mL   Basic Metabolic Panel    Collection Time: 03/27/25  6:16 AM    Specimen: Blood, Venous   Result Value Ref Range    Fasting Status      Sodium 137 135 - 145 mmol/L    Potassium 5.0 3.4 - 5.1 mmol/L    Chloride 111 (H) 97 - 110 mmol/L    Carbon Dioxide 21 21 - 32 mmol/L    Anion Gap 10 7 - 19 mmol/L    Glucose 134 (H) 70 - 99 mg/dL    BUN 40 (H) 6 - 20 mg/dL    Creatinine 1.80 (H) 0.67 - 1.17 mg/dL    Glomerular Filtration Rate 38 (L) >=60    BUN/Cr 22 7 - 25    Calcium 10.3 (H) 8.4 - 10.2 mg/dL   Magnesium    Collection Time: 03/27/25  6:16 AM    Specimen: Blood, Venous   Result Value Ref Range    Magnesium 2.5 (H) 1.7 - 2.4 mg/dL   CBC with Automated Differential (performable only)    Collection Time: 03/27/25  6:16 AM    Specimen: Blood, Venous   Result Value Ref Range    WBC 10.7 4.2 - 11.0 K/mcL    RBC 2.99 (L) 4.50 - 5.90 mil/mcL    HGB 9.5 (L) 13.0 - 17.0 g/dL    HCT 30.0 (L) 39.0 - 51.0 %    .3 (H) 78.0 - 100.0 fl    MCH 31.8 26.0 - 34.0 pg    MCHC 31.7 (L) 32.0 - 36.5 g/dL    RDW-CV 14.0 11.0 - 15.0 %    RDW-SD 51.8 (H) 39.0 - 50.0 fL     (L) 140 - 450 K/mcL    NRBC 0 <=0 /100 WBC    Neutrophil, Percent 85 %    Lymphocytes, Percent 6 %    Mono, Percent 8 %    Eosinophils, Percent 0 %    Basophils, Percent 0 %    Immature Granulocytes 1 %    Absolute  Neutrophils 9.1 (H) 1.8 - 7.7 K/mcL    Absolute Lymphocytes 0.7 (L) 1.0 - 4.0 K/mcL    Absolute Monocytes 0.9 0.3 - 0.9 K/mcL    Absolute Eosinophils  0.0 0.0 - 0.5 K/mcL    Absolute Basophils 0.0 0.0 - 0.3 K/mcL    Absolute Immature Granulocytes 0.1 0.0 - 0.2 K/mcL   GLUCOSE, BEDSIDE - POINT OF CARE    Collection Time: 03/27/25  8:18 AM    Specimen: Blood   Result Value Ref Range    GLUCOSE, BEDSIDE - POINT OF CARE 113 (H) 70 - 99 mg/dL       Pathology    Order Name Source Comment Collection Info Order Time   SURGICAL PATHOLOGY Parathyroid Gland, Right Please Weigh Collected By: Fareed Perez MD 3/26/2025 12:29 PM     How should test results be released to the patient's [employer] portal?   Automatic Release            Imaging  LAST ECHO/ECHO STRESS:  No valid procedures specified.      No orders to display          Test Results Pending at Discharge  Pending Labs       Order Current Status    Surgical Pathology In process                Discharge Medications       Summary of your Discharge Medications        Take these Medications        Details   acetaminophen 650 MG CR tablet  Commonly known as: TYLENOL   Take 650 mg by mouth every 8 hours as needed for Pain.     aspirin 81 MG EC tablet  Commonly known as: Aspir-Low  Notes to patient: Blood thinner - to prevent blood clots   Take 1 tablet by mouth daily.     atorvastatin 80 MG tablet  Commonly known as: LIPITOR  Notes to patient: For your cholesterol   Take 1 tablet by mouth daily.     colchicine 0.6 MG tablet  Commonly known as: COLCRYS  Notes to patient: For gout - as needed   Take 1 tablet by mouth in the morning and 1 tablet in the evening. For gout attack, discontinue when pain and swelling resolves     Eliquis 2.5 MG Tab  Notes to patient: Blood thinner - to prevent blood clots   Generic drug: apixaBAN  TAKE 1 TABLET EVERY 12 HOURS     Febuxostat 80 MG Tab  Notes to patient: For gout   Take 80 mg by mouth daily.     Iron 325 (65 Fe) MG Tab   Take 325 mg  by mouth in the morning and 325 mg in the evening.     spironolactone 25 MG tablet  Commonly known as: ALDACTONE  Notes to patient: For blood pressure and heart rate   Take 0.5 tablets by mouth daily.  Comment: DISCONTINUE PREVIOUS ORDER. PLEASE FILL SPIRONOLACTONE 12.5MG QD     torsemide 10 MG tablet  Commonly known as: DEMADEX  Notes to patient: For blood pressure and heart rate   Take 1 tablet by mouth daily.                DISCHARGE INSTRUCTIONS  I have  reviewed all medications with patient/family and reviewed potential side effects. Patient/family  is to seek medical attention immediately should symptoms recurr, worsen, or if any other problems/abnormalities arise. Patient/family understands these instructions. Is to follow up with PCP as noted above. All specialists and consultants as noted. Patient/family educated about compliance with medications and expectations for the course of treatment.   Patient was thoroughly informed regarding new medications and possible interactions/adverse effects, recommendations from consultants and specialists. Pt was also instructed to call 911 and/or report to the emergency room should symptoms recurr or if any other problems arise.       Discharge Plan:    Discharge Status: Stable  Discharge instructions given to the patient  Discharge Disposition: home  Code Status:   Code Status: Full Resuscitation      Primary Care Physician  Keeley Devine MD    Informed   in details by [employer].    Final diagnosis, treatment plan, and follow-up recommendations were discussed and explained to the patient. The patient was given an opportunity to ask questions concerning the diagnosis and treatment plan. The patient acknowledged understanding of the diagnosis, treatment, and follow-up recommendations. The patient was advised to seek urgent care upon discharge if worsening symptoms develop prior to scheduled follow-up. I spent 35 minutes on the discharge.  This includes the  following: Reviewed all vitals, medications, new orders, I/O, labs, micro, radiology, nurses notes, pertinent consultant notes, as well as discussing patient's diagnosis and plan of care.       Yudy Burdick MD    [facility] Hospitalist  3/27/2025 1:05 PM         No